# Patient Record
Sex: FEMALE | Race: WHITE | NOT HISPANIC OR LATINO | Employment: PART TIME | URBAN - METROPOLITAN AREA
[De-identification: names, ages, dates, MRNs, and addresses within clinical notes are randomized per-mention and may not be internally consistent; named-entity substitution may affect disease eponyms.]

---

## 2017-02-08 ENCOUNTER — LAB CONVERSION - ENCOUNTER (OUTPATIENT)
Dept: OTHER | Facility: OTHER | Age: 57
End: 2017-02-08

## 2017-02-08 LAB
CHOLEST SERPL-MCNC: 273 MG/DL (ref 125–200)
CHOLEST/HDLC SERPL: 5.7 (CALC)
HBA1C MFR BLD HPLC: 6.9 % OF TOTAL HGB
HDLC SERPL-MCNC: 48 MG/DL
LDL CHOLESTEROL (HISTORICAL): ABNORMAL MG/DL (CALC)
LDL CHOLESTEROL DIRECT (HISTORICAL): 139 MG/DL
NON-HDL-CHOL (CHOL-HDL) (HISTORICAL): 225 MG/DL (CALC)
TRIGL SERPL-MCNC: 459 MG/DL

## 2017-02-09 ENCOUNTER — GENERIC CONVERSION - ENCOUNTER (OUTPATIENT)
Dept: OTHER | Facility: OTHER | Age: 57
End: 2017-02-09

## 2017-02-13 ENCOUNTER — ALLSCRIPTS OFFICE VISIT (OUTPATIENT)
Dept: OTHER | Facility: OTHER | Age: 57
End: 2017-02-13

## 2017-02-13 DIAGNOSIS — L40.9 PSORIASIS: ICD-10-CM

## 2017-02-13 DIAGNOSIS — I10 ESSENTIAL (PRIMARY) HYPERTENSION: ICD-10-CM

## 2017-02-13 DIAGNOSIS — E78.5 HYPERLIPIDEMIA: ICD-10-CM

## 2017-02-13 DIAGNOSIS — E11.29 TYPE 2 DIABETES MELLITUS WITH OTHER DIABETIC KIDNEY COMPLICATION (HCC): ICD-10-CM

## 2017-02-13 DIAGNOSIS — R80.9 PROTEINURIA: ICD-10-CM

## 2017-02-13 DIAGNOSIS — G37.9 DEMYELINATING DISEASE OF CENTRAL NERVOUS SYSTEM (HCC): ICD-10-CM

## 2017-02-13 DIAGNOSIS — D64.9 ANEMIA: ICD-10-CM

## 2017-02-14 ENCOUNTER — LAB CONVERSION - ENCOUNTER (OUTPATIENT)
Dept: OTHER | Facility: OTHER | Age: 57
End: 2017-02-14

## 2017-02-14 LAB
CREATININE, RANDOM URINE (HISTORICAL): 230 MG/DL (ref 20–320)
MAGNESIUM, UR (HISTORICAL): 13 MG/DL
MICROALBUMIN/CREATININE RATIO (HISTORICAL): 57 MCG/MG CREAT

## 2017-03-30 ENCOUNTER — GENERIC CONVERSION - ENCOUNTER (OUTPATIENT)
Dept: OTHER | Facility: OTHER | Age: 57
End: 2017-03-30

## 2017-07-27 ENCOUNTER — GENERIC CONVERSION - ENCOUNTER (OUTPATIENT)
Dept: OTHER | Facility: OTHER | Age: 57
End: 2017-07-27

## 2017-08-13 DIAGNOSIS — E55.9 VITAMIN D DEFICIENCY: ICD-10-CM

## 2017-08-13 DIAGNOSIS — R80.9 PROTEINURIA: ICD-10-CM

## 2017-08-13 DIAGNOSIS — G37.9 DEMYELINATING DISEASE OF CENTRAL NERVOUS SYSTEM (HCC): ICD-10-CM

## 2017-08-13 DIAGNOSIS — E78.5 HYPERLIPIDEMIA: ICD-10-CM

## 2017-08-13 DIAGNOSIS — L40.9 PSORIASIS: ICD-10-CM

## 2017-08-13 DIAGNOSIS — C44.91 BASAL CELL CARCINOMA OF SKIN: ICD-10-CM

## 2017-08-13 DIAGNOSIS — D64.9 ANEMIA: ICD-10-CM

## 2017-08-13 DIAGNOSIS — E11.29 TYPE 2 DIABETES MELLITUS WITH OTHER DIABETIC KIDNEY COMPLICATION (HCC): ICD-10-CM

## 2017-08-13 DIAGNOSIS — I10 ESSENTIAL (PRIMARY) HYPERTENSION: ICD-10-CM

## 2017-11-30 ENCOUNTER — LAB CONVERSION - ENCOUNTER (OUTPATIENT)
Dept: OTHER | Facility: OTHER | Age: 57
End: 2017-11-30

## 2017-11-30 LAB
25(OH)D3 SERPL-MCNC: 34 NG/ML (ref 30–100)
A/G RATIO (HISTORICAL): 1.5 (CALC) (ref 1–2.5)
ALBUMIN SERPL BCP-MCNC: 4.4 G/DL (ref 3.6–5.1)
ALP SERPL-CCNC: 68 U/L (ref 33–130)
ALT SERPL W P-5'-P-CCNC: 46 U/L (ref 6–29)
AST SERPL W P-5'-P-CCNC: 42 U/L (ref 10–35)
BILIRUB SERPL-MCNC: 1 MG/DL (ref 0.2–1.2)
BUN SERPL-MCNC: 15 MG/DL (ref 7–25)
BUN/CREA RATIO (HISTORICAL): ABNORMAL (CALC) (ref 6–22)
CALCIUM SERPL-MCNC: 9.7 MG/DL (ref 8.6–10.4)
CHLORIDE SERPL-SCNC: 100 MMOL/L (ref 98–110)
CHOLEST SERPL-MCNC: 274 MG/DL
CHOLEST/HDLC SERPL: 6.7 (CALC)
CO2 SERPL-SCNC: 22 MMOL/L (ref 20–31)
CREAT SERPL-MCNC: 0.8 MG/DL (ref 0.5–1.05)
EGFR AFRICAN AMERICAN (HISTORICAL): 94 ML/MIN/1.73M2
EGFR-AMERICAN CALC (HISTORICAL): 81 ML/MIN/1.73M2
GAMMA GLOBULIN (HISTORICAL): 3 G/DL (CALC) (ref 1.9–3.7)
GLUCOSE (HISTORICAL): 193 MG/DL (ref 65–99)
HBA1C MFR BLD HPLC: 7.3 % OF TOTAL HGB
HDLC SERPL-MCNC: 41 MG/DL
LDL CHOLESTEROL DIRECT (HISTORICAL): 137 MG/DL
NON-HDL-CHOL (CHOL-HDL) (HISTORICAL): 233 MG/DL (CALC)
POTASSIUM SERPL-SCNC: 4.5 MMOL/L (ref 3.5–5.3)
SODIUM SERPL-SCNC: 132 MMOL/L (ref 135–146)
TOTAL PROTEIN (HISTORICAL): 7.4 G/DL (ref 6.1–8.1)
TRIGL SERPL-MCNC: 419 MG/DL

## 2017-12-26 ENCOUNTER — ALLSCRIPTS OFFICE VISIT (OUTPATIENT)
Dept: OTHER | Facility: OTHER | Age: 57
End: 2017-12-26

## 2018-01-13 VITALS
BODY MASS INDEX: 28.77 KG/M2 | HEART RATE: 84 BPM | DIASTOLIC BLOOD PRESSURE: 82 MMHG | SYSTOLIC BLOOD PRESSURE: 124 MMHG | HEIGHT: 66 IN | WEIGHT: 179 LBS

## 2018-01-13 NOTE — RESULT NOTES
Message   Please call the patient let him know that I do have her blood work results  Her hemoglobin A1c has elevated to 7 6 after fasting sugar was 162 and therefore I would like to add Januvia 100 mg once daily  She also needs to restart her fenofibrate 145 once daily as her triglycerides are 566 and her LDL is uncomfortable due to this elevation in her triglycerides  I have ordered a repeat blood work for 3 months from now and she should make an appointment one week after her blood work to review  Verified Results  (1) COMPREHENSIVE METABOLIC PANEL [id number] 31:02MP Juleen Shield     Test Name Result Flag Reference   GLUCOSE 162 mg/dL H 65-99   Fasting reference interval   UREA NITROGEN (BUN) 17 mg/dL  7-25   CREATININE 0 92 mg/dL  0 50-1 05   For patients >52years of age, the reference limit  for Creatinine is approximately 13% higher for people  identified as -American  eGFR NON-AFR   AMERICAN 69 mL/min/1 73m2  > OR = 60   eGFR AFRICAN AMERICAN 80 mL/min/1 73m2  > OR = 60   BUN/CREATININE RATIO   4-01   NOT APPLICABLE (calc)   SODIUM 133 mmol/L L 135-146   POTASSIUM 4 6 mmol/L  3 5-5 3   CHLORIDE 100 mmol/L     CARBON DIOXIDE 23 mmol/L  20-31   CALCIUM 9 8 mg/dL  8 6-10 4   PROTEIN, TOTAL 8 0 g/dL  6 1-8 1   ALBUMIN 4 6 g/dL  3 6-5 1   GLOBULIN 3 4 g/dL (calc)  1 9-3 7   ALBUMIN/GLOBULIN RATIO 1 4 (calc)  1 0-2 5   BILIRUBIN, TOTAL 1 2 mg/dL  0 2-1 2   ALKALINE PHOSPHATASE 89 U/L     AST 58 U/L H 10-35   ALT 63 U/L H 6-29     (1) CBC/PLT/DIFF [id number] 10:00AM Juleen Shield     Test Name Result Flag Reference   WHITE BLOOD CELL COUNT 4 3 Thousand/uL  3 8-10 8   RED BLOOD CELL COUNT 3 93 Million/uL  3 80-5 10   HEMOGLOBIN 12 3 g/dL  11 7-15 5   HEMATOCRIT 36 9 %  35 0-45 0   MCV 93 8 fL  80 0-100 0   MCH 31 3 pg  27 0-33 0   MCHC 33 4 g/dL  32 0-36 0   RDW 13 2 %  11 0-15 0   PLATELET COUNT 253 Thousand/uL  140-400   MPV 7 5 fL  7 5-11 5   ABSOLUTE NEUTROPHILS 1883 cells/uL 9837-6647   ABSOLUTE LYMPHOCYTES 1462 cells/uL  850-3900   ABSOLUTE MONOCYTES 628 cells/uL  200-950   ABSOLUTE EOSINOPHILS 292 cells/uL     ABSOLUTE BASOPHILS 34 cells/uL  0-200   NEUTROPHILS 43 8 %     LYMPHOCYTES 34 0 %     MONOCYTES 14 6 %     EOSINOPHILS 6 8 %     BASOPHILS 0 8 %       (Q) LIPID PANEL WITH REFLEX TO DIRECT LDL [id number] 10:00AM [facility] [employer]     Test Name Result Flag Reference   CHOLESTEROL, TOTAL 292 mg/dL H 125-200   HDL CHOLESTEROL 44 mg/dL L > OR = 46   TRIGLICERIDES 804 mg/dL H <150   LDL-CHOLESTEROL  mg/dL (calc)  <130   LDL cholesterol not calculated  Triglyceride levels  greater than 400 mg/dL invalidate calculated LDL results  Desirable range <100 mg/dL for patients with CHD or  diabetes and <70 mg/dL for diabetic patients with  known heart disease  CHOL/HDLC RATIO 6 6 (calc) H < OR = 5 0   NON HDL CHOLESTEROL 248 mg/dL (calc) H    Target for non-HDL cholesterol is 30 mg/dL higher than   LDL cholesterol target  DIRECT  mg/dL  <130   Desirable range <100 mg/dL for patients with CHD or  diabetes and <70 mg/dL for diabetic patients with  known heart disease  (Q) HEMOGLOBIN A1c [id number] 10:00AM Shereen Servando   REPORT COMMENT:  FASTING:YES     Test Name Result Flag Reference   HEMOGLOBIN A1c 7 6 % of total Hgb H <5 7   According to ADA guidelines, hemoglobin A1c <7 0%  represents optimal control in non-pregnant diabetic  patients  Different metrics may apply to specific  patient populations  Standards of Medical Care in    Diabetes Care  2013;36:s11-s66     For the purpose of screening for the presence of  diabetes  <5 7%       Consistent with the absence of diabetes  5 7-6 4%    Consistent with increased risk for diabetes              (prediabetes)  >or=6 5%    Consistent with diabetes     This assay result is consistent with diabetes  mellitus       Currently, no consensus exists for use of hemoglobin  A1c for diagnosis of diabetes for children  Plan  Hyperlipidemia, Type 2 diabetes mellitus with renal manifestations, controlled    · Follow-up visit in 3 months Evaluation and Treatment  Follow-up  Status: Hold For -  Scheduling  Requested for: [id number]   · (1) COMPREHENSIVE METABOLIC PANEL; Status:Active; Requested for:26Nov2016;    · (1) HEMOGLOBIN A1C; Status:Active; Requested for:26Nov2016;    · (1) LIPID PANEL FASTING W DIRECT LDL REFLEX; Status:Active; Requested  for:26Nov2016;     Signatures   Electronically signed by : Declan Argueta, [facility];  Aug 26 2016 12:12PM EST                       (Author)

## 2018-01-15 NOTE — MISCELLANEOUS
Message  Message Free Text Note Form: Diagnosed with sinus infection and been feeling nauseated for 2 days  Has to fly to  tomorrow and requesting med for nausea  She has used Zofran in the past with relief  Plan    1  Ondansetron 4 MG Oral Tablet Dispersible; 1 tab po q8 hours prn nausea    Discussion/Summary  Discussion Summary:   Nausea with sinusitis  Has to fly tomorrow  Rx: Zofran 4 mg q8 hour prn nausea #10 R0        Signatures   Electronically signed by : TOMAS Horner ; Aug 31 2016  8:48PM EST                       (Author)

## 2018-01-22 VITALS
HEIGHT: 66 IN | WEIGHT: 183.38 LBS | SYSTOLIC BLOOD PRESSURE: 124 MMHG | HEART RATE: 80 BPM | DIASTOLIC BLOOD PRESSURE: 80 MMHG | BODY MASS INDEX: 29.47 KG/M2

## 2018-01-29 ENCOUNTER — TELEPHONE (OUTPATIENT)
Dept: FAMILY MEDICINE CLINIC | Facility: CLINIC | Age: 58
End: 2018-01-29

## 2018-01-29 DIAGNOSIS — I10 ESSENTIAL HYPERTENSION: Primary | ICD-10-CM

## 2018-01-29 RX ORDER — LISINOPRIL 10 MG/1
1 TABLET ORAL DAILY
COMMUNITY
Start: 2011-11-15 | End: 2018-01-29 | Stop reason: SDUPTHER

## 2018-01-29 RX ORDER — LISINOPRIL 10 MG/1
10 TABLET ORAL DAILY
Qty: 30 TABLET | Refills: 11 | Status: SHIPPED | OUTPATIENT
Start: 2018-01-29 | End: 2019-02-19 | Stop reason: SDUPTHER

## 2018-01-29 RX ORDER — LISINOPRIL 10 MG/1
10 TABLET ORAL DAILY
Qty: 30 TABLET | Refills: 5 | Status: CANCELLED | OUTPATIENT
Start: 2018-01-29

## 2018-01-29 NOTE — TELEPHONE ENCOUNTER
Patient called in and said that her pharmacy had faxed over a refill script for lisinopril but they have not received anything back   She needs a refill sent to [address] on [facility]  Thank you

## 2018-02-06 ENCOUNTER — OFFICE VISIT (OUTPATIENT)
Dept: FAMILY MEDICINE CLINIC | Facility: CLINIC | Age: 58
End: 2018-02-06

## 2018-02-06 VITALS
RESPIRATION RATE: 16 BRPM | BODY MASS INDEX: 29.09 KG/M2 | HEIGHT: 66 IN | TEMPERATURE: 97.3 F | WEIGHT: 181 LBS | HEART RATE: 72 BPM | DIASTOLIC BLOOD PRESSURE: 76 MMHG | SYSTOLIC BLOOD PRESSURE: 122 MMHG

## 2018-02-06 DIAGNOSIS — J01.00 ACUTE NON-RECURRENT MAXILLARY SINUSITIS: Primary | ICD-10-CM

## 2018-02-06 PROBLEM — J01.90 ACUTE NON-RECURRENT SINUSITIS: Status: ACTIVE | Noted: 2018-02-06

## 2018-02-06 PROCEDURE — 99212 OFFICE O/P EST SF 10 MIN: CPT | Performed by: PHYSICIAN ASSISTANT

## 2018-02-06 RX ORDER — CEFUROXIME AXETIL 500 MG/1
500 TABLET ORAL EVERY 12 HOURS SCHEDULED
Qty: 20 TABLET | Refills: 0 | Status: SHIPPED | OUTPATIENT
Start: 2018-02-06 | End: 2018-02-16

## 2018-02-06 RX ORDER — CHLORAL HYDRATE 500 MG
2 CAPSULE ORAL DAILY
COMMUNITY
Start: 2017-12-26

## 2018-02-06 NOTE — PATIENT INSTRUCTIONS
Acute non-recurrent maxillary sinusitis  Patient was prescribed Ceftin 500 mg 1 twice daily for 10 days  Increase fluids and continue her decongestant expectorant combination as directed

## 2018-02-06 NOTE — PROGRESS NOTES
I have reviewed the physician assistant notes, assessments, and/or procedures performed, I concur with the documentation on Noemi Sandhoff

## 2018-02-06 NOTE — ASSESSMENT & PLAN NOTE
Patient was prescribed Ceftin 500 mg 1 twice daily for 10 days  Increase fluids and continue her decongestant expectorant combination as directed

## 2018-02-06 NOTE — PROGRESS NOTES
Assessment/Plan:    Acute non-recurrent maxillary sinusitis  Patient was prescribed Ceftin 500 mg 1 twice daily for 10 days  Increase fluids and continue her decongestant expectorant combination as directed  Diagnoses and all orders for this visit:    Acute non-recurrent maxillary sinusitis  -     cefuroxime (CEFTIN) 500 mg tablet; Take 1 tablet (500 mg total) by mouth every 12 (twelve) hours for 10 days    Other orders  -     metFORMIN (GLUCOPHAGE) 1000 MG tablet; Take by mouth  -     Cholecalciferol (VITAMIN D3) 1000 UNIT/SPRAY LIQD; Take by mouth  -     Multiple Vitamin (MULTI-VITAMIN DAILY PO); Take 1 tablet by mouth daily  -     Omega-3 Fatty Acids (FISH OIL) 1,000 mg; Take 2 capsules by mouth daily  -     Cancel: POCT urine dip          Subjective:      Patient ID: Lori Limon is a 59 y o  female  Cc: congestion, cough, post nasal drip, headache, sinus pressure x 5 days  R Sotero    Pt has been sick and is afraid she needs to get better since she will have her two grandchildren as her daughter is going into one month rehab  URI    This is a new problem  The current episode started in the past 7 days  The problem has been gradually worsening  There has been no fever  Associated symptoms include congestion, coughing, diarrhea, headaches, a plugged ear sensation, rhinorrhea, sinus pain and a sore throat  Treatments tried: decongestants, expectorants  The treatment provided no relief  The following portions of the patient's history were reviewed and updated as appropriate: allergies, current medications, past family history, past medical history, past social history, past surgical history and problem list     Review of Systems   Constitutional: Negative  HENT: Positive for congestion, rhinorrhea, sinus pain and sore throat  Eyes: Negative  Respiratory: Positive for cough  Cardiovascular: Negative  Gastrointestinal: Positive for diarrhea  Endocrine: Negative  Genitourinary: Negative  Musculoskeletal: Negative  Skin: Negative  Allergic/Immunologic: Negative  Neurological: Positive for headaches  Hematological: Negative  Psychiatric/Behavioral: Negative  Objective:  Vitals:    02/06/18 1544   BP: 122/76   BP Location: Left arm   Patient Position: Sitting   Cuff Size: Standard   Pulse: 72   Resp: 16   Temp: (!) 97 3 °F (36 3 °C)   TempSrc: Tympanic   Weight: 82 1 kg (181 lb)   Height: 5' 6" (1 676 m)        Physical Exam   Constitutional: She is oriented to person, place, and time  She appears well-developed and well-nourished  HENT:   Head: Normocephalic and atraumatic  Right Ear: Hearing, external ear and ear canal normal  A middle ear effusion is present  Left Ear: Hearing, tympanic membrane, external ear and ear canal normal    Nose: Mucosal edema and rhinorrhea present  Mouth/Throat: Posterior oropharyngeal edema and posterior oropharyngeal erythema present  No oropharyngeal exudate  Eyes: Conjunctivae are normal  Right eye exhibits no discharge  Left eye exhibits no discharge  Neck: Neck supple  Cardiovascular: Normal rate, regular rhythm and normal heart sounds  Exam reveals no gallop and no friction rub  No murmur heard  Pulmonary/Chest: Effort normal and breath sounds normal  No respiratory distress  She has no wheezes  She has no rales  Lymphadenopathy:     She has cervical adenopathy  Right cervical: Superficial cervical adenopathy present  Left cervical: Superficial cervical adenopathy present  Neurological: She is alert and oriented to person, place, and time  Skin: Skin is warm and dry  Psychiatric: She has a normal mood and affect  Judgment normal    Nursing note and vitals reviewed

## 2018-03-08 ENCOUNTER — TRANSITIONAL CARE MANAGEMENT (OUTPATIENT)
Dept: FAMILY MEDICINE CLINIC | Facility: CLINIC | Age: 58
End: 2018-03-08

## 2018-03-13 PROBLEM — N81.89 PELVIC FLOOR WEAKNESS IN FEMALE: Status: ACTIVE | Noted: 2017-12-01

## 2018-03-13 PROBLEM — M17.12 LEFT KNEE DJD: Status: ACTIVE | Noted: 2017-03-08

## 2018-03-13 PROBLEM — K76.0 FATTY INFILTRATION OF LIVER: Status: ACTIVE | Noted: 2017-12-26

## 2018-03-13 PROBLEM — N39.46 MIXED INCONTINENCE: Status: ACTIVE | Noted: 2017-12-01

## 2018-03-13 PROBLEM — C44.91 BASAL CELL CARCINOMA: Status: ACTIVE | Noted: 2017-02-13

## 2018-03-13 PROBLEM — E11.9 DIABETES MELLITUS (HCC): Status: ACTIVE | Noted: 2018-03-13

## 2018-03-14 ENCOUNTER — OFFICE VISIT (OUTPATIENT)
Dept: FAMILY MEDICINE CLINIC | Facility: CLINIC | Age: 58
End: 2018-03-14

## 2018-03-14 ENCOUNTER — TRANSITIONAL CARE MANAGEMENT (OUTPATIENT)
Dept: FAMILY MEDICINE CLINIC | Facility: CLINIC | Age: 58
End: 2018-03-14

## 2018-03-14 VITALS
DIASTOLIC BLOOD PRESSURE: 78 MMHG | HEART RATE: 84 BPM | BODY MASS INDEX: 28.12 KG/M2 | WEIGHT: 174.2 LBS | SYSTOLIC BLOOD PRESSURE: 126 MMHG

## 2018-03-14 DIAGNOSIS — E78.2 MIXED HYPERLIPIDEMIA: ICD-10-CM

## 2018-03-14 DIAGNOSIS — E11.8 TYPE 2 DIABETES MELLITUS WITH COMPLICATION, WITHOUT LONG-TERM CURRENT USE OF INSULIN (HCC): ICD-10-CM

## 2018-03-14 DIAGNOSIS — E34.8 PINEAL GLAND CYST: ICD-10-CM

## 2018-03-14 DIAGNOSIS — E78.5 HYPERLIPIDEMIA, UNSPECIFIED HYPERLIPIDEMIA TYPE: Primary | ICD-10-CM

## 2018-03-14 DIAGNOSIS — Q21.1 PFO (PATENT FORAMEN OVALE): ICD-10-CM

## 2018-03-14 DIAGNOSIS — I10 ESSENTIAL HYPERTENSION: ICD-10-CM

## 2018-03-14 DIAGNOSIS — I63.531 CEREBROVASCULAR ACCIDENT (CVA) DUE TO OCCLUSION OF RIGHT POSTERIOR CEREBRAL ARTERY (HCC): ICD-10-CM

## 2018-03-14 PROBLEM — I63.9 CEREBROVASCULAR ACCIDENT (CVA) DUE TO VASCULAR OCCLUSION (HCC): Status: ACTIVE | Noted: 2018-03-14

## 2018-03-14 PROBLEM — Q21.12 PFO (PATENT FORAMEN OVALE): Status: ACTIVE | Noted: 2018-03-14

## 2018-03-14 PROCEDURE — 99212 OFFICE O/P EST SF 10 MIN: CPT | Performed by: PHYSICIAN ASSISTANT

## 2018-03-14 RX ORDER — GLIMEPIRIDE 2 MG/1
TABLET ORAL
Qty: 30 TABLET | Refills: 11
Start: 2018-03-14 | End: 2018-03-14 | Stop reason: SDUPTHER

## 2018-03-14 RX ORDER — GLIMEPIRIDE 2 MG/1
2 TABLET ORAL
COMMUNITY
Start: 2018-03-08 | End: 2018-03-14 | Stop reason: SDUPTHER

## 2018-03-14 RX ORDER — ASPIRIN 81 MG/1
81 TABLET, CHEWABLE ORAL
COMMUNITY
Start: 2018-03-08 | End: 2020-05-20

## 2018-03-14 RX ORDER — METHOCARBAMOL 500 MG/1
500 TABLET, FILM COATED ORAL
COMMUNITY
Start: 2018-03-07 | End: 2018-12-11 | Stop reason: ALTCHOICE

## 2018-03-14 RX ORDER — GLIMEPIRIDE 2 MG/1
TABLET ORAL
Qty: 30 TABLET | Refills: 5 | Status: SHIPPED | OUTPATIENT
Start: 2018-03-14 | End: 2018-10-16 | Stop reason: SDUPTHER

## 2018-03-14 NOTE — PROGRESS NOTES
Assessment/Plan:     Type 2 diabetes mellitus with complication, without long-term current use of insulin ([address] )  And recent hospitalization A1c was 8  Last check here in the office A1c was 7 3  At this time upon discharge patient was started on Amaryl 2 mg daily however she is experiencing hypoglycemia shortly after this a m  dose even though she eats breakfast   We will change to half am are all with breakfast and dinner as a trial  Pt is to call me in one week with her numbers and will change if needed over the phone  Check CMP and A1C in 3 months  Pineal gland cyst  Per recent CT MRI imaging at the hospital there has been no change in size since 2015  Cerebrovascular accident (CVA) due to vascular occlusion [facility])  Patient is status post recent hospitalization at [facility]   She is to follow up with Neurology and has the number to call and continue aspirin 81 mg once daily  She is also following up with Cardiology  Some residua L arm heaviness and tiredness  PFO (patent foramen ovale)  This was found during trans esophageal echo during hospitalization  She is following up with Cardiology on 3/22  Currently on holter monitor       Essential hypertension  Stable  Mixed hyperlipidemia  Blood work to be Re ordered in 3 months  Diagnoses and all orders for this visit:    Hyperlipidemia, unspecified hyperlipidemia type  -     Lipid Panel with Direct LDL reflex; Future    Essential hypertension    Cerebrovascular accident (CVA) due to occlusion of right posterior cerebral artery (HCC)    Type 2 diabetes mellitus with complication, without long-term current use of insulin ([facility])  -     metFORMIN (GLUCOPHAGE) 1000 MG tablet; Take 1 tablet (1,000 mg total) by mouth 2 (two) times a day with meals  -     Discontinue: glimepiride (AMARYL) 2 mg tablet; 1/2 tab BID with meals   -     glimepiride (AMARYL) 2 mg tablet; 1/2 tab BID with meals  -     Comprehensive metabolic panel;  Future  - HEMOGLOBIN A1C W/ EAG ESTIMATION; Future    Pineal gland cyst    PFO (patent foramen ovale)    Mixed hyperlipidemia    Other orders  -     aspirin 81 mg chewable tablet; Chew 81 mg  -     Discontinue: glimepiride (AMARYL) 2 mg tablet; Take 2 mg by mouth  -     methocarbamol (ROBAXIN) 500 mg tablet; Take 500 mg by mouth  -     Probiotic Product (PROBIOTIC-10 PO); Take by mouth         Subjective:  CC: Follow up to hospitalization at [facility] from 3/1/18-3/7/19 for a stroke  Pt  Had a Holter monitor placed after d/c and is following up with cardiology  [facility]       Patient ID: Manpreet Espinoza is a 59 y o  female  Patient here today having no insurance for [facility] visit  On the 1st of March she woke up with severe headache nausea vomiting and spend the day trying to treat the symptoms but by midnight of this day she asked her  to take her to the emergency room  At 1st they thought it was a complex migraine having a lumbar puncture and CT within normal limits but because the headache was intensifying the patient was getting left arm discomfort it is sided to do an MRA MRI which did show a right acute subacute upset the lobe infarction which actually did worsen throughout her duration of hospitalization with a repeat image  There uncertain she was started on aspirin 81 mg but Neurology and Cardiology was consulted  She had a trans esophageal echo which did show a small POF  She was placed on Holter monitor as of yesterday and is supposed to have that for 1 month and then Re visit with Cardiology because before they consider surgery to close this they want to make sure she does not have paroxysmal atrial fibrillation  She has the name of an neurologist call and set up a follow-up appointment post told she did not need to do this for 1 month  Blood work in the hospital showed A1c did elevate from 7 3-8 since her last check here    She was started upon discharge on Amaryl 2 mg at bedtime however her sugars in the morning when she is fasting are 170-211 and then dropped to the 80s to 90s after her Amaryl dose with metformin  Overall she is without headache nausea vomiting however still is very fatigued and has intermittent left arm sensations that are discomforting  She does have a follow-up appoint with Cardiology on the 22nd of this month  Review of Systems   Constitutional: Positive for fatigue  HENT: Negative  Eyes: Negative  Respiratory: Negative  Cardiovascular: Negative  Gastrointestinal: Negative  Endocrine: Negative  Genitourinary: Negative  Musculoskeletal: Negative  Skin: Negative  Allergic/Immunologic: Negative  Neurological: Negative  Still with intermittent L arm discomfort  Hematological: Negative  Psychiatric/Behavioral: Negative  Objective:     Physical Exam   Constitutional: She is oriented to person, place, and time  She appears well-developed and well-nourished  No distress  HENT:   Head: Normocephalic and atraumatic  Eyes: Conjunctivae are normal  Right eye exhibits no discharge  Left eye exhibits no discharge  Neck: Neck supple  Carotid bruit is not present  Cardiovascular: Normal rate, regular rhythm and normal heart sounds  Exam reveals no gallop and no friction rub  No murmur heard  Pulmonary/Chest: Effort normal and breath sounds normal  No respiratory distress  She has no wheezes  She has no rales  Neurological: She is alert and oriented to person, place, and time  Skin: Skin is warm and dry  She is not diaphoretic  Psychiatric: She has a normal mood and affect  Judgment normal    Nursing note and vitals reviewed  Vitals:    03/14/18 0925   BP: 126/78   BP Location: Left arm   Patient Position: Sitting   Pulse: 84   Weight: 79 kg (174 lb 3 2 oz)       Transitional Care Management Review:  Fariba Seay is a 59 y o  female here for TCM follow up       During the TCM phone call patient stated:    Date and time hospital follow up call was made:  3/8/2018  3:29 PM  Hospital care reviewed:  Records not available  Patient was hopsitalized at:  [address] [facility]  Date of admission:  3/1/18  Date of discharge:  3/7/18  Diagnosis:  stroke  Were the patients medicaitons reviewed and updated:  No  Scheduled for follow up?:  Yes  I have advised the patient to call PCP with any new or worsening symptoms (please type in name along with any credentials):  pt called for hospital follow up appt               Sheila Love PA-C

## 2018-03-14 NOTE — ASSESSMENT & PLAN NOTE
And recent hospitalization A1c was 8  Last check here in the office A1c was 7 3  At this time upon discharge patient was started on Amaryl 2 mg daily however she is experiencing hypoglycemia shortly after this a m  dose even though she eats breakfast   We will change to half am are all with breakfast and dinner as a trial  Pt is to call me in one week with her numbers and will change if needed over the phone  Check CMP and A1C in 3 months

## 2018-03-14 NOTE — ASSESSMENT & PLAN NOTE
This was found during trans esophageal echo during hospitalization  She is following up with Cardiology on 3/22  Currently on holter monitor  Marco Clemens

## 2018-03-14 NOTE — ASSESSMENT & PLAN NOTE
Patient is status post recent hospitalization at [facility]   She is to follow up with Neurology and has the number to call and continue aspirin 81 mg once daily  She is also following up with Cardiology  Some residua L arm heaviness and tiredness

## 2018-04-05 LAB
LEFT EYE DIABETIC RETINOPATHY: NORMAL
RIGHT EYE DIABETIC RETINOPATHY: NORMAL
SEVERITY (EYE EXAM): NORMAL

## 2018-04-23 DIAGNOSIS — E11.8 TYPE 2 DIABETES MELLITUS WITH COMPLICATION, WITHOUT LONG-TERM CURRENT USE OF INSULIN (HCC): ICD-10-CM

## 2018-04-26 DIAGNOSIS — R80.9 PROTEINURIA: ICD-10-CM

## 2018-04-26 DIAGNOSIS — E78.5 HYPERLIPIDEMIA: ICD-10-CM

## 2018-04-26 DIAGNOSIS — E11.29 TYPE 2 DIABETES MELLITUS WITH OTHER DIABETIC KIDNEY COMPLICATION (CODE): ICD-10-CM

## 2018-04-26 DIAGNOSIS — E55.9 VITAMIN D DEFICIENCY: ICD-10-CM

## 2018-04-26 DIAGNOSIS — I10 ESSENTIAL (PRIMARY) HYPERTENSION: ICD-10-CM

## 2018-08-02 DIAGNOSIS — E11.8 TYPE 2 DIABETES MELLITUS WITH COMPLICATION, WITHOUT LONG-TERM CURRENT USE OF INSULIN (HCC): ICD-10-CM

## 2018-08-07 ENCOUNTER — OFFICE VISIT (OUTPATIENT)
Dept: FAMILY MEDICINE CLINIC | Facility: CLINIC | Age: 58
End: 2018-08-07

## 2018-08-07 VITALS
TEMPERATURE: 98.2 F | SYSTOLIC BLOOD PRESSURE: 128 MMHG | BODY MASS INDEX: 28.79 KG/M2 | WEIGHT: 178.4 LBS | DIASTOLIC BLOOD PRESSURE: 92 MMHG | HEART RATE: 100 BPM

## 2018-08-07 DIAGNOSIS — N89.8 LEUKORRHEA: ICD-10-CM

## 2018-08-07 DIAGNOSIS — R10.84 GENERALIZED ABDOMINAL PAIN: Primary | ICD-10-CM

## 2018-08-07 DIAGNOSIS — K21.9 GERD WITHOUT ESOPHAGITIS: ICD-10-CM

## 2018-08-07 DIAGNOSIS — R53.83 FATIGUE, UNSPECIFIED TYPE: ICD-10-CM

## 2018-08-07 DIAGNOSIS — R53.83 OTHER FATIGUE: ICD-10-CM

## 2018-08-07 PROBLEM — N95.2 VAGINAL ATROPHY: Status: ACTIVE | Noted: 2017-12-01

## 2018-08-07 PROBLEM — N36.8 SUBURETHRAL CYST: Status: ACTIVE | Noted: 2017-12-01

## 2018-08-07 PROBLEM — R17 TOTAL BILIRUBIN, ELEVATED: Status: ACTIVE | Noted: 2018-03-01

## 2018-08-07 PROBLEM — E78.1 HYPERTRIGLYCERIDEMIA: Status: ACTIVE | Noted: 2018-03-01

## 2018-08-07 PROBLEM — G43.909 MIGRAINE: Status: ACTIVE | Noted: 2018-03-01

## 2018-08-07 PROBLEM — K42.9 UMBILICAL HERNIA: Status: ACTIVE | Noted: 2017-12-01

## 2018-08-07 PROBLEM — R79.89 LOW VITAMIN D LEVEL: Status: ACTIVE | Noted: 2017-02-13

## 2018-08-07 LAB
SL AMB  POCT GLUCOSE, UA: 100
SL AMB LEUKOCYTE ESTERASE,UA: NORMAL
SL AMB POCT BILIRUBIN,UA: NEGATIVE
SL AMB POCT BLOOD,UA: NEGATIVE
SL AMB POCT CLARITY,UA: CLEAR
SL AMB POCT COLOR,UA: YELLOW
SL AMB POCT HGB: 12.7
SL AMB POCT KETONES,UA: NEGATIVE
SL AMB POCT NITRITE,UA: NEGATIVE
SL AMB POCT PH,UA: 5.5
SL AMB POCT SPECIFIC GRAVITY,UA: 1.01
SL AMB POCT URINE PROTEIN: NEGATIVE
SL AMB POCT UROBILINOGEN: 0.2

## 2018-08-07 PROCEDURE — 87086 URINE CULTURE/COLONY COUNT: CPT | Performed by: PHYSICIAN ASSISTANT

## 2018-08-07 PROCEDURE — 85018 HEMOGLOBIN: CPT | Performed by: PHYSICIAN ASSISTANT

## 2018-08-07 PROCEDURE — 99214 OFFICE O/P EST MOD 30 MIN: CPT | Performed by: PHYSICIAN ASSISTANT

## 2018-08-07 PROCEDURE — 81003 URINALYSIS AUTO W/O SCOPE: CPT | Performed by: PHYSICIAN ASSISTANT

## 2018-08-07 RX ORDER — CIPROFLOXACIN 500 MG/1
500 TABLET, FILM COATED ORAL EVERY 12 HOURS SCHEDULED
Qty: 20 TABLET | Refills: 0 | Status: SHIPPED | OUTPATIENT
Start: 2018-08-07 | End: 2018-08-17

## 2018-08-07 RX ORDER — OMEPRAZOLE 20 MG/1
20 TABLET, DELAYED RELEASE ORAL DAILY
Qty: 30 TABLET | Refills: 1 | Status: SHIPPED | OUTPATIENT
Start: 2018-08-07 | End: 2018-12-11 | Stop reason: ALTCHOICE

## 2018-08-07 NOTE — ASSESSMENT & PLAN NOTE
Pt has been off meds for many years however I am wondering if her current symptoms are due to a resurfacing of this diagnosis and therefore I will restart PPI  Hx of GI bleed but hgb is 12 7 fingerstick today

## 2018-08-07 NOTE — ASSESSMENT & PLAN NOTE
Check CBC and CMP  Urine dip shows trace WBC so will send urine for culture  Hgb fingerstick is 12 7

## 2018-08-07 NOTE — PROGRESS NOTES
Assessment/Plan:    GERD without esophagitis  Pt has been off meds for many years however I am wondering if her current symptoms are due to a resurfacing of this diagnosis and therefore I will restart PPI  Hx of GI bleed but hgb is 12 7 fingerstick today  Generalized abdominal pain  Check CBC and CMP  Urine dip shows trace WBC so will send urine for culture  Hgb fingerstick is 12 7  Leukorrhea  Send urine for culture and treat empirically with Cipro 500 mg 1 twice daily  Increase fluids  Diagnoses and all orders for this visit:    Generalized abdominal pain  -     CBC and differential; Future  -     Comprehensive metabolic panel; Future  -     Lipase; Future  -     Amylase; Future  -     Urine culture  -     POCT urine dip auto non-scope    Fatigue, unspecified type  -     CBC and differential; Future  -     Comprehensive metabolic panel; Future  -     Lipase; Future  -     Amylase; Future  -     Urine culture  -     POCT urine dip auto non-scope  -     POCT hemoglobin fingerstick    Other fatigue    GERD without esophagitis  -     omeprazole (PriLOSEC OTC) 20 MG tablet; Take 1 tablet (20 mg total) by mouth daily    Leukorrhea  -     ciprofloxacin (CIPRO) 500 mg tablet; Take 1 tablet (500 mg total) by mouth every 12 (twelve) hours for 10 days          Subjective: Pt c/o kath in lower back,uncomfortable stomach,sore throat, random heat flashes  Patient ID: Meryle Meo is a 59 y o  female  Pt c/o Thursday started not feeling well with mid back ache, stomach ache with a gnawing sensation, belching more than usual and it burns  Pt feels run down  Trying to rest more  Feels hot all over  Supposed to go away this weekend  Short with her family  ST now since two days ago  Concerned it is plavix and ASA for her recent CVA  Hx of GERD but off meds for years  BM not darker than normal for her  Apatite fair  Eating/drinking does not bother symptoms or improve them   No other family members feeling similar symptoms  Glucose 157 this am at home  The following portions of the patient's history were reviewed and updated as appropriate: allergies, current medications, past family history, past medical history, past social history, past surgical history and problem list     Review of Systems   Constitutional: Positive for diaphoresis  HENT: Positive for sore throat  Eyes: Negative  Respiratory: Negative  Cardiovascular: Negative  Gastrointestinal: Positive for abdominal pain  Endocrine: Negative  Genitourinary: Negative  Musculoskeletal: Positive for back pain  Skin: Negative  Allergic/Immunologic: Negative  Neurological: Negative  Hematological: Negative  Psychiatric/Behavioral: Negative  Objective:      /92   Pulse 100   Temp 98 2 °F (36 8 °C)   Wt 80 9 kg (178 lb 6 4 oz)   BMI 28 79 kg/m²          Physical Exam   Constitutional: She is oriented to person, place, and time  She appears well-developed and well-nourished  No distress  HENT:   Head: Normocephalic and atraumatic  Mouth/Throat: Uvula is midline, oropharynx is clear and moist and mucous membranes are normal    Maybe a hint of PND erythema  Eyes: Conjunctivae are normal  Right eye exhibits no discharge  Left eye exhibits no discharge  Neck: Neck supple  Carotid bruit is not present  Cardiovascular: Normal rate, regular rhythm and normal heart sounds  Exam reveals no gallop and no friction rub  No murmur heard  Pulmonary/Chest: Effort normal and breath sounds normal  No respiratory distress  She has no wheezes  She has no rales  Abdominal: Normal appearance and bowel sounds are normal  She exhibits no shifting dullness, no distension, no pulsatile liver, no fluid wave, no abdominal bruit, no ascites, no pulsatile midline mass and no mass  There is no hepatosplenomegaly  There is generalized tenderness  There is no rebound and no CVA tenderness     Neurological: She is alert and oriented to person, place, and time  Skin: Skin is warm and dry  She is not diaphoretic  Psychiatric: She has a normal mood and affect  Judgment normal    Nursing note and vitals reviewed

## 2018-08-07 NOTE — PATIENT INSTRUCTIONS
Problem List Items Addressed This Visit        Digestive    GERD without esophagitis     Pt has been off meds for many years however I am wondering if her current symptoms are due to a resurfacing of this diagnosis and therefore I will restart PPI  Hx of GI bleed but hgb is 12 7 fingerstick today  Relevant Medications    omeprazole (PriLOSEC OTC) 20 MG tablet       Other    Generalized abdominal pain - Primary     Check CBC and CMP  Urine dip shows trace WBC so will send urine for culture  Hgb fingerstick is 12 7  Relevant Orders    CBC and differential    Comprehensive metabolic panel    Lipase    Amylase    Urine culture    POCT urine dip auto non-scope (Completed)    Other fatigue    Leukorrhea     Send urine for culture and treat empirically with Cipro 500 mg 1 twice daily  Increase fluids           Relevant Medications    ciprofloxacin (CIPRO) 500 mg tablet      Other Visit Diagnoses     Fatigue, unspecified type        Relevant Orders    CBC and differential    Comprehensive metabolic panel    Lipase    Amylase    Urine culture    POCT urine dip auto non-scope (Completed)    POCT hemoglobin fingerstick (Completed)

## 2018-08-09 LAB — BACTERIA UR CULT: NORMAL

## 2018-09-10 ENCOUNTER — OFFICE VISIT (OUTPATIENT)
Dept: FAMILY MEDICINE CLINIC | Facility: CLINIC | Age: 58
End: 2018-09-10

## 2018-09-10 VITALS
HEART RATE: 84 BPM | BODY MASS INDEX: 28.92 KG/M2 | WEIGHT: 179.2 LBS | SYSTOLIC BLOOD PRESSURE: 124 MMHG | DIASTOLIC BLOOD PRESSURE: 70 MMHG

## 2018-09-10 DIAGNOSIS — R10.2 PELVIC PAIN: Primary | ICD-10-CM

## 2018-09-10 PROCEDURE — 99212 OFFICE O/P EST SF 10 MIN: CPT | Performed by: PHYSICIAN ASSISTANT

## 2018-09-10 NOTE — PROGRESS NOTES
Assessment/Plan:    Pelvic pain  Exam WNL  Urine studies and labs done last month  At this point we need to order a pelvic U/S with transvaginal views  Colonoscopy WNL 2015  Diagnoses and all orders for this visit:    Pelvic pain  -     US pelvis complete w transvaginal; Future          Subjective:   CC: c/o pain and discomfort in pelvic area x 3 wks  stephen     Patient ID: Robbin Mendoza is a 59 y o  female  Pt  Was seen one month ago and is s/p antibiotic for possible UTI which ended up culturing out negative  All symptoms improved/resolved except low pelvic discomfort  States it gets better when lying down wore when lifting sitting or standing up or doing anything  This is when it feels like a pain and not just a discomfort  Always there in the background as a pressure and dull ache  But when active pain intensifies with increased pressure and throbbing  No vaginal d/c, urinary frequency, burning, fever  Appetite normal  BM have been intermittently loose but not darker, no blood or mucus  Menopause at least 10 years  Last pap/gyn Nov 21st 2017 and WNL  Pt supposed to go on vacation on plane to [place]  The following portions of the patient's history were reviewed and updated as appropriate: allergies, current medications, past family history, past medical history, past social history, past surgical history and problem list     Review of Systems   Constitutional: Negative  Negative for appetite change and fever  HENT: Negative  Eyes: Negative  Respiratory: Negative  Cardiovascular: Negative  Gastrointestinal: Positive for diarrhea  Negative for abdominal distention, abdominal pain, blood in stool, constipation, nausea and vomiting  Endocrine: Negative  Genitourinary: Positive for dyspareunia and pelvic pain  Negative for decreased urine volume, difficulty urinating, frequency, urgency, vaginal bleeding and vaginal discharge  Musculoskeletal: Negative  Skin: Negative  Allergic/Immunologic: Negative  Neurological: Negative  Hematological: Negative  Psychiatric/Behavioral: Negative  Objective:      Vitals:    09/10/18 0857   BP: 124/70   BP Location: Left arm   Patient Position: Sitting   Pulse: 84   Weight: 81 3 kg (179 lb 3 2 oz)            Physical Exam   Constitutional: She is oriented to person, place, and time  She appears well-developed and well-nourished  No distress  HENT:   Head: Normocephalic and atraumatic  Eyes: Conjunctivae are normal  Right eye exhibits no discharge  Left eye exhibits no discharge  Neck: Neck supple  Carotid bruit is not present  Cardiovascular: Normal rate, regular rhythm and normal heart sounds  Exam reveals no gallop and no friction rub  No murmur heard  Pulmonary/Chest: Effort normal and breath sounds normal  No respiratory distress  She has no wheezes  She has no rales  Abdominal: Soft  Normal appearance and bowel sounds are normal  She exhibits no shifting dullness, no distension, no pulsatile liver, no fluid wave, no abdominal bruit, no ascites, no pulsatile midline mass and no mass  There is no hepatosplenomegaly  There is generalized tenderness  There is no rebound and no CVA tenderness  No hernia  Neurological: She is alert and oriented to person, place, and time  Skin: Skin is warm and dry  She is not diaphoretic  Psychiatric: She has a normal mood and affect  Judgment normal    Nursing note and vitals reviewed

## 2018-09-10 NOTE — PATIENT INSTRUCTIONS
Problem List Items Addressed This Visit        Other    Pelvic pain - Primary     Exam WNL  Urine studies and labs done last month  At this point we need to order a pelvic U/S with transvaginal views  Colonoscopy WNL 2015            Relevant Orders    US pelvis complete w transvaginal

## 2018-09-10 NOTE — ASSESSMENT & PLAN NOTE
Exam WNL  Urine studies and labs done last month  At this point we need to order a pelvic U/S with transvaginal views  Colonoscopy WNL 2015

## 2018-09-12 ENCOUNTER — TELEPHONE (OUTPATIENT)
Dept: FAMILY MEDICINE CLINIC | Facility: CLINIC | Age: 58
End: 2018-09-12

## 2018-09-12 NOTE — TELEPHONE ENCOUNTER
Pls call pt and let her know that her pelvic U/S ws WNL  I suggest she make an apt with her Gyn for further evaluation  Thank you

## 2018-10-16 DIAGNOSIS — E11.8 TYPE 2 DIABETES MELLITUS WITH COMPLICATION, WITHOUT LONG-TERM CURRENT USE OF INSULIN (HCC): ICD-10-CM

## 2018-10-16 RX ORDER — GLIMEPIRIDE 2 MG/1
TABLET ORAL
Qty: 30 TABLET | Refills: 4 | Status: SHIPPED | OUTPATIENT
Start: 2018-10-16 | End: 2018-12-11 | Stop reason: SDUPTHER

## 2018-10-20 DIAGNOSIS — K21.9 GASTROESOPHAGEAL REFLUX DISEASE WITHOUT ESOPHAGITIS: Primary | ICD-10-CM

## 2018-10-20 RX ORDER — OMEPRAZOLE 20 MG/1
CAPSULE, DELAYED RELEASE ORAL
Qty: 30 CAPSULE | Refills: 1 | Status: SHIPPED | OUTPATIENT
Start: 2018-10-20 | End: 2018-12-11 | Stop reason: ALTCHOICE

## 2018-11-21 DIAGNOSIS — E11.8 TYPE 2 DIABETES MELLITUS WITH COMPLICATION, WITHOUT LONG-TERM CURRENT USE OF INSULIN (HCC): ICD-10-CM

## 2018-12-04 LAB
CREAT ?TM UR-SCNC: 180 UMOL/L
HBA1C MFR BLD HPLC: 6.7 %
MICROALBUMIN UR-MCNC: 38.9 MG/L (ref 0–20)
MICROALBUMIN/CREAT UR: 216.1 MG/G{CREAT}

## 2018-12-10 PROBLEM — R53.83 OTHER FATIGUE: Status: RESOLVED | Noted: 2018-08-07 | Resolved: 2018-12-10

## 2018-12-10 PROBLEM — R10.2 PELVIC PAIN: Status: RESOLVED | Noted: 2018-09-10 | Resolved: 2018-12-10

## 2018-12-10 PROBLEM — J01.00 ACUTE NON-RECURRENT MAXILLARY SINUSITIS: Status: RESOLVED | Noted: 2018-02-06 | Resolved: 2018-12-10

## 2018-12-10 PROBLEM — R10.84 GENERALIZED ABDOMINAL PAIN: Status: RESOLVED | Noted: 2018-08-07 | Resolved: 2018-12-10

## 2018-12-10 NOTE — ASSESSMENT & PLAN NOTE
Hemoglobin A1c stable at 6 7  CMP was not done so fasting sugar unavailable  Continue current medication recheck 6 months  Pt wishes to hold off on Pneumonia 23 due to no insurance

## 2018-12-10 NOTE — ASSESSMENT & PLAN NOTE
Patient intolerant to multiple cholesterol reducing medications  She is status post Cardiology evaluation and lipid Clinic evaluation  LDL is 168 with triglyceride of 474  This is obviously above goal for a diabetic patient however patient is intolerant to most medications or unable to afford others  Will at least have pt restart fenofibrate 145 mg once daily and recheck with labs

## 2018-12-11 ENCOUNTER — OFFICE VISIT (OUTPATIENT)
Dept: FAMILY MEDICINE CLINIC | Facility: CLINIC | Age: 58
End: 2018-12-11

## 2018-12-11 VITALS
DIASTOLIC BLOOD PRESSURE: 82 MMHG | SYSTOLIC BLOOD PRESSURE: 124 MMHG | BODY MASS INDEX: 28.99 KG/M2 | WEIGHT: 180.4 LBS | HEIGHT: 66 IN | HEART RATE: 80 BPM

## 2018-12-11 DIAGNOSIS — R80.9 CONTROLLED TYPE 2 DIABETES MELLITUS WITH MICROALBUMINURIA, WITHOUT LONG-TERM CURRENT USE OF INSULIN (HCC): Primary | ICD-10-CM

## 2018-12-11 DIAGNOSIS — R80.9 MICROALBUMINURIA: ICD-10-CM

## 2018-12-11 DIAGNOSIS — E78.2 MIXED HYPERLIPIDEMIA: ICD-10-CM

## 2018-12-11 DIAGNOSIS — E11.29 CONTROLLED TYPE 2 DIABETES MELLITUS WITH MICROALBUMINURIA, WITHOUT LONG-TERM CURRENT USE OF INSULIN (HCC): Primary | ICD-10-CM

## 2018-12-11 DIAGNOSIS — I10 ESSENTIAL HYPERTENSION: ICD-10-CM

## 2018-12-11 DIAGNOSIS — E11.8 TYPE 2 DIABETES MELLITUS WITH COMPLICATION, WITHOUT LONG-TERM CURRENT USE OF INSULIN (HCC): ICD-10-CM

## 2018-12-11 PROCEDURE — 99213 OFFICE O/P EST LOW 20 MIN: CPT | Performed by: PHYSICIAN ASSISTANT

## 2018-12-11 RX ORDER — GLIMEPIRIDE 2 MG/1
TABLET ORAL
Qty: 90 TABLET | Refills: 1 | Status: SHIPPED | OUTPATIENT
Start: 2018-12-11 | End: 2019-03-09 | Stop reason: SDUPTHER

## 2018-12-11 RX ORDER — FENOFIBRATE 145 MG/1
145 TABLET, COATED ORAL DAILY
Qty: 30 TABLET | Refills: 11 | Status: SHIPPED | OUTPATIENT
Start: 2018-12-11 | End: 2019-05-08

## 2018-12-11 NOTE — PATIENT INSTRUCTIONS
Problem List Items Addressed This Visit        Endocrine    Type 2 diabetes mellitus with complication, without long-term current use of insulin ([facility] [address] )     Lab Results   Component Value Date    HGBA1C 6 7 12/04/2018       No results for input(s): POCGLU in the last 72 hours  Blood Sugar Average: Last 72 hrs:           Relevant Medications    glimepiride (AMARYL) 2 mg tablet    Type 2 diabetes mellitus with renal manifestations, controlled (HCC) - Primary     Hemoglobin A1c stable at 6 7  CMP was not done so fasting sugar unavailable  Continue current medication recheck 6 months  Pt wishes to hold off on Pneumonia 23 due to no insurance  Relevant Medications    glimepiride (AMARYL) 2 mg tablet    Other Relevant Orders    Hemoglobin A1C    Comprehensive metabolic panel       Cardiovascular and Mediastinum    Essential hypertension     Stable continue current medication  Other    Mixed hyperlipidemia     Patient intolerant to multiple cholesterol reducing medications  She is status post Cardiology evaluation and lipid Clinic evaluation  LDL is 168 with triglyceride of 474  This is obviously above goal for a diabetic patient however patient is intolerant to most medications or unable to afford others  Will at least have pt restart fenofibrate 145 mg once daily and recheck with labs  Relevant Orders    Lipid Panel with Direct LDL reflex    Microalbuminuria     Continues to be slightly elevated  Continue Zestril daily  Recheck 1 year

## 2018-12-11 NOTE — PROGRESS NOTES
Assessment/Plan:    Type 2 diabetes mellitus with renal manifestations, controlled (HCC)  Hemoglobin A1c stable at 6 7  CMP was not done so fasting sugar unavailable  Continue current medication recheck 6 months  Pt wishes to hold off on Pneumonia 23 due to no insurance  Mixed hyperlipidemia  Patient intolerant to multiple cholesterol reducing medications  She is status post Cardiology evaluation and lipid Clinic evaluation  LDL is 168 with triglyceride of 474  This is obviously above goal for a diabetic patient however patient is intolerant to most medications or unable to afford others  Will at least have pt restart fenofibrate 145 mg once daily and recheck with labs  Microalbuminuria  Continues to be slightly elevated  Continue Zestril daily  Recheck 1 year  Essential hypertension  Stable continue current medication  Type 2 diabetes mellitus with complication, without long-term current use of insulin (HCC)  Lab Results   Component Value Date    HGBA1C 6 7 12/04/2018       No results for input(s): POCGLU in the last 72 hours  Blood Sugar Average: Last 72 hrs:         Diagnoses and all orders for this visit:    Controlled type 2 diabetes mellitus with microalbuminuria, without long-term current use of insulin (HCC)  -     Hemoglobin A1C; Future  -     Comprehensive metabolic panel; Future    Essential hypertension    Mixed hyperlipidemia  -     Lipid Panel with Direct LDL reflex; Future  -     fenofibrate (TRICOR) 145 mg tablet; Take 1 tablet (145 mg total) by mouth daily    Microalbuminuria    Type 2 diabetes mellitus with complication, without long-term current use of insulin (HCC)  -     glimepiride (AMARYL) 2 mg tablet; Take 1/2 tablet by mouth Twice daily with meals  Subjective:   CC: Follow up to review blood work  C/o sinus dryness and slight sore throat  stephen   Patient ID: Terri Narayanan is a 59 y o  female        Terri Narayanan is here for chronic conditions f/u including the diagnosis of Controlled type 2 diabetes mellitus with microalbuminuria, without long-term current use of insulin (hcc)  (primary encounter diagnosis)  Essential hypertension  Mixed hyperlipidemia  Microalbuminuria  Type 2 diabetes mellitus with complication, without long-term current use of insulin (hcc)   Pt  states they are taking all medications as directed without complaints or side effects  Pt  had labs done prior to today's visit which included Recent Results (from the past 672 hour(s))  -Hemoglobin A1C  Collection Time: 12/04/18 12:00 AM       Result                      Value             Ref Range           Hemoglobin A1C              6 7                              -Microalbumin / creatinine urine ratio  Collection Time: 12/04/18 12:00 AM       Result                      Value             Ref Range           EXT Creatinine Urine        180 0                                 Microalbum  ,U,Random        38 9 (A)          0 0 - 20 0 m*       EXTERNAL Microalb/Crea*     216 1                                    The following portions of the patient's history were reviewed and updated as appropriate: allergies, current medications, past family history, past medical history, past social history, past surgical history and problem list     Review of Systems   Constitutional: Negative  HENT: Negative  Eyes: Negative  Respiratory: Negative  Cardiovascular: Negative  Gastrointestinal: Negative  Endocrine: Negative  Genitourinary: Negative  Musculoskeletal: Negative  Skin: Negative  Allergic/Immunologic: Negative  Neurological: Negative  Hematological: Negative  Psychiatric/Behavioral: Negative  Objective:      Vitals:    12/11/18 1421   BP: 124/82   BP Location: Left arm   Patient Position: Sitting   Pulse: 80   Weight: 81 8 kg (180 lb 6 4 oz)   Height: 5' 5 75" (1 67 m)            Physical Exam   Constitutional: She is oriented to person, place, and time  She appears well-developed and well-nourished  No distress  HENT:   Head: Normocephalic and atraumatic  Eyes: Conjunctivae are normal  Right eye exhibits no discharge  Left eye exhibits no discharge  Neck: Neck supple  Carotid bruit is not present  Cardiovascular: Normal rate, regular rhythm and normal heart sounds  Exam reveals no gallop and no friction rub  No murmur heard  Pulmonary/Chest: Effort normal and breath sounds normal  No respiratory distress  She has no wheezes  She has no rales  Neurological: She is alert and oriented to person, place, and time  Skin: Skin is warm and dry  She is not diaphoretic  Psychiatric: She has a normal mood and affect  Judgment normal    Nursing note and vitals reviewed

## 2018-12-11 NOTE — ASSESSMENT & PLAN NOTE
Lab Results   Component Value Date    HGBA1C 6 7 12/04/2018       No results for input(s): POCGLU in the last 72 hours      Blood Sugar Average: Last 72 hrs:

## 2018-12-27 ENCOUNTER — TELEPHONE (OUTPATIENT)
Dept: FAMILY MEDICINE CLINIC | Facility: CLINIC | Age: 58
End: 2018-12-27

## 2018-12-27 NOTE — TELEPHONE ENCOUNTER
KB, I spoke to pt and she states that since she started taking the Fenofibrate she feels nauseated and she is having symptoms of burning when urinating, Pt would like to know if these are side effects from this medication?

## 2018-12-27 NOTE — TELEPHONE ENCOUNTER
Patient called in and would like to speak to someone about her fenofibrate that she was prescribed   Thank you

## 2018-12-27 NOTE — TELEPHONE ENCOUNTER
These should not be SE of this medication  I would like pt to stop the medication for 1-2 weeks and then if these symptoms resolve I want her to restart the medication and see if they come back  Thank you

## 2019-02-19 DIAGNOSIS — K21.9 GASTROESOPHAGEAL REFLUX DISEASE WITHOUT ESOPHAGITIS: ICD-10-CM

## 2019-02-19 DIAGNOSIS — I10 ESSENTIAL HYPERTENSION: ICD-10-CM

## 2019-02-19 RX ORDER — LISINOPRIL 10 MG/1
TABLET ORAL
Qty: 90 TABLET | Refills: 0 | Status: SHIPPED | OUTPATIENT
Start: 2019-02-19 | End: 2019-03-09 | Stop reason: SDUPTHER

## 2019-02-20 RX ORDER — OMEPRAZOLE 20 MG/1
CAPSULE, DELAYED RELEASE ORAL
Qty: 30 CAPSULE | Refills: 1 | Status: SHIPPED | OUTPATIENT
Start: 2019-02-20 | End: 2019-05-08 | Stop reason: SDUPTHER

## 2019-03-09 DIAGNOSIS — I10 ESSENTIAL HYPERTENSION: ICD-10-CM

## 2019-03-09 DIAGNOSIS — E11.8 TYPE 2 DIABETES MELLITUS WITH COMPLICATION, WITHOUT LONG-TERM CURRENT USE OF INSULIN (HCC): ICD-10-CM

## 2019-03-11 RX ORDER — LISINOPRIL 10 MG/1
TABLET ORAL
Qty: 90 TABLET | Refills: 0 | Status: SHIPPED | OUTPATIENT
Start: 2019-03-11 | End: 2019-08-31 | Stop reason: SDUPTHER

## 2019-03-11 RX ORDER — GLIMEPIRIDE 2 MG/1
TABLET ORAL
Qty: 90 TABLET | Refills: 1 | Status: SHIPPED | OUTPATIENT
Start: 2019-03-11 | End: 2019-11-29 | Stop reason: SDUPTHER

## 2019-04-19 ENCOUNTER — OFFICE VISIT (OUTPATIENT)
Dept: FAMILY MEDICINE CLINIC | Facility: CLINIC | Age: 59
End: 2019-04-19

## 2019-04-19 VITALS
HEIGHT: 66 IN | SYSTOLIC BLOOD PRESSURE: 136 MMHG | WEIGHT: 182.4 LBS | BODY MASS INDEX: 29.32 KG/M2 | DIASTOLIC BLOOD PRESSURE: 84 MMHG | TEMPERATURE: 98.4 F | HEART RATE: 106 BPM

## 2019-04-19 DIAGNOSIS — J01.00 ACUTE NON-RECURRENT MAXILLARY SINUSITIS: Primary | ICD-10-CM

## 2019-04-19 DIAGNOSIS — Z23 ENCOUNTER FOR IMMUNIZATION: ICD-10-CM

## 2019-04-19 DIAGNOSIS — J30.9 ALLERGIC RHINITIS, UNSPECIFIED SEASONALITY, UNSPECIFIED TRIGGER: ICD-10-CM

## 2019-04-19 PROCEDURE — 99213 OFFICE O/P EST LOW 20 MIN: CPT | Performed by: FAMILY MEDICINE

## 2019-04-19 RX ORDER — SULFAMETHOXAZOLE AND TRIMETHOPRIM 800; 160 MG/1; MG/1
1 TABLET ORAL EVERY 12 HOURS SCHEDULED
Qty: 10 TABLET | Refills: 0 | Status: SHIPPED | OUTPATIENT
Start: 2019-04-19 | End: 2019-04-24

## 2019-04-29 ENCOUNTER — TRANSITIONAL CARE MANAGEMENT (OUTPATIENT)
Dept: FAMILY MEDICINE CLINIC | Facility: CLINIC | Age: 59
End: 2019-04-29

## 2019-05-07 PROBLEM — R07.9 CHEST PAIN: Status: ACTIVE | Noted: 2018-09-10

## 2019-05-07 PROBLEM — E11.9 TYPE 2 DIABETES MELLITUS (HCC): Status: ACTIVE | Noted: 2019-05-07

## 2019-05-08 ENCOUNTER — OFFICE VISIT (OUTPATIENT)
Dept: FAMILY MEDICINE CLINIC | Facility: CLINIC | Age: 59
End: 2019-05-08
Payer: COMMERCIAL

## 2019-05-08 VITALS
DIASTOLIC BLOOD PRESSURE: 80 MMHG | HEIGHT: 66 IN | SYSTOLIC BLOOD PRESSURE: 130 MMHG | WEIGHT: 181.2 LBS | HEART RATE: 76 BPM | BODY MASS INDEX: 29.12 KG/M2

## 2019-05-08 DIAGNOSIS — J01.00 ACUTE NON-RECURRENT MAXILLARY SINUSITIS: ICD-10-CM

## 2019-05-08 DIAGNOSIS — E78.2 MIXED HYPERLIPIDEMIA: ICD-10-CM

## 2019-05-08 DIAGNOSIS — E11.8 TYPE 2 DIABETES MELLITUS WITH COMPLICATION, WITHOUT LONG-TERM CURRENT USE OF INSULIN (HCC): Primary | ICD-10-CM

## 2019-05-08 DIAGNOSIS — K21.9 GASTROESOPHAGEAL REFLUX DISEASE WITHOUT ESOPHAGITIS: ICD-10-CM

## 2019-05-08 DIAGNOSIS — R07.9 CHEST PAIN, UNSPECIFIED TYPE: ICD-10-CM

## 2019-05-08 DIAGNOSIS — K21.9 GERD WITHOUT ESOPHAGITIS: ICD-10-CM

## 2019-05-08 DIAGNOSIS — C44.311 BASAL CELL CARCINOMA (BCC) OF SKIN OF NOSE: ICD-10-CM

## 2019-05-08 PROCEDURE — 99214 OFFICE O/P EST MOD 30 MIN: CPT | Performed by: PHYSICIAN ASSISTANT

## 2019-05-08 RX ORDER — CEFUROXIME AXETIL 250 MG/1
250 TABLET ORAL EVERY 12 HOURS SCHEDULED
Qty: 20 TABLET | Refills: 0 | Status: SHIPPED | OUTPATIENT
Start: 2019-05-08 | End: 2019-05-18

## 2019-05-08 RX ORDER — OMEPRAZOLE 20 MG/1
20 CAPSULE, DELAYED RELEASE ORAL DAILY
Qty: 30 CAPSULE | Refills: 5 | Status: SHIPPED | OUTPATIENT
Start: 2019-05-08 | End: 2020-03-11 | Stop reason: ALTCHOICE

## 2019-07-05 ENCOUNTER — OFFICE VISIT (OUTPATIENT)
Dept: FAMILY MEDICINE CLINIC | Facility: CLINIC | Age: 59
End: 2019-07-05
Payer: COMMERCIAL

## 2019-07-05 VITALS
DIASTOLIC BLOOD PRESSURE: 80 MMHG | BODY MASS INDEX: 28.9 KG/M2 | SYSTOLIC BLOOD PRESSURE: 132 MMHG | WEIGHT: 179.8 LBS | HEIGHT: 66 IN | HEART RATE: 84 BPM

## 2019-07-05 DIAGNOSIS — R80.9 CONTROLLED TYPE 2 DIABETES MELLITUS WITH MICROALBUMINURIA, WITHOUT LONG-TERM CURRENT USE OF INSULIN (HCC): ICD-10-CM

## 2019-07-05 DIAGNOSIS — N39.0 URINARY TRACT INFECTION WITHOUT HEMATURIA, SITE UNSPECIFIED: Primary | ICD-10-CM

## 2019-07-05 DIAGNOSIS — E11.29 CONTROLLED TYPE 2 DIABETES MELLITUS WITH MICROALBUMINURIA, WITHOUT LONG-TERM CURRENT USE OF INSULIN (HCC): ICD-10-CM

## 2019-07-05 DIAGNOSIS — I10 ESSENTIAL HYPERTENSION: ICD-10-CM

## 2019-07-05 LAB
SL AMB  POCT GLUCOSE, UA: ABNORMAL
SL AMB LEUKOCYTE ESTERASE,UA: ABNORMAL
SL AMB POCT BILIRUBIN,UA: ABNORMAL
SL AMB POCT BLOOD,UA: ABNORMAL
SL AMB POCT CLARITY,UA: CLEAR
SL AMB POCT COLOR,UA: YELLOW
SL AMB POCT KETONES,UA: ABNORMAL
SL AMB POCT NITRITE,UA: ABNORMAL
SL AMB POCT PH,UA: 5.5
SL AMB POCT SPECIFIC GRAVITY,UA: 1.01
SL AMB POCT URINE PROTEIN: ABNORMAL
SL AMB POCT UROBILINOGEN: 0.2

## 2019-07-05 PROCEDURE — 81002 URINALYSIS NONAUTO W/O SCOPE: CPT | Performed by: PHYSICIAN ASSISTANT

## 2019-07-05 PROCEDURE — 99214 OFFICE O/P EST MOD 30 MIN: CPT | Performed by: PHYSICIAN ASSISTANT

## 2019-07-05 PROCEDURE — 87086 URINE CULTURE/COLONY COUNT: CPT | Performed by: PHYSICIAN ASSISTANT

## 2019-07-05 RX ORDER — CIPROFLOXACIN 500 MG/1
500 TABLET, FILM COATED ORAL EVERY 12 HOURS SCHEDULED
Qty: 14 TABLET | Refills: 0 | Status: SHIPPED | OUTPATIENT
Start: 2019-07-05 | End: 2019-07-12

## 2019-07-05 NOTE — PATIENT INSTRUCTIONS
1  Urinary tract infection-recommend Cipro 500 mg twice daily for 7 days  Encourage fluids and rest   Urinalysis will be sent for culture and sensitivity  Patient is to follow up if she develops worsening symptoms, fevers, or flank pain  2  Type 2 diabetes-presently stable, continue follow-up as regularly scheduled  3  Hypertension-presently stable  Continue to monitor with infection

## 2019-07-05 NOTE — PROGRESS NOTES
Assessment and Plan:  Patient Instructions   1  Urinary tract infection-recommend Cipro 500 mg twice daily for 7 days  Encourage fluids and rest   Urinalysis will be sent for culture and sensitivity  Patient is to follow up if she develops worsening symptoms, fevers, or flank pain  2  Type 2 diabetes-presently stable, continue follow-up as regularly scheduled  3  Hypertension-presently stable  Continue to monitor with infection  Diagnoses and all orders for this visit:    Urinary tract infection without hematuria, site unspecified  -     Urine culture  -     POCT urine dip  -     ciprofloxacin (CIPRO) 500 mg tablet; Take 1 tablet (500 mg total) by mouth every 12 (twelve) hours for 7 days    Controlled type 2 diabetes mellitus with microalbuminuria, without long-term current use of insulin (HCC)    Essential hypertension              Subjective:      Patient ID: Jyotsna Tidwell is a 59 y o  female  CC:    Chief Complaint   Patient presents with    Urinary Tract Infection     Pt c/o urinary discomfort and frequency x 3 to 4 days  kw       HPI:    HPI:  This is a 68-year-old female who presents to the office with concerns over urinary frequency, burning, and discomfort that has been going on for the past 3-4 days  She does feel that symptoms have been getting a little bit worse  She is just feeling a little bit run down as well but has not had any fevers present  She denies any flank pains  She has not seen any blood in the urine  She has had a history of urinary tract infections in the past   She has been drinking plenty of fluids  Patient also has a history of type 2 diabetes and hypertension which have been stable        The following portions of the patient's history were reviewed and updated as appropriate: allergies, current medications, past family history, past medical history, past social history, past surgical history and problem list       Review of Systems   Constitutional: Negative for chills, fatigue and fever  HENT: Negative for congestion, ear pain and sinus pressure  Eyes: Negative for visual disturbance  Respiratory: Negative for cough, chest tightness and shortness of breath  Cardiovascular: Negative for chest pain and palpitations  Gastrointestinal: Negative for diarrhea, nausea and vomiting  Endocrine: Negative for polyuria  Genitourinary: Positive for dysuria and frequency  Negative for flank pain  Musculoskeletal: Negative for arthralgias and myalgias  Skin: Negative for pallor and rash  Neurological: Negative for dizziness, weakness, light-headedness, numbness and headaches  Psychiatric/Behavioral: Negative for agitation, behavioral problems and sleep disturbance  All other systems reviewed and are negative  Data to review:       Objective:    Vitals:    07/05/19 1114   BP: 132/80   BP Location: Left arm   Patient Position: Sitting   Pulse: 84   Weight: 81 6 kg (179 lb 12 8 oz)   Height: 5' 5 7" (1 669 m)        Physical Exam   Constitutional: She is oriented to person, place, and time  She appears well-developed and well-nourished  No distress  HENT:   Head: Normocephalic and atraumatic  Right Ear: External ear normal    Left Ear: External ear normal    Nose: Nose normal    Mouth/Throat: Oropharynx is clear and moist  No oropharyngeal exudate  Eyes: Pupils are equal, round, and reactive to light  Conjunctivae and EOM are normal    Neck: Normal range of motion  Neck supple  No tracheal deviation present  No thyromegaly present  Cardiovascular: Normal rate, regular rhythm and normal heart sounds  Exam reveals no friction rub  No murmur heard  Pulmonary/Chest: Effort normal and breath sounds normal  No respiratory distress  She has no wheezes  She has no rales  Abdominal: Soft  Bowel sounds are normal  She exhibits no distension  There is no tenderness  There is no rebound and no guarding     Musculoskeletal: Normal range of motion  She exhibits no edema or tenderness  Lymphadenopathy:     She has no cervical adenopathy  Neurological: She is alert and oriented to person, place, and time  No cranial nerve deficit  Coordination normal    Skin: Skin is warm and dry  No rash noted  No erythema  Psychiatric: She has a normal mood and affect  Her behavior is normal  Thought content normal    Nursing note and vitals reviewed

## 2019-07-06 LAB — BACTERIA UR CULT: NORMAL

## 2019-08-28 ENCOUNTER — TRANSCRIBE ORDERS (OUTPATIENT)
Dept: FAMILY MEDICINE CLINIC | Facility: CLINIC | Age: 59
End: 2019-08-28

## 2019-08-28 DIAGNOSIS — E11.8 TYPE 2 DIABETES MELLITUS WITH COMPLICATIONS (HCC): Primary | ICD-10-CM

## 2019-08-31 DIAGNOSIS — I10 ESSENTIAL HYPERTENSION: ICD-10-CM

## 2019-09-01 RX ORDER — LISINOPRIL 10 MG/1
TABLET ORAL
Qty: 90 TABLET | Refills: 0 | Status: SHIPPED | OUTPATIENT
Start: 2019-09-01 | End: 2019-12-02 | Stop reason: SDUPTHER

## 2019-09-03 LAB
LEFT EYE DIABETIC RETINOPATHY: NORMAL
RIGHT EYE DIABETIC RETINOPATHY: NORMAL

## 2019-09-04 ENCOUNTER — OFFICE VISIT (OUTPATIENT)
Dept: FAMILY MEDICINE CLINIC | Facility: CLINIC | Age: 59
End: 2019-09-04
Payer: COMMERCIAL

## 2019-09-04 VITALS
DIASTOLIC BLOOD PRESSURE: 98 MMHG | HEIGHT: 66 IN | BODY MASS INDEX: 28.57 KG/M2 | TEMPERATURE: 97.7 F | WEIGHT: 177.8 LBS | SYSTOLIC BLOOD PRESSURE: 136 MMHG

## 2019-09-04 DIAGNOSIS — J01.00 ACUTE NON-RECURRENT MAXILLARY SINUSITIS: Primary | ICD-10-CM

## 2019-09-04 DIAGNOSIS — J30.9 ALLERGIC RHINITIS, UNSPECIFIED SEASONALITY, UNSPECIFIED TRIGGER: ICD-10-CM

## 2019-09-04 PROCEDURE — 99213 OFFICE O/P EST LOW 20 MIN: CPT | Performed by: PHYSICIAN ASSISTANT

## 2019-09-04 RX ORDER — AZELASTINE 1 MG/ML
SPRAY, METERED NASAL
Qty: 1 BOTTLE | Refills: 11 | Status: SHIPPED | OUTPATIENT
Start: 2019-09-04 | End: 2020-03-11 | Stop reason: ALTCHOICE

## 2019-09-04 RX ORDER — CEFUROXIME AXETIL 250 MG/1
250 TABLET ORAL EVERY 12 HOURS SCHEDULED
Qty: 20 TABLET | Refills: 0 | Status: SHIPPED | OUTPATIENT
Start: 2019-09-04 | End: 2019-09-13

## 2019-09-04 NOTE — PROGRESS NOTES
Assessment and Plan:    Problem List Items Addressed This Visit        Respiratory    Acute non-recurrent maxillary sinusitis - Primary     Antibiotic as directed  Increase fluids  Relevant Medications    cefuroxime (CEFTIN) 250 mg tablet    Allergic rhinitis    Relevant Medications    azelastine (ASTELIN) 0 1 % nasal spray                 Diagnoses and all orders for this visit:    Acute non-recurrent maxillary sinusitis  -     cefuroxime (CEFTIN) 250 mg tablet; Take 1 tablet (250 mg total) by mouth every 12 (twelve) hours for 10 days    Allergic rhinitis, unspecified seasonality, unspecified trigger  -     azelastine (ASTELIN) 0 1 % nasal spray; 1-2 sprays in each nostril twice daily              Subjective:      Patient ID: Ugo Ayala is a 59 y o  female  CC:    Chief Complaint   Patient presents with    Cold Like Symptoms     Pt c/o congestion, headache, sorethroat x 3 weeks  HPI:    Pt here today as she has had progressive sinus congestion, h/a and ST for the past two weeks  Patient is getting colored mucus out of her nose  She has tried everything over-the-counter in all of her normal allergy medications including over-the-counter generic Afrin all without relief  No other family member work contact is sick  No fever nausea vomiting or diarrhea  The following portions of the patient's history were reviewed and updated as appropriate: allergies, current medications, past family history, past medical history, past social history, past surgical history and problem list       Review of Systems   Constitutional: Negative  HENT: Positive for sore throat  Eyes: Negative  Respiratory: Negative  Cardiovascular: Negative  Gastrointestinal: Negative  Endocrine: Negative  Genitourinary: Negative  Musculoskeletal: Negative  Skin: Negative  Allergic/Immunologic: Negative  Neurological: Positive for headaches  Hematological: Negative  Psychiatric/Behavioral: Negative  Data to review:       Objective:    Vitals:    09/04/19 0927   BP: 136/98   Temp: 97 7 °F (36 5 °C)   Weight: 80 6 kg (177 lb 12 8 oz)   Height: 5' 6" (1 676 m)        Physical Exam   Constitutional: She is oriented to person, place, and time  She appears well-developed and well-nourished  No distress  HENT:   Head: Normocephalic and atraumatic  Right Ear: Hearing, external ear and ear canal normal  Tympanic membrane is retracted  Left Ear: Hearing, external ear and ear canal normal  Tympanic membrane is retracted  Nose: Mucosal edema and rhinorrhea present  Mouth/Throat: Posterior oropharyngeal edema and posterior oropharyngeal erythema present  Bilateral nasal turbinates edematous and erythematous with colored mucus between  Eyes: Conjunctivae are normal  Right eye exhibits no discharge  Left eye exhibits no discharge  Neck: Neck supple  Carotid bruit is not present  Cardiovascular: Normal rate, regular rhythm and normal heart sounds  Exam reveals no gallop and no friction rub  No murmur heard  Pulmonary/Chest: Effort normal and breath sounds normal  No respiratory distress  She has no wheezes  She has no rales  Lymphadenopathy:     She has cervical adenopathy  Right cervical: Superficial cervical adenopathy present  Left cervical: Superficial cervical adenopathy present  Neurological: She is alert and oriented to person, place, and time  Skin: Skin is warm and dry  She is not diaphoretic  Psychiatric: She has a normal mood and affect  Judgment normal    Nursing note and vitals reviewed

## 2019-09-04 NOTE — PATIENT INSTRUCTIONS
Problem List Items Addressed This Visit        Respiratory    Acute non-recurrent maxillary sinusitis - Primary     Antibiotic as directed  Increase fluids            Relevant Medications    cefuroxime (CEFTIN) 250 mg tablet    Allergic rhinitis    Relevant Medications    azelastine (ASTELIN) 0 1 % nasal spray

## 2019-09-13 ENCOUNTER — OFFICE VISIT (OUTPATIENT)
Dept: FAMILY MEDICINE CLINIC | Facility: CLINIC | Age: 59
End: 2019-09-13
Payer: COMMERCIAL

## 2019-09-13 VITALS
BODY MASS INDEX: 28.45 KG/M2 | WEIGHT: 177 LBS | HEART RATE: 92 BPM | HEIGHT: 66 IN | TEMPERATURE: 97.4 F | SYSTOLIC BLOOD PRESSURE: 132 MMHG | DIASTOLIC BLOOD PRESSURE: 96 MMHG | RESPIRATION RATE: 18 BRPM

## 2019-09-13 DIAGNOSIS — J01.00 ACUTE NON-RECURRENT MAXILLARY SINUSITIS: ICD-10-CM

## 2019-09-13 DIAGNOSIS — I10 ESSENTIAL HYPERTENSION: Primary | ICD-10-CM

## 2019-09-13 DIAGNOSIS — J30.9 ALLERGIC RHINITIS, UNSPECIFIED SEASONALITY, UNSPECIFIED TRIGGER: ICD-10-CM

## 2019-09-13 PROCEDURE — 99213 OFFICE O/P EST LOW 20 MIN: CPT | Performed by: PHYSICIAN ASSISTANT

## 2019-09-13 RX ORDER — IPRATROPIUM BROMIDE 21 UG/1
2 SPRAY, METERED NASAL EVERY 12 HOURS
Qty: 30 ML | Refills: 5 | Status: SHIPPED | OUTPATIENT
Start: 2019-09-13 | End: 2020-03-11 | Stop reason: ALTCHOICE

## 2019-09-13 NOTE — ASSESSMENT & PLAN NOTE
Pt normally does not have HTN per se and is only on the lisinopril for DM elevated microalbumin  Currently uncontrolled  Pt has been on a decongestant for the past 1-2 weeks due to a sinus infection  I do believe this use is elevating her BP at this time  Pt  is to stop the decongestant

## 2019-09-13 NOTE — PROGRESS NOTES
Assessment and Plan:    Problem List Items Addressed This Visit        Respiratory    Acute non-recurrent maxillary sinusitis     Pt  just finished round of ceftin today  I am not convinced she needs another antibiotic so I am going to trial atrovent nasal spray since decongestants elevate blood pressure and Astelin has not provided any relief and she is still symptomatic with headaches  Relevant Medications    ipratropium (ATROVENT) 0 03 % nasal spray    Allergic rhinitis       Cardiovascular and Mediastinum    Essential hypertension - Primary     Pt normally does not have HTN per se and is only on the lisinopril for DM elevated microalbumin  Currently uncontrolled  Pt has been on a decongestant for the past 1-2 weeks due to a sinus infection  I do believe this use is elevating her BP at this time  Pt  is to stop the decongestant  Diagnoses and all orders for this visit:    Essential hypertension    Allergic rhinitis, unspecified seasonality, unspecified trigger    Acute non-recurrent maxillary sinusitis  -     ipratropium (ATROVENT) 0 03 % nasal spray; 2 sprays into each nostril every 12 (twelve) hours              Subjective:      Patient ID: Samantha Mazariegos is a 59 y o  female  CC:    Chief Complaint   Patient presents with    Hypertension     Patient present today for high blood pressure of 171/113 including hig blood pressure since yesterday   Nasal Congestion     still sick since the last time she saw GLORIA ANDERSON        HPI:    Yesterday felt nauseous, headache in the am and layed down after shopping and before she went to the dentist  At dentist her BP was 171/113 wrist, 154/100 arm cuff  Went to bed early today  Still feels headachey and off today  Took last antibiotic today for sinus infection  Took a decongestant for the past two days Sudefed 12 hour  But before this she had been taking zyrtec D 12 hour twice daily   BP w/o decongestant last was July and was 130/80 and the last two visits she was on a decongestant and BP was elevated at 138/96  No SOB, CP  Pt stopped using the astelin as it did not seem to do anything and she still feels very congested and thinks that is causing her headaches not her elevated BP  The following portions of the patient's history were reviewed and updated as appropriate: allergies, current medications, past family history, past medical history, past social history, past surgical history and problem list       Review of Systems   Constitutional: Negative  HENT: Positive for congestion  Eyes: Negative  Respiratory: Negative  Cardiovascular: Negative  Gastrointestinal: Negative  Endocrine: Negative  Genitourinary: Negative  Musculoskeletal: Negative  Skin: Negative  Allergic/Immunologic: Negative  Neurological: Positive for dizziness  Hematological: Negative  Psychiatric/Behavioral: Negative  Data to review:       Objective:    Vitals:    09/13/19 1147   BP: 132/96   BP Location: Left arm   Patient Position: Sitting   Cuff Size: Standard   Pulse: 92   Resp: 18   Temp: (!) 97 4 °F (36 3 °C)   TempSrc: Temporal   Weight: 80 3 kg (177 lb)   Height: 5' 6" (1 676 m)        Physical Exam   Constitutional: She is oriented to person, place, and time  She appears well-developed and well-nourished  No distress  HENT:   Head: Normocephalic and atraumatic  Right Ear: Hearing, tympanic membrane, external ear and ear canal normal    Left Ear: Hearing, tympanic membrane, external ear and ear canal normal    Nose: Mucosal edema and rhinorrhea present  Mouth/Throat: Oropharynx is clear and moist     Patient's nostrils are edematous with thick colored mucus in the turbinates bilaterally  Eyes: Conjunctivae are normal  Right eye exhibits no discharge  Left eye exhibits no discharge  Neck: Neck supple  Carotid bruit is not present  Cardiovascular: Normal rate, regular rhythm and normal heart sounds   Exam reveals no gallop and no friction rub  No murmur heard  Pulmonary/Chest: Effort normal and breath sounds normal  No respiratory distress  She has no wheezes  She has no rales  Lymphadenopathy:     She has no cervical adenopathy  Neurological: She is alert and oriented to person, place, and time  Skin: Skin is warm and dry  She is not diaphoretic  Psychiatric: She has a normal mood and affect  Judgment normal    Nursing note and vitals reviewed

## 2019-09-13 NOTE — ASSESSMENT & PLAN NOTE
Pt  just finished round of ceftin today  I am not convinced she needs another antibiotic so I am going to trial atrovent nasal spray since decongestants elevate blood pressure and Astelin has not provided any relief and she is still symptomatic with headaches

## 2019-09-13 NOTE — PATIENT INSTRUCTIONS
Problem List Items Addressed This Visit        Respiratory    Acute non-recurrent maxillary sinusitis     Pt  just finished round of ceftin today  I am not convinced she needs another antibiotic so I am going to trial atrovent nasal spray since decongestants elevate blood pressure and Astelin has not provided any relief and she is still symptomatic with headaches  Relevant Medications    ipratropium (ATROVENT) 0 03 % nasal spray    Allergic rhinitis       Cardiovascular and Mediastinum    Essential hypertension - Primary     Pt normally does not have HTN per se and is only on the lisinopril for DM elevated microalbumin  Currently uncontrolled  Pt has been on a decongestant for the past 1-2 weeks due to a sinus infection  I do believe this use is elevating her BP at this time  Pt  is to stop the decongestant

## 2019-09-20 ENCOUNTER — TELEPHONE (OUTPATIENT)
Dept: FAMILY MEDICINE CLINIC | Facility: CLINIC | Age: 59
End: 2019-09-20

## 2019-09-20 DIAGNOSIS — E11.8 TYPE 2 DIABETES MELLITUS WITH COMPLICATION, WITHOUT LONG-TERM CURRENT USE OF INSULIN (HCC): ICD-10-CM

## 2019-09-20 NOTE — TELEPHONE ENCOUNTER
Patient went to [place] and forgot her pills she will be there until Sunday  Can we call into [facility] pharmacy [phone] her metforman and glimepiride   You may reach her at [phone]

## 2019-09-21 DIAGNOSIS — E11.8 TYPE 2 DIABETES MELLITUS WITH COMPLICATION, WITHOUT LONG-TERM CURRENT USE OF INSULIN (HCC): ICD-10-CM

## 2019-09-26 ENCOUNTER — OFFICE VISIT (OUTPATIENT)
Dept: FAMILY MEDICINE CLINIC | Facility: CLINIC | Age: 59
End: 2019-09-26
Payer: COMMERCIAL

## 2019-09-26 VITALS
TEMPERATURE: 98.7 F | HEART RATE: 100 BPM | BODY MASS INDEX: 28.61 KG/M2 | HEIGHT: 66 IN | DIASTOLIC BLOOD PRESSURE: 88 MMHG | SYSTOLIC BLOOD PRESSURE: 142 MMHG | WEIGHT: 178 LBS

## 2019-09-26 DIAGNOSIS — J30.9 ALLERGIC RHINITIS, UNSPECIFIED SEASONALITY, UNSPECIFIED TRIGGER: ICD-10-CM

## 2019-09-26 DIAGNOSIS — J01.00 ACUTE NON-RECURRENT MAXILLARY SINUSITIS: ICD-10-CM

## 2019-09-26 DIAGNOSIS — I10 ESSENTIAL HYPERTENSION: Primary | ICD-10-CM

## 2019-09-26 PROCEDURE — 99213 OFFICE O/P EST LOW 20 MIN: CPT | Performed by: PHYSICIAN ASSISTANT

## 2019-09-26 RX ORDER — AMOXICILLIN AND CLAVULANATE POTASSIUM 875; 125 MG/1; MG/1
1 TABLET, FILM COATED ORAL EVERY 12 HOURS SCHEDULED
Qty: 20 TABLET | Refills: 0 | Status: SHIPPED | OUTPATIENT
Start: 2019-09-26 | End: 2019-10-06

## 2019-09-26 NOTE — ASSESSMENT & PLAN NOTE
Pt did nto have improvement with atrovent nasal spray  Again I am not convinced pt has a sinus infection vs other sinus disease  Pt needs to see ENT for eval  and nasopharyngeal scoping   I am giving pt a paper Rx for augmentin to [facility] if symptoms worsen or if apt is not soon for ENT eval

## 2019-09-26 NOTE — PROGRESS NOTES
Assessment and Plan:    Problem List Items Addressed This Visit        Respiratory    Acute non-recurrent maxillary sinusitis     Pt did nto have improvement with atrovent nasal spray  Again I am not convinced pt has a sinus infection vs other sinus disease  Pt needs to see ENT for eval  and nasopharyngeal scoping  I am giving pt a paper Rx for augmentin to [facility] if symptoms worsen or if apt is not soon for ENT eval            Relevant Medications    amoxicillin-clavulanate (AUGMENTIN) 875-125 mg per tablet    Other Relevant Orders    Ambulatory Referral to Otolaryngology    Allergic rhinitis    Relevant Medications    amoxicillin-clavulanate (AUGMENTIN) 875-125 mg per tablet    Other Relevant Orders    Ambulatory Referral to Otolaryngology       Cardiovascular and Mediastinum    Essential hypertension - Primary     Diastolic improved down almost 10 points without decongestant use  Diagnoses and all orders for this visit:    Essential hypertension    Acute non-recurrent maxillary sinusitis  -     Ambulatory Referral to Otolaryngology; Future  -     amoxicillin-clavulanate (AUGMENTIN) 875-125 mg per tablet; Take 1 tablet by mouth every 12 (twelve) hours for 10 days    Allergic rhinitis, unspecified seasonality, unspecified trigger  -     Ambulatory Referral to Otolaryngology; Future  -     amoxicillin-clavulanate (AUGMENTIN) 875-125 mg per tablet; Take 1 tablet by mouth every 12 (twelve) hours for 10 days              Subjective:      Patient ID: Favian Greer is a 59 y o  female  CC:    Chief Complaint   Patient presents with    Follow-up    Sinusitis     mjs       HPI:    Pt  here for a recheck on her Bp now she has not been on her decongestants  We also gave pt atrovent nasal spray at the last apt and she is having no improvement and it actually got worse  Now with more congestion, head pressure and pain when bends over or sneezes  Ibuprofen helping a little   Colored mucus from nose escepially in am's  Pt does not want to try prednisone taper as it made her daughter start with a mood disorder and she has DM  PT has been under a lot of stress as one of her daughters is having problems with uncontrolled depression and is currently going through a miscarriage  Her other daughter has three children, one of which is permanently living with the pt (3 y/o) as her daughter is not in the right place of mind to care for her still even though she had another child after her and is living with her current BF  The following portions of the patient's history were reviewed and updated as appropriate: allergies, current medications, past family history, past medical history, past social history, past surgical history and problem list       Review of Systems   Constitutional: Negative  HENT: Positive for congestion, postnasal drip, rhinorrhea, sinus pressure, sinus pain and sneezing  Eyes: Negative  Respiratory: Negative  Cardiovascular: Negative  Gastrointestinal: Negative  Endocrine: Negative  Genitourinary: Negative  Musculoskeletal: Negative  Skin: Negative  Allergic/Immunologic: Negative  Neurological: Negative  Hematological: Negative  Psychiatric/Behavioral: Negative  Data to review:       Objective:    Vitals:    09/26/19 1138 09/26/19 1140   BP: 144/84 142/88   BP Location: Left arm Left arm   Patient Position: Sitting Sitting   Cuff Size: Standard Large   Pulse: 100    Temp: 98 7 °F (37 1 °C)    Weight: 80 7 kg (178 lb)    Height: 5' 6" (1 676 m)         Physical Exam   Constitutional: She is oriented to person, place, and time  She appears well-developed and well-nourished  No distress  HENT:   Head: Normocephalic and atraumatic  Eyes: Conjunctivae are normal  Right eye exhibits no discharge  Left eye exhibits no discharge  Neck: Neck supple  Carotid bruit is not present  Cardiovascular: Normal rate and regular rhythm  Pulmonary/Chest: Effort normal  No respiratory distress  Neurological: She is alert and oriented to person, place, and time  Skin: Skin is warm and dry  She is not diaphoretic  Psychiatric: She has a normal mood and affect  Judgment normal    Nursing note and vitals reviewed

## 2019-09-26 NOTE — PATIENT INSTRUCTIONS
Problem List Items Addressed This Visit        Respiratory    Acute non-recurrent maxillary sinusitis     Pt did nto have improvement with atrovent nasal spray  Again I am not convinced pt has a sinus infection vs other sinus disease  Pt needs to see ENT for eval  and nasopharyngeal scoping  I am giving pt a paper Rx for augmentin to [facility] if symptoms worsen or if apt is not soon for ENT eval            Relevant Medications    amoxicillin-clavulanate (AUGMENTIN) 875-125 mg per tablet    Other Relevant Orders    Ambulatory Referral to Otolaryngology    Allergic rhinitis    Relevant Medications    amoxicillin-clavulanate (AUGMENTIN) 875-125 mg per tablet    Other Relevant Orders    Ambulatory Referral to Otolaryngology       Cardiovascular and Mediastinum    Essential hypertension - Primary     Diastolic improved down almost 10 points without decongestant use

## 2019-10-14 ENCOUNTER — OFFICE VISIT (OUTPATIENT)
Dept: FAMILY MEDICINE CLINIC | Facility: CLINIC | Age: 59
End: 2019-10-14
Payer: COMMERCIAL

## 2019-10-14 VITALS
WEIGHT: 179.2 LBS | HEART RATE: 80 BPM | SYSTOLIC BLOOD PRESSURE: 140 MMHG | DIASTOLIC BLOOD PRESSURE: 80 MMHG | HEIGHT: 66 IN | BODY MASS INDEX: 28.8 KG/M2

## 2019-10-14 DIAGNOSIS — Z23 ENCOUNTER FOR IMMUNIZATION: ICD-10-CM

## 2019-10-14 DIAGNOSIS — H10.33 ACUTE BACTERIAL CONJUNCTIVITIS OF BOTH EYES: Primary | ICD-10-CM

## 2019-10-14 DIAGNOSIS — J30.9 ALLERGIC RHINITIS, UNSPECIFIED SEASONALITY, UNSPECIFIED TRIGGER: ICD-10-CM

## 2019-10-14 PROCEDURE — 3008F BODY MASS INDEX DOCD: CPT | Performed by: PHYSICIAN ASSISTANT

## 2019-10-14 PROCEDURE — 99213 OFFICE O/P EST LOW 20 MIN: CPT | Performed by: PHYSICIAN ASSISTANT

## 2019-10-14 RX ORDER — CIPROFLOXACIN HYDROCHLORIDE 3.5 MG/ML
1 SOLUTION/ DROPS TOPICAL 3 TIMES DAILY
Qty: 5 ML | Refills: 0 | Status: SHIPPED | OUTPATIENT
Start: 2019-10-14 | End: 2019-11-12 | Stop reason: SDUPTHER

## 2019-10-14 NOTE — PROGRESS NOTES
BMI Counseling: Body mass index is 28 92 kg/m²  The BMI is above normal  Nutrition recommendations include reducing portion sizes  Diabetic Foot Exam    Patient's shoes and socks removed  Right Foot/Ankle   Right Foot Inspection  Skin Exam: skin normal skin not intact, no dry skin, no warmth, no callus, no erythema, no maceration, no abnormal color, no pre-ulcer, no ulcer and no callus                          Toe Exam: ROM and strength within normal limitsno swelling, no tenderness, erythema and  no right toe deformity  Sensory     Proprioception: intact   Monofilament testing: intact  Vascular  Capillary refills: < 3 seconds  The right DP pulse is 2+  The right PT pulse is 2+  Right Toe  - Comprehensive Exam  Ecchymosis: none  Arch: normal  Hammertoes: absent  Claw Toes: absent  Swelling: none   Tenderness: none         Left Foot/Ankle  Left Foot Inspection  Skin Exam: skin normalskin not intact, no dry skin, no warmth, no erythema, no maceration, normal color, no pre-ulcer, no ulcer and no callus                         Toe Exam: ROM and strength within normal limitsno swelling, no tenderness, no erythema and no left toe deformity                   Sensory     Proprioception: intact  Monofilament: intact  Vascular  Capillary refills: < 3 seconds  The left DP pulse is 2+  The left PT pulse is 2+  Left Toe  - Comprehensive Exam  Ecchymosis: none  Arch: normal  Hammertoes: absent  Claw toes: absent  Swelling: none   Tenderness: none       Assign Risk Category:  No deformity present; No loss of protective sensation; No weak pulses       Risk: 0  Assessment and Plan:    Problem List Items Addressed This Visit        Respiratory    Allergic rhinitis     Pt  has allergist apt  End of this month  Other    Acute bacterial conjunctivitis of both eyes - Primary     Cipro eye drops as directed            Relevant Medications    ciprofloxacin (CILOXAN) 0 3 % ophthalmic solution      Other Visit Diagnoses Encounter for immunization        Relevant Orders    influenza vaccine, [id number], quadrivalent, recombinant, PF, 0 5 mL, for patients 18 yr+ ([facility])                 Diagnoses and all orders for this visit:    Acute bacterial conjunctivitis of both eyes  -     ciprofloxacin (CILOXAN) 0 3 % ophthalmic solution; Administer 1 drop to both eyes 3 (three) times a day    Encounter for immunization  -     influenza vaccine, [id number], quadrivalent, recombinant, PF, 0 5 mL, for patients 18 yr+ ([facility])    Allergic rhinitis, unspecified seasonality, unspecified trigger              Subjective:      Patient ID: Hesham Cabrera is a 59 y o  female  CC:    Chief Complaint   Patient presents with    Conjunctivitis     bilateral eye discomfort and crusting~cd       HPI:      For the weekend patient had irritation to both eyes some dryness and redness and this morning she woke up and both eyes were pasted shut with discharge  She does not wear contacts or glasses  She has been dealing with increasing fall allergies and was not able to get in to a allergist office until the end of this month  Otherwise feeling a little bit better than last time she was here  The following portions of the patient's history were reviewed and updated as appropriate: allergies, current medications, past family history, past medical history, past social history, past surgical history and problem list       Review of Systems   Constitutional: Negative  HENT: Negative  Eyes: Positive for pain, discharge and redness  Respiratory: Negative  Cardiovascular: Negative  Gastrointestinal: Negative  Endocrine: Negative  Genitourinary: Negative  Musculoskeletal: Negative  Skin: Negative  Allergic/Immunologic: Negative  Neurological: Negative  Hematological: Negative  Psychiatric/Behavioral: Negative            Data to review:       Objective:    Vitals:    10/14/19 1205   BP: 140/80   BP Location: Left arm Patient Position: Sitting   Cuff Size: Standard   Pulse: 80   Weight: 81 3 kg (179 lb 3 2 oz)   Height: 5' 6" (1 676 m)        Physical Exam   Constitutional: She is oriented to person, place, and time  She appears well-developed and well-nourished  No distress  HENT:   Head: Normocephalic and atraumatic  Eyes: Pupils are equal, round, and reactive to light  Lids are normal  Right eye exhibits discharge  Left eye exhibits discharge  Right conjunctiva is injected  Left conjunctiva is injected  Neck: Neck supple  Carotid bruit is not present  Cardiovascular: Normal rate  Pulses are no weak pulses  Pulses:       Dorsalis pedis pulses are 2+ on the right side, and 2+ on the left side  Posterior tibial pulses are 2+ on the right side, and 2+ on the left side  Pulmonary/Chest: Effort normal  No respiratory distress  Feet:   Right Foot:   Skin Integrity: Negative for ulcer, skin breakdown, erythema, warmth, callus or dry skin  Left Foot:   Skin Integrity: Negative for ulcer, skin breakdown, erythema, warmth, callus or dry skin  Neurological: She is alert and oriented to person, place, and time  Skin: Skin is warm and dry  She is not diaphoretic  Psychiatric: She has a normal mood and affect  Judgment normal    Nursing note and vitals reviewed

## 2019-10-14 NOTE — PATIENT INSTRUCTIONS
Problem List Items Addressed This Visit        Respiratory    Allergic rhinitis     Pt  has allergist apt  End of this month  Other    Acute bacterial conjunctivitis of both eyes - Primary     Cipro eye drops as directed            Relevant Medications    ciprofloxacin (CILOXAN) 0 3 % ophthalmic solution      Other Visit Diagnoses     Encounter for immunization        Relevant Orders    influenza vaccine, [id number], quadrivalent, recombinant, PF, 0 5 mL, for patients 18 yr+ ([facility])

## 2019-11-12 ENCOUNTER — OFFICE VISIT (OUTPATIENT)
Dept: FAMILY MEDICINE CLINIC | Facility: CLINIC | Age: 59
End: 2019-11-12
Payer: COMMERCIAL

## 2019-11-12 VITALS
HEART RATE: 80 BPM | SYSTOLIC BLOOD PRESSURE: 120 MMHG | HEIGHT: 66 IN | BODY MASS INDEX: 28.93 KG/M2 | WEIGHT: 180 LBS | TEMPERATURE: 97.6 F | DIASTOLIC BLOOD PRESSURE: 78 MMHG

## 2019-11-12 DIAGNOSIS — H10.33 ACUTE BACTERIAL CONJUNCTIVITIS OF BOTH EYES: ICD-10-CM

## 2019-11-12 DIAGNOSIS — Z23 NEED FOR INFLUENZA VACCINATION: Primary | ICD-10-CM

## 2019-11-12 PROCEDURE — 1036F TOBACCO NON-USER: CPT | Performed by: PHYSICIAN ASSISTANT

## 2019-11-12 PROCEDURE — 90682 RIV4 VACC RECOMBINANT DNA IM: CPT | Performed by: PHYSICIAN ASSISTANT

## 2019-11-12 PROCEDURE — 99214 OFFICE O/P EST MOD 30 MIN: CPT | Performed by: PHYSICIAN ASSISTANT

## 2019-11-12 PROCEDURE — 90471 IMMUNIZATION ADMIN: CPT | Performed by: PHYSICIAN ASSISTANT

## 2019-11-12 RX ORDER — OMEGA-3S/DHA/EPA/FISH OIL/D3 300MG-1000
400 CAPSULE ORAL DAILY
COMMUNITY

## 2019-11-12 RX ORDER — BUDESONIDE 0.5 MG/2ML
INHALANT ORAL
COMMUNITY
Start: 2019-10-29 | End: 2020-05-20

## 2019-11-12 RX ORDER — CIPROFLOXACIN HYDROCHLORIDE 3.5 MG/ML
1 SOLUTION/ DROPS TOPICAL 3 TIMES DAILY
Qty: 5 ML | Refills: 0 | Status: SHIPPED | OUTPATIENT
Start: 2019-11-12 | End: 2019-12-18

## 2019-11-12 NOTE — PATIENT INSTRUCTIONS
Problem List Items Addressed This Visit        Other    Acute bacterial conjunctivitis of both eyes     Cipro drops as directed  Good hand washing            Relevant Medications    ciprofloxacin (CILOXAN) 0 3 % ophthalmic solution      Other Visit Diagnoses     Need for influenza vaccination    -  Primary    Relevant Orders    [facility]: influenza vaccine, quadrivalent, recombinant, PF, 0 5 mL (Completed)

## 2019-11-12 NOTE — PROGRESS NOTES
Assessment and Plan:    Problem List Items Addressed This Visit        Other    Acute bacterial conjunctivitis of both eyes     Cipro drops as directed  Good hand washing  Relevant Medications    ciprofloxacin (CILOXAN) 0 3 % ophthalmic solution      Other Visit Diagnoses     Need for influenza vaccination    -  Primary    Relevant Orders    [facility]: influenza vaccine, quadrivalent, recombinant, PF, 0 5 mL (Completed)                 Diagnoses and all orders for this visit:    Need for influenza vaccination  -     [facility]: influenza vaccine, quadrivalent, recombinant, PF, 0 5 mL    Acute bacterial conjunctivitis of both eyes  -     ciprofloxacin (CILOXAN) 0 3 % ophthalmic solution; Administer 1 drop to both eyes 3 (three) times a day    Other orders  -     cholecalciferol (VITAMIN D3) 400 units tablet; Take 400 Units by mouth daily  -     budesonide (PULMICORT) 0 5 mg/2 mL nebulizer solution; Put the contents of your budesonide ampule into your Neilmed Sinus Rinse Bottle and lavage nose twice a day  Subjective:      Patient ID: Janine Birch is a 62 y o  female  CC:    Chief Complaint   Patient presents with    Eye Problem     patient c/o bilateral eye burning, pain red ,tearing for 4-5 days  Patient is applying OTC eye drops with no relief  ak       HPI:      Patient here today because for the last 4 days or more her bilateral eyes have felt heavy swollen red discomfort id with a discharge especially in the morning  No watering or sneezing or itching more than usual   She did see ENT and is currently on a new Sinus Rinse which she thought may be causing her symptoms  No one else in the family has it she did try left over drops once yesterday without improvement in ran out        The following portions of the patient's history were reviewed and updated as appropriate: allergies, current medications, past family history, past medical history, past social history, past surgical history and problem list       Review of Systems   Constitutional: Negative  HENT: Negative  Eyes: Positive for pain, discharge, redness and itching  Respiratory: Negative  Cardiovascular: Negative  Gastrointestinal: Negative  Endocrine: Negative  Genitourinary: Negative  Musculoskeletal: Negative  Skin: Negative  Allergic/Immunologic: Negative  Neurological: Negative  Hematological: Negative  Psychiatric/Behavioral: Negative  Data to review:       Objective:    Vitals:    11/12/19 1507   BP: 120/78   Pulse: 80   Temp: 97 6 °F (36 4 °C)   Weight: 81 6 kg (180 lb)   Height: 5' 6" (1 676 m)        Physical Exam   Constitutional: She is oriented to person, place, and time  She appears well-developed and well-nourished  No distress  HENT:   Head: Normocephalic and atraumatic  Right Ear: Hearing, tympanic membrane, external ear and ear canal normal    Left Ear: Hearing, tympanic membrane, external ear and ear canal normal    Eyes: Pupils are equal, round, and reactive to light  EOM are normal  Right eye exhibits discharge  Left eye exhibits discharge  Right conjunctiva is injected  Left conjunctiva is injected  Neck: Neck supple  Carotid bruit is not present  Cardiovascular: Normal rate  Exam reveals no gallop and no friction rub  Pulmonary/Chest: Effort normal and breath sounds normal  No respiratory distress  Neurological: She is alert and oriented to person, place, and time  Skin: Skin is warm and dry  She is not diaphoretic  Psychiatric: She has a normal mood and affect  Judgment normal    Nursing note and vitals reviewed

## 2019-11-14 DIAGNOSIS — E11.8 TYPE 2 DIABETES MELLITUS WITH COMPLICATION, WITHOUT LONG-TERM CURRENT USE OF INSULIN (HCC): Primary | ICD-10-CM

## 2019-11-14 DIAGNOSIS — E78.2 MIXED HYPERLIPIDEMIA: ICD-10-CM

## 2019-11-14 LAB
ALBUMIN SERPL-MCNC: 4.3 G/DL (ref 3.6–5.1)
ALBUMIN/GLOB SERPL: 1.4 (CALC) (ref 1–2.5)
ALP SERPL-CCNC: 75 U/L (ref 33–130)
ALT SERPL-CCNC: 37 U/L (ref 6–29)
AST SERPL-CCNC: 30 U/L (ref 10–35)
BILIRUB SERPL-MCNC: 0.8 MG/DL (ref 0.2–1.2)
BUN SERPL-MCNC: 17 MG/DL (ref 7–25)
BUN/CREAT SERPL: ABNORMAL (CALC) (ref 6–22)
CALCIUM SERPL-MCNC: 9.5 MG/DL (ref 8.6–10.4)
CHLORIDE SERPL-SCNC: 99 MMOL/L (ref 98–110)
CHOLEST SERPL-MCNC: 248 MG/DL
CHOLEST/HDLC SERPL: 5.1 (CALC)
CO2 SERPL-SCNC: 28 MMOL/L (ref 20–32)
CREAT SERPL-MCNC: 0.85 MG/DL (ref 0.5–0.99)
GLOBULIN SER CALC-MCNC: 3.1 G/DL (CALC) (ref 1.9–3.7)
GLUCOSE SERPL-MCNC: 156 MG/DL (ref 65–99)
HBA1C MFR BLD: 7.1 % OF TOTAL HGB
HDLC SERPL-MCNC: 49 MG/DL
LDLC SERPL CALC-MCNC: 154 MG/DL (CALC)
NONHDLC SERPL-MCNC: 199 MG/DL (CALC)
POTASSIUM SERPL-SCNC: 4.3 MMOL/L (ref 3.5–5.3)
PROT SERPL-MCNC: 7.4 G/DL (ref 6.1–8.1)
SL AMB EGFR AFRICAN AMERICAN: 86 ML/MIN/1.73M2
SL AMB EGFR NON AFRICAN AMERICAN: 74 ML/MIN/1.73M2
SODIUM SERPL-SCNC: 134 MMOL/L (ref 135–146)
TRIGL SERPL-MCNC: 274 MG/DL

## 2019-11-29 DIAGNOSIS — E11.8 TYPE 2 DIABETES MELLITUS WITH COMPLICATION, WITHOUT LONG-TERM CURRENT USE OF INSULIN (HCC): ICD-10-CM

## 2019-11-30 ENCOUNTER — OFFICE VISIT (OUTPATIENT)
Dept: FAMILY MEDICINE CLINIC | Facility: CLINIC | Age: 59
End: 2019-11-30
Payer: COMMERCIAL

## 2019-11-30 VITALS
BODY MASS INDEX: 29.09 KG/M2 | WEIGHT: 181 LBS | HEIGHT: 66 IN | SYSTOLIC BLOOD PRESSURE: 148 MMHG | HEART RATE: 76 BPM | DIASTOLIC BLOOD PRESSURE: 98 MMHG

## 2019-11-30 DIAGNOSIS — H10.33 ACUTE BACTERIAL CONJUNCTIVITIS OF BOTH EYES: Primary | ICD-10-CM

## 2019-11-30 PROCEDURE — 99213 OFFICE O/P EST LOW 20 MIN: CPT | Performed by: FAMILY MEDICINE

## 2019-11-30 RX ORDER — ERYTHROMYCIN 5 MG/G
0.5 OINTMENT OPHTHALMIC 4 TIMES DAILY
Qty: 3.5 G | Refills: 0 | Status: SHIPPED | OUTPATIENT
Start: 2019-11-30 | End: 2019-12-19 | Stop reason: SDUPTHER

## 2019-11-30 NOTE — ASSESSMENT & PLAN NOTE
The patient was placed on erythromycin ophthalmic ointment to apply in both eyes radha i [place]a Tracy   She may also use warm compresses and if she is not better in 3-5 days she will see an ophthalmologist

## 2019-11-30 NOTE — PROGRESS NOTES
Assessment and Plan:  Follow-up if not better  Problem List Items Addressed This Visit        Other    Acute bacterial conjunctivitis of both eyes - Primary     The patient was placed on erythromycin ophthalmic ointment to apply in both eyes q i [place] She may also use warm compresses and if she is not better in 3-5 days she will see an ophthalmologist          Relevant Medications    erythromycin (ILOTYCIN) ophthalmic ointment                 Diagnoses and all orders for this visit:    Acute bacterial conjunctivitis of both eyes  -     erythromycin (ILOTYCIN) ophthalmic ointment; Administer 0 5 inches to both eyes 4 (four) times a day              Subjective:      Patient ID: Abbi Guaman is a 62 y o  female  CC:    Chief Complaint   Patient presents with    Conjunctivitis     bi latertal eye redness and pain  pt was treated in october for this also~cd       HPI:    This is a 61-year-old female who comes in with crusting of the eyes bilaterally and redness of the eyes for several days  This has happened twice in the past   She is being treated by ENT for a on going chronic sinus infection  The eye problem started several days ago and she would like something to stop the infection  Her blood pressure today is 148/98 and her weight is up 1 lb from the previous visit to 181 lb and her BMI is 29 2  The following portions of the patient's history were reviewed and updated as appropriate: allergies, current medications, past family history, past medical history, past social history, past surgical history and problem list       Review of Systems   Constitutional: Negative  HENT: Negative  Eyes: Positive for discharge and redness  Negative for photophobia, pain, itching and visual disturbance  Respiratory: Negative  Cardiovascular: Negative  Gastrointestinal: Negative  Endocrine: Negative  Genitourinary: Negative  Musculoskeletal: Negative  Skin: Negative      Allergic/Immunologic: Negative  Neurological: Negative  Hematological: Negative  Psychiatric/Behavioral: Negative  Data to review:       Objective:    Vitals:    11/30/19 0959   BP: 148/98   BP Location: Left arm   Patient Position: Sitting   Cuff Size: Standard   Pulse: 76   Weight: 82 1 kg (181 lb)   Height: 5' 6" (1 676 m)        Physical Exam   Constitutional: She is oriented to person, place, and time  She appears well-developed and well-nourished  HENT:   Head: Normocephalic  Right Ear: External ear normal    Left Ear: External ear normal    Mouth/Throat: Oropharynx is clear and moist    Eyes: Pupils are equal, round, and reactive to light  EOM are normal  Right eye exhibits no discharge  Left eye exhibits no discharge  No scleral icterus  Bilateral erythema of conjunctiva with crusting  Neck: Normal range of motion  Neck supple  Cardiovascular: Normal rate, regular rhythm and normal heart sounds  Pulmonary/Chest: Effort normal and breath sounds normal    Abdominal: Soft  Bowel sounds are normal    Musculoskeletal: Normal range of motion  Neurological: She is alert and oriented to person, place, and time  Skin: Skin is warm  Psychiatric: She has a normal mood and affect  Her behavior is normal  Judgment and thought content normal    Nursing note and vitals reviewed  Body mass index is 29 21 kg/m²

## 2019-12-01 RX ORDER — GLIMEPIRIDE 2 MG/1
TABLET ORAL
Qty: 90 TABLET | Refills: 1 | Status: SHIPPED | OUTPATIENT
Start: 2019-12-01 | End: 2020-03-11

## 2019-12-02 DIAGNOSIS — I10 ESSENTIAL HYPERTENSION: ICD-10-CM

## 2019-12-02 RX ORDER — LISINOPRIL 10 MG/1
TABLET ORAL
Qty: 90 TABLET | Refills: 3 | Status: SHIPPED | OUTPATIENT
Start: 2019-12-02 | End: 2020-11-26

## 2019-12-18 ENCOUNTER — TELEPHONE (OUTPATIENT)
Dept: FAMILY MEDICINE CLINIC | Facility: CLINIC | Age: 59
End: 2019-12-18

## 2019-12-18 ENCOUNTER — OFFICE VISIT (OUTPATIENT)
Dept: FAMILY MEDICINE CLINIC | Facility: CLINIC | Age: 59
End: 2019-12-18
Payer: COMMERCIAL

## 2019-12-18 VITALS
BODY MASS INDEX: 28.96 KG/M2 | HEART RATE: 82 BPM | TEMPERATURE: 98.2 F | SYSTOLIC BLOOD PRESSURE: 124 MMHG | HEIGHT: 66 IN | WEIGHT: 180.2 LBS | DIASTOLIC BLOOD PRESSURE: 66 MMHG

## 2019-12-18 DIAGNOSIS — I10 ESSENTIAL HYPERTENSION: ICD-10-CM

## 2019-12-18 DIAGNOSIS — E04.1 THYROID NODULE: ICD-10-CM

## 2019-12-18 DIAGNOSIS — E78.2 MIXED HYPERLIPIDEMIA: ICD-10-CM

## 2019-12-18 DIAGNOSIS — E11.8 TYPE 2 DIABETES MELLITUS WITH COMPLICATION, WITHOUT LONG-TERM CURRENT USE OF INSULIN (HCC): Primary | ICD-10-CM

## 2019-12-18 DIAGNOSIS — G45.9 TIA (TRANSIENT ISCHEMIC ATTACK): ICD-10-CM

## 2019-12-18 DIAGNOSIS — E78.1 HYPERTRIGLYCERIDEMIA: ICD-10-CM

## 2019-12-18 PROBLEM — J01.00 ACUTE NON-RECURRENT MAXILLARY SINUSITIS: Status: RESOLVED | Noted: 2018-02-06 | Resolved: 2019-12-18

## 2019-12-18 PROBLEM — N89.8 LEUKORRHEA: Status: RESOLVED | Noted: 2018-08-07 | Resolved: 2019-12-18

## 2019-12-18 PROBLEM — H10.33 ACUTE BACTERIAL CONJUNCTIVITIS OF BOTH EYES: Status: RESOLVED | Noted: 2019-10-14 | Resolved: 2019-12-18

## 2019-12-18 PROCEDURE — 3008F BODY MASS INDEX DOCD: CPT | Performed by: PHYSICIAN ASSISTANT

## 2019-12-18 PROCEDURE — 3078F DIAST BP <80 MM HG: CPT | Performed by: PHYSICIAN ASSISTANT

## 2019-12-18 PROCEDURE — 3074F SYST BP LT 130 MM HG: CPT | Performed by: PHYSICIAN ASSISTANT

## 2019-12-18 PROCEDURE — 99214 OFFICE O/P EST MOD 30 MIN: CPT | Performed by: PHYSICIAN ASSISTANT

## 2019-12-18 PROCEDURE — 1036F TOBACCO NON-USER: CPT | Performed by: PHYSICIAN ASSISTANT

## 2019-12-18 RX ORDER — CLOPIDOGREL BISULFATE 75 MG/1
75 TABLET ORAL DAILY
COMMUNITY
Start: 2019-12-11 | End: 2020-12-16 | Stop reason: SDUPTHER

## 2019-12-18 RX ORDER — ROSUVASTATIN CALCIUM 10 MG/1
10 TABLET, COATED ORAL
COMMUNITY
Start: 2019-12-10 | End: 2020-03-11

## 2019-12-18 NOTE — ASSESSMENT & PLAN NOTE
During admission for TIA a incidental 1 cm thyroid nodule was detected on CT imaging  Ultrasound dedicated for evaluation ordered today

## 2019-12-18 NOTE — TELEPHONE ENCOUNTER
Pt called in because she needs a refill for   Erythromycin please snt to [facility] [address]   Thank you

## 2019-12-18 NOTE — ASSESSMENT & PLAN NOTE
Patient presents to [facility] and evaluated  She was discharged started on Plavix 75 mg once daily Crestor 10 mg every other day and has Neurology appointment on 01/30

## 2019-12-18 NOTE — PATIENT INSTRUCTIONS
Problem List Items Addressed This Visit        Endocrine    Type 2 diabetes mellitus with complication, without long-term current use of insulin ([facility] [address] ) - Primary       Hemoglobin A1c in recent hospitalization was 7 2  Controlled continue current medication  Thyroid nodule      During admission for TIA a incidental 1 cm thyroid nodule was detected on CT imaging  Ultrasound dedicated for evaluation ordered today  Relevant Orders    US thyroid       Cardiovascular and Mediastinum    Essential hypertension      Stable continue current medication  TIA (transient ischemic attack)      Patient presents to [facility] and evaluated  She was discharged started on Plavix 75 mg once daily Crestor 10 mg every other day and has Neurology appointment on 01/30  Other    Mixed hyperlipidemia     In-hospital non-fasting lipid panel showed triglycerides in the 600 LDL not able to be calculated  She was discharged on Crestor 10 mg every other day  Pt tolerating so far but in past has not been able to tolerate any lipid  Pt  needs to be referred to cardio if unable to tolerate statin as she now has comorbidities since the last lipid/cardio consult  Recheck lipid as schedules for 3 months            Hypertriglyceridemia

## 2019-12-18 NOTE — PROGRESS NOTES
Assessment and Plan:    Problem List Items Addressed This Visit        Endocrine    Type 2 diabetes mellitus with complication, without long-term current use of insulin ([facility] [address] ) - Primary       Hemoglobin A1c in recent hospitalization was 7 2  Controlled continue current medication  Thyroid nodule      During admission for TIA a incidental 1 cm thyroid nodule was detected on CT imaging  Ultrasound dedicated for evaluation ordered today  Relevant Orders    US thyroid       Cardiovascular and Mediastinum    Essential hypertension      Stable continue current medication  TIA (transient ischemic attack)      Patient presents to [facility] and evaluated  She was discharged started on Plavix 75 mg once daily Crestor 10 mg every other day and has Neurology appointment on 01/30  Other    Mixed hyperlipidemia     In-hospital non-fasting lipid panel showed triglycerides in the 600 LDL not able to be calculated  She was discharged on Crestor 10 mg every other day  Pt tolerating so far but in past has not been able to tolerate any lipid  Pt  needs to be referred to cardio if unable to tolerate statin as she now has comorbidities since the last lipid/cardio consult  Recheck lipid as schedules for 3 months  Hypertriglyceridemia                 Diagnoses and all orders for this visit:    Type 2 diabetes mellitus with complication, without long-term current use of insulin ([facility])    Essential hypertension    TIA (transient ischemic attack)    Mixed hyperlipidemia    Thyroid nodule  -     US thyroid; Future    Hypertriglyceridemia              Subjective:      Patient ID: Eugenio Hines is a 62 y o  female  CC:    Chief Complaint   Patient presents with    Follow-up     Pt is here as an ED follow up from 12/09/19 for numbness and tingling to left side of body  Pt states they told her she had a TIA  Pt denies TIA/ stroke symptoms  Pt denies one sided weakness   No facial droopiness  HPI:      Patient here today for follow-up of emergency room visit at 12/9 for paresthesias on 1 side on the left  She was diagnosed with a TIA status post MRI head neck and MRI ruling out CVA  She does have a history of CVA and was on aspirin therapy  Unfortunately she is intolerant to any cholesterol medication but she was still discharged on Crestor 10 mg every other day and Plavix 75 mg once daily  Incidentally there was a 1 cm right thyroid nodule detected on imaging which needs further workup  Neurology appointment is scheduled for 1/30/2020  She was instructed to have a CMP in 1 month  Pt has 4 episodes of a very short fleeting L sided "wave feeling" before going to the ER  She went to the ER this time as her symptoms were lasting longer than they had been  Since ER has had a couple episodes lasting only a couple seconds each of L sides "wave sensation"  The following portions of the patient's history were reviewed and updated as appropriate: allergies, current medications, past family history, past medical history, past social history, past surgical history and problem list       Review of Systems   Constitutional: Negative  HENT: Negative  Eyes: Negative  Respiratory: Negative  Cardiovascular: Negative  Gastrointestinal: Negative  Endocrine: Negative  Genitourinary: Negative  Musculoskeletal: Negative  Skin: Negative  Allergic/Immunologic: Negative  Neurological: Negative  Hematological: Negative  Psychiatric/Behavioral: Negative  Data to review:       Objective:    Vitals:    12/18/19 0805   BP: 124/66   BP Location: Left arm   Patient Position: Sitting   Cuff Size: Adult   Pulse: 82   Temp: 98 2 °F (36 8 °C)   TempSrc: Oral   Weight: 81 7 kg (180 lb 3 2 oz)   Height: 5' 6" (1 676 m)        Physical Exam   Constitutional: She is oriented to person, place, and time  She appears well-developed and well-nourished  No distress  HENT:   Head: Normocephalic and atraumatic  Eyes: Conjunctivae are normal  Right eye exhibits no discharge  Left eye exhibits no discharge  Neck: Neck supple  Carotid bruit is not present  Cardiovascular: Normal rate, regular rhythm and normal heart sounds  Exam reveals no gallop and no friction rub  No murmur heard  Pulmonary/Chest: Effort normal and breath sounds normal  No respiratory distress  She has no wheezes  She has no rales  Neurological: She is alert and oriented to person, place, and time  She has normal strength and normal reflexes  No cranial nerve deficit or sensory deficit  She displays a negative Romberg sign  Skin: Skin is warm and dry  She is not diaphoretic  Psychiatric: She has a normal mood and affect  Her speech is normal and behavior is normal  Judgment and thought content normal  Cognition and memory are normal    Nursing note and vitals reviewed

## 2019-12-18 NOTE — ASSESSMENT & PLAN NOTE
In-hospital non-fasting lipid panel showed triglycerides in the 600 LDL not able to be calculated  She was discharged on Crestor 10 mg every other day  Pt tolerating so far but in past has not been able to tolerate any lipid  Pt  needs to be referred to cardio if unable to tolerate statin as she now has comorbidities since the last lipid/cardio consult  Recheck lipid as schedules for 3 months

## 2019-12-19 DIAGNOSIS — H10.33 ACUTE BACTERIAL CONJUNCTIVITIS OF BOTH EYES: ICD-10-CM

## 2019-12-19 RX ORDER — ERYTHROMYCIN 5 MG/G
0.5 OINTMENT OPHTHALMIC 4 TIMES DAILY
Qty: 3.5 G | Refills: 0 | Status: SHIPPED | OUTPATIENT
Start: 2019-12-19 | End: 2020-05-20

## 2019-12-27 ENCOUNTER — HOSPITAL ENCOUNTER (OUTPATIENT)
Dept: RADIOLOGY | Facility: HOSPITAL | Age: 59
Discharge: HOME | End: 2019-12-27
Payer: COMMERCIAL

## 2019-12-27 DIAGNOSIS — E04.1 THYROID NODULE: ICD-10-CM

## 2019-12-27 PROCEDURE — 76536 US EXAM OF HEAD AND NECK: CPT

## 2020-02-14 ENCOUNTER — OFFICE VISIT (OUTPATIENT)
Dept: URGENT CARE | Facility: CLINIC | Age: 60
End: 2020-02-14
Payer: COMMERCIAL

## 2020-02-14 ENCOUNTER — APPOINTMENT (OUTPATIENT)
Dept: RADIOLOGY | Facility: CLINIC | Age: 60
End: 2020-02-14
Payer: COMMERCIAL

## 2020-02-14 VITALS
OXYGEN SATURATION: 99 % | DIASTOLIC BLOOD PRESSURE: 84 MMHG | SYSTOLIC BLOOD PRESSURE: 126 MMHG | RESPIRATION RATE: 18 BRPM | HEIGHT: 66 IN | HEART RATE: 72 BPM | WEIGHT: 180 LBS | BODY MASS INDEX: 28.93 KG/M2 | TEMPERATURE: 97.3 F

## 2020-02-14 DIAGNOSIS — S92.525A CLOSED NONDISPLACED FRACTURE OF MIDDLE PHALANX OF LESSER TOE OF LEFT FOOT, INITIAL ENCOUNTER: Primary | ICD-10-CM

## 2020-02-14 DIAGNOSIS — S99.922A FOOT INJURY, LEFT, INITIAL ENCOUNTER: ICD-10-CM

## 2020-02-14 PROCEDURE — 73630 X-RAY EXAM OF FOOT: CPT

## 2020-02-14 PROCEDURE — 99203 OFFICE O/P NEW LOW 30 MIN: CPT | Performed by: PREVENTIVE MEDICINE

## 2020-02-14 NOTE — PATIENT INSTRUCTIONS
Elevate the foot as much as possible  Ice as much as possible  Tylenol for pain  Weightbearing as tolerated  You can try taping several toes together

## 2020-02-14 NOTE — PROGRESS NOTES
[facility]        NAME: Citlali Wilde is a 62 y o  female  : 1960    MRN: [id number]  DATE: 2020  TIME: 6:20 PM    Assessment and Plan   Closed nondisplaced fracture of middle phalanx of lesser toe of left foot, initial encounter [S92 525A]  1  Closed nondisplaced fracture of middle phalanx of lesser toe of left foot, initial encounter     2  Foot injury, left, initial encounter  XR foot 3+ vw left         Patient Instructions       Follow up with PCP in 3-5 days  Proceed to  ER if symptoms worsen  Chief Complaint     Chief Complaint   Patient presents with    Foot Pain     left foot pain s/p bumping against steel chair on Wednesday  Pain and ecchymosis to base of left middle toe  Pain is a 0 at rest; a 7 when weight-bearing  Taking tylenol only (on Plavix)  History of Present Illness       On Wednesday she bumped her left foot against a chair she now has pain at the base of her 3rd toe left foot      Review of Systems   Review of Systems   Musculoskeletal: Positive for gait problem and joint swelling           Current Medications       Current Outpatient Medications:     aspirin 81 mg chewable tablet, Chew 81 mg, Disp: , Rfl:     budesonide (PULMICORT) 0 5 mg/2 mL nebulizer solution, Put the contents of your budesonide ampule into your Neilmed Sinus Rinse Bottle and lavage nose twice a day , Disp: , Rfl:     cholecalciferol (VITAMIN D3) 400 units tablet, Take 400 Units by mouth daily, Disp: , Rfl:     clopidogrel (PLAVIX) 75 mg tablet, Take 75 mg by mouth daily, Disp: , Rfl:     erythromycin (ILOTYCIN) ophthalmic ointment, Administer 0 5 inches to both eyes 4 (four) times a day, Disp: 3 5 g, Rfl: 0    glimepiride (AMARYL) 2 mg tablet, TAKE 1/2 TABLET BY MOUTH TWICE DAILY WITH MEALS, Disp: 90 tablet, Rfl: 1    lisinopril (ZESTRIL) 10 mg tablet, TAKE ONE TABLET BY MOUTH EVERY DAY, Disp: 90 tablet, Rfl: 3    metFORMIN (GLUCOPHAGE) 1000 MG tablet, TAKE ONE TABLET BY MOUTH TWICE A DAY WITH MEALS, Disp: 180 tablet, Rfl: 1    Multiple Vitamin (MULTI-VITAMIN DAILY PO), Take 1 tablet by mouth daily, Disp: , Rfl:     Omega-3 Fatty Acids (FISH OIL) 1,000 mg, Take 2 capsules by mouth daily, Disp: , Rfl:     Probiotic Product (PROBIOTIC-10 PO), Take by mouth, Disp: , Rfl:     azelastine (ASTELIN) 0 1 % nasal spray, 1-2 sprays in each nostril twice daily, Disp: 1 Bottle, Rfl: 11    ipratropium (ATROVENT) 0 03 % nasal spray, 2 sprays into each nostril every 12 (twelve) hours (Patient not taking: Reported on 11/12/2019), Disp: 30 mL, Rfl: 5    omeprazole (PriLOSEC) 20 mg delayed release capsule, Take 1 capsule (20 mg total) by mouth daily, Disp: 30 capsule, Rfl: 5    rosuvastatin (CRESTOR) 10 MG tablet, Take 10 mg by mouth, Disp: , Rfl:     Current Allergies     Allergies as of 02/14/2020 - Reviewed 02/14/2020   Allergen Reaction Noted    Bactrim [sulfamethoxazole-trimethoprim]  05/08/2019    Neomycin-bacitracin zn-polymyx Other (See Comments) 02/09/2017            The following portions of the patient's history were reviewed and updated as appropriate: allergies, current medications, past family history, past medical history, past social history, past surgical history and problem list      No past medical history on file  Past Surgical History:   Procedure Laterality Date    CHOLECYSTECTOMY LAPAROSCOPIC      MOHS SURGERY      Micrographic Surgery Nose, Last assessed: 2/13/17    TOTAL KNEE ARTHROPLASTY Left 03/08/2017    Last assessed: 12/26/17    TUBAL LIGATION         Family History   Problem Relation Age of Onset    Diabetes Father     Hypertension Father     Anemia Daughter     Breast cancer Other          Medications have been verified          Objective   /84   Pulse 72   Temp (!) 97 3 °F (36 3 °C)   Resp 18   Ht 5' 6" (1 676 m)   Wt 81 6 kg (180 lb)   SpO2 99%   BMI 29 05 kg/m²        Physical Exam     Physical Exam   Musculoskeletal:   Hematoma at base of left 3rd toe       X-ray reveals transverse fracture proximal phalanx

## 2020-02-21 ENCOUNTER — OFFICE VISIT (OUTPATIENT)
Dept: URGENT CARE | Facility: CLINIC | Age: 60
End: 2020-02-21
Payer: COMMERCIAL

## 2020-02-21 VITALS
HEIGHT: 66 IN | HEART RATE: 81 BPM | WEIGHT: 182 LBS | OXYGEN SATURATION: 98 % | BODY MASS INDEX: 29.25 KG/M2 | TEMPERATURE: 97.4 F | RESPIRATION RATE: 20 BRPM | DIASTOLIC BLOOD PRESSURE: 88 MMHG | SYSTOLIC BLOOD PRESSURE: 139 MMHG

## 2020-02-21 DIAGNOSIS — R07.9 CHEST PAIN, UNSPECIFIED TYPE: Primary | ICD-10-CM

## 2020-02-21 PROCEDURE — 93005 ELECTROCARDIOGRAM TRACING: CPT | Performed by: PHYSICIAN ASSISTANT

## 2020-02-21 PROCEDURE — 99213 OFFICE O/P EST LOW 20 MIN: CPT | Performed by: PHYSICIAN ASSISTANT

## 2020-02-21 NOTE — PROGRESS NOTES
[facility]        NAME: Cameron Torres is a 62 y o  female  : 1960    MRN: [id number]  DATE: 2020  TIME: 12:57 PM    Assessment and Plan   Chest pain, unspecified type [R07 9]  1  Chest pain, unspecified type  Transfer to other facility     Pt is not in distress  She drove herself to urgent care  Plan to transfer her to ER for cardiac work up  Pt refused EMS transport and has a friend to drive her  Patient Instructions   Patient Instructions   Go directly to ER for further evaluation  Chest Pain   WHAT YOU NEED TO KNOW:   Chest pain can be caused by a range of conditions, from not serious to life-threatening  Chest pain can be a symptom of a digestive problem, such as acid reflux or a stomach ulcer  An anxiety attack or a strong emotion, such as anger, can also cause chest pain  Infection, inflammation, or a fracture in the bones or cartilage in your chest can cause pain or discomfort  Sometimes chest pain or pressure is caused by poor blood flow to your heart (angina)  Chest pain may also be caused by life-threatening conditions such as a heart attack or blood clot in your lungs  DISCHARGE INSTRUCTIONS:   Call 911 if:   · You have any of the following signs of a heart attack:      ¨ Squeezing, pressure, or pain in your chest that lasts longer than 5 minutes or returns    ¨ Discomfort or pain in your back, neck, jaw, stomach, or arm     ¨ Trouble breathing    ¨ Nausea or vomiting    ¨ Lightheadedness or a sudden cold sweat, especially with chest pain or trouble breathing    Return to the emergency department if:   · You have chest discomfort that gets worse, even with medicine  · You cough or vomit blood  · Your bowel movements are black or bloody  · You cannot stop vomiting, or it hurts to swallow  Contact your healthcare provider if:   · You have questions or concerns about your condition or care      Medicines:   · Medicines  may be given to treat the cause of your chest pain  Examples include pain medicine, anxiety medicine, or medicines to increase blood flow to your heart  · Do not take certain medicines without asking your healthcare provider first   These include NSAIDs, herbal or vitamin supplements, or hormones (estrogen or progestin)  · Take your medicine as directed  Contact your healthcare provider if you think your medicine is not helping or if you have side effects  Tell him or her if you are allergic to any medicine  Keep a list of the medicines, vitamins, and herbs you take  Include the amounts, and when and why you take them  Bring the list or the pill bottles to follow-up visits  Carry your medicine list with you in case of an emergency  Follow up with your healthcare provider within 72 hours, or as directed: You may need to return for more tests to find the cause of your chest pain  You may be referred to a specialist, such as a cardiologist or gastroenterologist  Write down your questions so you remember to ask them during your visits  Healthy living tips: The following are general healthy guidelines  If your chest pain is caused by a heart problem, your healthcare provider will give you specific guidelines to follow  · Do not smoke  Nicotine and other chemicals in cigarettes and cigars can cause lung and heart damage  Ask your healthcare provider for information if you currently smoke and need help to quit  E-cigarettes or smokeless tobacco still contain nicotine  Talk to your healthcare provider before you use these products  · Eat a variety of healthy, low-fat foods  Healthy foods include fruits, vegetables, whole-grain breads, low-fat dairy products, beans, lean meats, and fish  Ask for more information about a heart healthy diet  · Ask about activity  Your healthcare provider will tell you which activities to limit or avoid  Ask when you can drive, return to work, and have sex   Ask about the best exercise plan for you     · Maintain a healthy weight  Ask your healthcare provider how much you should weigh  Ask him or her to help you create a weight loss plan if you are overweight  · Get the flu and pneumonia vaccines  All adults should get the influenza (flu) vaccine  Get it every year as soon as it becomes available  The pneumococcal vaccine is given to adults aged 72 years or older  The vaccine is given every 5 years to prevent pneumococcal disease, such as pneumonia  © 2017 [employer] Information is for End User's use only and may not be sold, redistributed or otherwise used for commercial purposes  All illustrations and images included in CareNotes® are the copyrighted property of [employer]  or [employer]  The above information is an  only  It is not intended as medical advice for individual conditions or treatments  Talk to your doctor, nurse or pharmacist before following any medical regimen to see if it is safe and effective for you  Proceed to  ER if symptoms worsen  Chief Complaint     Chief Complaint   Patient presents with    Chest Pain     patient reports 2 years ago she had a CVA with mild results, this am she woke up "fluttery chest pressure" which is constant, pain level of a 5 along with slight nausea  took Tylenol at 0800 with no relief  History of Present Illness       Patient is a 58-year-old female with history of hypertension, type 2 diabetes, CVA 2 years ago and a TIA this past December presents for evaluation of chest pressure and fluttering feeling that began upon waking this morning  She rates her pain as a 5/10  She denies that the pain radiates  No jaw pain or arm pain  She does have some nausea no vomiting or diaphoresis  She does take aspirin and Plavix daily  Review of Systems   Review of Systems   Constitutional: Positive for fatigue  HENT: Negative  Eyes: Negative      Respiratory: Negative for shortness of breath  Cardiovascular: Positive for chest pain and palpitations  Negative for leg swelling  Gastrointestinal: Positive for nausea  Musculoskeletal: Negative  Skin: Negative  Neurological: Negative            Current Medications       Current Outpatient Medications:     aspirin 81 mg chewable tablet, Chew 81 mg, Disp: , Rfl:     budesonide (PULMICORT) 0 5 mg/2 mL nebulizer solution, Put the contents of your budesonide ampule into your Neilmed Sinus Rinse Bottle and lavage nose twice a day , Disp: , Rfl:     cholecalciferol (VITAMIN D3) 400 units tablet, Take 400 Units by mouth daily, Disp: , Rfl:     clopidogrel (PLAVIX) 75 mg tablet, Take 75 mg by mouth daily, Disp: , Rfl:     erythromycin (ILOTYCIN) ophthalmic ointment, Administer 0 5 inches to both eyes 4 (four) times a day, Disp: 3 5 g, Rfl: 0    glimepiride (AMARYL) 2 mg tablet, TAKE 1/2 TABLET BY MOUTH TWICE DAILY WITH MEALS, Disp: 90 tablet, Rfl: 1    lisinopril (ZESTRIL) 10 mg tablet, TAKE ONE TABLET BY MOUTH EVERY DAY, Disp: 90 tablet, Rfl: 3    metFORMIN (GLUCOPHAGE) 1000 MG tablet, TAKE ONE TABLET BY MOUTH TWICE A DAY WITH MEALS, Disp: 180 tablet, Rfl: 1    Multiple Vitamin (MULTI-VITAMIN DAILY PO), Take 1 tablet by mouth daily, Disp: , Rfl:     Omega-3 Fatty Acids (FISH OIL) 1,000 mg, Take 2 capsules by mouth daily, Disp: , Rfl:     Probiotic Product (PROBIOTIC-10 PO), Take by mouth, Disp: , Rfl:     rosuvastatin (CRESTOR) 10 MG tablet, Take 10 mg by mouth, Disp: , Rfl:     azelastine (ASTELIN) 0 1 % nasal spray, 1-2 sprays in each nostril twice daily, Disp: 1 Bottle, Rfl: 11    ipratropium (ATROVENT) 0 03 % nasal spray, 2 sprays into each nostril every 12 (twelve) hours (Patient not taking: Reported on 11/12/2019), Disp: 30 mL, Rfl: 5    omeprazole (PriLOSEC) 20 mg delayed release capsule, Take 1 capsule (20 mg total) by mouth daily, Disp: 30 capsule, Rfl: 5    Current Allergies     Allergies as of 02/21/2020 - Reviewed 02/21/2020   Allergen Reaction Noted    Bactrim [sulfamethoxazole-trimethoprim]  05/08/2019    Neomycin-bacitracin zn-polymyx Other (See Comments) 02/09/2017            The following portions of the patient's history were reviewed and updated as appropriate: allergies, current medications, past family history, past medical history, past social history, past surgical history and problem list      Past Medical History:   Diagnosis Date    Diabetes mellitus ([facility] [address] )     Hypertension     Stroke (cerebrum) ([facility] [address] )        Past Surgical History:   Procedure Laterality Date    CHOLECYSTECTOMY LAPAROSCOPIC      MOHS SURGERY      Micrographic Surgery Nose, Last assessed: 2/13/17    TOTAL KNEE ARTHROPLASTY Left 03/08/2017    Last assessed: 12/26/17    TUBAL LIGATION         Family History   Problem Relation Age of Onset    Diabetes Father     Hypertension Father     Anemia Daughter     Breast cancer Other          Medications have been verified  Objective   /88   Pulse 81   Temp (!) 97 4 °F (36 3 °C)   Resp 20   Ht 5' 6" (1 676 m)   Wt 82 6 kg (182 lb)   SpO2 98%   BMI 29 38 kg/m²        Physical Exam     Physical Exam   Constitutional: She is oriented to person, place, and time  She appears well-developed  Cardiovascular: Normal rate and normal pulses  HR irregular    Pulmonary/Chest: Effort normal and breath sounds normal    Abdominal: Soft  Normal appearance  There is no tenderness  Musculoskeletal: Normal range of motion  Neurological: She is alert and oriented to person, place, and time  She has normal strength  No cranial nerve deficit or sensory deficit  Skin: Skin is warm and dry  She is not diaphoretic  Vitals reviewed

## 2020-02-21 NOTE — PATIENT INSTRUCTIONS
Go directly to ER for further evaluation  Chest Pain   WHAT YOU NEED TO KNOW:   Chest pain can be caused by a range of conditions, from not serious to life-threatening  Chest pain can be a symptom of a digestive problem, such as acid reflux or a stomach ulcer  An anxiety attack or a strong emotion, such as anger, can also cause chest pain  Infection, inflammation, or a fracture in the bones or cartilage in your chest can cause pain or discomfort  Sometimes chest pain or pressure is caused by poor blood flow to your heart (angina)  Chest pain may also be caused by life-threatening conditions such as a heart attack or blood clot in your lungs  DISCHARGE INSTRUCTIONS:   Call 911 if:   · You have any of the following signs of a heart attack:      ¨ Squeezing, pressure, or pain in your chest that lasts longer than 5 minutes or returns    ¨ Discomfort or pain in your back, neck, jaw, stomach, or arm     ¨ Trouble breathing    ¨ Nausea or vomiting    ¨ Lightheadedness or a sudden cold sweat, especially with chest pain or trouble breathing    Return to the emergency department if:   · You have chest discomfort that gets worse, even with medicine  · You cough or vomit blood  · Your bowel movements are black or bloody  · You cannot stop vomiting, or it hurts to swallow  Contact your healthcare provider if:   · You have questions or concerns about your condition or care  Medicines:   · Medicines  may be given to treat the cause of your chest pain  Examples include pain medicine, anxiety medicine, or medicines to increase blood flow to your heart  · Do not take certain medicines without asking your healthcare provider first   These include NSAIDs, herbal or vitamin supplements, or hormones (estrogen or progestin)  · Take your medicine as directed  Contact your healthcare provider if you think your medicine is not helping or if you have side effects   Tell him or her if you are allergic to any medicine  Keep a list of the medicines, vitamins, and herbs you take  Include the amounts, and when and why you take them  Bring the list or the pill bottles to follow-up visits  Carry your medicine list with you in case of an emergency  Follow up with your healthcare provider within 72 hours, or as directed: You may need to return for more tests to find the cause of your chest pain  You may be referred to a specialist, such as a cardiologist or gastroenterologist  Write down your questions so you remember to ask them during your visits  Healthy living tips: The following are general healthy guidelines  If your chest pain is caused by a heart problem, your healthcare provider will give you specific guidelines to follow  · Do not smoke  Nicotine and other chemicals in cigarettes and cigars can cause lung and heart damage  Ask your healthcare provider for information if you currently smoke and need help to quit  E-cigarettes or smokeless tobacco still contain nicotine  Talk to your healthcare provider before you use these products  · Eat a variety of healthy, low-fat foods  Healthy foods include fruits, vegetables, whole-grain breads, low-fat dairy products, beans, lean meats, and fish  Ask for more information about a heart healthy diet  · Ask about activity  Your healthcare provider will tell you which activities to limit or avoid  Ask when you can drive, return to work, and have sex  Ask about the best exercise plan for you  · Maintain a healthy weight  Ask your healthcare provider how much you should weigh  Ask him or her to help you create a weight loss plan if you are overweight  · Get the flu and pneumonia vaccines  All adults should get the influenza (flu) vaccine  Get it every year as soon as it becomes available  The pneumococcal vaccine is given to adults aged 72 years or older  The vaccine is given every 5 years to prevent pneumococcal disease, such as pneumonia    © 2017 [facility] [address] is for End User's use only and may not be sold, redistributed or otherwise used for commercial purposes  All illustrations and images included in CareNotes® are the copyrighted property of A D A M , Inc  or Daryl Parham  The above information is an  only  It is not intended as medical advice for individual conditions or treatments  Talk to your doctor, nurse or pharmacist before following any medical regimen to see if it is safe and effective for you

## 2020-02-24 LAB
ATRIAL RATE: 103 BPM
P AXIS: 18 DEGREES
PR INTERVAL: 186 MS
QRS AXIS: 79 DEGREES
QRSD INTERVAL: 82 MS
QT INTERVAL: 344 MS
QTC INTERVAL: 450 MS
T WAVE AXIS: -16 DEGREES
VENTRICULAR RATE: 103 BPM

## 2020-02-24 PROCEDURE — 93010 ELECTROCARDIOGRAM REPORT: CPT | Performed by: INTERNAL MEDICINE

## 2020-03-03 LAB
LEFT EYE DIABETIC RETINOPATHY: NORMAL
RIGHT EYE DIABETIC RETINOPATHY: NORMAL

## 2020-03-11 ENCOUNTER — OFFICE VISIT (OUTPATIENT)
Dept: FAMILY MEDICINE CLINIC | Facility: CLINIC | Age: 60
End: 2020-03-11
Payer: COMMERCIAL

## 2020-03-11 VITALS
SYSTOLIC BLOOD PRESSURE: 120 MMHG | WEIGHT: 177.8 LBS | DIASTOLIC BLOOD PRESSURE: 70 MMHG | HEART RATE: 90 BPM | BODY MASS INDEX: 28.57 KG/M2 | TEMPERATURE: 97.6 F | HEIGHT: 66 IN | RESPIRATION RATE: 18 BRPM

## 2020-03-11 DIAGNOSIS — E11.8 TYPE 2 DIABETES MELLITUS WITH COMPLICATION, WITHOUT LONG-TERM CURRENT USE OF INSULIN (HCC): ICD-10-CM

## 2020-03-11 DIAGNOSIS — L40.9 PSORIASIS: Primary | ICD-10-CM

## 2020-03-11 PROBLEM — I49.3 FREQUENT PVCS: Status: ACTIVE | Noted: 2020-02-22

## 2020-03-11 PROBLEM — Z86.73 HISTORY OF TIA (TRANSIENT ISCHEMIC ATTACK): Status: ACTIVE | Noted: 2020-02-01

## 2020-03-11 PROBLEM — I67.9 CEREBROVASCULAR DISEASE: Status: ACTIVE | Noted: 2020-02-01

## 2020-03-11 PROBLEM — Z78.9 STATIN INTOLERANCE: Status: ACTIVE | Noted: 2020-02-01

## 2020-03-11 PROBLEM — R00.2 PALPITATIONS: Status: ACTIVE | Noted: 2020-02-22

## 2020-03-11 PROCEDURE — 1036F TOBACCO NON-USER: CPT | Performed by: PHYSICIAN ASSISTANT

## 2020-03-11 PROCEDURE — 99213 OFFICE O/P EST LOW 20 MIN: CPT | Performed by: PHYSICIAN ASSISTANT

## 2020-03-11 PROCEDURE — 3066F NEPHROPATHY DOC TX: CPT | Performed by: PHYSICIAN ASSISTANT

## 2020-03-11 PROCEDURE — 3078F DIAST BP <80 MM HG: CPT | Performed by: PHYSICIAN ASSISTANT

## 2020-03-11 PROCEDURE — 3074F SYST BP LT 130 MM HG: CPT | Performed by: PHYSICIAN ASSISTANT

## 2020-03-11 PROCEDURE — 3008F BODY MASS INDEX DOCD: CPT | Performed by: PHYSICIAN ASSISTANT

## 2020-03-11 PROCEDURE — 2022F DILAT RTA XM EVC RTNOPTHY: CPT | Performed by: PHYSICIAN ASSISTANT

## 2020-03-11 RX ORDER — EZETIMIBE 10 MG/1
TABLET ORAL
COMMUNITY
Start: 2020-02-22 | End: 2020-03-11

## 2020-03-11 RX ORDER — TRIAMCINOLONE ACETONIDE 1 MG/G
CREAM TOPICAL 2 TIMES DAILY
Qty: 30 G | Refills: 5 | Status: SHIPPED | OUTPATIENT
Start: 2020-03-11

## 2020-03-11 NOTE — ASSESSMENT & PLAN NOTE
Pt has been experiencing low glucose now that she is on a very low carb diet  PT instructed to hold amaryl for now  If needed in future will decrease metformin     Lab Results   Component Value Date    HGBA1C 7 2 (H) 12/09/2019

## 2020-03-11 NOTE — ASSESSMENT & PLAN NOTE
Spreading from scalp to bilateral ears  Has not had topical treatment in a while  Trial topical steroid as directed

## 2020-03-11 NOTE — PATIENT INSTRUCTIONS
Problem List Items Addressed This Visit        Musculoskeletal and Integument    Psoriasis - Primary     Spreading from scalp to bilateral ears  Has not had topical treatment in a while  Trial topical steroid as directed            Relevant Medications    triamcinolone (KENALOG) 0 1 % cream

## 2020-03-11 NOTE — PROGRESS NOTES
Assessment and Plan:    Problem List Items Addressed This Visit        Endocrine    Type 2 diabetes mellitus with complication, without long-term current use of insulin ([address] )     Pt has been experiencing low glucose now that she is on a very low carb diet  PT instructed to hold amaryl for now  If needed in future will decrease metformin  Lab Results   Component Value Date    HGBA1C 7 2 (H) 12/09/2019               Musculoskeletal and Integument    Psoriasis - Primary     Spreading from scalp to bilateral ears  Has not had topical treatment in a while  Trial topical steroid as directed  Relevant Medications    triamcinolone (KENALOG) 0 1 % cream                 Diagnoses and all orders for this visit:    Psoriasis  -     triamcinolone (KENALOG) 0 1 % cream; Apply topically 2 (two) times a day    Type 2 diabetes mellitus with complication, without long-term current use of insulin ([facility])    Other orders  -     Discontinue: ezetimibe (ZETIA) 10 mg tablet              Subjective:      Patient ID: Romaine Marquis is a 62 y o  female  CC:    Chief Complaint   Patient presents with    Rash     pt has a rash around both ears, Derm gave pt Metro gel  and pt says it is not working   Blood Sugar Problem     pt wishes to discuss blood sugar       HPI:    Patient here for 2 things today  She does have a history of psoriasis more notable on her scalp but for the past several weeks she has had redness itching and irritability circling her ears on her face and on her ears as well both stemming from her psoriasis from her scalp edge  She did see dermatology and they gave her prescription for metronidazole cream which is usually for rosacea which is not doing anything    She also has been in the hospital lately with palpitations and does have a follow-up with cardiology next week but she decided after this episode last in the hospital that she was going to really take cold of her diet and she has dramatically cut out simple carbohydrates and other carbs and now she is finding that her blood glucose levels have been going too low and she is not feeling well  She wants to know which medications need to be cut back or stopped an order for her to remain feeling better and avoiding low sugars  The following portions of the patient's history were reviewed and updated as appropriate: allergies, current medications, past family history, past medical history, past social history, past surgical history and problem list       Review of Systems   Constitutional: Negative  HENT: Negative  Eyes: Negative  Respiratory: Negative  Cardiovascular: Negative  Gastrointestinal: Negative  Endocrine: Negative  Genitourinary: Negative  Musculoskeletal: Negative  Skin: Positive for rash  Allergic/Immunologic: Negative  Neurological: Negative  Hematological: Negative  Psychiatric/Behavioral: Negative  Data to review:       Objective:    Vitals:    03/11/20 1433   BP: 120/70   BP Location: Left arm   Patient Position: Sitting   Cuff Size: Adult   Pulse: 90   Resp: 18   Temp: 97 6 °F (36 4 °C)   TempSrc: Temporal   Weight: 80 6 kg (177 lb 12 8 oz)   Height: 5' 6" (1 676 m)        Physical Exam   Constitutional: She is oriented to person, place, and time  She appears well-developed and well-nourished  No distress  HENT:   Head: Normocephalic and atraumatic  Eyes: Conjunctivae are normal  Right eye exhibits no discharge  Left eye exhibits no discharge  Neck: Neck supple  Carotid bruit is not present  Cardiovascular: Normal rate, regular rhythm and normal heart sounds  Exam reveals no gallop and no friction rub  No murmur heard  Pulmonary/Chest: Effort normal and breath sounds normal  No respiratory distress  She has no wheezes  She has no rales  Neurological: She is alert and oriented to person, place, and time  Skin: Skin is warm and dry  She is not diaphoretic     Entire scalp does have a mild amount of psoriasis but it does look controlled and not angry stemming from the scalp above bilateral ears is a well-demarcated erythematous rash going down around each ear and back to the scalp and involving the ear itself with dryness and flakiness  Psychiatric: She has a normal mood and affect  Judgment normal    Nursing note and vitals reviewed

## 2020-03-23 ENCOUNTER — TELEMEDICINE (OUTPATIENT)
Dept: FAMILY MEDICINE CLINIC | Facility: CLINIC | Age: 60
End: 2020-03-23
Payer: COMMERCIAL

## 2020-03-23 DIAGNOSIS — J01.11 ACUTE RECURRENT FRONTAL SINUSITIS: Primary | ICD-10-CM

## 2020-03-23 PROCEDURE — G2012 BRIEF CHECK IN BY MD/QHP: HCPCS | Performed by: PHYSICIAN ASSISTANT

## 2020-03-23 RX ORDER — CEFUROXIME AXETIL 250 MG/1
250 TABLET ORAL EVERY 12 HOURS SCHEDULED
Qty: 20 TABLET | Refills: 0 | Status: SHIPPED | OUTPATIENT
Start: 2020-03-23 | End: 2020-04-02

## 2020-03-23 NOTE — PATIENT INSTRUCTIONS
Problem List Items Addressed This Visit        Respiratory    Acute recurrent frontal sinusitis - Primary     Ceftin 250 mg 1 twice daily for 10 days increase fluids  I have instructed patient to add back her Pulmicort her sinus rinses to make a medicated rinse this was ordered by ENT and patient has not done this for about 1 month now  Patient to call back if any of her symptoms worsen  Relevant Medications    cefuroxime (CEFTIN) 250 mg tablet        Sinusitis   AMBULATORY CARE:   Sinusitis  is inflammation or infection of your sinuses  It is most often caused by a virus  Acute sinusitis may last up to 12 weeks  Chronic sinusitis lasts longer than 12 weeks  Recurrent sinusitis means you have 4 or more times in 1 year  Common symptoms include the following:   · Fever    · Pain, pressure, redness, or swelling around the forehead, cheeks, or eyes    · Thick yellow or green discharge from your nose    · Tenderness when you touch your face over your sinuses    · Dry cough that happens mostly at night or when you lie down    · Headache and face pain that is worse when you lean forward    · Tooth pain, or pain when you chew  Seek care immediately if:   · Your eye and eyelid are red, swollen, and painful  · You cannot open your eye  · You have vision changes, such as double vision  · Your eyeball bulges out or you cannot move your eye  · You are more sleepy than normal, or you notice changes in your ability to think, move, or talk  · You have a stiff neck, a fever, or a bad headache  · You have swelling of your forehead or scalp  Contact your healthcare provider if:   · Your symptoms do not improve after 3 days  · Your symptoms do not go away after 10 days  · You have nausea and are vomiting  · Your nose is bleeding  · You have questions or concerns about your condition or care  Treatment for sinusitis:  Your symptoms may go away on their own   Your healthcare provider may recommend watchful waiting for up to 10 days before starting antibiotics  You may  need any of the following:  · Acetaminophen  decreases pain and fever  It is available without a doctor's order  Ask how much to take and how often to take it  Follow directions  Read the labels of all other medicines you are using to see if they also contain acetaminophen, or ask your doctor or pharmacist  Acetaminophen can cause liver damage if not taken correctly  Do not use more than 4 grams (4,000 milligrams) total of acetaminophen in one day  · NSAIDs , such as ibuprofen, help decrease swelling, pain, and fever  This medicine is available with or without a doctor's order  NSAIDs can cause stomach bleeding or kidney problems in certain people  If you take blood thinner medicine, always ask your healthcare provider if NSAIDs are safe for you  Always read the medicine label and follow directions  · Nasal steroid sprays  may help decrease inflammation in your nose and sinuses  · Decongestants  help reduce swelling and drain mucus in the nose and sinuses  They may help you breathe easier  · Antihistamines  help dry mucus in the nose and relieve sneezing  · Antibiotics  help treat or prevent a bacterial infection  · Take your medicine as directed  Contact your healthcare provider if you think your medicine is not helping or if you have side effects  Tell him or her if you are allergic to any medicine  Keep a list of the medicines, vitamins, and herbs you take  Include the amounts, and when and why you take them  Bring the list or the pill bottles to follow-up visits  Carry your medicine list with you in case of an emergency  Self-care:   · Rinse your sinuses  Use a sinus rinse device to rinse your nasal passages with a saline (salt water) solution or distilled water  Do not use tap water  This will help thin the mucus in your nose and rinse away pollen and dirt   It will also help reduce swelling so you can breathe normally  Ask your healthcare provider how often to do this  · Breathe in steam   Heat a bowl of water until you see steam  Lean over the bowl and make a tent over your head with a large towel  Breathe deeply for about 20 minutes  Be careful not to get too close to the steam or burn yourself  Do this 3 times a day  You can also breathe deeply when you take a hot shower  · Sleep with your head elevated  Place an extra pillow under your head before you go to sleep to help your sinuses drain  · Drink liquids as directed  Ask your healthcare provider how much liquid to drink each day and which liquids are best for you  Liquids will thin the mucus in your nose and help it drain  Avoid drinks that contain alcohol or caffeine  · Do not smoke, and avoid secondhand smoke  Nicotine and other chemicals in cigarettes and cigars can make your symptoms worse  Ask your healthcare provider for information if you currently smoke and need help to quit  E-cigarettes or smokeless tobacco still contain nicotine  Talk to your healthcare provider before you use these products  Prevent the spread of germs that cause sinusitis:  Wash your hands often with soap and water  Wash your hands after you use the bathroom, change a child's diaper, or sneeze  Wash your hands before you prepare or eat food  Follow up with your healthcare provider as directed: You may be referred to an ear, nose, and throat specialist  Write down your questions so you remember to ask them during your visits  © 2017 [address] Information is for End User's use only and may not be sold, redistributed or otherwise used for commercial purposes  All illustrations and images included in CareNotes® are the copyrighted property of [employer] , [employer]  or Daryl Parham  The above information is an  only  It is not intended as medical advice for individual conditions or treatments   Talk to your doctor, nurse or pharmacist before following any medical regimen to see if it is safe and effective for you

## 2020-03-23 NOTE — PROGRESS NOTES
Virtual Regular Visit    Problem List Items Addressed This Visit        Respiratory    Acute recurrent frontal sinusitis - Primary     Ceftin 250 mg 1 twice daily for 10 days increase fluids  I have instructed patient to add back her Pulmicort her sinus rinses to make a medicated rinse this was ordered by ENT and patient has not done this for about 1 month now  Patient to call back if any of her symptoms worsen  Relevant Medications    cefuroxime (CEFTIN) 250 mg tablet               Reason for visit is sinus infection symptoms  Encounter provider Rangel Patel PA-C    Provider located at [address] [place] [address] PRIMARY CARE  [address] [place] [address]      Recent Visits  No visits were found meeting these conditions  Showing recent visits within past 7 days and meeting all other requirements     Future Appointments  No visits were found meeting these conditions  Showing future appointments within next 150 days and meeting all other requirements        After connecting through [employer], the patient was identified by name and date of birth  Kristan Nicholas was informed that this is a telemedicine visit and that the visit is being conducted through telephone which may not be secure and therefore, might not be HIPAA-compliant  My office door was closed  No one else was in the room  She acknowledged consent and understanding of privacy and security of the video platform  The patient has agreed to participate and understands they can discontinue the visit at any time  Subjective  Kristan Nicholas is a 62 y o  female pt        Past Medical History:   Diagnosis Date    Diabetes mellitus ([facility] [address] )     Hypertension     [facility]roke [facility]cerebrum[facility])        Past Surgical History:   Procedure Laterality Date    CHOLECYSTECTOMY LAPAROSCOPIC      MOHS SURGERY      Micrographic Surgery Nose, Last assessed: 2/13/17    TOTAL KNEE ARTHROPLASTY Left 03/08/2017    Last assessed: 12/26/17    TUBAL LIGATION         Current Outpatient Medications   Medication Sig Dispense Refill    aspirin 81 mg chewable tablet Chew 81 mg      budesonide (PULMICORT) 0 5 mg/2 mL nebulizer solution Put the contents of your budesonide ampule into your [employer] Sinus Rinse Bottle and lavage nose twice a day   cefuroxime (CEFTIN) 250 mg tablet Take 1 tablet (250 mg total) by mouth every 12 (twelve) hours for 10 days 20 tablet 0    cholecalciferol (VITAMIN D3) 400 units tablet Take 400 Units by mouth daily      clopidogrel (PLAVIX) 75 mg tablet Take 75 mg by mouth daily      erythromycin (ILOTYCIN) ophthalmic ointment Administer 0 5 inches to both eyes 4 (four) times a day 3 5 g 0    lisinopril (ZESTRIL) 10 mg tablet TAKE ONE TABLET BY MOUTH EVERY DAY 90 tablet 3    metFORMIN (GLUCOPHAGE) 1000 MG tablet TAKE ONE TABLET BY MOUTH TWICE A DAY WITH MEALS 180 tablet 1    Multiple Vitamin (MULTI-VITAMIN DAILY PO) Take 1 tablet by mouth daily      Omega-3 Fatty Acids (FISH OIL) 1,000 mg Take 2 capsules by mouth daily      Probiotic Product (PROBIOTIC-10 PO) Take by mouth      triamcinolone (KENALOG) 0 1 % cream Apply topically 2 (two) times a day 30 g 5     No current facility-administered medications for this visit  Allergies   Allergen Reactions    Bactrim [Sulfamethoxazole-Trimethoprim]     Neomycin-Bacitracin Zn-Polymyx Other (See Comments)     REDNESS       Review of Systems   Constitutional: Negative  Negative for fever  HENT: Positive for congestion, postnasal drip, rhinorrhea and sinus pressure  Negative for ear pain  Eyes: Negative  Respiratory: Positive for cough  Cardiovascular: Negative  Gastrointestinal: Positive for nausea  Endocrine: Negative  Genitourinary: Negative  Musculoskeletal: Negative  Skin: Negative  Allergic/Immunologic: Negative  Neurological: Positive for headaches  Hematological: Negative  Psychiatric/Behavioral: Negative            I spent 10 minutes with the patient during this visit  HPI  This is a telephone call visit today  Patient does have a history of sinus infections then recurrence  She does see ENT on occasion and he did have her on a medicated rinse with Pulmicort which she has not done for about a month now  At this point she is just doing plain rinses  She is having a lot of very very thick colored mucus when she is able to get out of her sinuses she is having a lot of pain pressure headaches no fever no nausea vomiting or diarrhea  No one else in the household is sick with similar symptoms  She has been now fighting this for over 2 weeks at this point and is wondering if she needs an antibiotic  COVID 19 Instructions    Mariel Braden was advised to limit contact with others to essential tasks such as getting food, medications, and medical care  Proper handwashing reviewed, and Hand sanitzer when washing is not available  If the patient develops symptoms of COVID 19, the patient should call the office as soon as possible            Virtual Visit expected code: [id number]

## 2020-03-23 NOTE — ASSESSMENT & PLAN NOTE
Ceftin 250 mg 1 twice daily for 10 days increase fluids  I have instructed patient to add back her Pulmicort her sinus rinses to make a medicated rinse this was ordered by ENT and patient has not done this for about 1 month now  Patient to call back if any of her symptoms worsen

## 2020-05-20 ENCOUNTER — TELEPHONE (OUTPATIENT)
Dept: FAMILY MEDICINE CLINIC | Facility: CLINIC | Age: 60
End: 2020-05-20

## 2020-05-20 ENCOUNTER — TELEMEDICINE (OUTPATIENT)
Dept: FAMILY MEDICINE CLINIC | Facility: CLINIC | Age: 60
End: 2020-05-20
Payer: COMMERCIAL

## 2020-05-20 VITALS — TEMPERATURE: 97.6 F

## 2020-05-20 DIAGNOSIS — R10.9 ABDOMINAL PAIN, UNSPECIFIED ABDOMINAL LOCATION: ICD-10-CM

## 2020-05-20 DIAGNOSIS — R80.9 TYPE 2 DIABETES MELLITUS WITH MICROALBUMINURIA, WITHOUT LONG-TERM CURRENT USE OF INSULIN (HCC): ICD-10-CM

## 2020-05-20 DIAGNOSIS — E11.29 TYPE 2 DIABETES MELLITUS WITH MICROALBUMINURIA, WITHOUT LONG-TERM CURRENT USE OF INSULIN (HCC): ICD-10-CM

## 2020-05-20 DIAGNOSIS — I67.9 CEREBROVASCULAR DISEASE: ICD-10-CM

## 2020-05-20 DIAGNOSIS — R19.7 DIARRHEA, UNSPECIFIED TYPE: Primary | ICD-10-CM

## 2020-05-20 PROCEDURE — 3066F NEPHROPATHY DOC TX: CPT | Performed by: PHYSICIAN ASSISTANT

## 2020-05-20 PROCEDURE — G2012 BRIEF CHECK IN BY MD/QHP: HCPCS | Performed by: FAMILY MEDICINE

## 2020-05-30 DIAGNOSIS — E11.8 TYPE 2 DIABETES MELLITUS WITH COMPLICATION, WITHOUT LONG-TERM CURRENT USE OF INSULIN (HCC): ICD-10-CM

## 2020-05-30 RX ORDER — GLIMEPIRIDE 2 MG/1
TABLET ORAL
Qty: 90 TABLET | Refills: 1 | Status: SHIPPED | OUTPATIENT
Start: 2020-05-30 | End: 2021-07-01 | Stop reason: SDUPTHER

## 2020-06-08 NOTE — PATIENT INSTRUCTIONS
Problem List Items Addressed This Visit     Mixed hyperlipidemia - Primary     Blood work to be Re ordered in 3 months  Essential hypertension     Stable  Pineal gland cyst     Per recent CT MRI imaging at the hospital there has been no change in size since 2015  Type 2 diabetes mellitus with complication, without long-term current use of insulin ([address] )     And recent hospitalization A1c was 8  Last check here in the office A1c was 7 3  At this time upon discharge patient was started on Amaryl 2 mg daily however she is experiencing hypoglycemia shortly after this a m  dose even though she eats breakfast   We will change to half am are all with breakfast and dinner as a trial  Pt is to call me in one week with her numbers and will change if needed over the phone  Check CMP and A1C in 3 months  Relevant Medications    metFORMIN (GLUCOPHAGE) 1000 MG tablet    glimepiride (AMARYL) 2 mg tablet    Other Relevant Orders    Comprehensive metabolic panel    HEMOGLOBIN A1C W/ EAG ESTIMATION    Cerebrovascular accident (CVA) due to vascular occlusion [facility])     Patient is status post recent hospitalization at [facility]   She is to follow up with Neurology and has the number to call and continue aspirin 81 mg once daily  She is also following up with Cardiology  Some residua L arm heaviness and tiredness  PFO (patent foramen ovale)     This was found during trans esophageal echo during hospitalization  She is following up with Cardiology on 3/22  Currently on holter monitor  Frederick Guzmán Detail Level: Simple

## 2020-07-06 ENCOUNTER — LAB (OUTPATIENT)
Dept: LAB | Facility: CLINIC | Age: 60
End: 2020-07-06
Payer: COMMERCIAL

## 2020-07-06 ENCOUNTER — TRANSCRIBE ORDERS (OUTPATIENT)
Dept: LAB | Facility: CLINIC | Age: 60
End: 2020-07-06

## 2020-07-06 DIAGNOSIS — E78.2 MIXED HYPERLIPIDEMIA: ICD-10-CM

## 2020-07-06 DIAGNOSIS — E11.8 TYPE 2 DIABETES MELLITUS WITH COMPLICATION, WITHOUT LONG-TERM CURRENT USE OF INSULIN (HCC): ICD-10-CM

## 2020-07-06 DIAGNOSIS — E78.1 HYPERTRIGLYCERIDEMIA: ICD-10-CM

## 2020-07-06 DIAGNOSIS — E78.1 HYPERTRIGLYCERIDEMIA: Primary | ICD-10-CM

## 2020-07-06 LAB
ALBUMIN SERPL BCP-MCNC: 4 G/DL (ref 3.5–5)
ALP SERPL-CCNC: 80 U/L (ref 46–116)
ALT SERPL W P-5'-P-CCNC: 46 U/L (ref 12–78)
ANION GAP SERPL CALCULATED.3IONS-SCNC: 8 MMOL/L (ref 4–13)
AST SERPL W P-5'-P-CCNC: 36 U/L (ref 5–45)
BILIRUB SERPL-MCNC: 1.31 MG/DL (ref 0.2–1)
BUN SERPL-MCNC: 16 MG/DL (ref 5–25)
CALCIUM SERPL-MCNC: 9.3 MG/DL (ref 8.3–10.1)
CHLORIDE SERPL-SCNC: 99 MMOL/L (ref 100–108)
CHOLEST SERPL-MCNC: 250 MG/DL (ref 50–200)
CO2 SERPL-SCNC: 24 MMOL/L (ref 21–32)
CREAT SERPL-MCNC: 0.94 MG/DL (ref 0.6–1.3)
CREAT UR-MCNC: 160 MG/DL
EST. AVERAGE GLUCOSE BLD GHB EST-MCNC: 157 MG/DL
GFR SERPL CREATININE-BSD FRML MDRD: 66 ML/MIN/1.73SQ M
GLUCOSE P FAST SERPL-MCNC: 177 MG/DL (ref 65–99)
HBA1C MFR BLD: 7.1 %
HDLC SERPL-MCNC: 44 MG/DL
LDLC SERPL CALC-MCNC: 131 MG/DL (ref 0–100)
MICROALBUMIN UR-MCNC: 501 MG/L (ref 0–20)
MICROALBUMIN/CREAT 24H UR: 313 MG/G CREATININE (ref 0–30)
POTASSIUM SERPL-SCNC: 3.9 MMOL/L (ref 3.5–5.3)
PROT SERPL-MCNC: 7.9 G/DL (ref 6.4–8.2)
SODIUM SERPL-SCNC: 131 MMOL/L (ref 136–145)
TRIGL SERPL-MCNC: 377 MG/DL

## 2020-07-06 PROCEDURE — 82570 ASSAY OF URINE CREATININE: CPT

## 2020-07-06 PROCEDURE — 3062F POS MACROALBUMINURIA REV: CPT | Performed by: PHYSICIAN ASSISTANT

## 2020-07-06 PROCEDURE — 82043 UR ALBUMIN QUANTITATIVE: CPT

## 2020-07-06 PROCEDURE — 36415 COLL VENOUS BLD VENIPUNCTURE: CPT

## 2020-07-06 PROCEDURE — 80061 LIPID PANEL: CPT

## 2020-07-06 PROCEDURE — 80053 COMPREHEN METABOLIC PANEL: CPT

## 2020-07-06 PROCEDURE — 83036 HEMOGLOBIN GLYCOSYLATED A1C: CPT

## 2020-07-06 PROCEDURE — 3051F HG A1C>EQUAL 7.0%<8.0%: CPT | Performed by: PHYSICIAN ASSISTANT

## 2020-07-07 ENCOUNTER — TRANSCRIBE ORDERS (OUTPATIENT)
Dept: FAMILY MEDICINE CLINIC | Facility: CLINIC | Age: 60
End: 2020-07-07

## 2020-07-07 DIAGNOSIS — K21.9 GASTRO-ESOPHAGEAL REFLUX DISEASE WITHOUT ESOPHAGITIS: Primary | ICD-10-CM

## 2020-07-15 PROBLEM — Z91.89 CARDIOVASCULAR RISK FACTOR: Status: ACTIVE | Noted: 2020-06-24

## 2020-07-15 PROBLEM — J01.11 ACUTE RECURRENT FRONTAL SINUSITIS: Status: RESOLVED | Noted: 2020-03-23 | Resolved: 2020-07-15

## 2020-07-15 NOTE — ASSESSMENT & PLAN NOTE
A1c slightly improved from 7 2-7 1 with a fasting sugar of 177  I propose to restart amaryl at 1/2 in the am   Work on decreasing simple carbohydrates  Recheck with next labs in 4 months      Lab Results   Component Value Date    HGBA1C 7 1 (H) 07/06/2020

## 2020-07-15 NOTE — ASSESSMENT & PLAN NOTE
Patient is seeing cardiology she is intolerant to statins  Currently LDL is 131 down from the 150s and triglycerides are 377 which is not goal for someone who has had previous CVA  She was given a copy of her blood work to bring to Cardiology at the next appointment

## 2020-07-16 ENCOUNTER — OFFICE VISIT (OUTPATIENT)
Dept: FAMILY MEDICINE CLINIC | Facility: CLINIC | Age: 60
End: 2020-07-16
Payer: COMMERCIAL

## 2020-07-16 VITALS
SYSTOLIC BLOOD PRESSURE: 110 MMHG | HEIGHT: 66 IN | DIASTOLIC BLOOD PRESSURE: 74 MMHG | HEART RATE: 80 BPM | TEMPERATURE: 97.8 F | WEIGHT: 172 LBS | BODY MASS INDEX: 27.64 KG/M2

## 2020-07-16 DIAGNOSIS — C44.311 BASAL CELL CARCINOMA (BCC) OF SKIN OF NOSE: ICD-10-CM

## 2020-07-16 DIAGNOSIS — I10 ESSENTIAL HYPERTENSION: ICD-10-CM

## 2020-07-16 DIAGNOSIS — I67.9 CEREBROVASCULAR DISEASE: ICD-10-CM

## 2020-07-16 DIAGNOSIS — K21.9 GERD WITHOUT ESOPHAGITIS: ICD-10-CM

## 2020-07-16 DIAGNOSIS — Z12.39 BREAST CANCER SCREENING: ICD-10-CM

## 2020-07-16 DIAGNOSIS — Z78.9 STATIN INTOLERANCE: ICD-10-CM

## 2020-07-16 DIAGNOSIS — R80.9 MICROALBUMINURIA: ICD-10-CM

## 2020-07-16 DIAGNOSIS — E11.29 CONTROLLED TYPE 2 DIABETES MELLITUS WITH MICROALBUMINURIA, WITHOUT LONG-TERM CURRENT USE OF INSULIN (HCC): Primary | ICD-10-CM

## 2020-07-16 DIAGNOSIS — E78.2 MIXED HYPERLIPIDEMIA: ICD-10-CM

## 2020-07-16 DIAGNOSIS — R80.9 CONTROLLED TYPE 2 DIABETES MELLITUS WITH MICROALBUMINURIA, WITHOUT LONG-TERM CURRENT USE OF INSULIN (HCC): Primary | ICD-10-CM

## 2020-07-16 DIAGNOSIS — G37.9 DEMYELINATING DISEASE OF CENTRAL NERVOUS SYSTEM (HCC): ICD-10-CM

## 2020-07-16 PROCEDURE — 3060F POS MICROALBUMINURIA REV: CPT | Performed by: PHYSICIAN ASSISTANT

## 2020-07-16 PROCEDURE — 3008F BODY MASS INDEX DOCD: CPT | Performed by: PHYSICIAN ASSISTANT

## 2020-07-16 PROCEDURE — 3051F HG A1C>EQUAL 7.0%<8.0%: CPT | Performed by: PHYSICIAN ASSISTANT

## 2020-07-16 PROCEDURE — 99214 OFFICE O/P EST MOD 30 MIN: CPT | Performed by: PHYSICIAN ASSISTANT

## 2020-07-16 PROCEDURE — 2022F DILAT RTA XM EVC RTNOPTHY: CPT | Performed by: PHYSICIAN ASSISTANT

## 2020-07-16 PROCEDURE — 3066F NEPHROPATHY DOC TX: CPT | Performed by: PHYSICIAN ASSISTANT

## 2020-07-16 PROCEDURE — 3074F SYST BP LT 130 MM HG: CPT | Performed by: PHYSICIAN ASSISTANT

## 2020-07-16 PROCEDURE — 1036F TOBACCO NON-USER: CPT | Performed by: PHYSICIAN ASSISTANT

## 2020-07-16 PROCEDURE — 3078F DIAST BP <80 MM HG: CPT | Performed by: PHYSICIAN ASSISTANT

## 2020-07-16 RX ORDER — FAMOTIDINE 20 MG/1
20 TABLET, FILM COATED ORAL 2 TIMES DAILY
COMMUNITY
End: 2020-10-13

## 2020-07-16 NOTE — PATIENT INSTRUCTIONS
Problem List Items Addressed This Visit        Digestive    GERD without esophagitis     Patient follow with gastroenterology an EGD is set for this month  Relevant Medications    famotidine (PEPCID) 20 mg tablet       Endocrine    RESOLVED: Type 2 diabetes mellitus with renal manifestations, controlled ([facility] [address] ) - Primary    Relevant Orders    Hemoglobin A1C    Comprehensive metabolic panel       Cardiovascular and Mediastinum    Essential hypertension     Stable continue current medication  Cerebrovascular disease     Stable on Plavix and continue with neurology  Nervous and Auditory    Demyelinating disease of central nervous system [facility])     Continue with neurology follow-up  Musculoskeletal and Integument    Basal cell carcinoma       Other    Mixed hyperlipidemia     Patient is seen cardiology she is intolerant to statins  Currently LDL is 131 and triglycerides are 377 which is not goal for someone who has had previous CVA  Relevant Orders    Lipid Panel with Direct LDL reflex    Microalbuminuria     Microalbumin slightly elevated continue Zestril check yearly             Other Visit Diagnoses     Breast cancer screening        Relevant Orders    Mammo screening bilateral w 3d & cad

## 2020-07-16 NOTE — PROGRESS NOTES
Assessment and Plan:    Problem List Items Addressed This Visit        Digestive    GERD without esophagitis     Patient follow with gastroenterology an EGD is set for this month  Relevant Medications    famotidine (PEPCID) 20 mg tablet       Endocrine    RESOLVED: Type 2 diabetes mellitus with renal manifestations, controlled ([facility] [address] ) - Primary    Relevant Orders    Hemoglobin A1C    Comprehensive metabolic panel       Cardiovascular and Mediastinum    Essential hypertension     Stable continue current medication  Cerebrovascular disease     Stable on Plavix and continue with neurology  Nervous and Auditory    Demyelinating disease of central nervous system [facility])     Continue with neurology follow-up  Musculoskeletal and Integument    Basal cell carcinoma     Continue with regular dermatology for visits  Other    Mixed hyperlipidemia     Patient is seeing cardiology she is intolerant to statins  Currently LDL is 131 down from the 150s and triglycerides are 377 which is not goal for someone who has had previous CVA  She was given a copy of her blood work to bring to Cardiology at the next appointment  Relevant Orders    Lipid Panel with Direct LDL reflex    Microalbuminuria     Microalbumin slightly elevated continue Zestril check yearly  Statin intolerance      Other Visit Diagnoses     Breast cancer screening        Relevant Orders    Mammo screening bilateral w 3d & cad                 Diagnoses and all orders for this visit:    Controlled type 2 diabetes mellitus with microalbuminuria, without long-term current use of insulin (HCC)  -     Hemoglobin A1C; Future  -     Comprehensive metabolic panel; Future    Essential hypertension    Mixed hyperlipidemia  -     Lipid Panel with Direct LDL reflex;  Future    Microalbuminuria    Cerebrovascular disease    Demyelinating disease of central nervous system (HCC)    Basal cell carcinoma (BCC) of skin of nose    GERD without esophagitis    Breast cancer screening  -     Mammo screening bilateral w 3d & cad; Future    Statin intolerance    Other orders  -     famotidine (PEPCID) 20 mg tablet; Take 20 mg by mouth 2 (two) times a day              Subjective:      Patient ID: Shayne Cruz is a 62 y o  female  CC:    Chief Complaint   Patient presents with    Diabetes     Review BW results  Pt states there are no other concerns at this time  -  [facility]    GERD    Hypertension    Hyperlipidemia       HPI:      Shayne Cruz is here for chronic conditions f/u including the diagnosis of Controlled type 2 diabetes mellitus with microalbuminuria, without long-term current use of insulin (hcc)  (primary encounter diagnosis)  Essential hypertension  Mixed hyperlipidemia  Microalbuminuria  Cerebrovascular disease  Demyelinating disease of central nervous system (hcc)  Basal cell carcinoma (bcc) of skin of nose  Gerd without esophagitis  Breast cancer screening   Pt  states they are taking all medications as directed without complaints or side effects   Pt  had labs done prior to today's visit which included Recent Results (from the past 672 hour(s))  -Comprehensive metabolic panel  Collection Time: 07/06/20  7:16 AM       Result                      Value             Ref Range           Sodium                      131 (L)           136 - 145 mm*       Potassium                   3 9               3 5 - 5 3 mm*       Chloride                    99 (L)            100 - 108 mm*       CO2                         24                21 - 32 mmol*       ANION GAP                   8                 4 - 13 mmol/L       BUN                         16                5 - 25 mg/dL        Creatinine                  0 94              0 60 - 1 30 *       Glucose, Fasting            177 (H)           65 - 99 mg/dL       Calcium                     9 3               8 3 - 10 1 m*       AST                         36                5 - 45 U/L          ALT                         46                12 - 78 U/L         Alkaline Phosphatase        80                46 - 116 U/L        Total Protein               7 9               6 4 - 8 2 g/*       Albumin                     4 0               3 5 - 5 0 g/*       Total Bilirubin             1 31 (H)          0 20 - 1 00 *       eGFR                        66                ml/min/1 73s*  -Hemoglobin A1C  Collection Time: 07/06/20  7:16 AM       Result                      Value             Ref Range           Hemoglobin A1C              7 1 (H)           Normal 3 8-5*       EAG                         157               mg/dl          -Lipid Panel with Direct LDL reflex  Collection Time: 07/06/20  7:16 AM       Result                      Value             Ref Range           Cholesterol                 250 (H)           50 - 200 mg/*       Triglycerides               377 (H)           <=150 mg/dL         HDL, Direct                 44                >=40 mg/dL          LDL Calculated              131 (H)           0 - 100 mg/dL  -Microalbumin / creatinine urine ratio  Collection Time: 07/06/20  7:16 AM       Result                      Value             Ref Range           Creatinine, Ur              160 0             mg/dL               Microalbum  ,U,Random        501 0 (H)         0 0 - 20 0 m*       Microalb Creat Ratio        313 (H)           0 - 30 mg/g *        The following portions of the patient's history were reviewed and updated as appropriate: allergies, current medications, past family history, past medical history, past social history, past surgical history and problem list       Review of Systems   Constitutional: Negative  HENT: Negative  Eyes: Negative  Respiratory: Negative  Cardiovascular: Negative  Gastrointestinal: Negative  Endocrine: Negative  Genitourinary: Negative  Musculoskeletal: Negative  Skin: Negative  Allergic/Immunologic: Negative  Neurological: Negative  Hematological: Negative  Psychiatric/Behavioral: Negative  Data to review:     Patient's shoes and socks removed  Right Foot/Ankle   Right Foot Inspection  Skin Exam: skin normal skin not intact, no dry skin, no warmth, no callus, no erythema, no maceration, no abnormal color, no pre-ulcer, no ulcer and no callus                          Toe Exam: ROM and strength within normal limitsno swelling, no tenderness, erythema and  no right toe deformity  Sensory     Proprioception: intact   Monofilament testing: intact  Vascular  Capillary refills: < 3 seconds  The right DP pulse is 2+  The right PT pulse is 2+  Right Toe  - Comprehensive Exam  Ecchymosis: none  Arch: normal  Hammertoes: absent  Claw Toes: absent  Swelling: none   Tenderness: none         Left Foot/Ankle  Left Foot Inspection  Skin Exam: skin normalskin not intact, no dry skin, no warmth, no erythema, no maceration, normal color, no pre-ulcer, no ulcer and no callus                         Toe Exam: ROM and strength within normal limitsno swelling, no tenderness, no erythema and no left toe deformity                   Sensory     Proprioception: intact  Monofilament: intact  Vascular  Capillary refills: < 3 seconds  The left DP pulse is 2+  The left PT pulse is 2+  Left Toe  - Comprehensive Exam  Ecchymosis: none  Arch: normal  Hammertoes: absent  Claw toes: absent  Swelling: none   Tenderness: none       Assign Risk Category:  No deformity present; No loss of protective sensation; No weak pulses       Risk: 0      Objective:    Vitals:    07/16/20 0833   BP: 110/74   BP Location: Left arm   Patient Position: Sitting   Cuff Size: Large   Pulse: 80   Temp: 97 8 °F (36 6 °C)   TempSrc: Temporal   Weight: 78 kg (172 lb)   Height: 5' 6" (1 676 m)        Physical Exam   Constitutional: She is oriented to person, place, and time  She appears well-developed and well-nourished  No distress     HENT:   Head: Normocephalic and atraumatic  Eyes: Conjunctivae are normal  Right eye exhibits no discharge  Left eye exhibits no discharge  Neck: Neck supple  Carotid bruit is not present  Cardiovascular: Normal rate, regular rhythm and normal heart sounds  Exam reveals no gallop and no friction rub  Pulses are no weak pulses  No murmur heard  Pulses:       Dorsalis pedis pulses are 2+ on the right side, and 2+ on the left side  Posterior tibial pulses are 2+ on the right side, and 2+ on the left side  Pulmonary/Chest: Effort normal and breath sounds normal  No respiratory distress  She has no wheezes  She has no rales  Feet:   Right Foot:   Skin Integrity: Negative for ulcer, skin breakdown, erythema, warmth, callus or dry skin  Left Foot:   Skin Integrity: Negative for ulcer, skin breakdown, erythema, warmth, callus or dry skin  Neurological: She is alert and oriented to person, place, and time  Skin: Skin is warm and dry  She is not diaphoretic  Psychiatric: She has a normal mood and affect  Judgment normal    Nursing note and vitals reviewed

## 2020-07-20 ENCOUNTER — TRANSCRIBE ORDERS (OUTPATIENT)
Dept: FAMILY MEDICINE CLINIC | Facility: CLINIC | Age: 60
End: 2020-07-20

## 2020-07-20 DIAGNOSIS — K21.9 GASTRO-ESOPHAGEAL REFLUX DISEASE WITHOUT ESOPHAGITIS: Primary | ICD-10-CM

## 2020-08-03 ENCOUNTER — TRANSCRIBE ORDERS (OUTPATIENT)
Dept: FAMILY MEDICINE CLINIC | Facility: CLINIC | Age: 60
End: 2020-08-03

## 2020-08-03 DIAGNOSIS — K21.9 GASTRO-ESOPHAGEAL REFLUX DISEASE WITHOUT ESOPHAGITIS: Primary | ICD-10-CM

## 2020-10-13 ENCOUNTER — OFFICE VISIT (OUTPATIENT)
Dept: FAMILY MEDICINE CLINIC | Facility: CLINIC | Age: 60
End: 2020-10-13
Payer: COMMERCIAL

## 2020-10-13 VITALS
BODY MASS INDEX: 28.28 KG/M2 | DIASTOLIC BLOOD PRESSURE: 86 MMHG | TEMPERATURE: 97.8 F | HEIGHT: 66 IN | WEIGHT: 176 LBS | HEART RATE: 88 BPM | SYSTOLIC BLOOD PRESSURE: 128 MMHG

## 2020-10-13 DIAGNOSIS — R42 LIGHTHEADEDNESS: ICD-10-CM

## 2020-10-13 DIAGNOSIS — G44.019 EPISODIC CLUSTER HEADACHE, NOT INTRACTABLE: Primary | ICD-10-CM

## 2020-10-13 PROCEDURE — 3079F DIAST BP 80-89 MM HG: CPT | Performed by: PHYSICIAN ASSISTANT

## 2020-10-13 PROCEDURE — 99213 OFFICE O/P EST LOW 20 MIN: CPT | Performed by: PHYSICIAN ASSISTANT

## 2020-10-13 PROCEDURE — 3074F SYST BP LT 130 MM HG: CPT | Performed by: PHYSICIAN ASSISTANT

## 2020-10-13 PROCEDURE — 1036F TOBACCO NON-USER: CPT | Performed by: PHYSICIAN ASSISTANT

## 2020-11-26 DIAGNOSIS — I10 ESSENTIAL HYPERTENSION: ICD-10-CM

## 2020-11-26 DIAGNOSIS — E11.8 TYPE 2 DIABETES MELLITUS WITH COMPLICATION, WITHOUT LONG-TERM CURRENT USE OF INSULIN (HCC): ICD-10-CM

## 2020-11-26 PROCEDURE — 4010F ACE/ARB THERAPY RXD/TAKEN: CPT | Performed by: PHYSICIAN ASSISTANT

## 2020-11-26 RX ORDER — LISINOPRIL 10 MG/1
TABLET ORAL
Qty: 90 TABLET | Refills: 3 | Status: SHIPPED | OUTPATIENT
Start: 2020-11-26 | End: 2021-01-04

## 2020-12-08 ENCOUNTER — LAB (OUTPATIENT)
Dept: LAB | Facility: CLINIC | Age: 60
End: 2020-12-08
Payer: COMMERCIAL

## 2020-12-08 DIAGNOSIS — E78.2 MIXED HYPERLIPIDEMIA: ICD-10-CM

## 2020-12-08 DIAGNOSIS — E11.29 CONTROLLED TYPE 2 DIABETES MELLITUS WITH MICROALBUMINURIA, WITHOUT LONG-TERM CURRENT USE OF INSULIN (HCC): ICD-10-CM

## 2020-12-08 DIAGNOSIS — R80.9 CONTROLLED TYPE 2 DIABETES MELLITUS WITH MICROALBUMINURIA, WITHOUT LONG-TERM CURRENT USE OF INSULIN (HCC): ICD-10-CM

## 2020-12-08 LAB
ALBUMIN SERPL BCP-MCNC: 4 G/DL (ref 3.5–5)
ALP SERPL-CCNC: 92 U/L (ref 46–116)
ALT SERPL W P-5'-P-CCNC: 51 U/L (ref 12–78)
ANION GAP SERPL CALCULATED.3IONS-SCNC: 5 MMOL/L (ref 4–13)
AST SERPL W P-5'-P-CCNC: 31 U/L (ref 5–45)
BILIRUB SERPL-MCNC: 0.89 MG/DL (ref 0.2–1)
BUN SERPL-MCNC: 12 MG/DL (ref 5–25)
CALCIUM SERPL-MCNC: 10 MG/DL (ref 8.3–10.1)
CHLORIDE SERPL-SCNC: 103 MMOL/L (ref 100–108)
CHOLEST SERPL-MCNC: 268 MG/DL (ref 50–200)
CO2 SERPL-SCNC: 27 MMOL/L (ref 21–32)
CREAT SERPL-MCNC: 0.9 MG/DL (ref 0.6–1.3)
EST. AVERAGE GLUCOSE BLD GHB EST-MCNC: 140 MG/DL
GFR SERPL CREATININE-BSD FRML MDRD: 69 ML/MIN/1.73SQ M
GLUCOSE P FAST SERPL-MCNC: 165 MG/DL (ref 65–99)
HBA1C MFR BLD: 6.5 %
HDLC SERPL-MCNC: 41 MG/DL
LDLC SERPL DIRECT ASSAY-MCNC: 151 MG/DL (ref 0–100)
POTASSIUM SERPL-SCNC: 4.4 MMOL/L (ref 3.5–5.3)
PROT SERPL-MCNC: 8.1 G/DL (ref 6.4–8.2)
SODIUM SERPL-SCNC: 135 MMOL/L (ref 136–145)
TRIGL SERPL-MCNC: 495 MG/DL

## 2020-12-08 PROCEDURE — 83036 HEMOGLOBIN GLYCOSYLATED A1C: CPT

## 2020-12-08 PROCEDURE — 3066F NEPHROPATHY DOC TX: CPT | Performed by: PHYSICIAN ASSISTANT

## 2020-12-08 PROCEDURE — 80061 LIPID PANEL: CPT

## 2020-12-08 PROCEDURE — 83721 ASSAY OF BLOOD LIPOPROTEIN: CPT

## 2020-12-08 PROCEDURE — 80053 COMPREHEN METABOLIC PANEL: CPT

## 2020-12-08 PROCEDURE — 36415 COLL VENOUS BLD VENIPUNCTURE: CPT

## 2020-12-08 PROCEDURE — 3044F HG A1C LEVEL LT 7.0%: CPT | Performed by: PHYSICIAN ASSISTANT

## 2020-12-09 DIAGNOSIS — E78.2 MIXED HYPERLIPIDEMIA: ICD-10-CM

## 2020-12-09 DIAGNOSIS — E11.8 TYPE 2 DIABETES MELLITUS WITH COMPLICATION, WITHOUT LONG-TERM CURRENT USE OF INSULIN (HCC): Primary | ICD-10-CM

## 2020-12-09 DIAGNOSIS — M79.10 MYALGIA: ICD-10-CM

## 2020-12-16 ENCOUNTER — TELEPHONE (OUTPATIENT)
Dept: FAMILY MEDICINE CLINIC | Facility: CLINIC | Age: 60
End: 2020-12-16

## 2020-12-16 DIAGNOSIS — I63.531 CEREBROVASCULAR ACCIDENT (CVA) DUE TO STENOSIS OF RIGHT POSTERIOR CEREBRAL ARTERY (HCC): Primary | ICD-10-CM

## 2020-12-16 RX ORDER — CLOPIDOGREL BISULFATE 75 MG/1
75 TABLET ORAL DAILY
Qty: 90 TABLET | Refills: 3 | Status: SHIPPED | OUTPATIENT
Start: 2020-12-16 | End: 2022-01-11

## 2021-01-03 DIAGNOSIS — I10 ESSENTIAL HYPERTENSION: ICD-10-CM

## 2021-01-04 RX ORDER — LISINOPRIL 10 MG/1
TABLET ORAL
Qty: 90 TABLET | Refills: 3 | Status: SHIPPED | OUTPATIENT
Start: 2021-01-04 | End: 2022-01-11

## 2021-03-26 ENCOUNTER — TELEPHONE (OUTPATIENT)
Dept: FAMILY MEDICINE CLINIC | Facility: CLINIC | Age: 61
End: 2021-03-26

## 2021-03-26 ENCOUNTER — TELEMEDICINE (OUTPATIENT)
Dept: FAMILY MEDICINE CLINIC | Facility: CLINIC | Age: 61
End: 2021-03-26

## 2021-03-26 VITALS — HEIGHT: 66 IN | BODY MASS INDEX: 28.28 KG/M2 | WEIGHT: 176 LBS

## 2021-03-26 DIAGNOSIS — Z20.822 EXPOSURE TO COVID-19 VIRUS: Primary | ICD-10-CM

## 2021-03-26 DIAGNOSIS — Z20.822 EXPOSURE TO COVID-19 VIRUS: ICD-10-CM

## 2021-03-26 PROCEDURE — U0005 INFEC AGEN DETEC AMPLI PROBE: HCPCS | Performed by: NURSE PRACTITIONER

## 2021-03-26 PROCEDURE — 99212 OFFICE O/P EST SF 10 MIN: CPT | Performed by: NURSE PRACTITIONER

## 2021-03-26 PROCEDURE — U0003 INFECTIOUS AGENT DETECTION BY NUCLEIC ACID (DNA OR RNA); SEVERE ACUTE RESPIRATORY SYNDROME CORONAVIRUS 2 (SARS-COV-2) (CORONAVIRUS DISEASE [COVID-19]), AMPLIFIED PROBE TECHNIQUE, MAKING USE OF HIGH THROUGHPUT TECHNOLOGIES AS DESCRIBED BY CMS-2020-01-R: HCPCS | Performed by: NURSE PRACTITIONER

## 2021-03-26 NOTE — ASSESSMENT & PLAN NOTE
26Mar:  Patient is currently asymptomatic  COVID testing ordered    Will follow up with patient pending results of COVID test

## 2021-03-26 NOTE — PROGRESS NOTES
COVID-19 Virtual Visit     Assessment/Plan:    Problem List Items Addressed This Visit        Other    Exposure to COVID-19 virus - Primary     26Mar:  Patient is currently asymptomatic  COVID testing ordered  Will follow up with patient pending results of COVID test          Relevant Orders    Novel Coronavirus (COVID-19), PCR [facility] Collected at [facility] or [facility]         Disposition:     I referred patient to one of our centralized sites for a COVID-19 swab  I have spent 15 minutes directly with the patient  Encounter provider MARQUITA Cardeans    Provider located at [address] [place] [address] PRIMARY CARE  [facility] [id number]    Recent Visits  No visits were found meeting these conditions  Showing recent visits within past 7 days and meeting all other requirements     Today's Visits  Date Type Provider Dept   03/26/21 Telemedicine MARQUITA Gasca [facility]   03/26/21 Telephone [address] Primary Care   Showing today's visits and meeting all other requirements     Future Appointments  No visits were found meeting these conditions  Showing future appointments within next 150 days and meeting all other requirements        Patient agrees to participate in a virtual check in via telephone or video visit instead of presenting to the office to address urgent/immediate medical needs  Patient is aware this is a billable service  After connecting through Telephone, the patient was identified by name and date of birth  Lacy Gillette was informed that this was a telemedicine visit and that the exam was being conducted confidentially over secure lines  My office door was closed  No one else was in the room  Lacy Gillette acknowledged consent and understanding of privacy and security of the telemedicine visit   I informed the patient that I have reviewed her record in Epic and presented the opportunity for her to ask any questions regarding the visit today  The patient agreed to participate  It was my intent to perform this visit via video technology but the patient was not able to do a video connection so the visit was completed via audio telephone only  Subjective:   Tez Jackman is a 56 y o  female who is concerned about COVID-19  Patient is currently asymptomatic  Patient denies fever, chills, fatigue, malaise, congestion, rhinorrhea, sore throat, anosmia, loss of taste, cough, shortness of breath, chest tightness, abdominal pain, nausea, vomiting, diarrhea, myalgias and headaches  Date of exposure: 3/19/2021    Exposure:   Contact with a person who is under investigation (PUI) for or who is positive for COVID-19 within the last 14 days?: Yes    Hospitalized recently for fever and/or lower respiratory symptoms?: No      Currently a healthcare worker that is involved in direct patient care?: No      Works in a special setting where the risk of COVID-19 transmission may be high? (this may include long-term care, correctional and penitentiary facilities; homeless shelters; assisted-living facilities and group homes ): No      Resident in a special setting where the risk of COVID-19 transmission may be high? (this may include long-term care, correctional and penitentiary facilities; homeless shelters; assisted-living facilities and group homes ): No      Patient states that she was sitting at a table across from a person who tested positive for COVID  The last time she was in contact with this person was on 03/19/2021  The patient is currently asymptomatic  Since it has been over 5 days since the patient was last in contact with the COVID positive individual I will have her tested for COVID  COVID testing ordered    Patient advised to continue to quarantine until she receives the results of her COVID test     Lab Results   Component Value Date    [facility] Not Detected 02/08/2021     Past Medical History:   Diagnosis Date  Diabetes mellitus ([facility] [address] )     Hypertension     Stroke (cerebrum) ([facility] [address] )      Past Surgical History:   Procedure Laterality Date    CHOLECYSTECTOMY LAPAROSCOPIC      MOHS SURGERY      Micrographic Surgery Nose, Last assessed: 2/13/17    TOTAL KNEE ARTHROPLASTY Left 03/08/2017    Last assessed: 12/26/17    TUBAL LIGATION       Current Outpatient Medications   Medication Sig Dispense Refill    cholecalciferol (VITAMIN D3) 400 units tablet Take 400 Units by mouth daily      clopidogrel (PLAVIX) 75 mg tablet Take 1 tablet (75 mg total) by mouth daily 90 tablet 3    Famotidine (PEPCID AC PO) Take by mouth      glimepiride (AMARYL) 2 mg tablet TAKE 1/2 TABLET BY MOUTH TWICE DAILY WITH MEALS (Patient taking differently: Take by mouth Take 1/2 tablet by mouth  daily with meals ) 90 tablet 1    lisinopril (ZESTRIL) 10 mg tablet TAKE ONE TABLET BY MOUTH EVERY DAY 90 tablet 3    metFORMIN (GLUCOPHAGE) 1000 MG tablet TAKE ONE TABLET BY MOUTH TWICE A DAY WITH MEALS 180 tablet 1    Multiple Vitamin (MULTI-VITAMIN DAILY PO) Take 1 tablet by mouth daily      Omega-3 Fatty Acids (FISH OIL) 1,000 mg Take 2 capsules by mouth daily      Probiotic Product (PROBIOTIC-10 PO) Take by mouth      triamcinolone (KENALOG) 0 1 % cream Apply topically 2 (two) times a day 30 g 5     No current facility-administered medications for this visit  Allergies   Allergen Reactions    Bactrim [Sulfamethoxazole-Trimethoprim]     Neomycin-Bacitracin Zn-Polymyx Other (See Comments)     REDNESS    Statins Myalgia       Review of Systems   Constitutional: Negative for chills, fatigue and fever  HENT: Negative for congestion, rhinorrhea and sore throat  Eyes: Negative  Respiratory: Negative for cough, chest tightness and shortness of breath  Cardiovascular: Negative for chest pain and palpitations  Gastrointestinal: Negative for abdominal pain, diarrhea, nausea and vomiting  Endocrine: Negative  Genitourinary: Negative  Musculoskeletal: Negative for myalgias  Skin: Negative  Allergic/Immunologic: Negative  Neurological: Negative for headaches  Hematological: Negative  Psychiatric/Behavioral: Negative  Objective:    Vitals:    03/26/21 1045   Weight: 79 8 kg (176 lb)   Height: 5' 6" (1 676 m)       Physical Exam  Vitals reviewed: limited due to phone only exam    Neurological:      Mental Status: She is alert  VIRTUAL VISIT DISCLAIMER    Trinity Ruth acknowledges that she has consented to an online visit or consultation  She understands that the online visit is based solely on information provided by her, and that, in the absence of a face-to-face physical evaluation by the physician, the diagnosis she receives is both limited and provisional in terms of accuracy and completeness  This is not intended to replace a full medical face-to-face evaluation by the physician  Trinity Ruth understands and accepts these terms

## 2021-03-26 NOTE — PATIENT INSTRUCTIONS
TENT COVID TESTING SITES FOR SURGICAL/PROCEDURAL PATIENTS     [facility] Location  Address  Hours   Monday-Friday Saturday Hours    [facility]  8am-5pm  CLOSED    [place]   Reopening on 2/25  [address] [place] [address] [address] [address]  8am-5pm  8am-1pm      Please note: [employer] are closed as of [id number]  [address]    Your healthcare provider and/or public health staff have evaluated you and have determined that you do not need to remain in the hospital at this time  At this time you can be isolated at home where you will be monitored by staff from your local or state health department  You should carefully follow the prevention and isolation steps below until a healthcare provider or local or state health department says that you can return to your normal activities  Stay home except to get medical care    People who are mildly ill with COVID-19 are able to isolate at home during their illness  You should restrict activities outside your home, except for getting medical care  Do not go to work, school, or public areas  Avoid using public transportation, ride-sharing, or taxis  Separate yourself from other people and animals in your home    People: As much as possible, you should stay in a specific room and away from other people in your home  Also, you should use a separate bathroom, if available  Animals: You should restrict contact with pets and other animals while you are sick with COVID-19, just like you would around other people  Although there have not been reports of pets or other animals becoming sick with COVID-19, it is still recommended that people sick with COVID-19 limit contact with animals until more information is known about the virus  When possible, have another member of your household care for your animals while you are sick   If you are sick with COVID-19, avoid contact with your pet, including petting, snuggling, being kissed or licked, and sharing food  If you must care for your pet or be around animals while you are sick, wash your hands before and after you interact with pets and wear a facemask  See COVID-19 and Animals for more information  Call ahead before visiting your doctor    If you have a medical appointment, call the healthcare provider and tell them that you have or may have COVID-19  This will help the healthcare providers office take steps to keep other people from getting infected or exposed  Wear a facemask    You should wear a facemask when you are around other people (e g , sharing a room or vehicle) or pets and before you enter a healthcare providers office  If you are not able to wear a facemask (for example, because it causes trouble breathing), then people who live with you should not stay in the same room with you, or they should wear a facemask if they enter your room  Cover your coughs and sneezes    Cover your mouth and nose with a tissue when you cough or sneeze  Throw used tissues in a lined trash can  Immediately wash your hands with soap and water for at least 20 seconds or, if soap and water are not available, clean your hands with an alcohol-based hand  that contains at least 60% alcohol  Clean your hands often    Wash your hands often with soap and water for at least 20 seconds, especially after blowing your nose, coughing, or sneezing; going to the bathroom; and before eating or preparing food  If soap and water are not readily available, use an alcohol-based hand  with at least 60% alcohol, covering all surfaces of your hands and rubbing them together until they feel dry  Soap and water are the best option if hands are visibly dirty  Avoid touching your eyes, nose, and mouth with unwashed hands      Avoid sharing personal household items    You should not share dishes, drinking glasses, cups, eating utensils, towels, or bedding with other people or pets in your home  After using these items, they should be washed thoroughly with soap and water  Clean all high-touch surfaces everyday    High touch surfaces include counters, tabletops, doorknobs, bathroom fixtures, toilets, phones, keyboards, tablets, and bedside tables  Also, clean any surfaces that may have blood, stool, or body fluids on them  Use a household cleaning spray or wipe, according to the label instructions  Labels contain instructions for safe and effective use of the cleaning product including precautions you should take when applying the product, such as wearing gloves and making sure you have good ventilation during use of the product  Monitor your symptoms    Seek prompt medical attention if your illness is worsening (e g , difficulty breathing)  Before seeking care, call your healthcare provider and tell them that you have, or are being evaluated for, COVID-19  Put on a facemask before you enter the facility  These steps will help the healthcare providers office to keep other people in the office or waiting room from getting infected or exposed  Ask your healthcare provider to call the local or [facility] health department  Persons who are placed under active monitoring or facilitated self-monitoring should follow instructions provided by their local health department or occupational health professionals, as appropriate  If you have a medical emergency and need to call 911, notify the dispatch personnel that you have, or are being evaluated for COVID-19  If possible, put on a facemask before emergency medical services arrive      Discontinuing home isolation    Patients with confirmed COVID-19 should remain under home isolation precautions until the following conditions are met:   - They have had no fever for at least 24 hours (that is one full day of no fever without the use medicine that reduces fevers)  AND  - other symptoms have improved (for example, when their cough or shortness of breath have improved)  AND  - If had mild or moderate illness, at least 10 days have passed since their symptoms first appeared or if severe illness (needed oxygen) or immunosuppressed, at least 20 days have passed since symptoms first appeared  Patients with confirmed COVID-19 should also notify close contacts (including their workplace) and ask that they self-quarantine  Currently, close contact is defined as being within 6 feet for 15 minutes or more from the period 24 hours starting 48 hours before symptom onset to the time at which the patient went into isolation  Close contacts of patients diagnosed with COVID-19 should be instructed by the patient to self-quarantine for 14 days from the last time of their last contact with the patient  Source: [employer]     Problem List Items Addressed This Visit        Other    Exposure to COVID-19 virus - Primary     26Mar:  Patient is currently asymptomatic  COVID testing ordered    Will follow up with patient pending results of COVID test          Relevant Orders    Novel Coronavirus (COVID-19), PCR [facility] Collected at   [facility] [address] or [facility]

## 2021-03-27 LAB — SARS-COV-2 RNA RESP QL NAA+PROBE: NEGATIVE

## 2021-06-25 LAB
25(OH)D3 SERPL-MCNC: 53 NG/ML (ref 30–100)
ALBUMIN SERPL-MCNC: 4.3 G/DL (ref 3.6–5.1)
ALBUMIN/GLOB SERPL: 1.3 (CALC) (ref 1–2.5)
ALP SERPL-CCNC: 84 U/L (ref 37–153)
ALT SERPL-CCNC: 35 U/L (ref 6–29)
AST SERPL-CCNC: 32 U/L (ref 10–35)
BILIRUB SERPL-MCNC: 0.9 MG/DL (ref 0.2–1.2)
BUN SERPL-MCNC: 14 MG/DL (ref 7–25)
BUN/CREAT SERPL: ABNORMAL (CALC) (ref 6–22)
CALCIUM SERPL-MCNC: 10 MG/DL (ref 8.6–10.4)
CHLORIDE SERPL-SCNC: 99 MMOL/L (ref 98–110)
CHOLEST SERPL-MCNC: 232 MG/DL
CHOLEST/HDLC SERPL: 5.3 (CALC)
CO2 SERPL-SCNC: 27 MMOL/L (ref 20–32)
CREAT SERPL-MCNC: 0.73 MG/DL (ref 0.5–0.99)
GLOBULIN SER CALC-MCNC: 3.2 G/DL (CALC) (ref 1.9–3.7)
GLUCOSE SERPL-MCNC: 185 MG/DL (ref 65–99)
HBA1C MFR BLD: 7.3 % OF TOTAL HGB
HDLC SERPL-MCNC: 44 MG/DL
LDLC SERPL CALC-MCNC: 132 MG/DL (CALC)
NONHDLC SERPL-MCNC: 188 MG/DL (CALC)
POTASSIUM SERPL-SCNC: 4.5 MMOL/L (ref 3.5–5.3)
PROT SERPL-MCNC: 7.5 G/DL (ref 6.1–8.1)
SL AMB EGFR AFRICAN AMERICAN: 102 ML/MIN/1.73M2
SL AMB EGFR NON AFRICAN AMERICAN: 88 ML/MIN/1.73M2
SODIUM SERPL-SCNC: 134 MMOL/L (ref 135–146)
TRIGL SERPL-MCNC: 395 MG/DL

## 2021-06-29 PROBLEM — E55.9 VITAMIN D DEFICIENCY: Status: ACTIVE | Noted: 2020-12-29

## 2021-06-30 PROBLEM — R10.9 ABDOMINAL PAIN: Status: RESOLVED | Noted: 2020-05-20 | Resolved: 2021-06-30

## 2021-06-30 PROBLEM — R19.7 DIARRHEA: Status: RESOLVED | Noted: 2020-05-20 | Resolved: 2021-06-30

## 2021-06-30 PROBLEM — E78.1 HYPERTRIGLYCERIDEMIA: Status: RESOLVED | Noted: 2018-03-01 | Resolved: 2021-06-30

## 2021-06-30 PROBLEM — R07.9 CHEST PAIN: Status: RESOLVED | Noted: 2018-09-10 | Resolved: 2021-06-30

## 2021-07-01 ENCOUNTER — OFFICE VISIT (OUTPATIENT)
Dept: FAMILY MEDICINE CLINIC | Facility: CLINIC | Age: 61
End: 2021-07-01

## 2021-07-01 VITALS
BODY MASS INDEX: 28.28 KG/M2 | SYSTOLIC BLOOD PRESSURE: 138 MMHG | RESPIRATION RATE: 16 BRPM | WEIGHT: 176 LBS | HEART RATE: 76 BPM | HEIGHT: 66 IN | DIASTOLIC BLOOD PRESSURE: 86 MMHG

## 2021-07-01 DIAGNOSIS — G37.9 DEMYELINATING DISEASE OF CENTRAL NERVOUS SYSTEM (HCC): ICD-10-CM

## 2021-07-01 DIAGNOSIS — E78.1 HYPERTRIGLYCERIDEMIA: ICD-10-CM

## 2021-07-01 DIAGNOSIS — I10 ESSENTIAL HYPERTENSION: ICD-10-CM

## 2021-07-01 DIAGNOSIS — I63.531 CEREBROVASCULAR ACCIDENT (CVA) DUE TO STENOSIS OF RIGHT POSTERIOR CEREBRAL ARTERY (HCC): ICD-10-CM

## 2021-07-01 DIAGNOSIS — E55.9 VITAMIN D DEFICIENCY: ICD-10-CM

## 2021-07-01 DIAGNOSIS — J01.10 ACUTE NON-RECURRENT FRONTAL SINUSITIS: ICD-10-CM

## 2021-07-01 DIAGNOSIS — R79.89 LOW VITAMIN D LEVEL: ICD-10-CM

## 2021-07-01 DIAGNOSIS — E11.8 TYPE 2 DIABETES MELLITUS WITH COMPLICATION, WITHOUT LONG-TERM CURRENT USE OF INSULIN (HCC): Primary | ICD-10-CM

## 2021-07-01 DIAGNOSIS — R80.9 MICROALBUMINURIA: ICD-10-CM

## 2021-07-01 DIAGNOSIS — E78.2 MIXED HYPERLIPIDEMIA: ICD-10-CM

## 2021-07-01 PROCEDURE — 99214 OFFICE O/P EST MOD 30 MIN: CPT | Performed by: PHYSICIAN ASSISTANT

## 2021-07-01 RX ORDER — GLIMEPIRIDE 2 MG/1
2 TABLET ORAL 2 TIMES DAILY
Qty: 180 TABLET | Refills: 3 | Status: SHIPPED | OUTPATIENT
Start: 2021-07-01

## 2021-07-01 RX ORDER — CEFUROXIME AXETIL 500 MG/1
500 TABLET ORAL EVERY 12 HOURS SCHEDULED
Qty: 20 TABLET | Refills: 0 | Status: SHIPPED | OUTPATIENT
Start: 2021-07-01 | End: 2021-07-11

## 2021-07-01 NOTE — PROGRESS NOTES
Assessment and Plan:    Problem List Items Addressed This Visit        Endocrine    Type 2 diabetes mellitus with complication, without long-term current use of insulin ([facility] [address] ) - Primary       Hemoglobin A1c uncontrolled at this point time at 7 3 with a fasting sugar of 185  Pt hs only been taking 1/2 glymipiride so will have her take the full 2 mg BID and check in 4 months  Lab Results   Component Value Date    HGBA1C 7 3 (H) 06/24/2021            Relevant Medications    metFORMIN (GLUCOPHAGE) 1000 MG tablet    glimepiride (AMARYL) 2 mg tablet    Other Relevant Orders    Comprehensive metabolic panel    Hemoglobin A1C       Respiratory    Acute non-recurrent frontal sinusitis     Exam with significant findings  Ceftin 500 mg BID for 10 days  Relevant Medications    cefuroxime (CEFTIN) 500 mg tablet       Cardiovascular and Mediastinum    Essential hypertension      Stable would prefer patient to have a blood pressure lower than 130  CVA (cerebral vascular accident) [facility])      Patient continues on Plavix  Nervous and Auditory    Demyelinating disease of central nervous system [facility])      Continue with neurology follow-up  Other    Mixed hyperlipidemia       Patient intolerant to statins LDL is 132 triglycerides 395  Patient does follow-up cardiology  Pt stopped statin once weekly and never filled Vasepa due to cost from cardio  She is due to make f/u apt with them  Relevant Orders    Lipid Panel with Direct LDL reflex    Microalbuminuria       Check microalbumin next labs continue Zestril  Relevant Orders    Microalbumin / creatinine urine ratio    Vitamin D deficiency       Vitamin-D excellent at 53 continue current supplementation recheck 1 year  RESOLVED: Hypertriglyceridemia    RESOLVED: Low vitamin D level       Vitamin-D level great at 53 continue current supplementation recheck 1 year                        Diagnoses and all orders for this visit:    Type 2 diabetes mellitus with complication, without long-term current use of insulin ([facility])  -     Comprehensive metabolic panel; Future  -     Hemoglobin A1C; Future  -     metFORMIN (GLUCOPHAGE) 1000 MG tablet; Take 1 tablet (1,000 mg total) by mouth 2 (two) times a day with meals  -     glimepiride (AMARYL) 2 mg tablet; Take 1 tablet (2 mg total) by mouth 2 (two) times a day    Cerebrovascular accident (CVA) due to stenosis of right posterior cerebral artery (HCC)    Essential hypertension    Demyelinating disease of central nervous system ([facility] [address] )    Microalbuminuria  -     Microalbumin / creatinine urine ratio; Future    Mixed hyperlipidemia  -     Lipid Panel with Direct LDL reflex; Future    Low vitamin D level    Hypertriglyceridemia    Vitamin D deficiency    Acute non-recurrent frontal sinusitis  -     cefuroxime (CEFTIN) 500 mg tablet; Take 1 tablet (500 mg total) by mouth every 12 (twelve) hours for 10 days    Other orders  -     L-Lysine 1000 MG TABS; Take by mouth daily  -     Misc Natural Products (DAILY HERBS IMMUNE DEFENSE PO); Take by mouth              Subjective:      Patient ID: Yuridia Cronin is a 56 y o  female  CC:    Chief Complaint   Patient presents with    Follow-up     Patient present today for her routine follow up and to review labs  HPI:      Yuridia Cronin is here for chronic conditions f/u including the diagnosis of Type 2 diabetes mellitus with complication, without long-term current use of insulin ([facility])  (primary encounter diagnosis)  Cerebrovascular accident (cva) due to stenosis of right posterior cerebral artery (hcc)  Essential hypertension  Demyelinating disease of central nervous system (hcc)  Microalbuminuria  Mixed hyperlipidemia  Low vitamin d level  Hypertriglyceridemia  Vitamin d deficiency   Pt  states they are taking all medications as directed without complaints or side effects   Pt  had labs done prior to today's visit which included Recent Results (from the past 672 hour(s))  -Lipid Panel with Direct LDL reflex  Collection Time: 06/24/21  8:13 AM       Result                      Value             Ref Range           Total Cholesterol           232 (H)           <200 mg/dL          HDL                         44 (L)            > OR = 50 mg*       Triglycerides               395 (H)           <150 mg/dL          LDL Calculated              132 (H)           mg/dL (calc)        Chol HDLC Ratio             5 3 (H)           <5 0 (calc)         Non-HDL Cholesterol         188 (H)           <130 mg/dL (*  -Comprehensive metabolic panel  Collection Time: 06/24/21  8:13 AM       Result                      Value             Ref Range           Glucose, Random             185 (H)           65 - 99 mg/dL       BUN                         14                7 - 25 mg/dL        Creatinine                  0 73              0 50 - 0 99 *       eGFR Non  [employer]*     88                > OR = 60 mL*       eGFR        102               > OR = 60 mL*       SL AMB BUN/CREATININE *                       6 - 22 (calc)   NOT APPLICABLE       Sodium                      134 (L)           135 - 146 mm*       Potassium                   4 5               3 5 - 5 3 mm*       Chloride                    99                98 - 110 mmo*       CO2                         27                20 - 32 mmol*       Calcium                     10 0              8 6 - 10 4 m*       Protein, Total              7 5               6 1 - 8 1 g/*       Albumin                     4 3               3 6 - 5 1 g/*       Globulin                    3 2               1 9 - 3 7 g/*       Albumin/Globulin Ratio      1 3               1 0 - 2 5 (c*       TOTAL BILIRUBIN             0 9               0 2 - 1 2 mg*       Alkaline Phosphatase        84                37 - 153 U/L        AST                         32                10 - 35 U/L         ALT                         35 (H)            6 - 29 U/L     -Vitamin D 25 hydroxy  Collection Time: 06/24/21  8:13 AM       Result                      Value             Ref Range           Vitamin D, 25-Hydroxy,*     53                30 - 100 ng/*  -Hemoglobin A1c (w/out EAG)  Collection Time: 06/24/21  8:13 AM       Result                      Value             Ref Range           Hemoglobin A1C              7 3 (H)           <5 7 % of to*        The following portions of the patient's history were reviewed and updated as appropriate: allergies, current medications, past family history, past medical history, past social history, past surgical history and problem list       Review of Systems   Constitutional: Negative  HENT: Positive for congestion and sinus pressure  Eyes: Negative  Respiratory: Negative  Cardiovascular: Negative  Gastrointestinal: Negative  Endocrine: Negative  Genitourinary: Negative  Musculoskeletal: Negative  Skin: Negative  Allergic/Immunologic: Negative  Neurological: Positive for headaches  Hematological: Negative  Psychiatric/Behavioral: Negative  Data to review:   Patient's shoes and socks removed  Right Foot/Ankle   Right Foot Inspection  Skin Exam: skin normal skin not intact, no dry skin, no warmth, no callus, no erythema, no maceration, no abnormal color, no pre-ulcer, no ulcer and no callus                          Toe Exam: ROM and strength within normal limitsno swelling, no tenderness, erythema and  no right toe deformity  Sensory     Proprioception: intact   Monofilament testing: intact  Vascular  Capillary refills: < 3 seconds  The right DP pulse is 2+  The right PT pulse is 2+     Right Toe  - Comprehensive Exam  Ecchymosis: none  Arch: normal  Hammertoes: absent  Claw Toes: absent  Swelling: none   Tenderness: none         Left Foot/Ankle  Left Foot Inspection  Skin Exam: skin normalskin not intact, no dry skin, no warmth, no erythema, no maceration, normal color, no pre-ulcer, no ulcer and no callus                         Toe Exam: ROM and strength within normal limitsno swelling, no tenderness, no erythema and no left toe deformity                   Sensory     Proprioception: intact  Monofilament: intact  Vascular  Capillary refills: < 3 seconds  The left DP pulse is 2+  The left PT pulse is 2+  Left Toe  - Comprehensive Exam  Ecchymosis: none  Arch: normal  Hammertoes: absent  Claw toes: absent  Swelling: none   Tenderness: none       Assign Risk Category:  No deformity present; No loss of protective sensation; No weak pulses       Risk: 0        Objective:    Vitals:    07/01/21 0926   BP: 138/86   BP Location: Left arm   Patient Position: Sitting   Cuff Size: Large   Pulse: 76   Resp: 16   Weight: 79 8 kg (176 lb)   Height: 5' 6" (1 676 m)        Physical Exam  Vitals and nursing note reviewed  Constitutional:       Appearance: Normal appearance  She is well-developed  HENT:      Head: Normocephalic and atraumatic  Nose: Nasal tenderness present  Right Turbinates: Enlarged and swollen  Left Turbinates: Enlarged and swollen  Right Sinus: Frontal sinus tenderness present  Left Sinus: Frontal sinus tenderness present  Comments: Erythematous enlargement elin turbinates  Eyes:      General: Lids are normal       Conjunctiva/sclera: Conjunctivae normal       Pupils: Pupils are equal, round, and reactive to light  Cardiovascular:      Rate and Rhythm: Normal rate and regular rhythm  Pulses: no weak pulses          Dorsalis pedis pulses are 2+ on the right side and 2+ on the left side  Posterior tibial pulses are 2+ on the right side and 2+ on the left side  Heart sounds: No murmur heard  Pulmonary:      Effort: Pulmonary effort is normal       Breath sounds: Normal breath sounds  Feet:      Right foot:      Skin integrity: No ulcer, skin breakdown, erythema, warmth, callus or dry skin        Left foot: Skin integrity: No ulcer, skin breakdown, erythema, warmth, callus or dry skin  Skin:     General: Skin is warm and dry  Neurological:      General: No focal deficit present  Mental Status: She is alert  Coordination: Coordination is intact  Psychiatric:         Mood and Affect: Mood normal          Behavior: Behavior normal  Behavior is cooperative  Thought Content: Thought content normal          Judgment: Judgment normal            BMI Counseling: Body mass index is 28 41 kg/m²  The BMI is above normal  Nutrition recommendations include decreasing portion sizes, encouraging healthy choices of fruits and vegetables, decreasing fast food intake, consuming healthier snacks, limiting drinks that contain sugar, moderation in carbohydrate intake, increasing intake of lean protein, reducing intake of saturated and trans fat and reducing intake of cholesterol  Exercise recommendations include exercising 3-5 times per week  No pharmacotherapy was ordered

## 2021-07-01 NOTE — ASSESSMENT & PLAN NOTE
Hemoglobin A1c uncontrolled at this point time at 7 3 with a fasting sugar of 185  Pt hs only been taking 1/2 glymipiride so will have her take the full 2 mg BID and check in 4 months     Lab Results   Component Value Date    HGBA1C 7 3 (H) 06/24/2021

## 2021-07-01 NOTE — ASSESSMENT & PLAN NOTE
Patient intolerant to statins LDL is 132 triglycerides 395  Patient does follow-up cardiology  Pt stopped statin once weekly and never filled Vasepa due to cost from cardio  She is due to make f/u apt with them

## 2021-07-01 NOTE — PATIENT INSTRUCTIONS
Problem List Items Addressed This Visit        Endocrine    Type 2 diabetes mellitus with complication, without long-term current use of insulin (HCC) - Primary       Hemoglobin A1c uncontrolled at this point time at 7 3 with a fasting sugar of 185  Pt hs only been taking 1/2 glymipiride so will have her take the full 2 mg BID and check in 4 months  Lab Results   Component Value Date    HGBA1C 7 3 (H) 06/24/2021            Relevant Medications    metFORMIN (GLUCOPHAGE) 1000 MG tablet    glimepiride (AMARYL) 2 mg tablet    Other Relevant Orders    Comprehensive metabolic panel    Hemoglobin A1C       Cardiovascular and Mediastinum    Essential hypertension      Stable would prefer patient to have a blood pressure lower than 130  CVA (cerebral vascular accident) [facility])      Patient continues on Plavix  Nervous and Auditory    Demyelinating disease of central nervous system [facility])      Continue with neurology follow-up  Other    Mixed hyperlipidemia       Patient intolerant to statins LDL is 132 triglycerides 395  Patient does follow-up cardiology  Pt stopped statin once weekly and never filled Vasepa due to cost from cardio  She is due to make f/u apt with them  Relevant Orders    Lipid Panel with Direct LDL reflex    Microalbuminuria       Check microalbumin next labs continue Zestril  Relevant Orders    Microalbumin / creatinine urine ratio    Vitamin D deficiency       Vitamin-D excellent at 53 continue current supplementation recheck 1 year  RESOLVED: Hypertriglyceridemia    RESOLVED: Low vitamin D level       Vitamin-D level great at 53 continue current supplementation recheck 1 year             Other Visit Diagnoses     Acute non-recurrent frontal sinusitis        Relevant Medications    cefuroxime (CEFTIN) 500 mg tablet

## 2021-08-10 ENCOUNTER — TELEMEDICINE (OUTPATIENT)
Dept: FAMILY MEDICINE CLINIC | Facility: CLINIC | Age: 61
End: 2021-08-10

## 2021-08-10 VITALS — BODY MASS INDEX: 28.93 KG/M2 | WEIGHT: 180 LBS | HEIGHT: 66 IN

## 2021-08-10 DIAGNOSIS — J06.9 ACUTE URI: Primary | ICD-10-CM

## 2021-08-10 PROCEDURE — 99213 OFFICE O/P EST LOW 20 MIN: CPT | Performed by: PHYSICIAN ASSISTANT

## 2021-08-10 RX ORDER — DOXYCYCLINE 100 MG/1
100 CAPSULE ORAL 2 TIMES DAILY
Qty: 20 CAPSULE | Refills: 0 | Status: SHIPPED | OUTPATIENT
Start: 2021-08-10 | End: 2021-08-20

## 2021-08-10 NOTE — PROGRESS NOTES
COVID-19 Outpatient Progress Note    Assessment/Plan:    Problem List Items Addressed This Visit        Respiratory    Acute URI - Primary     I do not suspect covid at this time  Will cover pt  with doxycycline 100 mg BID  Increase fluids  Relevant Medications    doxycycline monohydrate (MONODOX) 100 mg capsule         Disposition:     After clarifying the patient's history, my suspicion for COVID-19 infection is very low  I have spent 10 minutes directly with the patient  Verification of patient location:    Patient is located in the following state in which I hold an active license [place]    Encounter provider Antoni Medel PA-C    Provider located at [address]  [address] [facility]  [address] [facility] [address] [place] [phone]    Recent Visits  No visits were found meeting these conditions  Showing recent visits within past 7 days and meeting all other requirements  Today's Visits  Date Type Provider Dept   08/10/21 1135 Patricia Raphael PA-C [facility]   Showing today's visits and meeting all other requirements  Future Appointments  No visits were found meeting these conditions  Showing future appointments within next 150 days and meeting all other requirements     This virtual check-in was done via [employer] and patient was informed that this is a secure, HIPAA-compliant platform  She agrees to proceed  Patient agrees to participate in a virtual check in via telephone or video visit instead of presenting to the office to address urgent/immediate medical needs  Patient is aware this is a billable service  After connecting through [facility], the patient was identified by name and date of birth  Neda Rodas was informed that this was a telemedicine visit and that the exam was being conducted confidentially over secure lines  My office door was closed  No one else was in the room   Neda Rodas acknowledged consent and understanding of privacy and security of the telemedicine visit  I informed the patient that I have reviewed her record in Epic and presented the opportunity for her to ask any questions regarding the visit today  The patient agreed to participate  Subjective:   Ashok Burkitt is a 56 y o  female who is concerned about COVID-19  Patient's symptoms include nasal congestion, cough, chest tightness, nausea, vomiting and headache  Patient denies fever, chills, fatigue, malaise, rhinorrhea, sore throat, anosmia, loss of taste, shortness of breath, abdominal pain, diarrhea and myalgias  Date of symptom onset: 8/3/2021    Exposure:   Contact with a person who is under investigation (PUI) for or who is positive for COVID-19 within the last 14 days?: No    Hospitalized recently for fever and/or lower respiratory symptoms?: No      Currently a healthcare worker that is involved in direct patient care?: No      Works in a special setting where the risk of COVID-19 transmission may be high? (this may include long-term care, correctional and long-term facilities; homeless shelters; assisted-living facilities and group homes ): No      Resident in a special setting where the risk of COVID-19 transmission may be high? (this may include long-term care, correctional and long-term facilities; homeless shelters; assisted-living facilities and group homes ): No      Pt  was seen in the office July 1st with same symptoms which partially resolved with antibiotic of ceftin and then returned  AM coughing is green mucus and pt feels tight  She has been using her rescue inhaler from a past ER visit which does help to lighten her chest symptoms when needed  No other family member or contacts are sick  She was nauseous once with one episode of vomiting this past week       Lab Results   Component Value Date    SARSCOV2 Negative 03/26/2021    [facility] Not Detected 02/08/2021     Past Medical History:   Diagnosis Date    Diabetes mellitus ([facility] )     Hypertension     Stroke (cerebrum) ([facility] )      Past Surgical History:   Procedure Laterality Date    CHOLECYSTECTOMY LAPAROSCOPIC      MOHS SURGERY      Micrographic Surgery Nose, Last assessed: 2/13/17    TOTAL KNEE ARTHROPLASTY Left 03/08/2017    Last assessed: 12/26/17    TUBAL LIGATION       Current Outpatient Medications   Medication Sig Dispense Refill    cholecalciferol (VITAMIN D3) 400 units tablet Take 400 Units by mouth daily      clopidogrel (PLAVIX) 75 mg tablet Take 1 tablet (75 mg total) by mouth daily 90 tablet 3    glimepiride (AMARYL) 2 mg tablet Take 1 tablet (2 mg total) by mouth 2 (two) times a day 180 tablet 3    L-Lysine 1000 MG TABS Take by mouth daily      lisinopril (ZESTRIL) 10 mg tablet TAKE ONE TABLET BY MOUTH EVERY DAY 90 tablet 3    metFORMIN (GLUCOPHAGE) 1000 MG tablet Take 1 tablet (1,000 mg total) by mouth 2 (two) times a day with meals 180 tablet 1    Misc Natural Products (DAILY HERBS IMMUNE DEFENSE PO) Take by mouth      Multiple Vitamin (MULTI-VITAMIN DAILY PO) Take 1 tablet by mouth daily      Omega-3 Fatty Acids (FISH OIL) 1,000 mg Take 2 capsules by mouth daily      Probiotic Product (PROBIOTIC-10 PO) Take by mouth      triamcinolone (KENALOG) 0 1 % cream Apply topically 2 (two) times a day 30 g 5    doxycycline monohydrate (MONODOX) 100 mg capsule Take 1 capsule (100 mg total) by mouth 2 (two) times a day for 10 days 20 capsule 0    Famotidine (PEPCID AC PO) Take by mouth (Patient not taking: Reported on 8/10/2021)       No current facility-administered medications for this visit  Allergies   Allergen Reactions    Bactrim [Sulfamethoxazole-Trimethoprim]     Neomycin-Bacitracin Zn-Polymyx Other (See Comments)     REDNESS    Statins Myalgia       Review of Systems   Constitutional: Negative  Negative for chills, fatigue and fever  HENT: Positive for congestion  Negative for rhinorrhea and sore throat  Eyes: Negative  Respiratory: Positive for cough and chest tightness  Negative for shortness of breath  Cardiovascular: Negative  Gastrointestinal: Positive for nausea and vomiting  Negative for abdominal pain and diarrhea  Endocrine: Negative  Genitourinary: Negative  Musculoskeletal: Negative  Negative for myalgias  Skin: Negative  Allergic/Immunologic: Negative  Neurological: Positive for headaches  Hematological: Negative  Psychiatric/Behavioral: Negative  Objective:    Vitals:    08/10/21 1503   Weight: 81 6 kg (180 lb)   Height: 5' 6" (1 676 m)       Physical Exam  Vitals and nursing note reviewed  Constitutional:       General: She is not in acute distress  Appearance: Normal appearance  HENT:      Head: Normocephalic and atraumatic  Eyes:      General:         Right eye: No discharge  Left eye: No discharge  Conjunctiva/sclera: Conjunctivae normal    Pulmonary:      Effort: Pulmonary effort is normal    Skin:     General: Skin is warm and dry  Neurological:      General: No focal deficit present  Mental Status: She is alert  Psychiatric:         Mood and Affect: Mood normal          Behavior: Behavior normal          Thought Content: Thought content normal          Judgment: Judgment normal          VIRTUAL VISIT DISCLAIMER    Siobhan Romero verbally agrees to participate in [employer]  Pt is aware that [employer] could be limited without vital signs or the ability to perform a full hands-on physical Cleo New understands she or the provider may request at any time to terminate the video visit and request the patient to seek care or treatment in person

## 2021-08-17 ENCOUNTER — TELEPHONE (OUTPATIENT)
Dept: FAMILY MEDICINE CLINIC | Facility: CLINIC | Age: 61
End: 2021-08-17

## 2021-08-17 NOTE — TELEPHONE ENCOUNTER
PATIENT ASKING IF THERE IS AN ALTERNATIVE TO THE MUCINEX, SHE FINISHED A WHOLE BOX AND FEELS THAT IT HAS NOT REALLY HELPED HER, SHE IS COUGHING UP  A LOT OF MUCOUS IN THE MORNING, BUT SHE FEELS IT IS NOT "GETTING OUT OF HER BODY"  PATIENT HAS BEEN TAKING HER ANTIBIOTIC, SOUNDS VERY CONGESTED, DOES NOT UNDERSTAND WHY SHE IS NOT REALLY FEELING BETTER   PLEASE CALL PATIENT TO ADVISE [phone]

## 2021-08-18 NOTE — TELEPHONE ENCOUNTER
KB-patient still coughing up colored mucous only in the am  Patient still has 2 days of the antibiotic left  Patient has been using an OTC spray saline gel (nasal spray)  She is concerned that another nasal spray will dry her nose up even more  Please advise

## 2021-08-20 NOTE — TELEPHONE ENCOUNTER
She said she has today yet for the antibiotics  She asked specifically if she can take a decongestant because she thinks that would break up the mucous and make her feel better  She took a whole box of the plain Mucinex already  She doesn't really want another antibiotic unless you feel she needs one

## 2021-08-20 NOTE — TELEPHONE ENCOUNTER
Decongestants can raise blood pressure and she is on medication for that however if she wants to try a decongestant I would say to not take it more than 3 days

## 2021-10-25 LAB
ALBUMIN SERPL-MCNC: 4.4 G/DL (ref 3.6–5.1)
ALBUMIN/CREAT UR: 165 MCG/MG CREAT
ALBUMIN/GLOB SERPL: 1.2 (CALC) (ref 1–2.5)
ALP SERPL-CCNC: 69 U/L (ref 37–153)
ALT SERPL-CCNC: 32 U/L (ref 6–29)
AST SERPL-CCNC: 30 U/L (ref 10–35)
BILIRUB SERPL-MCNC: 1.4 MG/DL (ref 0.2–1.2)
BUN SERPL-MCNC: 17 MG/DL (ref 7–25)
BUN/CREAT SERPL: ABNORMAL (CALC) (ref 6–22)
CALCIUM SERPL-MCNC: 9.9 MG/DL (ref 8.6–10.4)
CHLORIDE SERPL-SCNC: 99 MMOL/L (ref 98–110)
CHOLEST SERPL-MCNC: 251 MG/DL
CHOLEST/HDLC SERPL: 5.2 (CALC)
CO2 SERPL-SCNC: 27 MMOL/L (ref 20–32)
CREAT SERPL-MCNC: 0.83 MG/DL (ref 0.5–0.99)
CREAT UR-MCNC: 149 MG/DL (ref 20–275)
GLOBULIN SER CALC-MCNC: 3.8 G/DL (CALC) (ref 1.9–3.7)
GLUCOSE SERPL-MCNC: 202 MG/DL (ref 65–99)
HBA1C MFR BLD: 7 % OF TOTAL HGB
HDLC SERPL-MCNC: 48 MG/DL
LDLC SERPL CALC-MCNC: 156 MG/DL (CALC)
MICROALBUMIN UR-MCNC: 24.6 MG/DL
NONHDLC SERPL-MCNC: 203 MG/DL (CALC)
POTASSIUM SERPL-SCNC: 4.6 MMOL/L (ref 3.5–5.3)
PROT SERPL-MCNC: 8.2 G/DL (ref 6.1–8.1)
SL AMB EGFR AFRICAN AMERICAN: 88 ML/MIN/1.73M2
SL AMB EGFR NON AFRICAN AMERICAN: 76 ML/MIN/1.73M2
SODIUM SERPL-SCNC: 134 MMOL/L (ref 135–146)
TRIGL SERPL-MCNC: 308 MG/DL

## 2021-11-07 PROBLEM — E11.9 TYPE 2 DIABETES MELLITUS (HCC): Status: RESOLVED | Noted: 2019-05-07 | Resolved: 2021-11-07

## 2021-11-07 PROBLEM — Z20.822 EXPOSURE TO COVID-19 VIRUS: Status: RESOLVED | Noted: 2021-03-26 | Resolved: 2021-11-07

## 2021-11-07 PROBLEM — Z86.73 HISTORY OF CVA (CEREBROVASCULAR ACCIDENT): Status: ACTIVE | Noted: 2018-03-14

## 2021-11-07 PROBLEM — J06.9 ACUTE URI: Status: RESOLVED | Noted: 2021-08-10 | Resolved: 2021-11-07

## 2021-11-07 PROBLEM — J01.10 ACUTE NON-RECURRENT FRONTAL SINUSITIS: Status: RESOLVED | Noted: 2020-03-23 | Resolved: 2021-11-07

## 2021-11-08 ENCOUNTER — OFFICE VISIT (OUTPATIENT)
Dept: FAMILY MEDICINE CLINIC | Facility: CLINIC | Age: 61
End: 2021-11-08

## 2021-11-08 VITALS
HEIGHT: 66 IN | HEART RATE: 110 BPM | DIASTOLIC BLOOD PRESSURE: 78 MMHG | SYSTOLIC BLOOD PRESSURE: 134 MMHG | TEMPERATURE: 97.8 F | WEIGHT: 176 LBS | BODY MASS INDEX: 28.28 KG/M2

## 2021-11-08 DIAGNOSIS — E55.9 VITAMIN D DEFICIENCY: ICD-10-CM

## 2021-11-08 DIAGNOSIS — I10 ESSENTIAL HYPERTENSION: ICD-10-CM

## 2021-11-08 DIAGNOSIS — Z12.31 ENCOUNTER FOR SCREENING MAMMOGRAM FOR MALIGNANT NEOPLASM OF BREAST: ICD-10-CM

## 2021-11-08 DIAGNOSIS — C44.311 BASAL CELL CARCINOMA (BCC) OF SKIN OF NOSE: ICD-10-CM

## 2021-11-08 DIAGNOSIS — G37.9 DEMYELINATING DISEASE OF CENTRAL NERVOUS SYSTEM (HCC): ICD-10-CM

## 2021-11-08 DIAGNOSIS — E11.8 TYPE 2 DIABETES MELLITUS WITH COMPLICATION, WITHOUT LONG-TERM CURRENT USE OF INSULIN (HCC): Primary | ICD-10-CM

## 2021-11-08 DIAGNOSIS — R80.9 MICROALBUMINURIA: ICD-10-CM

## 2021-11-08 DIAGNOSIS — E78.2 MIXED HYPERLIPIDEMIA: ICD-10-CM

## 2021-11-08 DIAGNOSIS — Z78.0 MENOPAUSE: ICD-10-CM

## 2021-11-08 DIAGNOSIS — I67.9 CEREBROVASCULAR DISEASE: ICD-10-CM

## 2021-11-08 PROCEDURE — 99214 OFFICE O/P EST MOD 30 MIN: CPT | Performed by: PHYSICIAN ASSISTANT

## 2021-11-08 RX ORDER — OMEGA-3-ACID ETHYL ESTERS 1 G/1
1 CAPSULE, LIQUID FILLED ORAL 2 TIMES DAILY
Qty: 60 CAPSULE | Refills: 3
Start: 2021-11-08 | End: 2022-04-29 | Stop reason: ALTCHOICE

## 2022-03-30 LAB
ALBUMIN SERPL-MCNC: 4.4 G/DL (ref 3.6–5.1)
ALBUMIN/GLOB SERPL: 1.3 (CALC) (ref 1–2.5)
ALP SERPL-CCNC: 76 U/L (ref 37–153)
ALT SERPL-CCNC: 31 U/L (ref 6–29)
AST SERPL-CCNC: 31 U/L (ref 10–35)
BILIRUB SERPL-MCNC: 1 MG/DL (ref 0.2–1.2)
BUN SERPL-MCNC: 16 MG/DL (ref 7–25)
BUN/CREAT SERPL: ABNORMAL (CALC) (ref 6–22)
CALCIUM SERPL-MCNC: 9.8 MG/DL (ref 8.6–10.4)
CHLORIDE SERPL-SCNC: 98 MMOL/L (ref 98–110)
CHOLEST SERPL-MCNC: 258 MG/DL
CHOLEST/HDLC SERPL: 5.4 (CALC)
CO2 SERPL-SCNC: 27 MMOL/L (ref 20–32)
CREAT SERPL-MCNC: 0.79 MG/DL (ref 0.5–0.99)
GLOBULIN SER CALC-MCNC: 3.5 G/DL (CALC) (ref 1.9–3.7)
GLUCOSE SERPL-MCNC: 168 MG/DL (ref 65–99)
HBA1C MFR BLD: 7 % OF TOTAL HGB
HDLC SERPL-MCNC: 48 MG/DL
LDLC SERPL DIRECT ASSAY-MCNC: 139 MG/DL
NONHDLC SERPL-MCNC: 210 MG/DL (CALC)
POTASSIUM SERPL-SCNC: 4.6 MMOL/L (ref 3.5–5.3)
PROT SERPL-MCNC: 7.9 G/DL (ref 6.1–8.1)
SL AMB EGFR AFRICAN AMERICAN: 92 ML/MIN/1.73M2
SL AMB EGFR NON AFRICAN AMERICAN: 80 ML/MIN/1.73M2
SODIUM SERPL-SCNC: 134 MMOL/L (ref 135–146)
TRIGL SERPL-MCNC: 413 MG/DL

## 2022-04-29 ENCOUNTER — OFFICE VISIT (OUTPATIENT)
Dept: FAMILY MEDICINE CLINIC | Facility: CLINIC | Age: 62
End: 2022-04-29

## 2022-04-29 VITALS
TEMPERATURE: 97.3 F | BODY MASS INDEX: 28.45 KG/M2 | SYSTOLIC BLOOD PRESSURE: 130 MMHG | WEIGHT: 177 LBS | DIASTOLIC BLOOD PRESSURE: 86 MMHG | HEART RATE: 96 BPM | HEIGHT: 66 IN

## 2022-04-29 DIAGNOSIS — G37.9 DEMYELINATING DISEASE OF CENTRAL NERVOUS SYSTEM (HCC): ICD-10-CM

## 2022-04-29 DIAGNOSIS — E78.2 MIXED HYPERLIPIDEMIA: ICD-10-CM

## 2022-04-29 DIAGNOSIS — K76.0 FATTY INFILTRATION OF LIVER: ICD-10-CM

## 2022-04-29 DIAGNOSIS — I10 ESSENTIAL HYPERTENSION: ICD-10-CM

## 2022-04-29 DIAGNOSIS — J30.9 ALLERGIC RHINITIS, UNSPECIFIED SEASONALITY, UNSPECIFIED TRIGGER: ICD-10-CM

## 2022-04-29 DIAGNOSIS — E11.8 TYPE 2 DIABETES MELLITUS WITH COMPLICATION, WITHOUT LONG-TERM CURRENT USE OF INSULIN (HCC): Primary | ICD-10-CM

## 2022-04-29 DIAGNOSIS — I67.9 CEREBROVASCULAR DISEASE: ICD-10-CM

## 2022-04-29 DIAGNOSIS — E55.9 VITAMIN D DEFICIENCY: ICD-10-CM

## 2022-04-29 DIAGNOSIS — C44.311 BASAL CELL CARCINOMA (BCC) OF SKIN OF NOSE: ICD-10-CM

## 2022-04-29 DIAGNOSIS — Z86.73 HISTORY OF CVA (CEREBROVASCULAR ACCIDENT): ICD-10-CM

## 2022-04-29 DIAGNOSIS — R80.9 MICROALBUMINURIA: ICD-10-CM

## 2022-04-29 DIAGNOSIS — Z86.73 HISTORY OF TIA (TRANSIENT ISCHEMIC ATTACK): ICD-10-CM

## 2022-04-29 PROCEDURE — 99213 OFFICE O/P EST LOW 20 MIN: CPT | Performed by: PHYSICIAN ASSISTANT

## 2022-04-29 RX ORDER — OMEGA-3-ACID ETHYL ESTERS 1 G/1
1 CAPSULE, LIQUID FILLED ORAL 2 TIMES DAILY
Qty: 60 CAPSULE | Refills: 3 | Status: SHIPPED | OUTPATIENT
Start: 2022-04-29

## 2022-04-29 NOTE — PATIENT INSTRUCTIONS
Problem List Items Addressed This Visit        Endocrine    Type 2 diabetes mellitus with complication, without long-term current use of insulin ([facility] ) - Primary    Relevant Orders    [facility] Diabetic eye exam          10% - bad control"> 10% - bad control,Hemoglobin A1c (HbA1c) greater than 10% indicating poor diabetic control,Haemoglobin A1c greater than 10% indicating poor diabetic control">   Diabetes Mellitus Type 2 in Adults, Ambulatory Care   GENERAL INFORMATION:   Diabetes mellitus type 2  is a disease that affects how your body uses glucose (sugar)  Insulin helps move sugar out of the blood so it can be used for energy  Normally, when the blood sugar level increases, the pancreas makes more insulin  Type 2 diabetes develops because either the body cannot make enough insulin, or it cannot use the insulin correctly  After many years, your pancreas may stop making insulin  Common symptoms include the following:   · More hunger or thirst than usual     · Frequent urination     · Weight loss without trying     · Blurred vision  Seek immediate care for the following symptoms:   · Severe abdominal pain, or pain that spreads to your back  You may also be vomiting  · Trouble staying awake or focusing    · Shaking or sweating    · Blurred or double vision    · Breath has a fruity, sweet smell    · Breathing is deep and labored, or rapid and shallow    · Heartbeat is fast and weak  Treatment for diabetes mellitus type 2  includes keeping your blood sugar at a normal level  You must eat the right foods, and exercise regularly  You may also need medicine if you cannot control your blood sugar level with nutrition and exercise  Manage diabetes mellitus type 2:   · Check your blood sugar level  You will be taught how to check a small drop of blood in a glucose monitor  Ask your healthcare provider when and how often to check during the day   Ask your healthcare provider what your blood sugar levels should be when you check them  · Keep track of carbohydrates (sugar and starchy foods)  Your blood sugar level can get too high if you eat too many carbohydrates  Your dietitian will help you plan meals and snacks that have the right amount of carbohydrates  · Eat low-fat foods  Some examples are skinless chicken and low-fat milk  · Eat less sodium (salt)  Some examples of high-sodium foods to limit are soy sauce, potato chips, and soup  Do not add salt to food you cook  Limit your use of table salt  · Eat high-fiber foods  Foods that are a good source of fiber include vegetables, whole grain bread, and beans  · Limit alcohol  Alcohol affects your blood sugar level and can make it harder to manage your diabetes  Women should limit alcohol to 1 drink a day  Men should limit alcohol to 2 drinks a day  A drink of alcohol is 12 ounces of beer, 5 ounces of wine, or 1½ ounces of liquor  · Get regular exercise  Exercise can help keep your blood sugar level steady, decrease your risk of heart disease, and help you lose weight  Exercise for at least 30 minutes, 5 days a week  Include muscle strengthening activities 2 days each week  Work with your healthcare provider to create an exercise plan  · Check your feet each day  for injuries or open sores  Ask your healthcare provider for activities you can do if you have an open sore  · Quit smoking  If you smoke, it is never too late to quit  Smoking can worsen the problems that may occur with diabetes  Ask your healthcare provider for information about how to stop smoking if you are having trouble quitting  · Ask about your weight:  Ask healthcare providers if you need to lose weight, and how much to lose  Ask them to help you with a weight loss program  Even a 10 to 15 pound weight loss can help you manage your blood sugar level  · Carry medical alert identification  Wear medical alert jewelry or carry a card that says you have diabetes   Ask your healthcare provider where to get these items  · Ask about vaccines  Diabetes puts you at risk of serious illness if you get the flu, pneumonia, or hepatitis  Ask your healthcare provider if you should get a flu, pneumonia, or hepatitis B vaccine, and when to get the vaccine  Follow up with your healthcare provider as directed:  Write down your questions so you remember to ask them during your visits  CARE AGREEMENT:   You have the right to help plan your care  Learn about your health condition and how it may be treated  Discuss treatment options with your caregivers to decide what care you want to receive  You always have the right to refuse treatment  The above information is an  only  It is not intended as medical advice for individual conditions or treatments  Talk to your doctor, nurse or pharmacist before following any medical regimen to see if it is safe and effective for you  © 2014 [address] is for End User's use only and may not be sold, redistributed or otherwise used for commercial purposes  All illustrations and images included in CareNotes® are the copyrighted property of A D A M [employer]  or Daryl Parham

## 2022-04-29 NOTE — ASSESSMENT & PLAN NOTE
No residual side effects patient still on Plavix but is looking for a new neurologist in [place] where she has moved

## 2022-04-29 NOTE — PROGRESS NOTES
Assessment and Plan:  Patient has been a patient of mine for many years however she has moved to [place] and I am going to give her information for a [facility] primary care practice in [facility] where she lives so she can set up as a new patient and continue care  They can help her to find specialists in this area  She was given blood work to do in 4 months 1 week before her appointment with them  In the meantime we will still available to have produce any refills or acute care as needed  Problem List Items Addressed This Visit        Digestive    Fatty infiltration of liver     AST ALT only slightly elevated at 31  Continue to observe  Patient has no insurance  Relevant Orders    Ambulatory Referral to [facility]       Endocrine    Type 2 diabetes mellitus with complication, without long-term current use of insulin ([address] ) - Primary     Controlled on current medications with an A1c of 7 and fasting sugar of 168  Pt was offered Prevnar 20 but wants to wait  Patient on lives in [place] and I suggest finding a new provider within [facility] closer to her new home  I did give her blood work for 4 months from now to do prior to getting set up for with her 1st  Appointment  Lab Results   Component Value Date    HGBA1C 7 0 (H) 03/29/2022            Relevant Orders    Ambulatory Referral to [facility]    Hemoglobin A1C    Comprehensive metabolic panel       Respiratory    Allergic rhinitis     Currently symptomatic  Cardiovascular and Mediastinum    Essential hypertension     Stable continue current medication  Relevant Orders    Ambulatory Referral to Family Practice    Cerebrovascular disease     Pt needs to find a new neurologist in [place]  She is to continue plavix  Nervous and Auditory    Demyelinating disease of central nervous system ([facility] [address] )     Nees to find a new neuro in [place]            Relevant Orders    Ambulatory Referral to [facility] Musculoskeletal and Integument    Basal cell carcinoma     Pt has moved and is still seeking a new derm in [place]  Relevant Orders    Ambulatory Referral to [facility]       Other    Mixed hyperlipidemia     Pt  LDL is better than it has been in a while at 139  Patient is intolerant to all statins and most therapy tried by Cardiology to this date  Trigs elevated at 413  Pt  was Seeing cardio for treatment but has been out of her Lovaza so I did refill this  She needs to set up with new cardio in [place] her she currently resides             Relevant Medications    omega-3-acid ethyl esters (LOVAZA) 1 g capsule    Other Relevant Orders    Ambulatory Referral to [facility]    Lipid Panel with Direct LDL reflex    History of CVA (cerebrovascular accident)     No residual side effects patient still on Plavix but is looking for a new neurologist in [place] where she has moved  Microalbuminuria     Continue ACE  Check yearly  Relevant Orders    Ambulatory Referral to [facility] / creatinine urine ratio    History of TIA (transient ischemic attack)     Continue Plavix  Patient does need to get set up with a new neurologist          Vitamin D deficiency     Check D level with next labs  Relevant Orders    Ambulatory Referral to Family Practice                 Diagnoses and all orders for this visit:    Type 2 diabetes mellitus with complication, without long-term current use of insulin ([facility] [address] )  -     Cancel: IRIS Diabetic eye exam  -     Ambulatory Referral to [facility]; Future  -     Hemoglobin A1C; Future  -     Comprehensive metabolic panel; Future    Fatty infiltration of liver  -     Ambulatory Referral to [facility]; Future    Essential hypertension  -     Ambulatory Referral to [facility]; Future    Demyelinating disease of central nervous system [facility])  -     Ambulatory Referral to [facility];  Future    Basal cell carcinoma (BCC) of skin of nose  -     Ambulatory Referral to [facility]; Future    Vitamin D deficiency  -     Ambulatory Referral to [facility]; Future    Mixed hyperlipidemia  -     omega-3-acid ethyl esters (LOVAZA) 1 g capsule; Take 1 capsule (1 g total) by mouth 2 (two) times a day Pre cardio  -     Ambulatory Referral to [facility]; Future  -     Lipid Panel with Direct LDL reflex; Future    Microalbuminuria  -     Ambulatory Referral to [facility]; Future  -     Microalbumin / creatinine urine ratio; Future    Allergic rhinitis, unspecified seasonality, unspecified trigger    Cerebrovascular disease    History of TIA (transient ischemic attack)    History of CVA (cerebrovascular accident)        Patient's shoes and socks removed  Right Foot/Ankle   Right Foot Inspection  Skin Exam: skin normal  Skin not intact, no dry skin, no warmth, no callus, no erythema, no maceration, no abnormal color, no pre-ulcer, no ulcer and no callus  Toe Exam: ROM and strength within normal limits  No swelling, no tenderness, erythema and  no right toe deformity    Sensory   Proprioception: intact  Monofilament testing: intact    Vascular  Capillary refills: < 3 seconds  The right DP pulse is 2+  The right PT pulse is 2+  Right Toe  - Comprehensive Exam  Ecchymosis: none  Arch: normal  Hammertoes: absent  Claw Toes: absent  Swelling: none   Tenderness: none         Left Foot/Ankle  Left Foot Inspection  Skin Exam: skin normal  Skin not intact, no dry skin, no warmth, no erythema, no maceration, normal color, no pre-ulcer and no ulcer  Toe Exam: ROM and strength within normal limits  No swelling, no tenderness, no erythema and no left toe deformity  Sensory   Proprioception: intact  Monofilament testing: intact    Vascular  Capillary refills: < 3 seconds  The left DP pulse is 2+  The left PT pulse is 2+       Left Toe  - Comprehensive Exam  Ecchymosis: none  Arch: normal  Hammertoes: absent  Claw toes: absent  Swelling: none   Tenderness: none           Assign Risk Category  No deformity present  No loss of protective sensation  No weak pulses  Risk: 0   foot      Subjective:      Patient ID: Kiara Aparicio is a 59 y o  female  CC:    Chief Complaint   Patient presents with    Follow-up     review labs from 3/29/22 - offered no complaints   Diabetes       HPI:      Kiara Aparicio is here for chronic conditions f/u including the diagnosis of Type 2 diabetes mellitus with complication, without long-term current use of insulin (hcc)  (primary encounter diagnosis)   Pt  states they are taking all medications as directed without complaints or side effects  Pt  had labs done prior to today's visit which included      The following portions of the patient's history were reviewed and updated as appropriate: allergies, current medications, past family history, past medical history, past social history, past surgical history and problem list       Review of Systems   Constitutional: Negative  HENT: Negative  Eyes: Negative  Respiratory: Negative  Cardiovascular: Negative  Gastrointestinal: Negative  Endocrine: Negative  Genitourinary: Negative  Musculoskeletal: Negative  Skin: Negative  Allergic/Immunologic: Negative  Neurological: Negative  Hematological: Negative  Psychiatric/Behavioral: Negative  Data to review:       Objective:    Vitals:    04/29/22 0900   BP: 130/86   BP Location: Right arm   Patient Position: Sitting   Cuff Size: Adult   Pulse: 96   Temp: (!) 97 3 °F (36 3 °C)   TempSrc: Temporal   Weight: 80 3 kg (177 lb)   Height: 5' 6" (1 676 m)        Physical Exam  Vitals and nursing note reviewed  Constitutional:       Appearance: Normal appearance  She is well-developed  HENT:      Head: Normocephalic and atraumatic     Eyes:      General: Lids are normal       Conjunctiva/sclera: Conjunctivae normal       Pupils: Pupils are equal, round, and reactive to light  Cardiovascular:      Rate and Rhythm: Normal rate and regular rhythm  Pulses: no weak pulses          Dorsalis pedis pulses are 2+ on the right side and 2+ on the left side  Posterior tibial pulses are 2+ on the right side and 2+ on the left side  Heart sounds: No murmur heard  Pulmonary:      Effort: Pulmonary effort is normal       Breath sounds: Normal breath sounds  Feet:      Right foot:      Skin integrity: No ulcer, skin breakdown, erythema, warmth, callus or dry skin  Left foot:      Skin integrity: No ulcer, skin breakdown, erythema, warmth or dry skin  Skin:     General: Skin is warm and dry  Neurological:      General: No focal deficit present  Mental Status: She is alert  Coordination: Coordination is intact  Psychiatric:         Mood and Affect: Mood normal          Behavior: Behavior normal  Behavior is cooperative  Thought Content: Thought content normal          Judgment: Judgment normal          BMI Counseling: Body mass index is 28 57 kg/m²  The BMI is above normal  Nutrition recommendations include reducing portion sizes, decreasing overall calorie intake, 3-5 servings of fruits/vegetables daily, reducing fast food intake, consuming healthier snacks, decreasing soda and/or juice intake, moderation in carbohydrate intake, increasing intake of lean protein, reducing intake of saturated fat and trans fat and reducing intake of cholesterol  BMI Counseling: Body mass index is 28 57 kg/m²  The BMI is above normal  Nutrition recommendations include decreasing portion sizes, encouraging healthy choices of fruits and vegetables, decreasing fast food intake, consuming healthier snacks, limiting drinks that contain sugar, moderation in carbohydrate intake, increasing intake of lean protein, reducing intake of saturated and trans fat and reducing intake of cholesterol  Exercise recommendations include exercising 3-5 times per week   No pharmacotherapy was ordered  Rationale for BMI follow-up plan is due to patient being overweight or obese  Depression Screening and Follow-up Plan: Patient was screened for depression during today's encounter  They screened negative with a PHQ-2 score of 0

## 2022-04-29 NOTE — ASSESSMENT & PLAN NOTE
Pt  LDL is better than it has been in a while at 139  Patient is intolerant to all statins and most therapy tried by Cardiology to this date  Trigs elevated at 413  Pt  was Seeing cardio for treatment but has been out of her Lovaza so I did refill this  She needs to set up with new cardio in [place] her she currently resides  [facility]

## 2022-04-29 NOTE — ASSESSMENT & PLAN NOTE
Pt needs to find a new neurologist in [address] [place] [address]  She is to continue plavix

## 2022-04-29 NOTE — ASSESSMENT & PLAN NOTE
Controlled on current medications with an A1c of 7 and fasting sugar of 168  Pt was offered Prevnar 20 but wants to wait  Patient on lives in [place] and I suggest finding a new provider within [facility] closer to her new home  I did give her blood work for 4 months from now to do prior to getting set up for with her 1st  Appointment    Lab Results   Component Value Date    HGBA1C 7 0 (H) 03/29/2022

## 2022-10-12 ENCOUNTER — TELEPHONE (OUTPATIENT)
Dept: DERMATOLOGY | Facility: CLINIC | Age: 62
End: 2022-10-12

## 2022-10-12 NOTE — TELEPHONE ENCOUNTER
Called to schedule consult from referral work queue  Spoke with patient who states that she does not currently have insurance  Made awre that current referral will  next month   Advised pt to contact her PCP once she gets new insurance to get a new referral

## 2022-11-10 LAB
ALBUMIN SERPL-MCNC: 4.6 G/DL (ref 3.8–4.8)
ALBUMIN/CREAT UR: 650 MG/G CREAT (ref 0–29)
ALBUMIN/GLOB SERPL: 1.4 {RATIO} (ref 1.2–2.2)
ALP SERPL-CCNC: 83 IU/L (ref 44–121)
ALT SERPL-CCNC: 41 IU/L (ref 0–32)
AST SERPL-CCNC: 47 IU/L (ref 0–40)
BILIRUB SERPL-MCNC: 0.8 MG/DL (ref 0–1.2)
BUN SERPL-MCNC: 12 MG/DL (ref 8–27)
BUN/CREAT SERPL: 14 (ref 12–28)
CALCIUM SERPL-MCNC: 10 MG/DL (ref 8.7–10.3)
CHLORIDE SERPL-SCNC: 96 MMOL/L (ref 96–106)
CHOLEST SERPL-MCNC: 282 MG/DL (ref 100–199)
CO2 SERPL-SCNC: 21 MMOL/L (ref 20–29)
CREAT SERPL-MCNC: 0.85 MG/DL (ref 0.57–1)
CREAT UR-MCNC: 253 MG/DL
EGFR: 76 ML/MIN/1.73
GLOBULIN SER-MCNC: 3.3 G/DL (ref 1.5–4.5)
GLUCOSE SERPL-MCNC: 184 MG/DL (ref 70–99)
HBA1C MFR BLD: 7.5 % (ref 4.8–5.6)
HDLC SERPL-MCNC: 46 MG/DL
LDLC SERPL CALC-MCNC: 151 MG/DL (ref 0–99)
MICROALBUMIN UR-MCNC: 1644.6 UG/ML
POTASSIUM SERPL-SCNC: 4.5 MMOL/L (ref 3.5–5.2)
PROT SERPL-MCNC: 7.9 G/DL (ref 6–8.5)
SODIUM SERPL-SCNC: 134 MMOL/L (ref 134–144)
TRIGL SERPL-MCNC: 452 MG/DL (ref 0–149)

## 2022-11-13 NOTE — RESULT ENCOUNTER NOTE
Please let pt know we have her lab results if she can keep her NP apt with her new PCP later this week to review  I hope she is well  Thank you

## 2022-11-18 ENCOUNTER — OFFICE VISIT (OUTPATIENT)
Dept: FAMILY MEDICINE CLINIC | Facility: CLINIC | Age: 62
End: 2022-11-18

## 2022-11-18 VITALS
TEMPERATURE: 97.5 F | BODY MASS INDEX: 28.67 KG/M2 | RESPIRATION RATE: 14 BRPM | DIASTOLIC BLOOD PRESSURE: 96 MMHG | HEIGHT: 66 IN | WEIGHT: 178.4 LBS | SYSTOLIC BLOOD PRESSURE: 152 MMHG | HEART RATE: 90 BPM

## 2022-11-18 DIAGNOSIS — Z23 NEED FOR INFLUENZA VACCINATION: ICD-10-CM

## 2022-11-18 DIAGNOSIS — Z87.74 S/P PATENT FORAMEN OVALE CLOSURE: ICD-10-CM

## 2022-11-18 DIAGNOSIS — Z12.31 ENCOUNTER FOR SCREENING MAMMOGRAM FOR MALIGNANT NEOPLASM OF BREAST: ICD-10-CM

## 2022-11-18 DIAGNOSIS — E11.29 TYPE 2 DIABETES MELLITUS WITH MICROALBUMINURIA, WITHOUT LONG-TERM CURRENT USE OF INSULIN (HCC): ICD-10-CM

## 2022-11-18 DIAGNOSIS — R80.9 TYPE 2 DIABETES MELLITUS WITH MICROALBUMINURIA, WITHOUT LONG-TERM CURRENT USE OF INSULIN (HCC): ICD-10-CM

## 2022-11-18 DIAGNOSIS — E78.2 MIXED HYPERLIPIDEMIA: ICD-10-CM

## 2022-11-18 DIAGNOSIS — I10 ESSENTIAL HYPERTENSION: Primary | ICD-10-CM

## 2022-11-18 PROBLEM — K42.9 UMBILICAL HERNIA: Status: RESOLVED | Noted: 2017-12-01 | Resolved: 2022-11-18

## 2022-11-18 PROBLEM — N36.8 SUBURETHRAL CYST: Status: RESOLVED | Noted: 2017-12-01 | Resolved: 2022-11-18

## 2022-11-18 RX ORDER — LOSARTAN POTASSIUM AND HYDROCHLOROTHIAZIDE 12.5; 1 MG/1; MG/1
1 TABLET ORAL DAILY
Qty: 90 TABLET | Refills: 3 | Status: SHIPPED | OUTPATIENT
Start: 2022-11-18

## 2022-11-18 RX ORDER — GEMFIBROZIL 600 MG/1
600 TABLET, FILM COATED ORAL
Qty: 60 TABLET | Refills: 1 | Status: SHIPPED | OUTPATIENT
Start: 2022-11-18

## 2022-11-18 NOTE — PROGRESS NOTES
Chief Complaint   Patient presents with   • Establish Care   multiple issues      Patient ID: Kristan Nicholas is a 57 y o  female  HPI  Pt is seeing for HTN, DM2, HLD   Stable, seen as a new pt     The following portions of the patient's history were reviewed and updated as appropriate: allergies, current medications, past family history, past medical history, past social history, past surgical history and problem list     Review of Systems   Constitutional: Negative  Respiratory: Negative  Cardiovascular: Negative  Gastrointestinal: Negative  Genitourinary: Negative  Musculoskeletal: Negative  Skin: Negative  Neurological: Negative  Psychiatric/Behavioral: Negative  Current Outpatient Medications   Medication Sig Dispense Refill   • clopidogrel (PLAVIX) 75 mg tablet TAKE ONE TABLET BY MOUTH EVERY DAY 90 tablet 3   • glimepiride (AMARYL) 2 mg tablet Take 1 tablet (2 mg total) by mouth 2 (two) times a day 180 tablet 3   • lisinopril (ZESTRIL) 10 mg tablet TAKE ONE TABLET BY MOUTH EVERY DAY 90 tablet 3   • metFORMIN (GLUCOPHAGE) 1000 MG tablet TAKE ONE TABLET BY MOUTH TWICE A DAY WITH MEALS 180 tablet 3   • Misc Natural Products (DAILY HERBS IMMUNE DEFENSE PO) Take by mouth     • Multiple Vitamin (MULTI-VITAMIN DAILY PO) Take 1 tablet by mouth daily     • omega-3-acid ethyl esters (LOVAZA) 1 g capsule TAKE ONE CAPSULE BY MOUTH TWICE A DAY 60 capsule 0   • cholecalciferol (VITAMIN D3) 400 units tablet Take 400 Units by mouth daily     • Famotidine (PEPCID AC PO) Take by mouth       • L-Lysine 1000 MG TABS Take by mouth daily       No current facility-administered medications for this visit         Objective:    /96 (BP Location: Left arm, Patient Position: Sitting, Cuff Size: Large)   Pulse 90   Temp 97 5 °F (36 4 °C)   Resp 14   Ht 5' 6" (1 676 m)   Wt 80 9 kg (178 lb 6 4 oz)   BMI 28 79 kg/m²        Physical Exam  Constitutional:       General: She is not in acute distress  Appearance: She is not ill-appearing  Cardiovascular:      Rate and Rhythm: Normal rate and regular rhythm  Pulmonary:      Effort: Pulmonary effort is normal  No respiratory distress  Breath sounds: No wheezing, rhonchi or rales  Neurological:      General: No focal deficit present  Mental Status: She is alert and oriented to person, place, and time  Psychiatric:         Mood and Affect: Mood normal            Labs in chart were reviewed  Recent Results (from the past 672 hour(s))   Comprehensive metabolic panel    Collection Time: 11/08/22  8:02 AM   Result Value Ref Range    Glucose, Random 184 (H) 70 - 99 mg/dL    BUN 12 8 - 27 mg/dL    Creatinine 0 85 0 57 - 1 00 mg/dL    eGFR 76 >59 mL/min/1 73    SL AMB BUN/CREATININE RATIO 14 12 - 28    Sodium 134 134 - 144 mmol/L    Potassium 4 5 3 5 - 5 2 mmol/L    Chloride 96 96 - 106 mmol/L    CO2 21 20 - 29 mmol/L    CALCIUM 10 0 8 7 - 10 3 mg/dL    Protein, Total 7 9 6 0 - 8 5 g/dL    Albumin 4 6 3 8 - 4 8 g/dL    Globulin, Total 3 3 1 5 - 4 5 g/dL    Albumin/Globulin Ratio 1 4 1 2 - 2 2    TOTAL BILIRUBIN 0 8 0 0 - 1 2 mg/dL    Alk Phos Isoenzymes 83 44 - 121 IU/L    AST 47 (H) 0 - 40 IU/L    ALT 41 (H) 0 - 32 IU/L   Lipid panel    Collection Time: 11/08/22  8:02 AM   Result Value Ref Range    Cholesterol, Total 282 (H) 100 - 199 mg/dL    Triglycerides 452 (H) 0 - 149 mg/dL    HDL 46 >39 mg/dL    LDL Calculated 151 (H) 0 - 99 mg/dL   Microalbumin / creatinine urine ratio    Collection Time: 11/08/22  8:02 AM   Result Value Ref Range    Creatinine, Urine 253 0 Not Estab  mg/dL    Microalbum  ,Mallorie Rump 9,686 4 Not Estab  ug/mL    Microalb/Creat Ratio 650 (H) 0 - 29 mg/g creat   Hemoglobin A1c (w/out EAG)    Collection Time: 11/08/22  8:02 AM   Result Value Ref Range    Hemoglobin A1C 7 5 (H) 4 8 - 5 6 %       Assessment/Plan:         Diagnoses and all orders for this visit:    Essential hypertension  -     losartan-hydrochlorothiazide (HYZAAR) 100-12 5 MG per tablet; Take 1 tablet by mouth daily    Encounter for screening mammogram for malignant neoplasm of breast  -     Mammo screening bilateral w cad; Future    Need for influenza vaccination  -     influenza vaccine, quadrivalent, recombinant, PF, 0 5 mL, for patients 18 yr+ (FLUBLOK)    Mixed hyperlipidemia  -     gemfibrozil (LOPID) 600 mg tablet; Take 1 tablet (600 mg total) by mouth 2 (two) times a day before meals    Type 2 diabetes mellitus with microalbuminuria, without long-term current use of insulin (HCC)  -     sitaGLIPtin-metFORMIN (JANUMET)  MG per tablet; Take 1 tablet by mouth 2 (two) times a day with meals    S/P patent foramen ovale closure  -     Ambulatory Referral to Cardiology; Future          BMI Counseling: Body mass index is 28 79 kg/m²  Discussed the patient's BMI with her  The BMI is above normal  Nutrition recommendations include reducing portion sizes, decreasing overall calorie intake, 3-5 servings of fruits/vegetables daily, reducing fast food intake and consuming healthier snacks  Exercise recommendations include exercising 3-5 times per week           rto in 3 m         Codey Contreras MD

## 2022-11-21 DIAGNOSIS — E11.8 TYPE 2 DIABETES MELLITUS WITH COMPLICATION, WITHOUT LONG-TERM CURRENT USE OF INSULIN (HCC): ICD-10-CM

## 2022-11-21 LAB
LEFT EYE DIABETIC RETINOPATHY: NORMAL
RIGHT EYE DIABETIC RETINOPATHY: NORMAL

## 2022-11-21 RX ORDER — GLIMEPIRIDE 2 MG/1
TABLET ORAL
Qty: 180 TABLET | Refills: 2 | Status: SHIPPED | OUTPATIENT
Start: 2022-11-21 | End: 2022-11-22 | Stop reason: SDUPTHER

## 2022-11-22 DIAGNOSIS — E11.8 TYPE 2 DIABETES MELLITUS WITH COMPLICATION, WITHOUT LONG-TERM CURRENT USE OF INSULIN (HCC): ICD-10-CM

## 2022-11-22 RX ORDER — GLIMEPIRIDE 2 MG/1
2 TABLET ORAL 2 TIMES DAILY
Qty: 180 TABLET | Refills: 2 | Status: SHIPPED | OUTPATIENT
Start: 2022-11-22

## 2022-11-23 ENCOUNTER — TELEPHONE (OUTPATIENT)
Dept: FAMILY MEDICINE CLINIC | Facility: CLINIC | Age: 62
End: 2022-11-23

## 2022-11-30 ENCOUNTER — OFFICE VISIT (OUTPATIENT)
Dept: FAMILY MEDICINE CLINIC | Facility: CLINIC | Age: 62
End: 2022-11-30

## 2022-11-30 VITALS
SYSTOLIC BLOOD PRESSURE: 120 MMHG | RESPIRATION RATE: 14 BRPM | BODY MASS INDEX: 28.28 KG/M2 | HEART RATE: 78 BPM | TEMPERATURE: 96.9 F | WEIGHT: 176 LBS | DIASTOLIC BLOOD PRESSURE: 80 MMHG | HEIGHT: 66 IN

## 2022-11-30 DIAGNOSIS — R10.13 EPIGASTRIC PAIN: Primary | ICD-10-CM

## 2022-11-30 RX ORDER — FAMOTIDINE 20 MG/1
20 TABLET, FILM COATED ORAL 2 TIMES DAILY
Qty: 30 TABLET | Refills: 1 | Status: SHIPPED | OUTPATIENT
Start: 2022-11-30 | End: 2022-12-09 | Stop reason: SDUPTHER

## 2022-11-30 NOTE — PROGRESS NOTES
Chief Complaint   Patient presents with   • Abdominal Pain     Pt states stomach feels acidic x3 days        Patient ID: Trinity Ruth is a 57 y o  female  HPI  Pt is seeing for epigastric cramping abd pain since started Lopid and Losartan  -  No N/V, had constipation x 3 days that resolved remote h/o pepcid use for GERD but did not use for 2 years     The following portions of the patient's history were reviewed and updated as appropriate: allergies, current medications, past family history, past medical history, past social history, past surgical history and problem list     Review of Systems   Constitutional: Negative  Respiratory: Negative  Cardiovascular: Negative  Genitourinary: Negative          Current Outpatient Medications   Medication Sig Dispense Refill   • clopidogrel (PLAVIX) 75 mg tablet TAKE ONE TABLET BY MOUTH EVERY DAY 90 tablet 3   • gemfibrozil (LOPID) 600 mg tablet Take 1 tablet (600 mg total) by mouth 2 (two) times a day before meals 60 tablet 1   • glimepiride (AMARYL) 2 mg tablet Take 1 tablet (2 mg total) by mouth 2 (two) times a day 180 tablet 2   • losartan-hydrochlorothiazide (HYZAAR) 100-12 5 MG per tablet Take 1 tablet by mouth daily 90 tablet 3   • metFORMIN (GLUCOPHAGE) 1000 MG tablet Take 1,000 mg by mouth 2 (two) times a day with meals     • Misc Natural Products (DAILY HERBS IMMUNE DEFENSE PO) Take by mouth     • Multiple Vitamin (MULTI-VITAMIN DAILY PO) Take 1 tablet by mouth daily     • omega-3-acid ethyl esters (LOVAZA) 1 g capsule TAKE ONE CAPSULE BY MOUTH TWICE A DAY 60 capsule 0   • cholecalciferol (VITAMIN D3) 400 units tablet Take 400 Units by mouth daily     • Famotidine (PEPCID AC PO) Take by mouth       • L-Lysine 1000 MG TABS Take by mouth daily     • sitaGLIPtin-metFORMIN (JANUMET)  MG per tablet Take 1 tablet by mouth 2 (two) times a day with meals (Patient not taking: Reported on 11/30/2022) 60 tablet 1     No current facility-administered medications for this visit  Objective:    /80 (BP Location: Left arm, Patient Position: Sitting, Cuff Size: Adult)   Pulse 78   Temp (!) 96 9 °F (36 1 °C) (Temporal)   Resp 14   Ht 5' 6" (1 676 m)   Wt 79 8 kg (176 lb)   BMI 28 41 kg/m²        Physical Exam  Constitutional:       Appearance: She is not ill-appearing  Abdominal:      Palpations: Abdomen is soft  Tenderness: There is no abdominal tenderness  There is no guarding or rebound  Neurological:      Mental Status: She is alert  Assessment/Plan:         Diagnoses and all orders for this visit:    Epigastric pain  -     famotidine (PEPCID) 20 mg tablet; Take 1 tablet (20 mg total) by mouth 2 (two) times a day  Will hold Lopid x 2 wks  Other orders  -     metFORMIN (GLUCOPHAGE) 1000 MG tablet;  Take 1,000 mg by mouth 2 (two) times a day with meals          Phone f/u in 2 wks                   Mally Cee MD

## 2022-12-01 ENCOUNTER — OFFICE VISIT (OUTPATIENT)
Dept: DERMATOLOGY | Age: 62
End: 2022-12-01

## 2022-12-01 VITALS — HEIGHT: 66 IN | BODY MASS INDEX: 28.03 KG/M2 | WEIGHT: 174.4 LBS | TEMPERATURE: 98.4 F

## 2022-12-01 DIAGNOSIS — D22.60 MULTIPLE BENIGN MELANOCYTIC NEVI OF UPPER EXTREMITY, LOWER EXTREMITY, AND TRUNK: ICD-10-CM

## 2022-12-01 DIAGNOSIS — D22.70 MULTIPLE BENIGN MELANOCYTIC NEVI OF UPPER EXTREMITY, LOWER EXTREMITY, AND TRUNK: ICD-10-CM

## 2022-12-01 DIAGNOSIS — L40.9 PSORIASIS: Primary | ICD-10-CM

## 2022-12-01 DIAGNOSIS — D22.5 MULTIPLE BENIGN MELANOCYTIC NEVI OF UPPER EXTREMITY, LOWER EXTREMITY, AND TRUNK: ICD-10-CM

## 2022-12-01 DIAGNOSIS — L81.4 SOLAR LENTIGO: ICD-10-CM

## 2022-12-01 DIAGNOSIS — Z85.828 HISTORY OF BASAL CELL CARCINOMA: ICD-10-CM

## 2022-12-01 RX ORDER — KETOCONAZOLE 20 MG/ML
SHAMPOO TOPICAL
Qty: 120 ML | Refills: 2 | Status: SHIPPED | OUTPATIENT
Start: 2022-12-01

## 2022-12-01 RX ORDER — TRIAMCINOLONE ACETONIDE 1 MG/G
CREAM TOPICAL 2 TIMES DAILY
Qty: 30 G | Refills: 0 | Status: SHIPPED | OUTPATIENT
Start: 2022-12-01

## 2022-12-01 RX ORDER — CLOBETASOL PROPIONATE 0.46 MG/ML
SOLUTION TOPICAL DAILY
Qty: 50 ML | Refills: 3 | Status: SHIPPED | OUTPATIENT
Start: 2022-12-01

## 2022-12-01 NOTE — PROGRESS NOTES
[facility] Note     Patient Name: Robert Cullen  Encounter Date: 12/1/22     Have you been cared for by a [place] [id number] Dermatologist in the last 3 years and, if so, which description applies to you? NO  I am considered a "new" patient and must complete all patient intake questions  I am FEMALE/of child-bearing potential     REVIEW OF SYSTEMS:  Have you recently had or currently have any of the following? · Recent fever or chills? No  · Any non-healing wound? No  · Are you pregnant or planning to become pregnant? No  · Are you currently or planning to be nursing or breast feeding? No   PAST MEDICAL HISTORY:  Have you personally ever had or currently have any of the following? If "YES," then please provide more detail  · Skin cancer (such as Melanoma, Basal Cell Carcinoma, Squamous Cell Carcinoma? YES, basal cell  · Tuberculosis, HIV/AIDS, Hepatitis B or C: No  · Systemic Immunosuppression such as Diabetes, Biologic or Immunotherapy, Chemotherapy, Organ Transplantation, Bone Marrow Transplantation No  · Radiation Treatment No   FAMILY HISTORY:  Any "first degree relatives" (parent, brother, sister, or child) with the following? • Skin Cancer, Pancreatic or Other Cancer? YES, father basal cell   PATIENT EXPERIENCE:    • Do you want the Dermatologist to perform a COMPLETE skin exam today including a clinical examination under the "bra and underwear" areas? Yes  • If necessary, do we have your permission to call and leave a detailed message on your Preferred Phone number that includes your specific medical information?   Yes      Allergies   Allergen Reactions   • Bactrim [Sulfamethoxazole-Trimethoprim]    • Neomycin-Bacitracin Zn-Polymyx Other (See Comments)     REDNESS   • Statins Myalgia      Current Outpatient Medications:   •  clopidogrel (PLAVIX) 75 mg tablet, TAKE ONE TABLET BY MOUTH EVERY DAY, Disp: 90 tablet, Rfl: 3  •  famotidine (PEPCID) 20 mg tablet, Take 1 tablet (20 mg total) by mouth 2 (two) times a day, Disp: 30 tablet, Rfl: 1  •  gemfibrozil (LOPID) 600 mg tablet, Take 1 tablet (600 mg total) by mouth 2 (two) times a day before meals, Disp: 60 tablet, Rfl: 1  •  glimepiride (AMARYL) 2 mg tablet, Take 1 tablet (2 mg total) by mouth 2 (two) times a day, Disp: 180 tablet, Rfl: 2  •  losartan-hydrochlorothiazide (HYZAAR) 100-12 5 MG per tablet, Take 1 tablet by mouth daily, Disp: 90 tablet, Rfl: 3  •  metFORMIN (GLUCOPHAGE) 1000 MG tablet, Take 1,000 mg by mouth 2 (two) times a day with meals, Disp: , Rfl:   •  Misc Natural Products (DAILY HERBS IMMUNE DEFENSE PO), Take by mouth, Disp: , Rfl:   •  Multiple Vitamin (MULTI-VITAMIN DAILY PO), Take 1 tablet by mouth daily, Disp: , Rfl:   •  omega-3-acid ethyl esters (LOVAZA) 1 g capsule, TAKE ONE CAPSULE BY MOUTH TWICE A DAY, Disp: 60 capsule, Rfl: 0  •  cholecalciferol (VITAMIN D3) 400 units tablet, Take 400 Units by mouth daily, Disp: , Rfl:   •  L-Lysine 1000 MG TABS, Take by mouth daily, Disp: , Rfl:   •  sitaGLIPtin-metFORMIN (JANUMET)  MG per tablet, Take 1 tablet by mouth 2 (two) times a day with meals (Patient not taking: Reported on 11/30/2022), Disp: 60 tablet, Rfl: 1          • Whom besides the patient is providing clinical information about today's encounter?   o NO ADDITIONAL HISTORIAN (patient alone provided history)    Physical Exam and Assessment/Plan by Diagnosis:      HISTORY OF BASAL CELL CARCINOMA    Physical Exam:  • Anatomic Location Affected:  nose  • Morphological Description of scar:  Well healed  • Suspected Recurrence: No  • Pertinent Positives: father with history  • Pertinent Negatives:       Additional History of Present Condition:  History of basal cell carcinoma with no sign of recurrence    Assessment and Plan:  Based on a thorough discussion of this condition and the management approach to it (including a comprehensive discussion of the known risks, side effects and potential benefits of treatment), the patient (family) agrees to implement the following specific plan:  • When outside we recommend using a wide brim hat, sunglasses, long sleeve and pants, sunscreen with SPF 60+ with reapplication every 2 hours, or SPF specific clothing     How can basal cell carcinoma be prevented? The most important way to prevent BCC is to avoid sunburn  This is especially important in childhood and early life  Fair skinned individuals and those with a personal or family history of BCC should protect their skin from sun exposure daily, year-round and lifelong  • Stay indoors or under the shade in the middle of the day   • Wear covering clothing   • Apply high protection factor SPF50+ broad-spectrum sunscreens generously to exposed skin if outdoors   • Avoid indoor tanning (sun beds, solaria)  • Oral nicotinamide (vitamin B3) in a dose of 500 mg twice daily may reduce the number and severity of BCCs  What is the outlook for basal cell carcinoma? Most BCCs are cured by treatment  Cure is most likely if treatment is undertaken when the lesion is small  About 50% of people with BCC develop a second one within 3 years of the first  They are also at increased risk of other skin cancers, especially melanoma  Regular self-skin examinations and long-term annual skin checks by an experienced health professional are recommended  PSORIASIS    Physical Exam:  • Anatomic Location Affected:  Scalp, posterior ears, left breast  • Morphological Description:  Pink patches with scale  • Severity: moderate  • Body Percent Affected: 15  • Pertinent Positives:  • Pertinent Negatives:     Additional History of Present Condition:  Noted itching    Assessment and Plan:  Based on a thorough discussion of this condition and the management approach to it (including a comprehensive discussion of the known risks, side effects and potential benefits of treatment), the patient (family) agrees to implement the following specific plan:  • Ketoconazole 2% shampoo apply topically on "Mondays, Wednesdays and Fridays":  Lather into scalp and behind the ears  leave on for 5 minutes and then rinse off completely  • Clobetasol 0 05% scalp solution apply topically daily for 2-4 weeks  Then as needed for flares  • Triamcinolone 0 1% cream apply topically under the left breast 2 times daily as needed  Psoriasis is a chronic inflammatory condition that causes the body to make new skin cells in days rather than weeks  As these cells pile up on the surface of the skin, you may see thick, scaly patches of thickened skin  Psoriasis affects 2-4% of males and females  It can start at any age including childhood, with peaks of onset at 15-25 years and 50-60 years  It tends to persist lifelong, fluctuating in extent and severity  It is particularly common in Caucasians but may affect people of any race  About one-third of patients with psoriasis have family members with psoriasis  Psoriasis is multifactorial  It is classified as an immune-mediated inflammatory disease (IMID)  Genetic factors are important and influence the type of psoriasis and response to treatment  What are the signs and symptoms of psoriasis? There are many different types of psoriasis that each have present uniquely  The types of psoriasis include:    Plaque psoriasis: About 80% to 90% of people who have psoriasis develop this type  When plaque psoriasis appears, you may see:  Plaque psoriasis usually presents with symmetrically distributed, red, scaly plaques with well-defined edges  The scale is typically silvery white, except in skin folds where the plaques often appear shiny and they may have a moist peeling surface  The most common sites are scalp, elbows and knees, but any part of the skin can be involved  The plaques are usually very persistent without treatment    Itch is mostly mild but may be severe in some patients, leading to scratching and lichenification (thickened leathery skin with increased skin markings)  Painful skin cracks or fissures may occur  When psoriatic plaques clear up, they may leave brown or pale marks that can be expected to fade over several months  Guttate psoriasis: When someone gets this type of psoriasis, you often see tiny bumps appear on the skin quite suddenly  The bumps tend to cover much of the torso, legs, and arms  Sometimes, the bumps also develop on the face, scalp, and ears  No matter where they appear, the bumps tend to be:   • Small and scaly  • [place]-colored to pink  • Temporary, clearing in a few weeks or months without treatment  When guttate psoriasis clears, it may never return  Why this happens is still a bit of a mystery  Guttate psoriasis tends to develop in children and young adults who've had an infection, such as strep throat  It's possible that when the infection clears so does guttate psoriasis  It's also possible to have:  • Guttate psoriasis for life  • See the guttate psoriasis clear and plaque psoriasis develop later in life  • Plaque psoriasis when you develop guttate psoriasis  There's no way to predict what will happen after the first flare-up of guttate psoriasis clears  Inverse psoriasis: This type of psoriasis develops in areas where skin touches skin, such as the armpits, genitals, and crease of the buttocks  Where the inverse psoriasis appears, you're likely to notice:  • Smooth, red patches of skin that look raw  • Little, if any, silvery-white coating  • Sore or painful skin  Other names for this type of psoriasis are intertriginous psoriasis or flexural psoriasis  Pustular psoriasis: This type of psoriasis causes pus-filled bumps that usually appear only on the feet and hands  While the pus-filled bumps may look like an infection, the skin is not infected  The bumps don't contain bacteria or anything else that could cause an infection    Where pustular psoriasis appears, you tend to notice:  • Red, swollen skin that is dotted with pus-filled bumps  • Extremely sore or painful skin  • Brown dots (and sometimes scale) appear as the pus-filled bumps dry  Pustular psoriasis can make just about any activity that requires your hands or feet, such as typing or walking, unbearably painful  Pustular psoriasis (generalized): Serious and life-threatening, this rare type of psoriasis causes pus-filled bumps to develop on much of the skin  Also called von Zumbusch psoriasis, a flare-up causes this sequence of events:  1  Skin on most of the body suddenly turns dry, red, and tender  2  Within hours, pus-filled bumps cover most of the skin  3  Often within a day, the pus-filled bumps break open and pools of pus leak onto the skin  4  As the pus dries (usually within 24 to 48 hours), the skin dries out and peels (as shown in this picture)  5  When the dried skin peels off, you see a smooth, glazed surface  6  In a few days or weeks, you may see a new crop of pus-filled bumps covering most of the skin, as the cycle repeats itself  Anyone with pustular psoriasis also feels very sick, and may develop a fever, headache, muscle weakness, and other symptoms  Medical care is often necessary to save the person's life  Erythrodermic psoriasis: Serious and life-threatening, this type of psoriasis requires immediate medical care  When someone develops erythrodermic psoriasis, you may notice:  • Skin on most of the body looks burnt  • Chills, fever, and the person looks extremely ill  • Muscle weakness, a rapid pulse, and severe itch  The person may also be unable to keep warm, so hypothermia can set in quickly  Most people who develop this type of psoriasis already have another type of psoriasis  Before developing erythrodermic psoriasis, they often notice that their psoriasis is worsening or not improving with treatment   If you notice either of these happening, see a board-certified dermatologist     Nails    Nail psoriasis: With any type of psoriasis, you may see changes to your fingernails or toenails  About half of the people who have plaque psoriasis see signs of psoriasis on their fingernails at some point2  When psoriasis affects the nails, you may notice:  • Tiny dents in your nails (called “nail pits”)  • White, yellow, or brown discoloration under one or more nails  • Crumbling, rough nails  • A nail lifting up so that it's no longer attached  • Buildup of skin cells beneath one or more nails, which lifts up the nail  Treatment and proper nail care can help you control nail psoriasis  Psoriatic arthritis: If you have psoriasis, it's important to pay attention to your joints  Some people who have psoriasis develop a type of arthritis called psoriatic arthritis  This is more likely to occur if you have severe psoriasis  Most people notice psoriasis on their skin years before they develop psoriatic arthritis  It's also possible to get psoriatic arthritis before psoriasis, but this is less common  When psoriatic arthritis develops, the signs can be subtle  At first, you may notice:  • A swollen and tender joint, especially in a finger or toe  • Heel pain  • Swelling on the back of your leg, just above your heel  • Stiffness in the morning that fades during the day  Like psoriasis, psoriatic arthritis cannot be cured  Treatment can prevent psoriatic arthritis from worsening, which is important  Allowed to progress, psoriatic arthritis can become disabling  Diagnosis and treatment of psoriasis   Psoriasis is usually diagnosed by clinical features, and skin biopsy if necessary  It is important to decrease factors that aggravate psoriasis  These include treating streptococcal infections, minimizing skin injuries, avoiding sun exposure if it exacerbates psoriasis, smoking, alcohol usage, decreasing stress, and maintaining an optimal body weight   Certain medications such as lithium, beta blockers, antimalarials, and NSAIDs have also been implicated  Suddenly stopping oral steroids or strong topical steroids can cause rebound disease  There are many categories of psoriasis treatments available  Topical therapy  Mild psoriasis is generally treated with topical agents alone  Which treatment is selected may depend on body site, extent and severity of psoriasis  • Emollients  • Coal tar preparations  • Dithranol  • Salicylic acid  • Vitamin D analogue (calcipotriol)  • Topical corticosteroids  • Calcineurin inhibitor (tacrolimus, pimecrolimus)  Phototherapy  Most psoriasis centres offer phototherapy with ultraviolet (UV) radiation, often in combination with topical or systemic agents  Types of phototherapy include  • Narrowband UVB  • Broadband UVB  • Photochemotherapy (PUVA)  • Targeted phototherapy  Systemic therapy  Moderate to severe psoriasis warrants treatment with a systemic agent and/or phototherapy  The most common treatments are:  • Methotrexate  • Ciclosporin  • Acitretin  Other medicines occasionally used for psoriasis include:  • Mycophenolate  • Apremilast  • Hydroxyurea  • Azathioprine  • 6-mercaptopurine  Systemic corticosteroids are best avoided due to a risk of severe withdrawal flare of psoriasis and adverse effects  Biologics or targeted therapies are reserved for conventional treatment-resistant severe psoriasis, mainly because of expense, as side effects compare favorably with other systemic agents  These include:  • Anti-tumour necrosis factor-alpha antagonists (anti-TNF?) infliximab, adalimumab and etanercept  • The interleukin (IL)-12/23 antagonist ustekinumab  • IL-17 antagonists such as secukinumab  Many other monoclonal antibodies are under investigation in the treatment of psoriasis        MELANOCYTIC NEVI ("Moles")    Physical Exam:  • Anatomic Location Affected:   Mostly on sun-exposed areas of the trunk and extremities  • Morphological Description:  Scattered, 1-4mm round to ovoid, symmetrical-appearing, even bordered, skin colored to dark brown macules/papules, mostly in sun-exposed areas  • Pertinent Positives:  • Pertinent Negatives: Additional History of Present Condition:      Assessment and Plan:  Based on a thorough discussion of this condition and the management approach to it (including a comprehensive discussion of the known risks, side effects and potential benefits of treatment), the patient (family) agrees to implement the following specific plan:  • When outside we recommend using a wide brim hat, sunglasses, long sleeve and pants, sunscreen with SPF 10+ with reapplication every 2 hours, or SPF specific clothing   • Benign, reassured  • Annual skin check     Melanocytic Nevi  Melanocytic nevi ("moles") are tan or brown, raised or flat areas of the skin which have an increased number of melanocytes  Melanocytes are the cells in our body which make pigment and account for skin color  Some moles are present at birth (I e , "congenital nevi"), while others come up later in life (i e , "acquired nevi")  The sun can stimulate the body to make more moles  Sunburns are not the only thing that triggers more moles  Chronic sun exposure can do it too  Clinically distinguishing a healthy mole from melanoma may be difficult, even for experienced dermatologists  The "ABCDE's" of moles have been suggested as a means of helping to alert a person to a suspicious mole and the possible increased risk of melanoma  The suggestions for raising alert are as follows:    Asymmetry: Healthy moles tend to be symmetric, while melanomas are often asymmetric  Asymmetry means if you draw a line through the mole, the two halves do not match in color, size, shape, or surface texture  Asymmetry can be a result of rapid enlargement of a mole, the development of a raised area on a previously flat lesion, scaling, ulceration, bleeding or scabbing within the mole    Any mole that starts to demonstrate "asymmetry" should be examined promptly by a board certified dermatologist      Jyothi Liner: Healthy moles tend to have discrete, even borders  The border of a melanoma often blends into the normal skin and does not sharply delineate the mole from normal skin  Any mole that starts to demonstrate "uneven borders" should be examined promptly by a board certified dermatologist      Color: Healthy moles tend to be one color throughout  Melanomas tend to be made up of different colors ranging from dark black, blue, white, or red  Any mole that demonstrates a color change should be examined promptly by a board certified dermatologist      Diameter: Healthy moles tend to be smaller than 0 6 cm in size; an exception are "congenital nevi" that can be larger  Melanomas tend to grow and can often be greater than 0 6 cm (1/4 of an inch, or the size of a pencil eraser)  This is only a guideline, and many normal moles may be larger than 0 6 cm without being unhealthy  Any mole that starts to change in size (small to bigger or bigger to smaller) should be examined promptly by a board certified dermatologist      Evolving: Healthy moles tend to "stay the same "  Melanomas may often show signs of change or evolution such as a change in size, shape, color, or elevation  Any mole that starts to itch, bleed, crust, burn, hurt, or ulcerate or demonstrate a change or evolution should be examined promptly by a board certified dermatologist         LENTIGO    Physical Exam:  • Anatomic Location Affected:  Mostly sun exposed areas  • Morphological Description:  Light brown macules  • Pertinent Positives:  • Pertinent Negatives:     Additional History of Present Condition:      Assessment and Plan:  Based on a thorough discussion of this condition and the management approach to it (including a comprehensive discussion of the known risks, side effects and potential benefits of treatment), the patient (family) agrees to implement the following specific plan:  • When outside we recommend using a wide brim hat, sunglasses, long sleeve and pants, sunscreen with SPF 11+ with reapplication every 2 hours, or SPF specific clothing       What is a lentigo? A lentigo is a pigmented flat or slightly raised lesion with a clearly defined edge  Unlike an ephelis (freckle), it does not fade in the winter months  There are several kinds of lentigo  The name lentigo originally referred to its appearance resembling a small lentil  The plural of lentigo is lentigines, although “lentigos” is also in common use  Who gets lentigines? Lentigines can affect males and females of all ages and races  Solar lentigines are especially prevalent in fair skinned adults  Lentigines associated with syndromes are present at birth or arise during childhood  What causes lentigines? Common forms of lentigo are due to exposure to ultraviolet radiation:  • Sun damage including sunburn   • Indoor tanning   • Phototherapy, especially photochemotherapy (PUVA)    Ionizing radiation, eg radiation therapy, can also cause lentigines  Several familial syndromes associated with widespread lentigines originate from mutations in Bc-MAP kinase, mTOR signaling and PTEN pathways  What is the treatment for lentigines? Most lentigines are left alone  Attempts to lighten them may not be successful  The following approaches are used:  • SPF 50+ broad-spectrum sunscreen   • Hydroquinone bleaching cream   • Alpha hydroxy acids   • Vitamin C   • Retinoids   • Azelaic acid   • Chemical peels  Individual lesions can be permanently removed using:  • Cryotherapy   • Intense pulsed light   • Pigment lasers    How can lentigines be prevented? Lentigines associated with exposure ultraviolet radiation can be prevented by very careful sun protection  Clothing is more successful at preventing new lentigines than are sunscreens  What is the outlook for lentigines?   Lentigines usually persist  They may increase in number with age and sun exposure  Some in sun-protected sites may fade and disappear        Scribe Attestation    I,:  Ailyn Thompson am acting as a scribe while in the presence of the attending physician :       I,:  Natalia Amaya MD personally performed the services described in this documentation    as scribed in my presence :

## 2022-12-01 NOTE — PATIENT INSTRUCTIONS
HISTORY OF BASAL CELL CARCINOMA    Assessment and Plan:  Based on a thorough discussion of this condition and the management approach to it (including a comprehensive discussion of the known risks, side effects and potential benefits of treatment), the patient (family) agrees to implement the following specific plan:  When outside we recommend using a wide brim hat, sunglasses, long sleeve and pants, sunscreen with SPF 96+ with reapplication every 2 hours, or SPF specific clothing     How can basal cell carcinoma be prevented? The most important way to prevent BCC is to avoid sunburn  This is especially important in childhood and early life  Fair skinned individuals and those with a personal or family history of BCC should protect their skin from sun exposure daily, year-round and lifelong  Stay indoors or under the shade in the middle of the day   Wear covering clothing   Apply high protection factor SPF50+ broad-spectrum sunscreens generously to exposed skin if outdoors   Avoid indoor tanning (sun beds, solaria)  Oral nicotinamide (vitamin B3) in a dose of 500 mg twice daily may reduce the number and severity of BCCs  What is the outlook for basal cell carcinoma? Most BCCs are cured by treatment  Cure is most likely if treatment is undertaken when the lesion is small  About 50% of people with BCC develop a second one within 3 years of the first  They are also at increased risk of other skin cancers, especially melanoma  Regular self-skin examinations and long-term annual skin checks by an experienced health professional are recommended        PSORIASIS    Assessment and Plan:  Based on a thorough discussion of this condition and the management approach to it (including a comprehensive discussion of the known risks, side effects and potential benefits of treatment), the patient (family) agrees to implement the following specific plan:  Ketoconazole 2% shampoo apply topically on "Mondays, Wednesdays and Fridays":  Lather into scalp and behind the ears  leave on for 5 minutes and then rinse off completely  Clobetasol 0 05% scalp solution apply topically daily for 2-4 weeks  Then as needed for flares  Triamcinolone 0 1% cream apply topically under the left breast 2 times daily as needed  Psoriasis is a chronic inflammatory condition that causes the body to make new skin cells in days rather than weeks  As these cells pile up on the surface of the skin, you may see thick, scaly patches of thickened skin  Psoriasis affects 2-4% of males and females  It can start at any age including childhood, with peaks of onset at 15-25 years and 50-60 years  It tends to persist lifelong, fluctuating in extent and severity  It is particularly common in Caucasians but may affect people of any race  About one-third of patients with psoriasis have family members with psoriasis  Psoriasis is multifactorial  It is classified as an immune-mediated inflammatory disease (IMID)  Genetic factors are important and influence the type of psoriasis and response to treatment  What are the signs and symptoms of psoriasis? There are many different types of psoriasis that each have present uniquely  The types of psoriasis include:    Plaque psoriasis: About 80% to 90% of people who have psoriasis develop this type  When plaque psoriasis appears, you may see:  Plaque psoriasis usually presents with symmetrically distributed, red, scaly plaques with well-defined edges  The scale is typically silvery white, except in skin folds where the plaques often appear shiny and they may have a moist peeling surface  The most common sites are scalp, elbows and knees, but any part of the skin can be involved  The plaques are usually very persistent without treatment  Itch is mostly mild but may be severe in some patients, leading to scratching and lichenification (thickened leathery skin with increased skin markings)   Painful skin cracks or fissures may occur  When psoriatic plaques clear up, they may leave brown or pale marks that can be expected to fade over several months  Guttate psoriasis: When someone gets this type of psoriasis, you often see tiny bumps appear on the skin quite suddenly  The bumps tend to cover much of the torso, legs, and arms  Sometimes, the bumps also develop on the face, scalp, and ears  No matter where they appear, the bumps tend to be:   Small and scaly  Graham-colored to pink  Temporary, clearing in a few weeks or months without treatment  When guttate psoriasis clears, it may never return  Why this happens is still a bit of a mystery  Guttate psoriasis tends to develop in children and young adults who've had an infection, such as strep throat  It's possible that when the infection clears so does guttate psoriasis  It's also possible to have:  Guttate psoriasis for life  See the guttate psoriasis clear and plaque psoriasis develop later in life  Plaque psoriasis when you develop guttate psoriasis  There's no way to predict what will happen after the first flare-up of guttate psoriasis clears  Inverse psoriasis: This type of psoriasis develops in areas where skin touches skin, such as the armpits, genitals, and crease of the buttocks  Where the inverse psoriasis appears, you're likely to notice:  Smooth, red patches of skin that look raw  Little, if any, silvery-white coating  Sore or painful skin  Other names for this type of psoriasis are intertriginous psoriasis or flexural psoriasis  Pustular psoriasis: This type of psoriasis causes pus-filled bumps that usually appear only on the feet and hands  While the pus-filled bumps may look like an infection, the skin is not infected  The bumps don't contain bacteria or anything else that could cause an infection    Where pustular psoriasis appears, you tend to notice:  Red, swollen skin that is dotted with pus-filled bumps  Extremely sore or painful skin  Brown dots (and sometimes scale) appear as the pus-filled bumps dry  Pustular psoriasis can make just about any activity that requires your hands or feet, such as typing or walking, unbearably painful  Pustular psoriasis (generalized): Serious and life-threatening, this rare type of psoriasis causes pus-filled bumps to develop on much of the skin  Also called von Zumbusch psoriasis, a flare-up causes this sequence of events:  Skin on most of the body suddenly turns dry, red, and tender  Within hours, pus-filled bumps cover most of the skin  Often within a day, the pus-filled bumps break open and pools of pus leak onto the skin  As the pus dries (usually within 24 to 48 hours), the skin dries out and peels (as shown in this picture)  When the dried skin peels off, you see a smooth, glazed surface  In a few days or weeks, you may see a new crop of pus-filled bumps covering most of the skin, as the cycle repeats itself  Anyone with pustular psoriasis also feels very sick, and may develop a fever, headache, muscle weakness, and other symptoms  Medical care is often necessary to save the person's life  Erythrodermic psoriasis: Serious and life-threatening, this type of psoriasis requires immediate medical care  When someone develops erythrodermic psoriasis, you may notice:  Skin on most of the body looks burnt  Chills, fever, and the person looks extremely ill  Muscle weakness, a rapid pulse, and severe itch  The person may also be unable to keep warm, so hypothermia can set in quickly  Most people who develop this type of psoriasis already have another type of psoriasis  Before developing erythrodermic psoriasis, they often notice that their psoriasis is worsening or not improving with treatment  If you notice either of these happening, see a board-certified dermatologist     Nails    Nail psoriasis: With any type of psoriasis, you may see changes to your fingernails or toenails   About half of the people who have plaque psoriasis see signs of psoriasis on their fingernails at some point2  When psoriasis affects the nails, you may notice:  Tiny dents in your nails (called “nail pits”)  White, yellow, or brown discoloration under one or more nails  Crumbling, rough nails  A nail lifting up so that it's no longer attached  Buildup of skin cells beneath one or more nails, which lifts up the nail  Treatment and proper nail care can help you control nail psoriasis  Psoriatic arthritis: If you have psoriasis, it's important to pay attention to your joints  Some people who have psoriasis develop a type of arthritis called psoriatic arthritis  This is more likely to occur if you have severe psoriasis  Most people notice psoriasis on their skin years before they develop psoriatic arthritis  It's also possible to get psoriatic arthritis before psoriasis, but this is less common  When psoriatic arthritis develops, the signs can be subtle  At first, you may notice:  A swollen and tender joint, especially in a finger or toe  Heel pain  Swelling on the back of your leg, just above your heel  Stiffness in the morning that fades during the day  Like psoriasis, psoriatic arthritis cannot be cured  Treatment can prevent psoriatic arthritis from worsening, which is important  Allowed to progress, psoriatic arthritis can become disabling  Diagnosis and treatment of psoriasis   Psoriasis is usually diagnosed by clinical features, and skin biopsy if necessary  It is important to decrease factors that aggravate psoriasis  These include treating streptococcal infections, minimizing skin injuries, avoiding sun exposure if it exacerbates psoriasis, smoking, alcohol usage, decreasing stress, and maintaining an optimal body weight  Certain medications such as lithium, beta blockers, antimalarials, and NSAIDs have also been implicated  Suddenly stopping oral steroids or strong topical steroids can cause rebound disease       There are many categories of psoriasis treatments available  Topical therapy  Mild psoriasis is generally treated with topical agents alone  Which treatment is selected may depend on body site, extent and severity of psoriasis  Emollients  Coal tar preparations  Dithranol  Salicylic acid  Vitamin D analogue (calcipotriol)  Topical corticosteroids  Calcineurin inhibitor (tacrolimus, pimecrolimus)  Phototherapy  Most psoriasis centres offer phototherapy with ultraviolet (UV) radiation, often in combination with topical or systemic agents  Types of phototherapy include  Narrowband UVB  Broadband UVB  Photochemotherapy (PUVA)  Targeted phototherapy  Systemic therapy  Moderate to severe psoriasis warrants treatment with a systemic agent and/or phototherapy  The most common treatments are:  Methotrexate  Ciclosporin  Acitretin  Other medicines occasionally used for psoriasis include:  Mycophenolate  Apremilast  Hydroxyurea  Azathioprine  6-mercaptopurine  Systemic corticosteroids are best avoided due to a risk of severe withdrawal flare of psoriasis and adverse effects  Biologics or targeted therapies are reserved for conventional treatment-resistant severe psoriasis, mainly because of expense, as side effects compare favorably with other systemic agents  These include:  Anti-tumour necrosis factor-alpha antagonists (anti-TNF?) infliximab, adalimumab and etanercept  The interleukin (IL)-12/23 antagonist ustekinumab  IL-17 antagonists such as secukinumab  Many other monoclonal antibodies are under investigation in the treatment of psoriasis        MELANOCYTIC NEVI ("Moles")    Assessment and Plan:  Based on a thorough discussion of this condition and the management approach to it (including a comprehensive discussion of the known risks, side effects and potential benefits of treatment), the patient (family) agrees to implement the following specific plan:  When outside we recommend using a wide brim hat, sunglasses, long sleeve and pants, sunscreen with SPF 19+ with reapplication every 2 hours, or SPF specific clothing   Benign, reassured  Annual skin check     Melanocytic Nevi  Melanocytic nevi ("moles") are tan or brown, raised or flat areas of the skin which have an increased number of melanocytes  Melanocytes are the cells in our body which make pigment and account for skin color  Some moles are present at birth (I e , "congenital nevi"), while others come up later in life (i e , "acquired nevi")  The sun can stimulate the body to make more moles  Sunburns are not the only thing that triggers more moles  Chronic sun exposure can do it too  Clinically distinguishing a healthy mole from melanoma may be difficult, even for experienced dermatologists  The "ABCDE's" of moles have been suggested as a means of helping to alert a person to a suspicious mole and the possible increased risk of melanoma  The suggestions for raising alert are as follows:    Asymmetry: Healthy moles tend to be symmetric, while melanomas are often asymmetric  Asymmetry means if you draw a line through the mole, the two halves do not match in color, size, shape, or surface texture  Asymmetry can be a result of rapid enlargement of a mole, the development of a raised area on a previously flat lesion, scaling, ulceration, bleeding or scabbing within the mole  Any mole that starts to demonstrate "asymmetry" should be examined promptly by a board certified dermatologist      Border: Healthy moles tend to have discrete, even borders  The border of a melanoma often blends into the normal skin and does not sharply delineate the mole from normal skin  Any mole that starts to demonstrate "uneven borders" should be examined promptly by a board certified dermatologist      Color: Healthy moles tend to be one color throughout  Melanomas tend to be made up of different colors ranging from dark black, blue, white, or red    Any mole that demonstrates a color change should be examined promptly by a board certified dermatologist      Diameter: Healthy moles tend to be smaller than 0 6 cm in size; an exception are "congenital nevi" that can be larger  Melanomas tend to grow and can often be greater than 0 6 cm (1/4 of an inch, or the size of a pencil eraser)  This is only a guideline, and many normal moles may be larger than 0 6 cm without being unhealthy  Any mole that starts to change in size (small to bigger or bigger to smaller) should be examined promptly by a board certified dermatologist      Evolving: Healthy moles tend to "stay the same "  Melanomas may often show signs of change or evolution such as a change in size, shape, color, or elevation  Any mole that starts to itch, bleed, crust, burn, hurt, or ulcerate or demonstrate a change or evolution should be examined promptly by a board certified dermatologist         Estela Rodriguez and Plan:  Based on a thorough discussion of this condition and the management approach to it (including a comprehensive discussion of the known risks, side effects and potential benefits of treatment), the patient (family) agrees to implement the following specific plan:  When outside we recommend using a wide brim hat, sunglasses, long sleeve and pants, sunscreen with SPF 93+ with reapplication every 2 hours, or SPF specific clothing       What is a lentigo? A lentigo is a pigmented flat or slightly raised lesion with a clearly defined edge  Unlike an ephelis (freckle), it does not fade in the winter months  There are several kinds of lentigo  The name lentigo originally referred to its appearance resembling a small lentil  The plural of lentigo is lentigines, although “lentigos” is also in common use  Who gets lentigines? Lentigines can affect males and females of all ages and races  Solar lentigines are especially prevalent in fair skinned adults   Lentigines associated with syndromes are present at birth or arise during childhood  What causes lentigines? Common forms of lentigo are due to exposure to ultraviolet radiation:  Sun damage including sunburn   Indoor tanning   Phototherapy, especially photochemotherapy (PUVA)    Ionizing radiation, eg radiation therapy, can also cause lentigines  Several familial syndromes associated with widespread lentigines originate from mutations in Bc-MAP kinase, mTOR signaling and PTEN pathways  What is the treatment for lentigines? Most lentigines are left alone  Attempts to lighten them may not be successful  The following approaches are used:  SPF 50+ broad-spectrum sunscreen   Hydroquinone bleaching cream   Alpha hydroxy acids   Vitamin C   Retinoids   Azelaic acid   Chemical peels  Individual lesions can be permanently removed using:  Cryotherapy   Intense pulsed light   Pigment lasers    How can lentigines be prevented? Lentigines associated with exposure ultraviolet radiation can be prevented by very careful sun protection  Clothing is more successful at preventing new lentigines than are sunscreens  What is the outlook for lentigines? Lentigines usually persist  They may increase in number with age and sun exposure  Some in sun-protected sites may fade and disappear

## 2022-12-15 ENCOUNTER — CONSULT (OUTPATIENT)
Dept: CARDIOLOGY CLINIC | Facility: CLINIC | Age: 62
End: 2022-12-15

## 2022-12-15 ENCOUNTER — TELEPHONE (OUTPATIENT)
Dept: CARDIOLOGY CLINIC | Facility: CLINIC | Age: 62
End: 2022-12-15

## 2022-12-15 VITALS
BODY MASS INDEX: 28.45 KG/M2 | OXYGEN SATURATION: 96 % | WEIGHT: 177 LBS | HEIGHT: 66 IN | DIASTOLIC BLOOD PRESSURE: 82 MMHG | HEART RATE: 93 BPM | SYSTOLIC BLOOD PRESSURE: 132 MMHG | TEMPERATURE: 97.7 F

## 2022-12-15 DIAGNOSIS — G37.9 DEMYELINATING DISEASE OF CENTRAL NERVOUS SYSTEM (HCC): ICD-10-CM

## 2022-12-15 DIAGNOSIS — I10 ESSENTIAL HYPERTENSION: ICD-10-CM

## 2022-12-15 DIAGNOSIS — I49.3 FREQUENT PVCS: ICD-10-CM

## 2022-12-15 DIAGNOSIS — I67.9 CEREBROVASCULAR DISEASE: ICD-10-CM

## 2022-12-15 DIAGNOSIS — E78.2 MIXED HYPERLIPIDEMIA: ICD-10-CM

## 2022-12-15 DIAGNOSIS — Z87.74 S/P PATENT FORAMEN OVALE CLOSURE: Primary | ICD-10-CM

## 2022-12-15 DIAGNOSIS — Z86.73 HISTORY OF TIA (TRANSIENT ISCHEMIC ATTACK): ICD-10-CM

## 2022-12-15 DIAGNOSIS — E11.8 TYPE 2 DIABETES MELLITUS WITH COMPLICATION, WITHOUT LONG-TERM CURRENT USE OF INSULIN (HCC): ICD-10-CM

## 2022-12-15 RX ORDER — OMEGA-3-ACID ETHYL ESTERS 1 G/1
1 CAPSULE, LIQUID FILLED ORAL 2 TIMES DAILY
Qty: 180 CAPSULE | Refills: 3 | Status: SHIPPED | OUTPATIENT
Start: 2022-12-15

## 2022-12-15 NOTE — TELEPHONE ENCOUNTER
(Radhames Wooten) - [id number]  Omega-3-acid Ethyl Esters 1GM capsules       Status: Sent to Plan  Created: December 15th, 5483 [phone]  Sent: December 15th, 2022

## 2022-12-21 ENCOUNTER — OFFICE VISIT (OUTPATIENT)
Dept: FAMILY MEDICINE CLINIC | Facility: CLINIC | Age: 62
End: 2022-12-21

## 2022-12-21 VITALS
WEIGHT: 178.4 LBS | RESPIRATION RATE: 14 BRPM | TEMPERATURE: 97.3 F | SYSTOLIC BLOOD PRESSURE: 158 MMHG | HEIGHT: 66 IN | DIASTOLIC BLOOD PRESSURE: 98 MMHG | BODY MASS INDEX: 28.67 KG/M2 | HEART RATE: 97 BPM

## 2022-12-21 DIAGNOSIS — R10.13 EPIGASTRIC PAIN: ICD-10-CM

## 2022-12-21 DIAGNOSIS — I10 ESSENTIAL HYPERTENSION: ICD-10-CM

## 2022-12-21 DIAGNOSIS — E11.8 TYPE 2 DIABETES MELLITUS WITH COMPLICATION, WITHOUT LONG-TERM CURRENT USE OF INSULIN (HCC): Primary | ICD-10-CM

## 2022-12-21 RX ORDER — FAMOTIDINE 20 MG/1
20 TABLET, FILM COATED ORAL 2 TIMES DAILY
Qty: 60 TABLET | Refills: 0 | Status: SHIPPED | OUTPATIENT
Start: 2022-12-21

## 2022-12-21 RX ORDER — AMLODIPINE BESYLATE 5 MG/1
5 TABLET ORAL DAILY
Qty: 30 TABLET | Refills: 3 | Status: SHIPPED | OUTPATIENT
Start: 2022-12-21

## 2022-12-21 RX ORDER — ALOGLIPTIN 25 MG/1
25 TABLET, FILM COATED ORAL DAILY
Qty: 30 TABLET | Refills: 1 | Status: SHIPPED | OUTPATIENT
Start: 2022-12-21

## 2022-12-21 NOTE — PROGRESS NOTES
Chief Complaint   Patient presents with   • Medication Management     Med check up   f/u HTN, DM2 , epigastric pain      Patient ID: Jazmín Sagastume is a 57 y o  female  HPI  Pt is seeing for f/u HTN and DM2 -  Admits " not ideal diet" -  Hg AIC 7 5 - has epigastric pain after Lopid was tried -  Med was stopped and Pepcid was Rx  -  Initially better but did not refill Rx  -  Still has epigastric discomfort     The following portions of the patient's history were reviewed and updated as appropriate: allergies, current medications, past family history, past medical history, past social history, past surgical history and problem list     Review of Systems   Constitutional: Negative  HENT: Negative  Respiratory: Negative  Cardiovascular: Negative  Gastrointestinal: Positive for abdominal pain  Negative for constipation, diarrhea, nausea and vomiting  Neurological: Negative  Current Outpatient Medications   Medication Sig Dispense Refill   • clobetasol (TEMOVATE) 0 05 % external solution Apply topically in the morning For 2-4 weeks scalp and posterior ears Then as needed for flares  50 mL 3   • clopidogrel (PLAVIX) 75 mg tablet TAKE ONE TABLET BY MOUTH EVERY DAY 90 tablet 3   • glimepiride (AMARYL) 2 mg tablet Take 1 tablet (2 mg total) by mouth 2 (two) times a day 180 tablet 2   • ketoconazole (NIZORAL) 2 % shampoo Apply topically on "Mondays, Wednesdays and Fridays":Lather into scalp and behind the ears  leave on for 5 minutes and then rinse off completely   120 mL 2   • losartan-hydrochlorothiazide (HYZAAR) 100-12 5 MG per tablet Take 1 tablet by mouth daily 90 tablet 3   • metFORMIN (GLUCOPHAGE) 1000 MG tablet Take 1,000 mg by mouth 2 (two) times a day with meals     • Misc Natural Products (DAILY HERBS IMMUNE DEFENSE PO) Take by mouth     • Multiple Vitamin (MULTI-VITAMIN DAILY PO) Take 1 tablet by mouth daily     • omega-3-acid ethyl esters (LOVAZA) 1 g capsule Take 1 capsule (1 g total) by mouth 2 (two) times a day 180 capsule 3   • triamcinolone (KENALOG) 0 1 % cream Apply topically 2 (two) times a day Under the left breast as needed for flares  30 g 0   • cholecalciferol (VITAMIN D3) 400 units tablet Take 400 Units by mouth daily     • famotidine (PEPCID) 20 mg tablet Take 1 tablet (20 mg total) by mouth 2 (two) times a day 60 tablet 0   • L-Lysine 1000 MG TABS Take by mouth daily       No current facility-administered medications for this visit  Objective:    /98 (BP Location: Left arm, Patient Position: Sitting, Cuff Size: Standard)   Pulse 97   Temp (!) 97 3 °F (36 3 °C) (Probe)   Resp 14   Ht 5' 6" (1 676 m)   Wt 80 9 kg (178 lb 6 4 oz)   BMI 28 79 kg/m²        Physical Exam  Constitutional:       General: She is not in acute distress  Appearance: She is not ill-appearing  Cardiovascular:      Rate and Rhythm: Normal rate and regular rhythm  Pulmonary:      Effort: Pulmonary effort is normal  No respiratory distress  Breath sounds: No wheezing  Neurological:      General: No focal deficit present  Mental Status: She is alert and oriented to person, place, and time  Assessment/Plan:         Diagnoses and all orders for this visit:    Type 2 diabetes mellitus with complication, without long-term current use of insulin (HCC)  -    Will add  Alogliptin Benzoate 25 MG TABS; Take 1 tablet (25 mg total) by mouth in the morning  -     Hemoglobin A1C; Future  -     Comprehensive metabolic panel; Future  -     Lipid Panel with Direct LDL reflex; Future  -     Hemoglobin A1C  -     Comprehensive metabolic panel  -     Lipid Panel with Direct LDL reflex    Epigastric pain  -    Will restart famotidine (PEPCID) 20 mg tablet; Take 1 tablet (20 mg total) by mouth 2 (two) times a day    Essential hypertension  -  Will add     amLODIPine (NORVASC) 5 mg tablet;  Take 1 tablet (5 mg total) by mouth daily          rto in 3 [facility] Meredith Cartwright MD

## 2022-12-22 ENCOUNTER — HOSPITAL ENCOUNTER (OUTPATIENT)
Dept: RADIOLOGY | Facility: HOSPITAL | Age: 62
Discharge: HOME | End: 2022-12-22
Attending: INTERNAL MEDICINE

## 2022-12-22 ENCOUNTER — HOSPITAL ENCOUNTER (OUTPATIENT)
Dept: NON INVASIVE DIAGNOSTICS | Facility: HOSPITAL | Age: 62
Discharge: HOME | End: 2022-12-22
Attending: INTERNAL MEDICINE

## 2022-12-22 VITALS
WEIGHT: 178 LBS | BODY MASS INDEX: 28.61 KG/M2 | HEART RATE: 92 BPM | SYSTOLIC BLOOD PRESSURE: 138 MMHG | DIASTOLIC BLOOD PRESSURE: 77 MMHG | HEIGHT: 66 IN

## 2022-12-22 DIAGNOSIS — Z87.74 S/P PATENT FORAMEN OVALE CLOSURE: ICD-10-CM

## 2022-12-22 DIAGNOSIS — Z86.73 HISTORY OF TIA (TRANSIENT ISCHEMIC ATTACK): ICD-10-CM

## 2022-12-23 ENCOUNTER — TELEPHONE (OUTPATIENT)
Dept: CARDIOLOGY CLINIC | Facility: CLINIC | Age: 62
End: 2022-12-23

## 2022-12-23 LAB
AORTIC ROOT: 3.2 CM
E WAVE DECELERATION TIME: 129 MS
FRACTIONAL SHORTENING: 31 (ref 28–44)
INTERVENTRICULAR SEPTUM IN DIASTOLE (PARASTERNAL SHORT AXIS VIEW): 1.1 CM
INTERVENTRICULAR SEPTUM: 1.1 CM (ref 0.6–1.1)
LAAS-AP2: 15.5 CM2
LAAS-AP4: 13.3 CM2
LEFT ATRIUM SIZE: 3.6 CM
LEFT INTERNAL DIMENSION IN SYSTOLE: 2.2 CM (ref 2.1–4)
LEFT VENTRICULAR INTERNAL DIMENSION IN DIASTOLE: 3.2 CM (ref 3.5–6)
LEFT VENTRICULAR POSTERIOR WALL IN END DIASTOLE: 1 CM
LEFT VENTRICULAR STROKE VOLUME: 24 ML
LVSV (TEICH): 24 ML
MV E'TISSUE VEL-LAT: 10 CM/S
MV E'TISSUE VEL-SEP: 7 CM/S
MV PEAK A VEL: 1.01 M/S
MV PEAK E VEL: 74 CM/S
RIGHT VENTRICLE ID DIMENSION: 2.8 CM
SL CV LEFT ATRIUM LENGTH A2C: 4.7 CM
SL CV LV EF: 65
SL CV PED ECHO LEFT VENTRICLE DIASTOLIC VOLUME (MOD BIPLANE) 2D: 41 ML
SL CV PED ECHO LEFT VENTRICLE SYSTOLIC VOLUME (MOD BIPLANE) 2D: 17 ML

## 2022-12-23 NOTE — TELEPHONE ENCOUNTER
----- Message from Tarik Moon DO sent at 12/23/2022  2:58 PM EST -----  Can you please let the patient know echocardiogram and carotid ultrasound were overall normal   Carotid ultrasound showed some very mild disease on one side  Keep working on diet and exercise     No changes at this time

## 2022-12-29 ENCOUNTER — TELEPHONE (OUTPATIENT)
Dept: FAMILY MEDICINE CLINIC | Facility: CLINIC | Age: 62
End: 2022-12-29

## 2022-12-29 NOTE — TELEPHONE ENCOUNTER
Pt is having a dental cleaning 1/6 and needs to know when to stop and resume plavix    She will need a clearance note faxed as well to [phone]

## 2023-01-16 DIAGNOSIS — E78.2 MIXED HYPERLIPIDEMIA: Primary | ICD-10-CM

## 2023-01-16 RX ORDER — GEMFIBROZIL 600 MG/1
TABLET, FILM COATED ORAL
Qty: 60 TABLET | Refills: 1 | Status: SHIPPED | OUTPATIENT
Start: 2023-01-16

## 2023-01-19 ENCOUNTER — HOSPITAL ENCOUNTER (OUTPATIENT)
Dept: RADIOLOGY | Facility: HOSPITAL | Age: 63
Discharge: HOME | End: 2023-01-19
Attending: FAMILY MEDICINE

## 2023-01-19 VITALS — HEIGHT: 66 IN | BODY MASS INDEX: 28.61 KG/M2 | WEIGHT: 178 LBS

## 2023-01-19 DIAGNOSIS — Z12.31 ENCOUNTER FOR SCREENING MAMMOGRAM FOR MALIGNANT NEOPLASM OF BREAST: ICD-10-CM

## 2023-01-23 DIAGNOSIS — R10.13 EPIGASTRIC PAIN: ICD-10-CM

## 2023-01-23 RX ORDER — FAMOTIDINE 20 MG/1
TABLET, FILM COATED ORAL
Qty: 30 TABLET | Refills: 1 | Status: SHIPPED | OUTPATIENT
Start: 2023-01-23

## 2023-02-01 DIAGNOSIS — I63.531 CEREBROVASCULAR ACCIDENT (CVA) DUE TO STENOSIS OF RIGHT POSTERIOR CEREBRAL ARTERY (HCC): ICD-10-CM

## 2023-02-01 RX ORDER — CLOPIDOGREL BISULFATE 75 MG/1
75 TABLET ORAL DAILY
Qty: 90 TABLET | Refills: 3 | Status: SHIPPED | OUTPATIENT
Start: 2023-02-01

## 2023-02-18 DIAGNOSIS — E11.8 TYPE 2 DIABETES MELLITUS WITH COMPLICATION, WITHOUT LONG-TERM CURRENT USE OF INSULIN (HCC): ICD-10-CM

## 2023-02-18 RX ORDER — ALOGLIPTIN 25 MG/1
TABLET, FILM COATED ORAL
Qty: 30 TABLET | Refills: 1 | Status: SHIPPED | OUTPATIENT
Start: 2023-02-18 | End: 2023-02-20

## 2023-02-23 ENCOUNTER — OFFICE VISIT (OUTPATIENT)
Dept: FAMILY MEDICINE CLINIC | Facility: CLINIC | Age: 63
End: 2023-02-23

## 2023-02-23 VITALS
DIASTOLIC BLOOD PRESSURE: 90 MMHG | HEIGHT: 66 IN | HEART RATE: 96 BPM | RESPIRATION RATE: 14 BRPM | WEIGHT: 178.4 LBS | BODY MASS INDEX: 28.67 KG/M2 | SYSTOLIC BLOOD PRESSURE: 140 MMHG | TEMPERATURE: 97.3 F

## 2023-02-23 DIAGNOSIS — E11.8 TYPE 2 DIABETES MELLITUS WITH COMPLICATION, WITHOUT LONG-TERM CURRENT USE OF INSULIN (HCC): Primary | ICD-10-CM

## 2023-02-23 DIAGNOSIS — J06.9 UPPER RESPIRATORY TRACT INFECTION, UNSPECIFIED TYPE: ICD-10-CM

## 2023-02-23 DIAGNOSIS — I10 ESSENTIAL HYPERTENSION: ICD-10-CM

## 2023-02-23 RX ORDER — AMOXICILLIN AND CLAVULANATE POTASSIUM 875; 125 MG/1; MG/1
1 TABLET, FILM COATED ORAL EVERY 12 HOURS SCHEDULED
Qty: 14 TABLET | Refills: 0 | Status: SHIPPED | OUTPATIENT
Start: 2023-02-23 | End: 2023-03-02

## 2023-02-23 NOTE — PROGRESS NOTES
Clinic Visit Note  Valenitno Short 57 y o  female   MRN: [id number]    Assessment and Plan      Diagnoses and all orders for this visit:    Type 2 diabetes mellitus with complication, without long-term current use of insulin ([address] )  Monitor blood glucose especially in the setting of acute infection, increased oral hydration recommended    Essential hypertension  Continue to monitor blood pressure, follow-up recommended with PCP    Upper respiratory tract infection, unspecified type  Antibiotic recommended, OTC symptomatic management, if symptoms worsen or not improving reevaluation  -     amoxicillin-clavulanate (AUGMENTIN) 875-125 mg per tablet; Take 1 tablet by mouth every 12 (twelve) hours for 7 days      My impressions and treatment recommendations were discussed in detail with the patient who verbalized understanding and had no further questions  Discharge instructions were provided  Subjective     Chief Complaint: Same-day/follow-up visit    History of Present Illness:    Patient is a 78-year-old female coming in as a same-day visit secondary to sore throat, upper respiratory tract infection symptoms that have been going on for over 5 days now  The following portions of the patient's history were reviewed and updated as appropriate: allergies, current medications, past family history, past medical history, past social history, past surgical history and problem list     REVIEW OF SYSTEMS:  A complete 12-point review of systems is negative other than that noted in the HPI  Review of Systems   Constitutional: Negative for chills, fatigue and fever  HENT: Positive for congestion and sore throat  Negative for postnasal drip  Respiratory: Negative for cough, shortness of breath and wheezing  Cardiovascular: Negative for chest pain and palpitations  Neurological: Negative for dizziness and headaches           Current Outpatient Medications:   •  Alogliptin Benzoate 25 MG TABS, TAKE ONE TABLET BY MOUTH EVERY DAY IN THE MORNING, Disp: 30 tablet, Rfl: 1  •  amLODIPine (NORVASC) 5 mg tablet, Take 1 tablet (5 mg total) by mouth daily, Disp: 30 tablet, Rfl: 3  •  amoxicillin-clavulanate (AUGMENTIN) 875-125 mg per tablet, Take 1 tablet by mouth every 12 (twelve) hours for 7 days, Disp: 14 tablet, Rfl: 0  •  clopidogrel (PLAVIX) 75 mg tablet, Take 1 tablet (75 mg total) by mouth daily, Disp: 90 tablet, Rfl: 3  •  famotidine (PEPCID) 20 mg tablet, TAKE ONE TABLET BY MOUTH TWICE A DAY, Disp: 30 tablet, Rfl: 1  •  gemfibrozil (LOPID) 600 mg tablet, TAKE ONE TABLET BY MOUTH TWICE A DAY BEFORE MEALS, Disp: 60 tablet, Rfl: 1  •  glimepiride (AMARYL) 2 mg tablet, Take 1 tablet (2 mg total) by mouth 2 (two) times a day, Disp: 180 tablet, Rfl: 2  •  losartan-hydrochlorothiazide (HYZAAR) 100-12 5 MG per tablet, Take 1 tablet by mouth daily, Disp: 90 tablet, Rfl: 3  •  metFORMIN (GLUCOPHAGE) 1000 MG tablet, Take 1,000 mg by mouth 2 (two) times a day with meals, Disp: , Rfl:   •  Misc Natural Products (DAILY HERBS IMMUNE DEFENSE PO), Take by mouth, Disp: , Rfl:   •  Multiple Vitamin (MULTI-VITAMIN DAILY PO), Take 1 tablet by mouth daily, Disp: , Rfl:   •  triamcinolone (KENALOG) 0 1 % cream, Apply topically 2 (two) times a day Under the left breast as needed for flares  , Disp: 30 g, Rfl: 0  Past Medical History:   Diagnosis Date   • Diabetes mellitus ([address] )    • Hypertension    • Stroke (cerebrum) ([address] )      Past Surgical History:   Procedure Laterality Date   • CHOLECYSTECTOMY LAPAROSCOPIC     • MOHS SURGERY      Micrographic Surgery Nose, Last assessed: 2/13/17   • TOTAL KNEE ARTHROPLASTY Left 03/08/2017    Last assessed: 12/26/17   • TUBAL LIGATION       Social History     Socioeconomic History   • Marital status: /Civil Union     Spouse name: Not on file   • Number of children: Not on file   • Years of education: Not on file   • Highest education level: Not on file   Occupational History   • Occupation: currently working   Tobacco Use   • Smoking status: Never   • Smokeless tobacco: Never   • Tobacco comments:     No secondhand smoke exposure   Vaping Use   • Vaping Use: Never used   Substance and Sexual Activity   • Alcohol use: No   • Drug use: Never   • Sexual activity: Not on file   Other Topics Concern   • Not on file   Social History Narrative    Feels safe at home     Social Determinants of Health     Financial Resource Strain: Not on file   Food Insecurity: Not on file   Transportation Needs: Not on file   Physical Activity: Not on file   Stress: Not on file   Social Connections: Not on file   Intimate Partner Violence: Not on file   Housing Stability: Not on file     Family History   Problem Relation Age of Onset   • Diabetes Father    • Hypertension Father    • Anemia Daughter    • Breast cancer Paternal Aunt      Allergies   Allergen Reactions   • Bactrim [Sulfamethoxazole-Trimethoprim]    • Neomycin-Bacitracin Zn-Polymyx Other (See Comments)     REDNESS   • Statins Myalgia       Objective     Vitals:    02/23/23 1026   BP: 140/90   BP Location: Left arm   Patient Position: Sitting   Cuff Size: Large   Pulse: 96   Resp: 14   Temp: (!) 97 3 °F (36 3 °C)   TempSrc: Temporal   Weight: 80 9 kg (178 lb 6 4 oz)   Height: 5' 6" (1 676 m)       Physical Exam:     GENERAL: NAD, pleasant   HEENT:  NC/AT, PERRL, EOMI, no scleral icterus, pharynx erythema with tonsillar swelling appreciated  CARDIAC:  RRR, +S1/S2, no S3/S4 appreciated, no m/g/r  PULMONARY:  CTA B/L, no wheezing/rales/rhonci, non-labored breathing  ABDOMEN:  Soft, NT/ND, no rebound/guarding/rigidity  Extremities:   No edema, cyanosis, or clubbing  Musculoskeletal:  Full range of motion intact in all extremities   NEUROLOGIC: Grossly intact, no focal deficits  SKIN:  No rashes or erythema noted on exposed skin  Psych: Normal affect, mood stable    ==  PLEASE NOTE:  This encounter was completed utilizing the M- Modal/Fluency Direct Speech Voice Recognition Software  Grammatical errors, random word insertions, pronoun errors and incomplete sentences are occasional consequences of the system due to software limitations, ambient noise and hardware issues  These may be missed by proof reading prior to affixing electronic signature  Any questions or concerns about the content, text or information contained within the body of this dictation should be directly addressed to the physician for clarification  Please do not hesitate to call me directly if you have any any questions or concerns      DO Artur Mendez [address] Internal Medicine   [facility]

## 2023-02-25 ENCOUNTER — APPOINTMENT (EMERGENCY)
Dept: RADIOLOGY | Facility: HOSPITAL | Age: 63
End: 2023-02-25

## 2023-02-25 ENCOUNTER — HOSPITAL ENCOUNTER (EMERGENCY)
Facility: HOSPITAL | Age: 63
Discharge: HOME | End: 2023-02-25
Attending: EMERGENCY MEDICINE

## 2023-02-25 VITALS
BODY MASS INDEX: 28.63 KG/M2 | SYSTOLIC BLOOD PRESSURE: 127 MMHG | HEART RATE: 90 BPM | TEMPERATURE: 96.9 F | WEIGHT: 177.4 LBS | RESPIRATION RATE: 18 BRPM | DIASTOLIC BLOOD PRESSURE: 66 MMHG | OXYGEN SATURATION: 96 %

## 2023-02-25 DIAGNOSIS — R10.9 ABDOMINAL PAIN: Primary | ICD-10-CM

## 2023-02-25 LAB
ALBUMIN SERPL BCP-MCNC: 4.4 G/DL (ref 3.5–5)
ALP SERPL-CCNC: 70 U/L (ref 34–104)
ALT SERPL W P-5'-P-CCNC: 35 U/L (ref 7–52)
ANION GAP SERPL CALCULATED.3IONS-SCNC: 13 MMOL/L (ref 4–13)
AST SERPL W P-5'-P-CCNC: 33 U/L (ref 13–39)
ATRIAL RATE: 97 BPM
BACTERIA UR QL AUTO: NORMAL /HPF
BASOPHILS # BLD AUTO: 0.03 THOUSANDS/ÂΜL (ref 0–0.1)
BASOPHILS NFR BLD AUTO: 1 % (ref 0–1)
BILIRUB SERPL-MCNC: 0.78 MG/DL (ref 0.2–1)
BILIRUB UR QL STRIP: NEGATIVE
BUN SERPL-MCNC: 15 MG/DL (ref 5–25)
CALCIUM SERPL-MCNC: 10.2 MG/DL (ref 8.4–10.2)
CARDIAC TROPONIN I PNL SERPL HS: 4 NG/L
CHLORIDE SERPL-SCNC: 97 MMOL/L (ref 96–108)
CLARITY UR: CLEAR
CO2 SERPL-SCNC: 23 MMOL/L (ref 21–32)
COLOR UR: ABNORMAL
CREAT SERPL-MCNC: 0.78 MG/DL (ref 0.6–1.3)
EOSINOPHIL # BLD AUTO: 0.29 THOUSAND/ÂΜL (ref 0–0.61)
EOSINOPHIL NFR BLD AUTO: 7 % (ref 0–6)
ERYTHROCYTE [DISTWIDTH] IN BLOOD BY AUTOMATED COUNT: 12 % (ref 11.6–15.1)
GFR SERPL CREATININE-BSD FRML MDRD: 80 ML/MIN/1.73SQ M
GLUCOSE SERPL-MCNC: 211 MG/DL (ref 65–140)
GLUCOSE UR STRIP-MCNC: NEGATIVE MG/DL
HCT VFR BLD AUTO: 37 % (ref 34.8–46.1)
HGB BLD-MCNC: 13.1 G/DL (ref 11.5–15.4)
HGB UR QL STRIP.AUTO: NEGATIVE
IMM GRANULOCYTES # BLD AUTO: 0.04 THOUSAND/UL (ref 0–0.2)
IMM GRANULOCYTES NFR BLD AUTO: 1 % (ref 0–2)
KETONES UR STRIP-MCNC: NEGATIVE MG/DL
LEUKOCYTE ESTERASE UR QL STRIP: NEGATIVE
LIPASE SERPL-CCNC: 64 U/L (ref 11–82)
LYMPHOCYTES # BLD AUTO: 1.41 THOUSANDS/ÂΜL (ref 0.6–4.47)
LYMPHOCYTES NFR BLD AUTO: 32 % (ref 14–44)
MAGNESIUM SERPL-MCNC: 1.6 MG/DL (ref 1.9–2.7)
MCH RBC QN AUTO: 32.5 PG (ref 26.8–34.3)
MCHC RBC AUTO-ENTMCNC: 35.4 G/DL (ref 31.4–37.4)
MCV RBC AUTO: 92 FL (ref 82–98)
MONOCYTES # BLD AUTO: 0.74 THOUSAND/ÂΜL (ref 0.17–1.22)
MONOCYTES NFR BLD AUTO: 17 % (ref 4–12)
NEUTROPHILS # BLD AUTO: 1.97 THOUSANDS/ÂΜL (ref 1.85–7.62)
NEUTS SEG NFR BLD AUTO: 42 % (ref 43–75)
NITRITE UR QL STRIP: NEGATIVE
NON-SQ EPI CELLS URNS QL MICRO: NORMAL /HPF
NRBC BLD AUTO-RTO: 0 /100 WBCS
P AXIS: 48 DEGREES
PH UR STRIP.AUTO: 7 [PH]
PLATELET # BLD AUTO: 265 THOUSANDS/UL (ref 149–390)
PMV BLD AUTO: 8.8 FL (ref 8.9–12.7)
POTASSIUM SERPL-SCNC: 4 MMOL/L (ref 3.5–5.3)
PR INTERVAL: 206 MS
PROT SERPL-MCNC: 7.5 G/DL (ref 6.4–8.4)
PROT UR STRIP-MCNC: ABNORMAL MG/DL
QRS AXIS: -14 DEGREES
QRSD INTERVAL: 90 MS
QT INTERVAL: 362 MS
QTC INTERVAL: 459 MS
RBC # BLD AUTO: 4.03 MILLION/UL (ref 3.81–5.12)
RBC #/AREA URNS AUTO: NORMAL /HPF
SODIUM SERPL-SCNC: 133 MMOL/L (ref 135–147)
SP GR UR STRIP.AUTO: 1.01 (ref 1–1.03)
T WAVE AXIS: 77 DEGREES
UROBILINOGEN UR QL STRIP.AUTO: 0.2 E.U./DL
VENTRICULAR RATE: 97 BPM
WBC # BLD AUTO: 4.48 THOUSAND/UL (ref 4.31–10.16)
WBC #/AREA URNS AUTO: NORMAL /HPF

## 2023-02-25 RX ORDER — FAMOTIDINE 10 MG/ML
20 INJECTION, SOLUTION INTRAVENOUS ONCE
Status: COMPLETED | OUTPATIENT
Start: 2023-02-25 | End: 2023-02-25

## 2023-02-25 RX ORDER — ONDANSETRON 4 MG/1
4 TABLET, ORALLY DISINTEGRATING ORAL EVERY 6 HOURS PRN
Qty: 15 TABLET | Refills: 0 | Status: SHIPPED | OUTPATIENT
Start: 2023-02-25

## 2023-02-25 RX ORDER — ONDANSETRON 2 MG/ML
4 INJECTION INTRAMUSCULAR; INTRAVENOUS ONCE
Status: COMPLETED | OUTPATIENT
Start: 2023-02-25 | End: 2023-02-25

## 2023-02-25 RX ORDER — MAGNESIUM SULFATE 1 G/100ML
1 INJECTION INTRAVENOUS ONCE
Status: COMPLETED | OUTPATIENT
Start: 2023-02-25 | End: 2023-02-25

## 2023-02-25 RX ADMIN — MAGNESIUM SULFATE HEPTAHYDRATE 1 G: 1 INJECTION, SOLUTION INTRAVENOUS at 18:05

## 2023-02-25 RX ADMIN — SODIUM CHLORIDE 1000 ML: 0.9 INJECTION, SOLUTION INTRAVENOUS at 16:41

## 2023-02-25 RX ADMIN — ONDANSETRON 4 MG: 2 INJECTION INTRAMUSCULAR; INTRAVENOUS at 16:44

## 2023-02-25 RX ADMIN — IOHEXOL 100 ML: 350 INJECTION, SOLUTION INTRAVENOUS at 19:34

## 2023-02-25 RX ADMIN — FAMOTIDINE 20 MG: 10 INJECTION, SOLUTION INTRAVENOUS at 16:39

## 2023-02-25 NOTE — ED PROVIDER NOTES
History  Chief Complaint   Patient presents with   • Abdominal Pain     States 1 hour ago started with feeling shakey , clammy and sweaty  FSBS was 149  Had just eaten a sandwich and a few chips  Upper abdominal pain and nausea  Took her BP which was 171/94  EKG done in triage, denies CP  Started on Augmentin on 2/23 for URI     Patient presents for evaluation of abdominal pain  She states 1 hour ago she started with a pain in her upper abdomen associated with some shaking and feeling clammy  She checked her blood sugar was 149  Patient just eating a sandwich and a few chips  She also recently started Augmentin on 2/23 for a URI  History provided by:  Patient   used: No        Prior to Admission Medications   Prescriptions Last Dose Informant Patient Reported? Taking?    Alogliptin Benzoate 25 MG TABS 2/25/2023  No Yes   Sig: TAKE ONE TABLET BY MOUTH EVERY DAY IN THE MORNING   Misc Natural Products (DAILY HERBS IMMUNE DEFENSE PO) 2/25/2023  Yes Yes   Sig: Take by mouth   Multiple Vitamin (MULTI-VITAMIN DAILY PO) 2/25/2023  Yes Yes   Sig: Take 1 tablet by mouth daily   amLODIPine (NORVASC) 5 mg tablet 2/25/2023  No Yes   Sig: Take 1 tablet (5 mg total) by mouth daily   amoxicillin-clavulanate (AUGMENTIN) 875-125 mg per tablet 2/25/2023  No Yes   Sig: Take 1 tablet by mouth every 12 (twelve) hours for 7 days   clopidogrel (PLAVIX) 75 mg tablet 2/25/2023  No Yes   Sig: Take 1 tablet (75 mg total) by mouth daily   famotidine (PEPCID) 20 mg tablet 2/25/2023  No Yes   Sig: TAKE ONE TABLET BY MOUTH TWICE A DAY   gemfibrozil (LOPID) 600 mg tablet 2/25/2023  No Yes   Sig: TAKE ONE TABLET BY MOUTH TWICE A DAY BEFORE MEALS   glimepiride (AMARYL) 2 mg tablet 2/25/2023  No Yes   Sig: Take 1 tablet (2 mg total) by mouth 2 (two) times a day   losartan-hydrochlorothiazide (HYZAAR) 100-12 5 MG per tablet 2/25/2023  No Yes   Sig: Take 1 tablet by mouth daily   metFORMIN (GLUCOPHAGE) 1000 MG tablet 2/25/2023  Yes Yes   Sig: Take 500 mg by mouth 2 (two) times a day with meals   triamcinolone (KENALOG) 0 1 % cream   No No   Sig: Apply topically 2 (two) times a day Under the left breast as needed for flares  Facility-Administered Medications: None       Past Medical History:   Diagnosis Date   • Diabetes mellitus ([facility] [address] )    • Hypertension    • Stroke (cerebrum) [address] [address] )        Past Surgical History:   Procedure Laterality Date   • CHOLECYSTECTOMY LAPAROSCOPIC     • MOHS SURGERY      Micrographic Surgery Nose, Last assessed: 2/13/17   • TOTAL KNEE ARTHROPLASTY Left 03/08/2017    Last assessed: 12/26/17   • TUBAL LIGATION         Family History   Problem Relation Age of Onset   • Diabetes Father    • Hypertension Father    • Anemia Daughter    • Breast cancer Paternal Aunt      I have reviewed and agree with the history as documented  E-Cigarette/Vaping   • E-Cigarette Use Never User      E-Cigarette/Vaping Substances     Social History     Tobacco Use   • Smoking status: Never   • Smokeless tobacco: Never   • Tobacco comments:     No secondhand smoke exposure   Vaping Use   • Vaping Use: Never used   Substance Use Topics   • Alcohol use: No   • Drug use: Never       Review of Systems   Constitutional: Positive for diaphoresis  Negative for chills and fever  HENT: Negative for ear pain and sore throat  Eyes: Negative for pain and visual disturbance  Respiratory: Negative for cough and shortness of breath  Cardiovascular: Negative for chest pain and palpitations  Gastrointestinal: Positive for abdominal pain and nausea  Negative for vomiting  Genitourinary: Negative for dysuria and hematuria  Musculoskeletal: Negative for arthralgias and back pain  Skin: Negative for color change and rash  Neurological: Negative for seizures and syncope  All other systems reviewed and are negative  Physical Exam  Physical Exam  Vitals and nursing note reviewed     Constitutional:       General: She is not in acute distress  HENT:      Right Ear: External ear normal       Left Ear: External ear normal       Nose: Nose normal       Mouth/Throat:      Mouth: Mucous membranes are moist       Pharynx: Oropharynx is clear  Eyes:      General: No scleral icterus  Conjunctiva/sclera: Conjunctivae normal    Cardiovascular:      Rate and Rhythm: Normal rate and regular rhythm  Pulses: Normal pulses  Pulmonary:      Effort: Pulmonary effort is normal  No respiratory distress  Breath sounds: Normal breath sounds  Abdominal:      General: Abdomen is flat  Bowel sounds are normal  There is no distension  Palpations: Abdomen is soft  Tenderness: There is abdominal tenderness  There is no guarding or rebound  Comments: Epigastric tenderness   Musculoskeletal:         General: No deformity  Normal range of motion  Skin:     Capillary Refill: Capillary refill takes less than 2 seconds  Findings: No rash  Neurological:      General: No focal deficit present  Mental Status: She is alert and oriented to person, place, and time           Vital Signs  ED Triage Vitals [02/25/23 1544]   Temperature Pulse Respirations Blood Pressure SpO2   (!) 96 9 °F (36 1 °C) (!) 108 20 (!) 188/88 99 %      Temp Source Heart Rate Source Patient Position - Orthostatic VS BP Location FiO2 (%)   Tympanic Monitor Sitting Left arm --      Pain Score       4           Vitals:    02/25/23 1544 02/25/23 1628 02/25/23 1907 02/25/23 2047   BP: (!) 188/88 152/80 151/84 127/66   Pulse: (!) 108  91 90   Patient Position - Orthostatic VS: Sitting   Sitting         Visual Acuity      ED Medications  Medications   ondansetron (ZOFRAN) injection 4 mg (4 mg Intravenous Given 2/25/23 1644)   sodium chloride 0 9 % bolus 1,000 mL (0 mL Intravenous Stopped 2/25/23 1906)   Famotidine (PF) (PEPCID) injection 20 mg (20 mg Intravenous Given 2/25/23 1639)   magnesium sulfate IVPB (premix) SOLN 1 g (0 g Intravenous Stopped 2/25/23 1906)   iohexol (OMNIPAQUE) 350 MG/ML injection (SINGLE-DOSE) 100 mL (100 mL Intravenous Given 2/25/23 1934)       Diagnostic Studies  Results Reviewed     Procedure Component Value Units Date/Time    HS Troponin 0hr (reflex protocol) [[id number]]  (Normal) Collected: 02/25/23 1644    Lab Status: Final result Specimen: Blood from Arm, Right Updated: 02/25/23 1714     hs TnI 0hr 4 ng/L     Comprehensive metabolic panel [[id number]]  (Abnormal) Collected: 02/25/23 1644    Lab Status: Final result Specimen: Blood from Arm, Right Updated: 02/25/23 1708     Sodium 133 mmol/L      Potassium 4 0 mmol/L      Chloride 97 mmol/L      CO2 23 mmol/L      ANION GAP 13 mmol/L      BUN 15 mg/dL      Creatinine 0 78 mg/dL      Glucose 211 mg/dL      Calcium 10 2 mg/dL      AST 33 U/L      ALT 35 U/L      Alkaline Phosphatase 70 U/L      Total Protein 7 5 g/dL      Albumin 4 4 g/dL      Total Bilirubin 0 78 mg/dL      eGFR 80 ml/min/1 73sq m     Narrative:      [place] guidelines for Chronic Kidney Disease (CKD):   •  Stage 1 with normal or high GFR (GFR > 90 mL/min/1 73 square meters)  •  Stage 2 Mild CKD (GFR = 60-89 mL/min/1 73 square meters)  •  Stage 3A Moderate CKD (GFR = 45-59 mL/min/1 73 square meters)  •  Stage 3B Moderate CKD (GFR = 30-44 mL/min/1 73 square meters)  •  Stage 4 Severe CKD (GFR = 15-29 mL/min/1 73 square meters)  •  Stage 5 End Stage CKD (GFR <15 mL/min/1 73 square meters)  Note: GFR calculation is accurate only with a steady state creatinine    Lipase [[id number]]  (Normal) Collected: 02/25/23 1644    Lab Status: Final result Specimen: Blood from Arm, Right Updated: 02/25/23 1708     Lipase 64 u/L     Magnesium [[id number]]  (Abnormal) Collected: 02/25/23 1644    Lab Status: Final result Specimen: Blood from Arm, Right Updated: 02/25/23 1708     Magnesium 1 6 mg/dL     Urine Microscopic [[id number]]  (Normal) Collected: 02/25/23 1644    Lab Status: Final result Specimen: Urine, Clean Catch Updated: 02/25/23 1659     RBC, UA None Seen /hpf      WBC, UA 1-2 /hpf      Epithelial Cells Occasional /hpf      Bacteria, UA None Seen /hpf     UA (URINE) with reflex to Scope [[id number]]  (Abnormal) Collected: 02/25/23 1644    Lab Status: Final result Specimen: Urine, Clean Catch Updated: 02/25/23 1652     Color, UA Light Yellow     Clarity, UA Clear     Specific Gravity, UA 1 015     pH, UA 7 0     Leukocytes, UA Negative     Nitrite, UA Negative     Protein,  (2+) mg/dl      Glucose, UA Negative mg/dl      Ketones, UA Negative mg/dl      Urobilinogen, UA 0 2 E U /dl      Bilirubin, UA Negative     Occult Blood, UA Negative    CBC and differential [[id number]]  (Abnormal) Collected: 02/25/23 1644    Lab Status: Final result Specimen: Blood from Arm, Right Updated: 02/25/23 1652     WBC 4 48 Thousand/uL      RBC 4 03 Million/uL      Hemoglobin 13 1 g/dL      Hematocrit 37 0 %      MCV 92 fL      MCH 32 5 pg      MCHC 35 4 g/dL      RDW 12 0 %      MPV 8 8 fL      Platelets 986 Thousands/uL      nRBC 0 /100 WBCs      Neutrophils Relative 42 %      Immat GRANS % 1 %      Lymphocytes Relative 32 %      Monocytes Relative 17 %      Eosinophils Relative 7 %      Basophils Relative 1 %      Neutrophils Absolute 1 97 Thousands/µL      Immature Grans Absolute 0 04 Thousand/uL      Lymphocytes Absolute 1 41 Thousands/µL      Monocytes Absolute 0 74 Thousand/µL      Eosinophils Absolute 0 29 Thousand/µL      Basophils Absolute 0 03 Thousands/µL                  CT abdomen pelvis with contrast   Final Result by Leonor Rodriguez MD (02/25 2036)   Nonobstructive right renal calculus  No acute intra-abdominal finding              Workstation performed: [id number]                    Procedures  ECG 12 Lead Documentation Only    Date/Time: 2/25/2023 4:07 PM  Performed by: Shabnam Walton DO  Authorized by: Shabnam Walton DO     ECG reviewed by me, the ED Provider: yes    Patient location: ED  Interpretation:     Interpretation: abnormal    Rate:     ECG rate:  97    ECG rate assessment: normal    Rhythm:     Rhythm: sinus rhythm    Ectopy:     Ectopy: none    ST segments:     ST segments:  Normal  T waves:     T waves: normal               ED Course             HEART Risk Score    Flowsheet Row Most Recent Value   Heart Score Risk Calculator    History 0 Filed at: 02/25/2023 2022   ECG 0 Filed at: 02/25/2023 2022   Age 1 Filed at: 02/25/2023 2022   Risk Factors 2 Filed at: 02/25/2023 2022   Troponin 0 Filed at: 02/25/2023 2022   HEART Score 3 Filed at: 02/25/2023 2022                        SBIRT 20yo+    Flowsheet Row Most Recent Value   SBIRT (25 yo +)    In order to provide better care to our patients, we are screening all of our patients for alcohol and drug use  Would it be okay to ask you these screening questions? Yes Filed at: 02/25/2023 4500   Initial Alcohol Screen: US AUDIT-C     1  How often do you have a drink containing alcohol? 0 Filed at: 02/25/2023 1628   2  How many drinks containing alcohol do you have on a typical day you are drinking? 0 Filed at: 02/25/2023 1628   3a  Male UNDER 65: How often do you have five or more drinks on one occasion? 0 Filed at: 02/25/2023 1628   3b  FEMALE Any Age, or MALE 65+: How often do you have 4 or more drinks on one occassion? 0 Filed at: 02/25/2023 1628   Audit-C Score 0 Filed at: 02/25/2023 2298   ANGEL: How many times in the past year have you    Used an illegal drug or used a prescription medication for non-medical reasons? Never Filed at: 02/25/2023 1628                    Medical Decision Making  Pulse ox 96% on room air indicating adequate oxygenation  Differential diagnosis includes but not limited to perforated viscus, gastritis, small bowel obstruction, pancreatitis      CT scan and lab results discussed with patient at bedside  Patient has had some improvement with pain medications in the ER  Follow-up outpatient      Abdominal pain: acute illness or injury  Amount and/or Complexity of Data Reviewed  Labs: ordered  Radiology: ordered  ECG/medicine tests: ordered and independent interpretation performed  Risk  Prescription drug management  Disposition  Final diagnoses:   Abdominal pain     Time reflects when diagnosis was documented in both MDM as applicable and the Disposition within this note     Time User Action Codes Description Comment    2/25/2023  8:39 PM Luis Villanueva Add [R10 9] Abdominal pain       ED Disposition     ED Disposition   Discharge    Condition   Stable    Date/Time   Sat Feb 25, 2023  8:39 PM    Comment   Florencia Sicard discharge to home/self care                 Follow-up Information     Follow up With Specialties Details Why Contact Info Additional Information    Ankit Berg MD Family Medicine In 1 week  [address] [facility] [address] [facility] Emergency Department Emergency Medicine  If symptoms worsen [address] [facility] [address]  [address] [address] Emergency Department, [facility], [id number]          Discharge Medication List as of 2/25/2023  8:41 PM      START taking these medications    Details   ondansetron (ZOFRAN-ODT) 4 mg disintegrating tablet Take 1 tablet (4 mg total) by mouth every 6 (six) hours as needed for nausea for up to 15 doses, Starting Sat 2/25/2023, Normal         CONTINUE these medications which have NOT CHANGED    Details   Alogliptin Benzoate 25 MG TABS TAKE ONE TABLET BY MOUTH EVERY DAY IN THE MORNING, Normal      amLODIPine (NORVASC) 5 mg tablet Take 1 tablet (5 mg total) by mouth daily, Starting Wed 12/21/2022, Normal      amoxicillin-clavulanate (AUGMENTIN) 875-125 mg per tablet Take 1 tablet by mouth every 12 (twelve) hours for 7 days, Starting Thu 2/23/2023, Until Thu 3/2/2023, Normal      clopidogrel (PLAVIX) 75 mg tablet Take 1 tablet (75 mg total) by mouth daily, Starting Wed 2/1/2023, Normal      famotidine (PEPCID) 20 mg tablet TAKE ONE TABLET BY MOUTH TWICE A DAY, Normal      gemfibrozil (LOPID) 600 mg tablet TAKE ONE TABLET BY MOUTH TWICE A DAY BEFORE MEALS, Normal      glimepiride (AMARYL) 2 mg tablet Take 1 tablet (2 mg total) by mouth 2 (two) times a day, Starting Tue 11/22/2022, Normal      losartan-hydrochlorothiazide (HYZAAR) 100-12 5 MG per tablet Take 1 tablet by mouth daily, Starting Fri 11/18/2022, Normal      metFORMIN (GLUCOPHAGE) 1000 MG tablet Take 500 mg by mouth 2 (two) times a day with meals, Historical Med      Misc Natural Products (DAILY HERBS IMMUNE DEFENSE PO) Take by mouth, Historical Med      Multiple Vitamin (MULTI-VITAMIN DAILY PO) Take 1 tablet by mouth daily, Starting Wed 7/6/2016, Historical Med      triamcinolone (KENALOG) 0 1 % cream Apply topically 2 (two) times a day Under the left breast as needed for flares  , Starting Thu 12/1/2022, Normal             No discharge procedures on file      PDMP Review     None          ED Provider  Electronically Signed by           Whitney Philip DO  02/27/23 3877

## 2023-03-02 LAB
ATRIAL RATE: 97 BPM
P AXIS: 48 DEGREES
PR INTERVAL: 206 MS
QRS AXIS: -14 DEGREES
QRSD INTERVAL: 90 MS
QT INTERVAL: 362 MS
QTC INTERVAL: 459 MS
T WAVE AXIS: 77 DEGREES
VENTRICULAR RATE: 97 BPM

## 2023-03-23 ENCOUNTER — TELEPHONE (OUTPATIENT)
Dept: FAMILY MEDICINE CLINIC | Facility: CLINIC | Age: 63
End: 2023-03-23

## 2023-03-23 LAB
ALBUMIN SERPL-MCNC: 4.6 G/DL (ref 3.8–4.8)
ALBUMIN/GLOB SERPL: 1.5 {RATIO} (ref 1.2–2.2)
ALP SERPL-CCNC: 93 IU/L (ref 44–121)
ALT SERPL-CCNC: 42 IU/L (ref 0–32)
AST SERPL-CCNC: 33 IU/L (ref 0–40)
BILIRUB SERPL-MCNC: 0.6 MG/DL (ref 0–1.2)
BUN SERPL-MCNC: 14 MG/DL (ref 8–27)
BUN/CREAT SERPL: 15 (ref 12–28)
CALCIUM SERPL-MCNC: 10 MG/DL (ref 8.7–10.3)
CHLORIDE SERPL-SCNC: 96 MMOL/L (ref 96–106)
CHOLEST SERPL-MCNC: 256 MG/DL (ref 100–199)
CO2 SERPL-SCNC: 23 MMOL/L (ref 20–29)
CREAT SERPL-MCNC: 0.91 MG/DL (ref 0.57–1)
EGFR: 70 ML/MIN/1.73
EST. AVERAGE GLUCOSE BLD GHB EST-MCNC: 194 MG/DL
GLOBULIN SER-MCNC: 3 G/DL (ref 1.5–4.5)
GLUCOSE SERPL-MCNC: 209 MG/DL (ref 70–99)
HBA1C MFR BLD: 8.4 % (ref 4.8–5.6)
HDLC SERPL-MCNC: 43 MG/DL
LDLC SERPL CALC-MCNC: 142 MG/DL (ref 0–99)
LDLC/HDLC SERPL: 3.3 RATIO (ref 0–3.2)
MICRODELETION SYND BLD/T FISH: NORMAL
POTASSIUM SERPL-SCNC: 4.5 MMOL/L (ref 3.5–5.2)
PROT SERPL-MCNC: 7.6 G/DL (ref 6–8.5)
SL AMB VLDL CHOLESTEROL CALC: 71 MG/DL (ref 5–40)
SODIUM SERPL-SCNC: 134 MMOL/L (ref 134–144)
TRIGL SERPL-MCNC: 384 MG/DL (ref 0–149)

## 2023-03-23 NOTE — TELEPHONE ENCOUNTER
----- Message from Cori Broderick MD sent at 3/23/2023 12:12 PM EDT -----  Pl, advise pt -  edgar is needed for DM check and lab review

## 2023-03-28 ENCOUNTER — OFFICE VISIT (OUTPATIENT)
Dept: FAMILY MEDICINE CLINIC | Facility: CLINIC | Age: 63
End: 2023-03-28

## 2023-03-28 VITALS
RESPIRATION RATE: 14 BRPM | TEMPERATURE: 98 F | DIASTOLIC BLOOD PRESSURE: 90 MMHG | BODY MASS INDEX: 28.93 KG/M2 | HEART RATE: 92 BPM | SYSTOLIC BLOOD PRESSURE: 130 MMHG | HEIGHT: 66 IN | WEIGHT: 180 LBS

## 2023-03-28 DIAGNOSIS — E11.29 TYPE 2 DIABETES MELLITUS WITH MICROALBUMINURIA, WITHOUT LONG-TERM CURRENT USE OF INSULIN (HCC): Primary | ICD-10-CM

## 2023-03-28 DIAGNOSIS — E78.2 MIXED HYPERLIPIDEMIA: ICD-10-CM

## 2023-03-28 DIAGNOSIS — I10 ESSENTIAL HYPERTENSION: ICD-10-CM

## 2023-03-28 DIAGNOSIS — R80.9 TYPE 2 DIABETES MELLITUS WITH MICROALBUMINURIA, WITHOUT LONG-TERM CURRENT USE OF INSULIN (HCC): Primary | ICD-10-CM

## 2023-03-28 RX ORDER — COLESEVELAM 180 1/1
1875 TABLET ORAL 2 TIMES DAILY WITH MEALS
Qty: 180 TABLET | Refills: 0 | Status: SHIPPED | OUTPATIENT
Start: 2023-03-28 | End: 2023-04-27

## 2023-03-28 NOTE — PROGRESS NOTES
Chief Complaint   Patient presents with   • Diabetes     Saw eye Dr Patrica Gee 12-22 OBGYN Dr Malika Mas saw 3-2023   HLD, HTN     Patient ID: Yovany Mahajan is a 57 y o  female  HPI  Pt is seeing for f/u DM2, HTN, HLD -  Has statins intolerance -  On Lopid -  Lipids are not at goal -  Worsening Hg AIC - trying to follow diet -  no regular exercises     The following portions of the patient's history were reviewed and updated as appropriate: allergies, current medications, past family history, past medical history, past social history, past surgical history and problem list     Review of Systems   Constitutional: Negative  Respiratory: Negative  Cardiovascular: Negative  Gastrointestinal: Negative  Genitourinary: Negative  Musculoskeletal: Negative  Psychiatric/Behavioral: Negative  Current Outpatient Medications   Medication Sig Dispense Refill   • Alogliptin Benzoate 25 MG TABS TAKE ONE TABLET BY MOUTH EVERY DAY IN THE MORNING 30 tablet 1   • amLODIPine (NORVASC) 5 mg tablet Take 1 tablet (5 mg total) by mouth daily 30 tablet 3   • clopidogrel (PLAVIX) 75 mg tablet Take 1 tablet (75 mg total) by mouth daily 90 tablet 3   • famotidine (PEPCID) 20 mg tablet TAKE ONE TABLET BY MOUTH TWICE A DAY 30 tablet 1   • gemfibrozil (LOPID) 600 mg tablet TAKE ONE TABLET BY MOUTH TWICE A DAY BEFORE MEALS 60 tablet 1   • glimepiride (AMARYL) 2 mg tablet Take 1 tablet (2 mg total) by mouth 2 (two) times a day 180 tablet 2   • losartan-hydrochlorothiazide (HYZAAR) 100-12 5 MG per tablet Take 1 tablet by mouth daily 90 tablet 3   • metFORMIN (GLUCOPHAGE) 1000 MG tablet Take 500 mg by mouth 2 (two) times a day with meals     • Misc Natural Products (DAILY HERBS IMMUNE DEFENSE PO) Take by mouth     • Multiple Vitamin (MULTI-VITAMIN DAILY PO) Take 1 tablet by mouth daily     • triamcinolone (KENALOG) 0 1 % cream Apply topically 2 (two) times a day Under the left breast as needed for flares   30 g 0     No "current facility-administered medications for this visit  Objective:    /90 (BP Location: Left arm, Patient Position: Sitting, Cuff Size: Standard)   Pulse 92   Temp 98 °F (36 7 °C) (Temporal)   Resp 14   Ht 5' 6\" (1 676 m)   Wt 81 6 kg (180 lb)   BMI 29 05 kg/m²        Physical Exam  Constitutional:       General: She is not in acute distress  Appearance: She is not ill-appearing  Cardiovascular:      Rate and Rhythm: Normal rate and regular rhythm  Pulses: no weak pulses          Dorsalis pedis pulses are 2+ on the right side and 2+ on the left side  Posterior tibial pulses are 2+ on the right side and 2+ on the left side  Heart sounds: No murmur heard  Pulmonary:      Effort: Pulmonary effort is normal  No respiratory distress  Breath sounds: No wheezing, rhonchi or rales  Musculoskeletal:      Right lower leg: No edema  Left lower leg: No edema  Feet:      Right foot:      Skin integrity: No ulcer, skin breakdown, erythema, warmth, callus or dry skin  Left foot:      Skin integrity: No ulcer, skin breakdown, erythema, warmth, callus or dry skin  Neurological:      General: No focal deficit present  Mental Status: She is alert and oriented to person, place, and time  Diabetic Foot Exam    Patient's shoes and socks removed  Right Foot/Ankle   Right Foot Inspection  Skin Exam: skin normal and skin intact  No dry skin, no warmth, no callus, no erythema, no maceration, no abnormal color, no pre-ulcer, no ulcer and no callus  Toe Exam: ROM and strength within normal limits  Sensory   Proprioception: intact  Monofilament testing: intact    Vascular  The right DP pulse is 2+  The right PT pulse is 2+  Left Foot/Ankle  Left Foot Inspection  Skin Exam: skin normal and skin intact  No dry skin, no warmth, no erythema, no maceration, normal color, no pre-ulcer, no ulcer and no callus       Toe Exam: ROM and strength within normal " limits  Sensory   Proprioception: intact  Monofilament testing: intact    Vascular  The left DP pulse is 2+  The left PT pulse is 2+  Assign Risk Category  No deformity present  No loss of protective sensation  No weak pulses  Risk: 0    Labs in chart were reviewed  Recent Results (from the past 672 hour(s))   Hemoglobin A1C    Collection Time: 03/22/23  9:00 AM   Result Value Ref Range    Hemoglobin A1C 8 4 (H) 4 8 - 5 6 %    Estimated Average Glucose 194 mg/dL   Comprehensive metabolic panel    Collection Time: 03/22/23  9:00 AM   Result Value Ref Range    Glucose, Random 209 (H) 70 - 99 mg/dL    BUN 14 8 - 27 mg/dL    Creatinine 0 91 0 57 - 1 00 mg/dL    eGFR 70 >59 mL/min/1 73    SL AMB BUN/CREATININE RATIO 15 12 - 28    Sodium 134 134 - 144 mmol/L    Potassium 4 5 3 5 - 5 2 mmol/L    Chloride 96 96 - 106 mmol/L    CO2 23 20 - 29 mmol/L    CALCIUM 10 0 8 7 - 10 3 mg/dL    Protein, Total 7 6 6 0 - 8 5 g/dL    Albumin 4 6 3 8 - 4 8 g/dL    Globulin, Total 3 0 1 5 - 4 5 g/dL    Albumin/Globulin Ratio 1 5 1 2 - 2 2    TOTAL BILIRUBIN 0 6 0 0 - 1 2 mg/dL    Alk Phos Isoenzymes 93 44 - 121 IU/L    AST 33 0 - 40 IU/L    ALT 42 (H) 0 - 32 IU/L   Lipid Panel with Direct LDL reflex    Collection Time: 03/22/23  9:00 AM   Result Value Ref Range    Cholesterol, Total 256 (H) 100 - 199 mg/dL    Triglycerides 384 (H) 0 - 149 mg/dL    HDL 43 >39 mg/dL    VLDL Cholesterol Calculated 71 (H) 5 - 40 mg/dL    LDL Calculated 142 (H) 0 - 99 mg/dL    LDl/HDL Ratio 3 3 (H) 0 0 - 3 2 ratio   Cardiovascular Report    Collection Time: 03/22/23  9:00 AM   Result Value Ref Range    Interpretation Note        Assessment/Plan:         Diagnoses and all orders for this visit:    Type 2 diabetes mellitus with microalbuminuria, without long-term current use of insulin (HCC)  -    Will increase med back to 100 mg BID -  metFORMIN (GLUCOPHAGE) 1000 MG tablet;  Take 1 tablet (1,000 mg total) by mouth 2 (two) times a day with meals  - colesevelam (WELCHOL) 625 mg tablet; Take 3 tablets (1,875 mg total) by mouth 2 (two) times a day with meals  -     Hemoglobin A1C; Future  -     Comprehensive metabolic panel; Future  -     Lipid Panel with Direct LDL reflex; Future  -     Hemoglobin A1C  -     Comprehensive metabolic panel  -     Lipid Panel with Direct LDL reflex    Mixed hyperlipidemia  -    Will add  colesevelam (WELCHOL) 625 mg tablet;  Take 3 tablets (1,875 mg total) by mouth 2 (two) times a day with meals    Not a goal   Essential hypertension      stable    rto in 3 m                 Edel Lamb MD

## 2023-03-30 DIAGNOSIS — R10.13 EPIGASTRIC PAIN: ICD-10-CM

## 2023-03-30 RX ORDER — FAMOTIDINE 20 MG/1
TABLET, FILM COATED ORAL
Qty: 30 TABLET | Refills: 1 | Status: SHIPPED | OUTPATIENT
Start: 2023-03-30

## 2023-05-03 DIAGNOSIS — R10.13 EPIGASTRIC PAIN: ICD-10-CM

## 2023-05-04 RX ORDER — FAMOTIDINE 20 MG/1
TABLET, FILM COATED ORAL
Qty: 60 TABLET | Refills: 0 | Status: SHIPPED | OUTPATIENT
Start: 2023-05-04

## 2023-05-05 ENCOUNTER — OFFICE VISIT (OUTPATIENT)
Dept: FAMILY MEDICINE CLINIC | Facility: CLINIC | Age: 63
End: 2023-05-05

## 2023-05-05 VITALS
WEIGHT: 180.6 LBS | HEART RATE: 98 BPM | HEIGHT: 66 IN | TEMPERATURE: 97.7 F | RESPIRATION RATE: 14 BRPM | BODY MASS INDEX: 29.02 KG/M2 | SYSTOLIC BLOOD PRESSURE: 162 MMHG | DIASTOLIC BLOOD PRESSURE: 96 MMHG

## 2023-05-05 DIAGNOSIS — M54.2 NECK PAIN ON RIGHT SIDE: Primary | ICD-10-CM

## 2023-05-05 NOTE — PROGRESS NOTES
"Chief Complaint   Patient presents with    Sore Throat     Patient complains of pain in throat area x a few weeks         Patient ID: Earnest Washington is a 57 y o  female  HPI  Pt is seeing for seeing for R sided neck pain anterior aspect worsening with swallowing \" sometimes\" -  Started 3 wks ago and not better  -  No recent URI, no associated symptoms, no therapy tried     The following portions of the patient's history were reviewed and updated as appropriate: allergies, current medications, past family history, past medical history, past social history, past surgical history and problem list     Review of Systems   Constitutional: Negative  HENT: Negative for congestion, dental problem, ear discharge, ear pain, facial swelling, postnasal drip, rhinorrhea, sinus pressure, sinus pain, sore throat, tinnitus, trouble swallowing and voice change  Respiratory: Negative          Current Outpatient Medications   Medication Sig Dispense Refill    Alogliptin Benzoate 25 MG TABS TAKE ONE TABLET BY MOUTH EVERY DAY IN THE MORNING 30 tablet 1    amLODIPine (NORVASC) 5 mg tablet TAKE ONE TABLET BY MOUTH EVERY DAY 30 tablet 3    clopidogrel (PLAVIX) 75 mg tablet Take 1 tablet (75 mg total) by mouth daily 90 tablet 3    famotidine (PEPCID) 20 mg tablet TAKE ONE TABLET BY MOUTH TWICE A DAY 60 tablet 0    gemfibrozil (LOPID) 600 mg tablet TAKE ONE TABLET BY MOUTH TWICE A DAY BEFORE MEALS 60 tablet 1    glimepiride (AMARYL) 2 mg tablet Take 1 tablet (2 mg total) by mouth 2 (two) times a day 180 tablet 2    losartan-hydrochlorothiazide (HYZAAR) 100-12 5 MG per tablet Take 1 tablet by mouth daily 90 tablet 3    metFORMIN (GLUCOPHAGE) 1000 MG tablet Take 1 tablet (1,000 mg total) by mouth 2 (two) times a day with meals 180 tablet 1    Misc Natural Products (DAILY HERBS IMMUNE DEFENSE PO) Take by mouth      Multiple Vitamin (MULTI-VITAMIN DAILY PO) Take 1 tablet by mouth daily      triamcinolone (KENALOG) 0 1 % " "cream APPLY TOPICALLY TWO TIMES A DAY UNDER THE LEFT BREAST AS NEEDED FOR FLARES 30 g 0    colesevelam (WELCHOL) 625 mg tablet Take 3 tablets (1,875 mg total) by mouth 2 (two) times a day with meals 180 tablet 0     No current facility-administered medications for this visit  Objective:    /96 (BP Location: Left arm, Patient Position: Sitting, Cuff Size: Large)   Pulse 98   Temp 97 7 °F (36 5 °C)   Resp 14   Ht 5' 6\" (1 676 m)   Wt 81 9 kg (180 lb 9 6 oz)   BMI 29 15 kg/m²        Physical Exam  Constitutional:       Appearance: She is not ill-appearing  HENT:      Head: Normocephalic  Nose: No congestion or rhinorrhea  Mouth/Throat:      Pharynx: No oropharyngeal exudate or posterior oropharyngeal erythema  Cardiovascular:      Rate and Rhythm: Normal rate  Pulmonary:      Effort: Pulmonary effort is normal  No respiratory distress  Musculoskeletal:      Cervical back: Normal range of motion and neck supple  No rigidity or tenderness  Lymphadenopathy:      Cervical: No cervical adenopathy  Neurological:      Mental Status: She is alert  Assessment/Plan:         Diagnoses and all orders for this visit:    Neck pain on right side  -     US head neck soft tissue; Future  -     Ambulatory Referral to Otolaryngology; Future    rto prn       BMI Counseling: Body mass index is 29 15 kg/m²                    Hamilton Real MD      "

## 2023-05-09 ENCOUNTER — HOSPITAL ENCOUNTER (OUTPATIENT)
Dept: RADIOLOGY | Facility: HOSPITAL | Age: 63
Discharge: HOME | End: 2023-05-09
Attending: FAMILY MEDICINE

## 2023-05-09 DIAGNOSIS — M54.2 NECK PAIN ON RIGHT SIDE: ICD-10-CM

## 2023-05-19 DIAGNOSIS — J02.0 ACUTE STREPTOCOCCAL PHARYNGITIS: Primary | ICD-10-CM

## 2023-05-19 RX ORDER — AMOXICILLIN AND CLAVULANATE POTASSIUM 875; 125 MG/1; MG/1
1 TABLET, FILM COATED ORAL EVERY 12 HOURS SCHEDULED
Qty: 14 TABLET | Refills: 0 | Status: SHIPPED | OUTPATIENT
Start: 2023-05-19 | End: 2023-05-26

## 2023-06-13 LAB
ALBUMIN SERPL-MCNC: 4.8 G/DL (ref 3.8–4.8)
ALBUMIN/GLOB SERPL: 1.7 {RATIO} (ref 1.2–2.2)
ALP SERPL-CCNC: 70 IU/L (ref 44–121)
ALT SERPL-CCNC: 38 IU/L (ref 0–32)
AST SERPL-CCNC: 32 IU/L (ref 0–40)
BILIRUB SERPL-MCNC: 1 MG/DL (ref 0–1.2)
BUN SERPL-MCNC: 14 MG/DL (ref 8–27)
BUN/CREAT SERPL: 16 (ref 12–28)
CALCIUM SERPL-MCNC: 10.1 MG/DL (ref 8.7–10.3)
CHLORIDE SERPL-SCNC: 93 MMOL/L (ref 96–106)
CHOLEST SERPL-MCNC: 256 MG/DL (ref 100–199)
CO2 SERPL-SCNC: 22 MMOL/L (ref 20–29)
CREAT SERPL-MCNC: 0.85 MG/DL (ref 0.57–1)
EGFR: 76 ML/MIN/1.73
EST. AVERAGE GLUCOSE BLD GHB EST-MCNC: 166 MG/DL
GLOBULIN SER-MCNC: 2.9 G/DL (ref 1.5–4.5)
GLUCOSE SERPL-MCNC: 213 MG/DL (ref 70–99)
HBA1C MFR BLD: 7.4 % (ref 4.8–5.6)
HDLC SERPL-MCNC: 48 MG/DL
LDLC SERPL CALC-MCNC: 131 MG/DL (ref 0–99)
LDLC/HDLC SERPL: 2.7 RATIO (ref 0–3.2)
MICRODELETION SYND BLD/T FISH: NORMAL
POTASSIUM SERPL-SCNC: 4.3 MMOL/L (ref 3.5–5.2)
PROT SERPL-MCNC: 7.7 G/DL (ref 6–8.5)
SL AMB VLDL CHOLESTEROL CALC: 77 MG/DL (ref 5–40)
SODIUM SERPL-SCNC: 132 MMOL/L (ref 134–144)
TRIGL SERPL-MCNC: 430 MG/DL (ref 0–149)

## 2023-06-20 ENCOUNTER — OFFICE VISIT (OUTPATIENT)
Dept: CARDIOLOGY CLINIC | Facility: CLINIC | Age: 63
End: 2023-06-20
Payer: COMMERCIAL

## 2023-06-20 VITALS
WEIGHT: 173 LBS | OXYGEN SATURATION: 97 % | TEMPERATURE: 97.6 F | BODY MASS INDEX: 27.8 KG/M2 | HEART RATE: 95 BPM | SYSTOLIC BLOOD PRESSURE: 120 MMHG | HEIGHT: 66 IN | DIASTOLIC BLOOD PRESSURE: 78 MMHG

## 2023-06-20 DIAGNOSIS — Z87.74 S/P PATENT FORAMEN OVALE CLOSURE: ICD-10-CM

## 2023-06-20 DIAGNOSIS — I10 ESSENTIAL HYPERTENSION: Primary | ICD-10-CM

## 2023-06-20 DIAGNOSIS — E11.8 TYPE 2 DIABETES MELLITUS WITH COMPLICATION, WITHOUT LONG-TERM CURRENT USE OF INSULIN (HCC): ICD-10-CM

## 2023-06-20 DIAGNOSIS — E78.2 MIXED HYPERLIPIDEMIA: ICD-10-CM

## 2023-06-20 DIAGNOSIS — I49.3 FREQUENT PVCS: ICD-10-CM

## 2023-06-20 DIAGNOSIS — Z86.73 HISTORY OF TIA (TRANSIENT ISCHEMIC ATTACK): ICD-10-CM

## 2023-06-20 PROCEDURE — 93000 ELECTROCARDIOGRAM COMPLETE: CPT | Performed by: INTERNAL MEDICINE

## 2023-06-20 PROCEDURE — 99214 OFFICE O/P EST MOD 30 MIN: CPT | Performed by: INTERNAL MEDICINE

## 2023-06-20 RX ORDER — BEMPEDOIC ACID 180 MG/1
1 TABLET, FILM COATED ORAL DAILY
Qty: 30 TABLET | Refills: 5 | Status: SHIPPED | OUTPATIENT
Start: 2023-06-20

## 2023-06-20 NOTE — PROGRESS NOTES
Cardiology   Martin Frausto DO, Mojgan Lacey MD, Karthik Fontenot MD, Kehinde Parr MD, [facility] [place]  -------------------------------------------------------------------  [facility]  [address], [address] [facility],[address] [place] [address], [facility]  [phone]    Cardiology Follow Up  Maria Eugenia Wagner  1960  [id number]          Assessment/Plan:    1  Essential hypertension    2  Mixed hyperlipidemia    3  History of TIA (transient ischemic attack)    4  S/P patent foramen ovale closure    5  Frequent PVCs    6  Type 2 diabetes mellitus with complication, without long-term current use of insulin ([facility] [address] )      -Patient with history of TIA and PFO which was subsequently closed  She has had no symptoms since then  Discussed with her the importance of risk factor reduction  Blood pressure is at goal   Cholesterol levels are elevated  She has multiple statin intolerances and could not take WelChol or fenofibrate  She is using Lovaza intermittently  Will prescribe Nexletol  She will take it if it is not expensive   - follow up with PMD regarding diabetes drug management  Consider discontinuation of glimepiride and beginning [place] or Sharin Nain  Interval History:     Maria Eugenia Wagner is 57 y o  female here for followup of PFO closure and dyslipidemia  Previously, she had a PFO closure several years ago after a transient ischemic attack  During that event, she had intractable nausea and vomiting and then confusion and loss of memory  PFO was closed 6 months later  She has been on Plavix since then  She denies any significant bleeding  She denies any chest pain, shortness of breath or palpitations  No recent neurological symptoms  Her most recent lipid panel  showed an LDL of 131 with triglycerides of 430  She has multiple medication intolerances including to multiple statins    She has also tried Zetia and most recently gemfibrozil which needed to be stopped due to GI side effects  She is on Welchol which she takes once a day  Diet is overall poor  She also has diabetes with proteinuria              The following portions of the patient's history were reviewed and updated as appropriate: allergies, current medications, past family history, past medical history, past social history, past surgical history, and problem list        Current Outpatient Medications:   •  Alogliptin Benzoate 25 MG TABS, TAKE ONE TABLET BY MOUTH EVERY DAY IN THE MORNING, Disp: 30 tablet, Rfl: 1  •  amLODIPine (NORVASC) 5 mg tablet, TAKE ONE TABLET BY MOUTH EVERY DAY, Disp: 30 tablet, Rfl: 3  •  Bempedoic Acid (Nexletol) 180 MG TABS, Take 1 tablet by mouth in the morning, Disp: 30 tablet, Rfl: 5  •  clopidogrel (PLAVIX) 75 mg tablet, Take 1 tablet (75 mg total) by mouth daily, Disp: 90 tablet, Rfl: 3  •  famotidine (PEPCID) 20 mg tablet, TAKE ONE TABLET BY MOUTH TWICE A DAY, Disp: 60 tablet, Rfl: 0  •  gemfibrozil (LOPID) 600 mg tablet, TAKE ONE TABLET BY MOUTH TWICE A DAY BEFORE MEALS, Disp: 60 tablet, Rfl: 1  •  glimepiride (AMARYL) 2 mg tablet, Take 1 tablet (2 mg total) by mouth 2 (two) times a day, Disp: 180 tablet, Rfl: 2  •  losartan-hydrochlorothiazide (HYZAAR) 100-12 5 MG per tablet, Take 1 tablet by mouth daily, Disp: 90 tablet, Rfl: 3  •  metFORMIN (GLUCOPHAGE) 1000 MG tablet, Take 1 tablet (1,000 mg total) by mouth 2 (two) times a day with meals, Disp: 180 tablet, Rfl: 1  •  Misc Natural Products (DAILY HERBS IMMUNE DEFENSE PO), Take by mouth, Disp: , Rfl:   •  Multiple Vitamin (MULTI-VITAMIN DAILY PO), Take 1 tablet by mouth daily, Disp: , Rfl:   •  colesevelam (WELCHOL) 625 mg tablet, Take 3 tablets (1,875 mg total) by mouth 2 (two) times a day with meals, Disp: 180 tablet, Rfl: 0  •  triamcinolone (KENALOG) 0 1 % cream, APPLY TOPICALLY TWO TIMES A DAY UNDER THE LEFT BREAST AS NEEDED FOR FLARES (Patient not taking: Reported on 6/20/2023), "Disp: 30 g, Rfl: 0        Review of Systems:  Review of Systems   Respiratory: Negative for shortness of breath  Cardiovascular: Negative for chest pain, palpitations and leg swelling  Musculoskeletal: Positive for arthralgias  All other systems reviewed and are negative  Physical Exam:  Vitals:  Vitals:    06/20/23 0800   BP: 120/78   BP Location: Right arm   Patient Position: Sitting   Cuff Size: Large   Pulse: 95   Temp: 97 6 °F (36 4 °C)   SpO2: 97%   Weight: 78 5 kg (173 lb)   Height: 5' 6\" (1 676 m)     Physical Exam   Constitutional: She appears healthy  No distress  Eyes: Pupils are equal, round, and reactive to light  Conjunctivae are normal    Neck: No JVD present  Cardiovascular: Normal rate, regular rhythm and normal heart sounds  Exam reveals no gallop and no friction rub  No murmur heard  Pulmonary/Chest: Effort normal and breath sounds normal  She has no wheezes  She has no rales  Musculoskeletal:         General: No tenderness, deformity or edema  Cervical back: Normal range of motion and neck supple  Neurological: She is alert and oriented to person, place, and time  Skin: Skin is warm and dry  Cardiographics:  EKG: Personally reviewed normal sinus rhythm with LVH  Last known EF: 65%    This note was completed in part utilizing M-Modal Fluency Direct Software  Grammatical errors, random word insertions, spelling mistakes, and incomplete sentences can be an occasional consequence of this system secondary to software limitations, ambient noise, and hardware issues  If you have any questions or concerns about the content, text, or information contained within the body of this dictation, please contact the provider for clarification        "

## 2023-06-21 DIAGNOSIS — E11.8 TYPE 2 DIABETES MELLITUS WITH COMPLICATION, WITHOUT LONG-TERM CURRENT USE OF INSULIN (HCC): ICD-10-CM

## 2023-06-21 RX ORDER — ALOGLIPTIN 25 MG/1
TABLET, FILM COATED ORAL
Qty: 30 TABLET | Refills: 1 | Status: SHIPPED | OUTPATIENT
Start: 2023-06-21

## 2023-07-03 ENCOUNTER — TELEPHONE (OUTPATIENT)
Dept: CARDIOLOGY CLINIC | Facility: CLINIC | Age: 63
End: 2023-07-03

## 2023-07-03 NOTE — TELEPHONE ENCOUNTER
Fax sent to the plan  Your PA has been faxed to the plan as a paper copy. Please contact the plan directly if you haven't received a determination in a typical timeframe. You will be notified of the determination via fax.

## 2023-07-05 ENCOUNTER — TELEPHONE (OUTPATIENT)
Dept: ADMINISTRATIVE | Facility: OTHER | Age: 63
End: 2023-07-05

## 2023-07-05 ENCOUNTER — OFFICE VISIT (OUTPATIENT)
Dept: FAMILY MEDICINE CLINIC | Facility: CLINIC | Age: 63
End: 2023-07-05
Payer: COMMERCIAL

## 2023-07-05 VITALS
SYSTOLIC BLOOD PRESSURE: 124 MMHG | BODY MASS INDEX: 29.32 KG/M2 | TEMPERATURE: 97.8 F | HEIGHT: 65 IN | WEIGHT: 176 LBS | RESPIRATION RATE: 16 BRPM | HEART RATE: 88 BPM | DIASTOLIC BLOOD PRESSURE: 84 MMHG

## 2023-07-05 DIAGNOSIS — E11.29 TYPE 2 DIABETES MELLITUS WITH MICROALBUMINURIA, WITHOUT LONG-TERM CURRENT USE OF INSULIN (HCC): ICD-10-CM

## 2023-07-05 DIAGNOSIS — H92.01 EARLOBE PAIN, RIGHT: ICD-10-CM

## 2023-07-05 DIAGNOSIS — R10.13 EPIGASTRIC PAIN: ICD-10-CM

## 2023-07-05 DIAGNOSIS — E78.2 MIXED HYPERLIPIDEMIA: ICD-10-CM

## 2023-07-05 DIAGNOSIS — R80.9 TYPE 2 DIABETES MELLITUS WITH MICROALBUMINURIA, WITHOUT LONG-TERM CURRENT USE OF INSULIN (HCC): ICD-10-CM

## 2023-07-05 DIAGNOSIS — Z00.00 ANNUAL PHYSICAL EXAM: Primary | ICD-10-CM

## 2023-07-05 PROCEDURE — 3079F DIAST BP 80-89 MM HG: CPT | Performed by: FAMILY MEDICINE

## 2023-07-05 PROCEDURE — 3725F SCREEN DEPRESSION PERFORMED: CPT | Performed by: FAMILY MEDICINE

## 2023-07-05 PROCEDURE — 3074F SYST BP LT 130 MM HG: CPT | Performed by: FAMILY MEDICINE

## 2023-07-05 PROCEDURE — 3066F NEPHROPATHY DOC TX: CPT | Performed by: FAMILY MEDICINE

## 2023-07-05 PROCEDURE — 99396 PREV VISIT EST AGE 40-64: CPT | Performed by: FAMILY MEDICINE

## 2023-07-05 RX ORDER — FAMOTIDINE 20 MG/1
20 TABLET, FILM COATED ORAL 2 TIMES DAILY
Qty: 60 TABLET | Refills: 6 | Status: SHIPPED | OUTPATIENT
Start: 2023-07-05

## 2023-07-05 RX ORDER — COLESEVELAM 180 1/1
1875 TABLET ORAL 2 TIMES DAILY WITH MEALS
Qty: 180 TABLET | Refills: 11 | Status: SHIPPED | OUTPATIENT
Start: 2023-07-05 | End: 2023-08-04

## 2023-07-05 RX ORDER — MOMETASONE FUROATE 1 MG/G
CREAM TOPICAL DAILY
Qty: 45 G | Refills: 0 | Status: SHIPPED | OUTPATIENT
Start: 2023-07-05

## 2023-07-05 NOTE — LETTER
Diabetic Eye Exam Form    Date Requested: 23  Patient: Leah Sepulveda  Patient : 1960   Referring Provider: Nimesh Sellers MD      DIABETIC Eye Exam Date _______________________________      Type of Exam MUST be documented for Diabetic Eye Exams. Please CHECK ONE. Retinal Exam       Dilated Retinal Exam       OCT       Optomap-Iris Exam      Fundus Photography       Left Eye - Please check Retinopathy or No Retinopathy        Exam did show retinopathy    Exam did not show retinopathy       Right Eye - Please check Retinopathy or No Retinopathy       Exam did show retinopathy    Exam did not show retinopathy       Comments __________________________________________________________    Practice Providing Exam ______________________________________________    Exam Performed By (print name) _______________________________________      Provider Signature ___________________________________________________      These reports are needed for  compliance. Please fax this completed form and a copy of the Diabetic Eye Exam report to our office located at [address] [place] [address] as soon as possible via Fax [phone] [facility]: Phone [phone]  We thank you for your assistance in treating our mutual patient.

## 2023-07-05 NOTE — LETTER
Diabetic Eye Exam Form 2ND Request!    Date Requested: 07/10/23  Patient: Carina Richardson  Patient : 1960   Referring Provider: Elicia Sesay MD      DIABETIC Eye Exam Date _______________________________      Type of Exam MUST be documented for Diabetic Eye Exams. Please CHECK ONE. Retinal Exam       Dilated Retinal Exam       OCT       Optomap-Iris Exam      Fundus Photography       Left Eye - Please check Retinopathy or No Retinopathy        Exam did show retinopathy    Exam did not show retinopathy       Right Eye - Please check Retinopathy or No Retinopathy       Exam did show retinopathy    Exam did not show retinopathy       Comments __________________________________________________________    Practice Providing Exam ______________________________________________    Exam Performed By (print name) _______________________________________      Provider Signature ___________________________________________________      These reports are needed for  compliance. Please fax this completed form and a copy of the Diabetic Eye Exam report to our office located at [address] [place] [address] as soon as possible via Fax [phone] attention [facility]ien [facility]: Phone [phone]  We thank you for your assistance in treating our mutual patient.

## 2023-07-05 NOTE — PROGRESS NOTES
Chief Complaint   Patient presents with   • Physical Exam     Right ear bothering her alittle         Patient ID: Arley Huitron is a 57 y.o. female. HPI   pt is seeing for annual PE -  Has DM2 -  Improved Hg AIC     The following portions of the patient's history were reviewed and updated as appropriate: allergies, current medications, past family history, past medical history, past social history, past surgical history and problem list.    Review of Systems   Constitutional: Negative. HENT: Positive for ear pain (R earlobe irritation x few days ). Negative for congestion and ear discharge. Eyes: Negative. Respiratory: Negative. Cardiovascular: Negative. Gastrointestinal: Negative. Endocrine: Negative. Genitourinary: Negative. Musculoskeletal: Negative. Skin: Negative. Neurological: Negative. Psychiatric/Behavioral: Negative.         Current Outpatient Medications   Medication Sig Dispense Refill   • Alogliptin Benzoate 25 MG TABS TAKE ONE TABLET BY MOUTH EVERY DAY IN THE MORNING 30 tablet 1   • amLODIPine (NORVASC) 5 mg tablet TAKE ONE TABLET BY MOUTH EVERY DAY 30 tablet 3   • Bempedoic Acid (Nexletol) 180 MG TABS Take 1 tablet by mouth in the morning 30 tablet 5   • clopidogrel (PLAVIX) 75 mg tablet Take 1 tablet (75 mg total) by mouth daily 90 tablet 3   • colesevelam (WELCHOL) 625 mg tablet Take 3 tablets (1,875 mg total) by mouth 2 (two) times a day with meals 180 tablet 0   • famotidine (PEPCID) 20 mg tablet TAKE ONE TABLET BY MOUTH TWICE A DAY 60 tablet 0   • gemfibrozil (LOPID) 600 mg tablet TAKE ONE TABLET BY MOUTH TWICE A DAY BEFORE MEALS 60 tablet 1   • glimepiride (AMARYL) 2 mg tablet Take 1 tablet (2 mg total) by mouth 2 (two) times a day 180 tablet 2   • losartan-hydrochlorothiazide (HYZAAR) 100-12.5 MG per tablet Take 1 tablet by mouth daily 90 tablet 3   • metFORMIN (GLUCOPHAGE) 1000 MG tablet Take 1 tablet (1,000 mg total) by mouth 2 (two) times a day with meals 180 tablet 1   • Misc Natural Products (DAILY HERBS IMMUNE DEFENSE PO) Take by mouth     • Multiple Vitamin (MULTI-VITAMIN DAILY PO) Take 1 tablet by mouth daily     • triamcinolone (KENALOG) 0.1 % cream APPLY TOPICALLY TWO TIMES A DAY UNDER THE LEFT BREAST AS NEEDED FOR FLARES 30 g 0     No current facility-administered medications for this visit. Objective:    /84 Comment: by MD  Pulse 88   Temp 97.8 °F (36.6 °C)   Resp 16   Ht 5' 5" (1.651 m)   Wt 79.8 kg (176 lb)   BMI 29.29 kg/m²        Physical Exam  Constitutional:       General: She is not in acute distress. Appearance: She is well-developed. She is not ill-appearing. HENT:      Head: Normocephalic and atraumatic. Right Ear: Hearing, tympanic membrane and ear canal normal.      Left Ear: Hearing, tympanic membrane, ear canal and external ear normal.      Ears:      Comments: Slight erythema inside R earlobe      Nose: No congestion or rhinorrhea. Mouth/Throat:      Pharynx: No oropharyngeal exudate or posterior oropharyngeal erythema. Eyes:      Extraocular Movements: Extraocular movements intact. Conjunctiva/sclera: Conjunctivae normal.   Neck:      Thyroid: No thyroid mass or thyromegaly. Vascular: No JVD. Cardiovascular:      Rate and Rhythm: Normal rate and regular rhythm. Heart sounds: Normal heart sounds. No murmur heard. No gallop. Pulmonary:      Effort: No respiratory distress. Breath sounds: Normal breath sounds. No wheezing, rhonchi or rales. Abdominal:      General: Bowel sounds are normal.      Palpations: Abdomen is soft. Tenderness: There is no abdominal tenderness. There is no right CVA tenderness or left CVA tenderness. Musculoskeletal:      Cervical back: Neck supple. Right lower leg: No edema. Left lower leg: No edema. Lymphadenopathy:      Cervical: No cervical adenopathy. Skin:     Coloration: Skin is not pale. Findings: No rash.    Neurological: General: No focal deficit present. Mental Status: She is alert and oriented to person, place, and time. Cranial Nerves: No cranial nerve deficit. Psychiatric:         Mood and Affect: Mood normal.         Behavior: Behavior normal.         Thought Content: Thought content normal.         Judgment: Judgment normal.           Labs in chart were reviewed. Assessment/Plan:         Diagnoses and all orders for this visit:    Annual physical exam    Type 2 diabetes mellitus with microalbuminuria, without long-term current use of insulin ([address])  -     colesevelam (WELCHOL) 625 mg tablet; Take 3 tablets (1,875 mg total) by mouth 2 (two) times a day with meals  -     Hemoglobin A1C; Future  -     Comprehensive metabolic panel; Future  -     Lipid Panel with Direct LDL reflex; Future  -     Albumin / creatinine urine ratio; Future  -     Hemoglobin A1C  -     Comprehensive metabolic panel  -     Lipid Panel with Direct LDL reflex  -     Albumin / creatinine urine ratio    Mixed hyperlipidemia  -     colesevelam (WELCHOL) 625 mg tablet; Take 3 tablets (1,875 mg total) by mouth 2 (two) times a day with meals    Epigastric pain  -     famotidine (PEPCID) 20 mg tablet; Take 1 tablet (20 mg total) by mouth 2 (two) times a day    Earlobe pain, right  -     mometasone (ELOCON) 0.1 % cream; Apply topically daily        Was offered Shingrix vaccine -  Will think about it   Will obtain records from [address] and eye doctor   BMI Counseling: Body mass index is 29.29 kg/m². Discussed the patient's BMI with her. The BMI is above normal. Nutrition recommendations include reducing portion sizes, decreasing overall calorie intake, 3-5 servings of fruits/vegetables daily, reducing fast food intake, consuming healthier snacks and decreasing soda and/or juice intake. Exercise recommendations include exercising 3-5 times per week.      rto in 3 m             Gema Farris MD

## 2023-07-05 NOTE — TELEPHONE ENCOUNTER
Upon review of the In Basket request and the patient's chart, initial outreach has been made via fax to facility. Please see Contacts section for details.      Thank you  Mary Perry

## 2023-07-12 NOTE — TELEPHONE ENCOUNTER
Upon review of the In Basket request we were able to locate, review, and update the patient chart as requested for Diabetic Eye Exam.    Any additional questions or concerns should be emailed to the Practice Liaisons via the appropriate education email address, please do not reply via In Basket.     Thank you  Micaela Chandler

## 2023-07-27 ENCOUNTER — OFFICE VISIT (OUTPATIENT)
Dept: OTOLARYNGOLOGY | Facility: CLINIC | Age: 63
End: 2023-07-27
Payer: COMMERCIAL

## 2023-07-27 VITALS — HEIGHT: 65 IN | WEIGHT: 175 LBS | TEMPERATURE: 97.6 F | BODY MASS INDEX: 29.16 KG/M2

## 2023-07-27 DIAGNOSIS — M54.2 NECK PAIN ON RIGHT SIDE: ICD-10-CM

## 2023-07-27 DIAGNOSIS — J34.2 DNS (DEVIATED NASAL SEPTUM): ICD-10-CM

## 2023-07-27 DIAGNOSIS — R09.81 NASAL CONGESTION: Primary | ICD-10-CM

## 2023-07-27 DIAGNOSIS — J31.0 CHRONIC RHINITIS: ICD-10-CM

## 2023-07-27 PROCEDURE — 99204 OFFICE O/P NEW MOD 45 MIN: CPT | Performed by: OTOLARYNGOLOGY

## 2023-07-27 PROCEDURE — 31231 NASAL ENDOSCOPY DX: CPT | Performed by: OTOLARYNGOLOGY

## 2023-07-27 RX ORDER — FLUTICASONE PROPIONATE 50 MCG
2 SPRAY, SUSPENSION (ML) NASAL DAILY
Qty: 16 G | Refills: 11 | Status: SHIPPED | OUTPATIENT
Start: 2023-07-27

## 2023-07-27 RX ORDER — FEXOFENADINE HCL 180 MG/1
180 TABLET ORAL DAILY
Qty: 30 TABLET | Refills: 11 | Status: SHIPPED | OUTPATIENT
Start: 2023-07-27

## 2023-07-27 NOTE — PROGRESS NOTES
Assessment/Plan:  Nasal congestion, likely due to allergies. Trial of Flonase and Allegra. D/c oxymetazoline. Elevate head of bed at night to ameliorate nocturnal congestion. F/u in 1 month. Diagnosis ICD-10-CM Associated Orders   1. Nasal congestion  R09.81 fluticasone (FLONASE) 50 mcg/act nasal spray     fexofenadine (ALLEGRA) 180 MG tablet      2. Neck pain on right side  M54.2 Ambulatory Referral to Otolaryngology      3. Chronic rhinitis  J31.0 fluticasone (FLONASE) 50 mcg/act nasal spray     fexofenadine (ALLEGRA) 180 MG tablet      4. DNS (deviated nasal septum)  J34.2              Subjective:      Patient ID: Abhijeet Benites is a 57 y.o. female. Pt with c/o several months of nasal congestion, ear pressure, especially when she lies down in bed. She has been using oxymetazoline nasal spray nightly for the past three weeks. She was originally scheduled due to c/o neck pain, but that has resolved. The following portions of the patient's history were reviewed and updated as appropriate: allergies, current medications, past family history, past medical history, past social history, past surgical history and problem list.    Review of Systems      Objective:      Temp 97.6 °F (36.4 °C) (Temporal)   Ht 5' 5" (1.651 m)   Wt 79.4 kg (175 lb)   BMI 29.12 kg/m²          Physical Exam  Constitutional:       Appearance: She is well-developed. HENT:      Head: Normocephalic and atraumatic. Right Ear: Tympanic membrane, ear canal and external ear normal. No drainage. No middle ear effusion. Left Ear: Tympanic membrane, ear canal and external ear normal. No drainage. No middle ear effusion. Nose: Septal deviation present. Mouth/Throat:      Pharynx: Uvula midline. No oropharyngeal exudate. Tonsils: 2+ on the right. 2+ on the left. Neck:      Thyroid: No thyroid mass or thyromegaly. Trachea: Trachea normal. No tracheal deviation.    Lymphadenopathy:      Cervical: No cervical adenopathy. Neurological:      Mental Status: She is alert. Flexible Fiberoptic Nasal Endoscopy Procedure Note:  Indication:  Nasal congestion  Verbal consent obtained. Surgeon: Tiera Mchugh MD  Anesthesia: 4% lidocaine, oxymetazoline  Scope passed through nasal cavities bilaterally.   Nasopharynx: normal  Right Nasal Cavity:   Mucosa: normal   Secretions: clear  Left Nasal Cavity:   Mucosa: normal   Secretions: clear  Other findings = DNS  Patient tolerated procedure well without complications

## 2023-08-13 DIAGNOSIS — E11.8 TYPE 2 DIABETES MELLITUS WITH COMPLICATION, WITHOUT LONG-TERM CURRENT USE OF INSULIN (HCC): ICD-10-CM

## 2023-08-14 DIAGNOSIS — I10 ESSENTIAL HYPERTENSION: ICD-10-CM

## 2023-08-14 DIAGNOSIS — I63.531 CEREBROVASCULAR ACCIDENT (CVA) DUE TO STENOSIS OF RIGHT POSTERIOR CEREBRAL ARTERY (HCC): ICD-10-CM

## 2023-08-14 DIAGNOSIS — E78.2 MIXED HYPERLIPIDEMIA: ICD-10-CM

## 2023-08-14 RX ORDER — GEMFIBROZIL 600 MG/1
TABLET, FILM COATED ORAL
Qty: 60 TABLET | Refills: 1 | Status: SHIPPED | OUTPATIENT
Start: 2023-08-14

## 2023-08-14 RX ORDER — AMLODIPINE BESYLATE 5 MG/1
TABLET ORAL
Qty: 30 TABLET | Refills: 3 | Status: SHIPPED | OUTPATIENT
Start: 2023-08-14

## 2023-08-14 RX ORDER — GLIMEPIRIDE 2 MG/1
2 TABLET ORAL 2 TIMES DAILY
Qty: 180 TABLET | Refills: 2 | Status: SHIPPED | OUTPATIENT
Start: 2023-08-14

## 2023-08-14 RX ORDER — CLOPIDOGREL BISULFATE 75 MG/1
75 TABLET ORAL DAILY
Qty: 90 TABLET | Refills: 0 | Status: SHIPPED | OUTPATIENT
Start: 2023-08-14

## 2023-08-21 DIAGNOSIS — E11.8 TYPE 2 DIABETES MELLITUS WITH COMPLICATION, WITHOUT LONG-TERM CURRENT USE OF INSULIN (HCC): ICD-10-CM

## 2023-08-22 RX ORDER — ALOGLIPTIN 25 MG/1
TABLET, FILM COATED ORAL
Qty: 30 TABLET | Refills: 1 | Status: SHIPPED | OUTPATIENT
Start: 2023-08-22

## 2023-09-07 ENCOUNTER — OFFICE VISIT (OUTPATIENT)
Dept: OTOLARYNGOLOGY | Facility: CLINIC | Age: 63
End: 2023-09-07
Payer: COMMERCIAL

## 2023-09-07 VITALS — TEMPERATURE: 97.4 F | WEIGHT: 175 LBS | HEIGHT: 65 IN | BODY MASS INDEX: 29.16 KG/M2

## 2023-09-07 DIAGNOSIS — J31.0 CHRONIC RHINITIS: ICD-10-CM

## 2023-09-07 DIAGNOSIS — R09.81 NASAL CONGESTION: Primary | ICD-10-CM

## 2023-09-07 DIAGNOSIS — J34.2 DNS (DEVIATED NASAL SEPTUM): ICD-10-CM

## 2023-09-07 PROCEDURE — 99213 OFFICE O/P EST LOW 20 MIN: CPT | Performed by: OTOLARYNGOLOGY

## 2023-09-07 NOTE — PROGRESS NOTES
Assessment/Plan:  Improved nasal congestion with Allegra and Flonase. F/u PRN. Diagnosis ICD-10-CM Associated Orders   1. Nasal congestion  R09.81       2. Chronic rhinitis  J31.0       3. DNS (deviated nasal septum)  J34.2              Subjective:      Patient ID: Jamey Moon is a 57 y.o. female. Pt reports significant improvement in her nasal congestion. The following portions of the patient's history were reviewed and updated as appropriate: allergies, current medications, past family history, past medical history, past social history, past surgical history and problem list.    Review of Systems      Objective:      Temp (!) 97.4 °F (36.3 °C) (Temporal)   Ht 5' 5" (1.651 m)   Wt 79.4 kg (175 lb)   BMI 29.12 kg/m²          Physical Exam  Constitutional:       Appearance: She is well-developed. HENT:      Head: Normocephalic and atraumatic. Right Ear: Tympanic membrane, ear canal and external ear normal. No drainage. No middle ear effusion. Left Ear: Tympanic membrane, ear canal and external ear normal. No drainage. No middle ear effusion. Nose: Septal deviation present. Mouth/Throat:      Pharynx: Uvula midline. No oropharyngeal exudate. Tonsils: 2+ on the right. 2+ on the left. Neck:      Thyroid: No thyroid mass or thyromegaly. Trachea: Trachea normal. No tracheal deviation. Lymphadenopathy:      Cervical: No cervical adenopathy. Neurological:      Mental Status: She is alert.

## 2023-09-18 DIAGNOSIS — R80.9 TYPE 2 DIABETES MELLITUS WITH MICROALBUMINURIA, WITHOUT LONG-TERM CURRENT USE OF INSULIN: ICD-10-CM

## 2023-09-18 DIAGNOSIS — E11.29 TYPE 2 DIABETES MELLITUS WITH MICROALBUMINURIA, WITHOUT LONG-TERM CURRENT USE OF INSULIN: ICD-10-CM

## 2023-10-08 DIAGNOSIS — E78.2 MIXED HYPERLIPIDEMIA: ICD-10-CM

## 2023-10-09 RX ORDER — GEMFIBROZIL 600 MG/1
TABLET, FILM COATED ORAL
Qty: 60 TABLET | Refills: 1 | Status: SHIPPED | OUTPATIENT
Start: 2023-10-09

## 2023-10-29 ENCOUNTER — APPOINTMENT (EMERGENCY)
Dept: CT IMAGING | Facility: HOSPITAL | Age: 63
DRG: 422 | End: 2023-10-29
Payer: COMMERCIAL

## 2023-10-29 ENCOUNTER — HOSPITAL ENCOUNTER (INPATIENT)
Facility: HOSPITAL | Age: 63
LOS: 1 days | Discharge: HOME | DRG: 422 | End: 2023-10-30
Attending: EMERGENCY MEDICINE | Admitting: INTERNAL MEDICINE
Payer: COMMERCIAL

## 2023-10-29 DIAGNOSIS — R42 LIGHTHEADEDNESS: ICD-10-CM

## 2023-10-29 DIAGNOSIS — I67.9 CEREBROVASCULAR DISEASE: ICD-10-CM

## 2023-10-29 DIAGNOSIS — I67.1 INTRACRANIAL ANEURYSM: ICD-10-CM

## 2023-10-29 DIAGNOSIS — R29.90 STROKE-LIKE SYMPTOMS: ICD-10-CM

## 2023-10-29 DIAGNOSIS — R29.810 FACIAL DROOP: Primary | ICD-10-CM

## 2023-10-29 DIAGNOSIS — Z86.73 HISTORY OF CVA (CEREBROVASCULAR ACCIDENT): ICD-10-CM

## 2023-10-29 DIAGNOSIS — E87.1 HYPONATREMIA: ICD-10-CM

## 2023-10-29 DIAGNOSIS — R42 DIZZINESS: ICD-10-CM

## 2023-10-29 LAB
2HR DELTA HS TROPONIN: 1 NG/L
4HR DELTA HS TROPONIN: 1 NG/L
ANION GAP SERPL CALCULATED.3IONS-SCNC: 12 MMOL/L
APTT PPP: 28 SECONDS (ref 23–37)
ATRIAL RATE: 102 BPM
BUN SERPL-MCNC: 19 MG/DL (ref 5–25)
CALCIUM SERPL-MCNC: 10.1 MG/DL (ref 8.4–10.2)
CARDIAC TROPONIN I PNL SERPL HS: 3 NG/L
CARDIAC TROPONIN I PNL SERPL HS: 4 NG/L
CARDIAC TROPONIN I PNL SERPL HS: 4 NG/L
CHLORIDE SERPL-SCNC: 91 MMOL/L (ref 96–108)
CO2 SERPL-SCNC: 24 MMOL/L (ref 21–32)
CREAT SERPL-MCNC: 0.89 MG/DL (ref 0.6–1.3)
ERYTHROCYTE [DISTWIDTH] IN BLOOD BY AUTOMATED COUNT: 12 % (ref 11.6–15.1)
FLUAV RNA RESP QL NAA+PROBE: NEGATIVE
FLUBV RNA RESP QL NAA+PROBE: NEGATIVE
GFR SERPL CREATININE-BSD FRML MDRD: 68 ML/MIN/1.73SQ M
GLUCOSE SERPL-MCNC: 134 MG/DL (ref 65–140)
GLUCOSE SERPL-MCNC: 213 MG/DL (ref 65–140)
GLUCOSE SERPL-MCNC: 237 MG/DL (ref 65–140)
HCT VFR BLD AUTO: 38 % (ref 34.8–46.1)
HGB BLD-MCNC: 13.3 G/DL (ref 11.5–15.4)
INR PPP: 0.89 (ref 0.84–1.19)
MCH RBC QN AUTO: 31.8 PG (ref 26.8–34.3)
MCHC RBC AUTO-ENTMCNC: 35 G/DL (ref 31.4–37.4)
MCV RBC AUTO: 91 FL (ref 82–98)
P AXIS: 60 DEGREES
PA ADP BLD-ACNC: 154 PRU (ref 194–418)
PLATELET # BLD AUTO: 295 THOUSANDS/UL (ref 149–390)
PMV BLD AUTO: 8.7 FL (ref 8.9–12.7)
POTASSIUM SERPL-SCNC: 3.9 MMOL/L (ref 3.5–5.3)
PR INTERVAL: 204 MS
PROTHROMBIN TIME: 12.6 SECONDS (ref 11.6–14.5)
QRS AXIS: -9 DEGREES
QRSD INTERVAL: 94 MS
QT INTERVAL: 338 MS
QTC INTERVAL: 440 MS
RBC # BLD AUTO: 4.18 MILLION/UL (ref 3.81–5.12)
RSV RNA RESP QL NAA+PROBE: NEGATIVE
SARS-COV-2 RNA RESP QL NAA+PROBE: NEGATIVE
SODIUM SERPL-SCNC: 127 MMOL/L (ref 135–147)
T WAVE AXIS: 80 DEGREES
VENTRICULAR RATE: 102 BPM
WBC # BLD AUTO: 3.81 THOUSAND/UL (ref 4.31–10.16)

## 2023-10-29 PROCEDURE — 0241U HB NFCT DS VIR RESP RNA 4 TRGT: CPT | Performed by: EMERGENCY MEDICINE

## 2023-10-29 PROCEDURE — 99285 EMERGENCY DEPT VISIT HI MDM: CPT

## 2023-10-29 PROCEDURE — 85576 BLOOD PLATELET AGGREGATION: CPT

## 2023-10-29 PROCEDURE — 99291 CRITICAL CARE FIRST HOUR: CPT | Performed by: PSYCHIATRY & NEUROLOGY

## 2023-10-29 PROCEDURE — 82948 REAGENT STRIP/BLOOD GLUCOSE: CPT

## 2023-10-29 PROCEDURE — 84484 ASSAY OF TROPONIN QUANT: CPT | Performed by: EMERGENCY MEDICINE

## 2023-10-29 PROCEDURE — 99285 EMERGENCY DEPT VISIT HI MDM: CPT | Performed by: EMERGENCY MEDICINE

## 2023-10-29 PROCEDURE — 70496 CT ANGIOGRAPHY HEAD: CPT

## 2023-10-29 PROCEDURE — 85027 COMPLETE CBC AUTOMATED: CPT | Performed by: EMERGENCY MEDICINE

## 2023-10-29 PROCEDURE — 80048 BASIC METABOLIC PNL TOTAL CA: CPT | Performed by: EMERGENCY MEDICINE

## 2023-10-29 PROCEDURE — 93005 ELECTROCARDIOGRAM TRACING: CPT

## 2023-10-29 PROCEDURE — 70498 CT ANGIOGRAPHY NECK: CPT

## 2023-10-29 PROCEDURE — 85610 PROTHROMBIN TIME: CPT | Performed by: EMERGENCY MEDICINE

## 2023-10-29 PROCEDURE — 93010 ELECTROCARDIOGRAM REPORT: CPT | Performed by: INTERNAL MEDICINE

## 2023-10-29 PROCEDURE — 36415 COLL VENOUS BLD VENIPUNCTURE: CPT | Performed by: EMERGENCY MEDICINE

## 2023-10-29 PROCEDURE — 99223 1ST HOSP IP/OBS HIGH 75: CPT | Performed by: INTERNAL MEDICINE

## 2023-10-29 PROCEDURE — 85730 THROMBOPLASTIN TIME PARTIAL: CPT | Performed by: EMERGENCY MEDICINE

## 2023-10-29 RX ORDER — ENOXAPARIN SODIUM 100 MG/ML
40 INJECTION SUBCUTANEOUS DAILY
Status: DISCONTINUED | OUTPATIENT
Start: 2023-10-30 | End: 2023-10-30 | Stop reason: HOSPADM

## 2023-10-29 RX ORDER — LORAZEPAM 2 MG/ML
1 INJECTION INTRAMUSCULAR AS NEEDED
Status: DISCONTINUED | OUTPATIENT
Start: 2023-10-29 | End: 2023-10-30 | Stop reason: HOSPADM

## 2023-10-29 RX ORDER — INSULIN LISPRO 100 [IU]/ML
1-6 INJECTION, SOLUTION INTRAVENOUS; SUBCUTANEOUS
Status: DISCONTINUED | OUTPATIENT
Start: 2023-10-29 | End: 2023-10-30 | Stop reason: HOSPADM

## 2023-10-29 RX ORDER — ATORVASTATIN CALCIUM 40 MG/1
40 TABLET, FILM COATED ORAL EVERY EVENING
Status: DISCONTINUED | OUTPATIENT
Start: 2023-10-29 | End: 2023-10-30

## 2023-10-29 RX ORDER — ACETAMINOPHEN 325 MG/1
650 TABLET ORAL EVERY 4 HOURS PRN
Status: DISCONTINUED | OUTPATIENT
Start: 2023-10-29 | End: 2023-10-30 | Stop reason: HOSPADM

## 2023-10-29 RX ORDER — CLOPIDOGREL BISULFATE 75 MG/1
75 TABLET ORAL DAILY
Status: DISCONTINUED | OUTPATIENT
Start: 2023-10-30 | End: 2023-10-30 | Stop reason: HOSPADM

## 2023-10-29 RX ADMIN — ATORVASTATIN CALCIUM 40 MG: 40 TABLET, FILM COATED ORAL at 17:11

## 2023-10-29 RX ADMIN — IOHEXOL 85 ML: 350 INJECTION, SOLUTION INTRAVENOUS at 10:32

## 2023-10-29 NOTE — ED PROVIDER NOTES
History  Chief Complaint   Patient presents with    CVA/TIA-like Symptoms     Pt reports intermittent dizziness x1 week. Noticed lastnight @8pm slight L side facial droop. Pt reports tingling in both hands for "a couple months" denies vision changes. Hx TIA     Patient is a 57year old female with hx TIA who presented to ED for intermittent dizziness and new onset facial droop. Patient stated that she has been feeling "off" recently, reporting intermittent dizziness and last night at 8 PM noticed a feeling of fullness and weakness in the left side of her face. Patient and  state that they noticed a slight left facial droop that started at this time. She also endorses some mild headaches for the last week and presents with one in addition to nausea and some vomiting. Denies any dizziness at this time. Notably, patient has had a prior TIA several years ago. She also has other risk factors for stroke including hypertension and diabetes, as well as imaging findings concerning for demyelinating disease. She denies any changes with her vision, speech, or lateralizing deficits. NIH stroke scale was performed with a score of 2. One-point was given for the mild left facial asymmetry and one-point for slight decrease sensation over the lateral aspect of the left lower leg. Given the acute onset of the symptoms including sudden facial droop starting last night at 8 PM, stroke alert was initiated. Prior to Admission Medications   Prescriptions Last Dose Informant Patient Reported? Taking?    Alogliptin Benzoate 25 MG TABS   No No   Sig: TAKE ONE TABLET BY MOUTH EVERY DAY IN THE MORNING   Bempedoic Acid (Nexletol) 180 MG TABS   No No   Sig: Take 1 tablet by mouth in the morning   Misc Natural Products (DAILY HERBS IMMUNE DEFENSE PO)  Self Yes No   Sig: Take by mouth   Multiple Vitamin (MULTI-VITAMIN DAILY PO)  Self Yes No   Sig: Take 1 tablet by mouth daily   amLODIPine (NORVASC) 5 mg tablet   No No   Sig: TAKE ONE TABLET BY MOUTH EVERY DAY   clopidogrel (PLAVIX) 75 mg tablet   No No   Sig: Take 1 tablet (75 mg total) by mouth daily   colesevelam (WELCHOL) 625 mg tablet   No No   Sig: Take 3 tablets (1,875 mg total) by mouth 2 (two) times a day with meals   famotidine (PEPCID) 20 mg tablet   No No   Sig: Take 1 tablet (20 mg total) by mouth 2 (two) times a day   fexofenadine (ALLEGRA) 180 MG tablet   No No   Sig: Take 1 tablet (180 mg total) by mouth daily   fluticasone (FLONASE) 50 mcg/act nasal spray   No No   Si sprays into each nostril daily   gemfibrozil (LOPID) 600 mg tablet   No No   Sig: TAKE ONE TABLET BY MOUTH TWICE A DAY BEFORE MEALS   glimepiride (AMARYL) 2 mg tablet   No No   Sig: TAKE ONE TABLET BY MOUTH TWICE A DAY   losartan-hydrochlorothiazide (HYZAAR) 100-12.5 MG per tablet  Self No No   Sig: Take 1 tablet by mouth daily   metFORMIN (GLUCOPHAGE) 1000 MG tablet   No No   Sig: TAKE ONE TABLET BY MOUTH TWICE A DAY WITH MEALS (GENERIC FOR GLUCOPHAGE)   mometasone (ELOCON) 0.1 % cream   No No   Sig: Apply topically daily   triamcinolone (KENALOG) 0.1 % cream   No No   Sig: APPLY TOPICALLY TWO TIMES A DAY UNDER THE LEFT BREAST AS NEEDED FOR FLARES      Facility-Administered Medications: None       Past Medical History:   Diagnosis Date    Diabetes mellitus (HCC)     GERD (gastroesophageal reflux disease)     Take pepcid but still bothersome with certain foods    UEAQZPVB(846.0)     Occasional    Hypertension     Nasal congestion     Espyat night    Otitis media     Occasional    Stroke (cerebrum) (HCC)     Stroke (HCC)     Tia    Tinnitus     Current       Past Surgical History:   Procedure Laterality Date    CHOLECYSTECTOMY LAPAROSCOPIC      MOHS SURGERY      Micrographic Surgery Nose, Last assessed: 17    TOTAL KNEE ARTHROPLASTY Left 2017    Last assessed: 17    TUBAL LIGATION         Family History   Problem Relation Age of Onset    Diabetes Father     Hypertension Father     Stroke Father     Anemia Daughter     Breast cancer Paternal Aunt      I have reviewed and agree with the history as documented. E-Cigarette/Vaping    E-Cigarette Use Never User      E-Cigarette/Vaping Substances    Nicotine No     THC No     CBD No     Flavoring No     Other No     Unknown No      Social History     Tobacco Use    Smoking status: Never    Smokeless tobacco: Never    Tobacco comments:     No secondhand smoke exposure   Vaping Use    Vaping Use: Never used   Substance Use Topics    Alcohol use: Never    Drug use: Never        Review of Systems   Constitutional:  Negative for chills, fatigue and fever. HENT: Negative. Eyes:  Negative for visual disturbance. Respiratory:  Negative for cough and shortness of breath. Cardiovascular:  Negative for chest pain and palpitations. Gastrointestinal:  Negative for abdominal pain, nausea and vomiting. Genitourinary:  Negative for dysuria, frequency and urgency. Musculoskeletal:  Negative for arthralgias and neck pain. Skin:  Negative for color change and pallor. Neurological:  Positive for dizziness, facial asymmetry and light-headedness. Negative for syncope. Left facial droop   Psychiatric/Behavioral:  Negative for agitation, behavioral problems and confusion. All other systems reviewed and are negative.       Physical Exam  ED Triage Vitals   Temperature Pulse Respirations Blood Pressure SpO2   10/29/23 1015 10/29/23 1009 10/29/23 1009 10/29/23 1009 10/29/23 1009   98.1 °F (36.7 °C) 94 18 (!) 179/85 98 %      Temp Source Heart Rate Source Patient Position - Orthostatic VS BP Location FiO2 (%)   10/29/23 1015 10/29/23 1009 10/29/23 1009 10/29/23 1009 --   Oral Monitor Sitting Right arm       Pain Score       10/29/23 1030       3             Orthostatic Vital Signs  Vitals:    10/30/23 0130 10/30/23 0330 10/30/23 0500 10/30/23 0824   BP: 118/80 118/76 115/78 132/62   Pulse:  76 80 85   Patient Position - Orthostatic VS: Lying Lying Lying Lying       Physical Exam  Vitals and nursing note reviewed. Constitutional:       Appearance: Normal appearance. She is not toxic-appearing. HENT:      Head: Normocephalic and atraumatic. Nose: Nose normal.      Mouth/Throat:      Mouth: Mucous membranes are moist.      Pharynx: Oropharynx is clear. Eyes:      Extraocular Movements: Extraocular movements intact. Conjunctiva/sclera: Conjunctivae normal.      Pupils: Pupils are equal, round, and reactive to light. Cardiovascular:      Rate and Rhythm: Normal rate and regular rhythm. Pulses: Normal pulses. Heart sounds: Normal heart sounds. Pulmonary:      Effort: Pulmonary effort is normal.      Breath sounds: Normal breath sounds. Abdominal:      General: Abdomen is flat. Bowel sounds are normal.      Palpations: Abdomen is soft. Skin:     General: Skin is warm and dry. Capillary Refill: Capillary refill takes less than 2 seconds. Neurological:      General: No focal deficit present. Mental Status: She is alert and oriented to person, place, and time. Comments: NIH 2, 1 point for facial asymmetry, 1 point for diminished sensation lateral left lower extremity. Alert and oriented. Tongue midline, 5/5 strength in the proximal and distal upper and lower extremities bilaterally with intact sensation to light touch with the exception of diminished sensation of lateral left lower extremity. CN II-XII intact. Normal finger-to-nose, rapid alternating movements, and heel-to-shin bilaterally. Normal speech, normal gait. No pronator drift. Psychiatric:         Mood and Affect: Mood normal.         Behavior: Behavior normal.         Thought Content:  Thought content normal.         ED Medications  Medications   iohexol (OMNIPAQUE) 350 MG/ML injection (MULTI-DOSE) 85 mL (85 mL Intravenous Given 10/29/23 1032)   sodium chloride 0.9 % bolus 500 mL (500 mL Intravenous New Bag 10/30/23 1302)       Diagnostic Studies  Results Reviewed       Procedure Component Value Units Date/Time    HS Troponin I 4hr [[id number]]  (Normal) Collected: 10/29/23 1536    Lab Status: Final result Specimen: Blood from Arm, Left Updated: 10/29/23 1603     hs TnI 4hr 4 ng/L      Delta 4hr hsTnI 1 ng/L     P2Y12 Platelet inhibitor [[id number]]  (Abnormal) Collected: 10/29/23 1338    Lab Status: Final result Specimen: Blood from Arm, Right Updated: 10/29/23 1424     P2Y12 154 PRU     HS Troponin I 2hr [[id number]]  (Normal) Collected: 10/29/23 1338    Lab Status: Final result Specimen: Blood from Arm, Right Updated: 10/29/23 1415     hs TnI 2hr 4 ng/L      Delta 2hr hsTnI 1 ng/L     FLU/RSV/COVID - if FLU/RSV clinically relevant [[id number]]  (Normal) Collected: 10/29/23 1024    Lab Status: Final result Specimen: Nares from Nose Updated: 10/29/23 1134     SARS-CoV-2 Negative     INFLUENZA A PCR Negative     INFLUENZA B PCR Negative     RSV PCR Negative    Narrative:      FOR PEDIATRIC PATIENTS - copy/paste COVID Guidelines URL to browser: [email or web address] ashx    SARS-CoV-2 assay is a Nucleic Acid Amplification assay intended for the  qualitative detection of nucleic acid from SARS-CoV-2 in nasopharyngeal  swabs. Results are for the presumptive identification of SARS-CoV-2 RNA. Positive results are indicative of infection with SARS-CoV-2, the virus  causing COVID-19, but do not rule out bacterial infection or co-infection  with other viruses. Laboratories within the [facility] and its  territories are required to report all positive results to the appropriate  public health authorities. Negative results do not preclude SARS-CoV-2  infection and should not be used as the sole basis for treatment or other  patient management decisions. Negative results must be combined with  clinical observations, patient history, and epidemiological information.   This test has not been FDA cleared or approved. This test has been authorized by FDA under an Emergency Use Authorization  (EUA). This test is only authorized for the duration of time the  declaration that circumstances exist justifying the authorization of the  emergency use of an in vitro diagnostic tests for detection of SARS-CoV-2  virus and/or diagnosis of COVID-19 infection under section 564(b)(1) of  the Act, 21 U. S.C. 400ZYE-3(L)(4), unless the authorization is terminated  or revoked sooner. The test has been validated but independent review by FDA  and CLIA is pending. Test performed using "[employer] GeneXpert: This RT-PCR assay targets N2,  a region unique to SARS-CoV-2. A conserved region in the E-gene was chosen  for pan-Sarbecovirus detection which includes SARS-CoV-2. According to CMS-2020-01-R, this platform meets the definition of high-throughput technology.     Basic metabolic panel [[id number]]  (Abnormal) Collected: 10/29/23 1024    Lab Status: Final result Specimen: Blood from Arm, Left Updated: 10/29/23 1123     Sodium 127 mmol/L      Potassium 3.9 mmol/L      Chloride 91 mmol/L      CO2 24 mmol/L      ANION GAP 12 mmol/L      BUN 19 mg/dL      Creatinine 0.89 mg/dL      Glucose 213 mg/dL      Calcium 10.1 mg/dL      eGFR 68 ml/min/1.73sq m     Narrative:      [place] guidelines for Chronic Kidney Disease (CKD):     Stage 1 with normal or high GFR (GFR > 90 mL/min/1.73 square meters)    Stage 2 Mild CKD (GFR = 60-89 mL/min/1.73 square meters)    Stage 3A Moderate CKD (GFR = 45-59 mL/min/1.73 square meters)    Stage 3B Moderate CKD (GFR = 30-44 mL/min/1.73 square meters)    Stage 4 Severe CKD (GFR = 15-29 mL/min/1.73 square meters)    Stage 5 End Stage CKD (GFR <15 mL/min/1.73 square meters)  Note: GFR calculation is accurate only with a steady state creatinine    HS Troponin 0hr (reflex protocol) [[id number]]  (Normal) Collected: 10/29/23 1024    Lab Status: Final result Specimen: Blood from Arm, Left Updated: 10/29/23 1103     hs TnI 0hr 3 ng/L     Protime-INR [[id number]]  (Normal) Collected: 10/29/23 1024    Lab Status: Final result Specimen: Blood from Arm, Left Updated: 10/29/23 1058     Protime 12.6 seconds      INR 0.89    APTT [[id number]]  (Normal) Collected: 10/29/23 1024    Lab Status: Final result Specimen: Blood from Arm, Left Updated: 10/29/23 1058     PTT 28 seconds     CBC and Platelet [[id number]]  (Abnormal) Collected: 10/29/23 1024    Lab Status: Final result Specimen: Blood from Arm, Left Updated: 10/29/23 1036     WBC 3.81 Thousand/uL      RBC 4.18 Million/uL      Hemoglobin 13.3 g/dL      Hematocrit 38.0 %      MCV 91 fL      MCH 31.8 pg      MCHC 35.0 g/dL      RDW 12.0 %      Platelets 471 Thousands/uL      MPV 8.7 fL     Fingerstick Glucose (POCT) [[id number]]  (Abnormal) Collected: 10/29/23 1021    Lab Status: Final result Updated: 10/29/23 1022     POC Glucose 237 mg/dl                    MRI brain wo contrast   Final Result by LAURA Lord MD (10/30 0358)      No acute intracranial pathology. Mild chronic microangiopathy. Workstation performed: [id number]         CTA stroke alert (head/neck)   Final Result by Sofia Murcia MD (10/29 1113)      CTA head:   -Focal moderate stenosis of the left M2 segment branch just distal to the bifurcation.   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 Mild irregularity at the proximal V4 segment may be due to atherosclerosis versus short segment dissection. Consider MRI/MRA of the brain if clinically indicated. CTA neck:   -No high-grade stenosis or dissection involving the carotid or vertebral arteries. -Broad-based 3 mm outpouching involving the right V3 segment may represent an aneurysm versus pseudoaneurysm. Recommend consultation with the [facility], a division of [facility] at [phone].       Other:   -Periapical lucency around the right maxillary molar, suggestive of odontogenic disease. Recommend dentistry consult. Findings were directly discussed with Angelica Bruce  at 10:55 a.m on 10/29/2023 by Dr. Malgorzata Hendricks. The study was marked in [facility] for immediate notification. Workstation performed: [id number]         CT stroke alert brain   Final Result by Malgorzata Hendricks MD (10/29 1115)      Focal hypodense within the right corona radiata and questionable hypodensity within the left medulla are nonspecific. Differential includes subacute infarcts, chronic small vessel ischemic disease among other etiologies. Consider MRI of the brain for    further evaluation. No acute intracranial hemorrhage. Findings were directly discussed with Angelica Bruce  at 10:55 a.m on 10/29/2023 by Dr. Malgorzata Hendricks.          Workstation performed: [id number]               Procedures  ECG 12 Lead Documentation Only    Date/Time: 10/29/2023 4:42 PM    Performed by: John Rutledge MD  Authorized by: John Rutledge MD    Indications / Diagnosis:  Stroke workup  ECG reviewed by me, the ED Provider: yes    Patient location:  ED  Interpretation:     Interpretation: non-specific    Rate:     ECG rate:  102    ECG rate assessment: tachycardic    Rhythm:     Rhythm: sinus tachycardia    Ectopy:     Ectopy: none    QRS:     QRS axis:  Normal    QRS intervals:  Normal  Conduction:     Conduction: normal    ST segments:     ST segments:  Non-specific  T waves:     T waves: non-specific          ED Course  ED Course as of 10/31/23 1344   Sun Oct 29, 2023   1113 CTA stroke alert (head/neck)  No acute process as per neuro   1115 CT stroke alert brain  No acute process as per neuro                  Stroke Assessment       Row Name 10/29/23 1045 10/31/23 1323          NIH Stroke Scale    Interval Baseline --     Level of Consciousness (1a.) 0 --     LOC Questions (1b.) 0 --     LOC Commands (1c.) 0 --     Best Gaze (2.) 0 --     Visual (3.) 0 --     Facial Palsy (4.) 1  Mild left facial asymmetry --     Motor Arm, Left (5a.) 0 --     Motor Arm, Right (5b.) 0 --     Motor Leg, Left (6a.) 0 --     Motor Leg, Right (6b.) 0 --     Limb Ataxia (7.) 0 --     Sensory (8.) 1  Mildly decreased sensation over lateral left LE --     Best Language (9.) 0 --     Dysarthria (10.) 0 --     Extinction and Inattention (11.) (Formerly Neglect) 0 --     Total 2 --                   Flowsheet Row Most Recent Value   Thrombolytic Decision Options    Thrombolytic Decision Patient not a candidate. Patient is not a candidate options Unclear time of onset outside appropriate time window. Medical Decision Making  Patient is a 57year old female with hx TIA who presented to ED for intermittent dizziness and new onset facial droop. Patient stated that she has been feeling "off" recently, reporting intermittent dizziness and last night at 8 PM noticed a feeling of fullness and weakness in the left side of her face. Patient and  state that they noticed a slight left facial droop that started at this time. She also endorses some mild headaches for the last week and presents with one in addition to nausea and some vomiting. Denies any dizziness at this time. Notably, patient has had a prior TIA several years ago. She also has other risk factors for stroke including hypertension and diabetes, as well as imaging findings concerning for demyelinating disease. She denies any changes with her vision, speech, or lateralizing deficits. NIH stroke scale was performed with a score of 2. One-point was given for the mild left facial asymmetry and one-point for slight decrease sensation over the lateral aspect of the left lower leg. Given the acute onset of the symptoms including sudden facial droop starting last night at 8 PM, stroke alert was initiated.     Differential diagnosis includes but is not limited to CVA/TIA, demyelinating disease, complex migraine, isolated facial nerve palsy/Bell's palsy. After stroke alert was initiated case was discussed with neurologist who agreed no thrombolytics in the face of onset of time out of window. CT+CTA stroke protocol was unremarkable for any acute findings as per neuro and radiology. Baseline labs not suggestive of a contributory reason for deficits. Patient was admitted to medicine under stroke pathway for further workup. Amount and/or Complexity of Data Reviewed  Labs: ordered. Radiology: ordered. Risk  Prescription drug management. Decision regarding hospitalization. Disposition  Final diagnoses:   Lightheadedness   Facial droop   History of CVA (cerebrovascular accident)     Time reflects when diagnosis was documented in both MDM as applicable and the Disposition within this note       Time User Action Codes Description Comment    10/29/2023 10:17 AM Willis Lozada Add [R42] Lightheadedness     10/29/2023 12:18 PM John Hope Add [R29.810] Facial droop     10/29/2023 12:19 PM John Rutledge Add [Z86.73] History of CVA (cerebrovascular accident)     10/29/2023 12:19 PM Wright Flakes [R42] Lightheadedness     10/29/2023 12:19 PM Wright Flakes [R29.810] Facial droop     10/29/2023  3:40 PM Zayda Arroyo Low Add [R29.90] Stroke-like symptoms     10/30/2023  1:10 PM Held, Heather Add [R42] Dizziness     10/30/2023  1:10 PM Held, Shante Rung Add [I67.9] Cerebrovascular disease     10/30/2023  1:10 PM Held, Heather Add [E87.1] Hyponatremia     10/30/2023  2:29 PM Held, Heather Add [I67.1] Intracranial aneurysm           ED Disposition       ED Disposition   Admit    Condition   Stable    Date/Time   Sun Oct 29, 2023 12:17 PM    Comment   Case was discussed with Dr. Phyllis Bolaños and the patient's admission status was agreed to be Admission Status: observation status to the service of Dr. Phyllis Bolaños .                Follow-up Information       Follow up With Specialties Details Why Contact Info    Mayur Bentley MD [facility] Medicine Schedule an appointment as soon as possible for a visit in 1 week(s)  [address] [facility] [address]  [phone]              Discharge Medication List as of 10/30/2023  2:23 PM        START taking these medications    Details   meclizine (ANTIVERT) 25 mg tablet Take 1 tablet (25 mg total) by mouth every 8 (eight) hours, Starting Mon 10/30/2023, Normal           CONTINUE these medications which have NOT CHANGED    Details   Alogliptin Benzoate 25 MG TABS TAKE ONE TABLET BY MOUTH EVERY DAY IN THE MORNING, Normal      amLODIPine (NORVASC) 5 mg tablet TAKE ONE TABLET BY MOUTH EVERY DAY, Normal      Bempedoic Acid (Nexletol) 180 MG TABS Take 1 tablet by mouth in the morning, Starting Tue 6/20/2023, Normal      clopidogrel (PLAVIX) 75 mg tablet Take 1 tablet (75 mg total) by mouth daily, Starting Mon 8/14/2023, Normal      colesevelam (WELCHOL) 625 mg tablet Take 3 tablets (1,875 mg total) by mouth 2 (two) times a day with meals, Starting Wed 7/5/2023, Until Fri 8/4/2023, Normal      famotidine (PEPCID) 20 mg tablet Take 1 tablet (20 mg total) by mouth 2 (two) times a day, Starting Wed 7/5/2023, Normal      fexofenadine (ALLEGRA) 180 MG tablet Take 1 tablet (180 mg total) by mouth daily, Starting Thu 7/27/2023, Normal      fluticasone (FLONASE) 50 mcg/act nasal spray 2 sprays into each nostril daily, Starting Thu 7/27/2023, Normal      gemfibrozil (LOPID) 600 mg tablet TAKE ONE TABLET BY MOUTH TWICE A DAY BEFORE MEALS, Normal      glimepiride (AMARYL) 2 mg tablet TAKE ONE TABLET BY MOUTH TWICE A DAY, Starting Mon 8/14/2023, Normal      losartan-hydrochlorothiazide (HYZAAR) 100-12.5 MG per tablet Take 1 tablet by mouth daily, Starting Fri 11/18/2022, Normal      metFORMIN (GLUCOPHAGE) 1000 MG tablet TAKE ONE TABLET BY MOUTH TWICE A DAY WITH MEALS (GENERIC FOR GLUCOPHAGE), Normal      Misc Natural Products (DAILY HERBS IMMUNE DEFENSE PO) Take by mouth, Historical Med      mometasone (ELOCON) 0.1 % cream Apply topically daily, Starting Wed 7/5/2023, Normal      Multiple Vitamin (MULTI-VITAMIN DAILY PO) Take 1 tablet by mouth daily, Starting Wed 7/6/2016, Historical Med      triamcinolone (KENALOG) 0.1 % cream APPLY TOPICALLY TWO TIMES A DAY UNDER THE LEFT BREAST AS NEEDED FOR FLARES, Normal           Outpatient Discharge Orders   Basic metabolic panel   Standing Status: Future Standing Exp. Date: 10/30/24     Ambulatory Referral to Otolaryngology   Standing Status: Future Standing Exp. Date: 10/30/24      Ambulatory Referral to Neurology   Standing Status: Future Standing Exp. Date: 10/30/24      Ambulatory Referral to Neurosurgery   Standing Status: Future Standing Exp. Date: 10/30/24      Echo complete w/ contrast if indicated   Standing Status: Future Standing Exp. Date: 10/30/27       PDMP Review       None             ED Provider  Attending physically available and evaluated Alejandra Elliott. I managed the patient along with the ED Attending.     Electronically Signed by           Trish Woods MD  10/31/23 4582

## 2023-10-29 NOTE — ASSESSMENT & PLAN NOTE
Intermittent dizziness and unsteadiness for 1 week. Acute onset left-sided facial droop last night  Seen by neurology in ED. Continue stroke pathway. Continue home dose Plavix.   Permissive hypertension  Pending MRI brain and echo  Will check orthostatic blood pressure

## 2023-10-29 NOTE — ASSESSMENT & PLAN NOTE
Lab Results   Component Value Date    HGBA1C 7.4 (H) 06/12/2023       Recent Labs     10/29/23  1021 10/29/23  1750   POCGLU 237* 134       Blood Sugar Average: Last 72 hrs:  (P) 185.5    Last A1C in July 7.4. Will repeat.  Sliding scale for now

## 2023-10-29 NOTE — ED ATTENDING ATTESTATION
10/29/2023  IAmy MD, saw and evaluated the patient. I have discussed the patient with the resident/non-physician practitioner and agree with the resident's/non-physician practitioner's findings, Plan of Care, and MDM as documented in the resident's/non-physician practitioner's note, except where noted. All available labs and Radiology studies were reviewed. I was present for key portions of any procedure(s) performed by the resident/non-physician practitioner and I was immediately available to provide assistance. At this point I agree with the current assessment done in the Emergency Department. I have conducted an independent evaluation of this patient a history and physical is as follows:    Patient is a 67-year-old female who presents to the emergency department having had intermittent dizziness over the last week and now since 8pm last night feeling of fullness and weakness in the left side of the face. She reports generally feeling "off."  Patient and  both appreciate slight change in appearance of face/mild droop. She does not currently have dizziness and notes that this was worse earlier in the week and with a lightheadedness sensation. No vertiginous sensation. She has not had difficulty with hearing, vision, speech, swallowing or coordination. She has had mild headaches over the last week and has 1 now. No chest discomfort, dyspnea or fever. Symptoms especially concerned patient that she does have history of a mild CVA a number of years ago. At that time she reports most prominent symptom was headache and vomiting. Chart briefly reviewed. Patient does have history of hypertension, diabetes, headaches & CVA. She has additionally had findings on imaging concerning for demyelinating disease. On exam patient is well-appearing. Mucous membranes are moist.  Her speech is clear. Heart sounds regular. Respirations unlabored.   She is able to move all extremities well. See NIH stroke scale on 's chart. Question very slight asymmetry left upper face  (droop)with full expression. Very slight perceived decrease in sensation over the lateral aspect of the left lower leg. No appreciated motor strength. No dysmetria. Given acute onset of left facial symptoms last night stroke alert was initiated. Differential diagnosis includes but is not limited to CVA, TIA, demyelinating disease, complex migraine, isolated facial nerve palsy/Bell's palsy. ED Course  ED Course as of 10/29/23 1228   Sun Oct 29, 2023   1101 No acute abnormality on CTA per Neuro. Patient will continue on her clopidogrel and be brought in for further evaluation on on stroke pathway.     Critical Care Time  Procedures

## 2023-10-29 NOTE — ED NOTES
Patient's  reports he is leaving and taking Patient's purse/wallet with him.       Blake Huntley RN  10/29/23 7600

## 2023-10-29 NOTE — CONSULTS
Consultation - Stroke   Carole Parry 57 y.o. female MRN: [id number]  Unit/Bed#: ED-23 Encounter: [id number]      Assessment/Plan     Stroke-like symptoms  Assessment & Plan    57 y.o. right handed female with diabetes, HTN, TIA, previous stroke in 2018 s/p PFO closure,  who presents [facility] on 10/29/23 as a stroke alert for dizziness and L facial weakness. BP on arrival 179/85. NIHSS 1. CTH revealed no acute infarct. CTA H/N wwo contrast revealed no IR target. Pt not a thrombolytic candidate due to being outside of thrombolytic time window. Pt on Plavix PTA. Plan  - Stroke pathway  Recommend MRI brain   Recommend Echo  Recommend Labs  Lipid Panel  Hemoglobin A1c  TSH  Continue home Plavix 75 mg daily   Atorvastatin 40 mg daily   No role for permissive hypertension as symptoms >24 hours from onset. Goal normotension   Euglycemic, normothermic goal  Continue telemetry  PT/OT/ST  Stroke education  Frequent neuro checks. Continue to monitor and notify neurology with any changes. STAT CT head for any acute change in neuro exam  - Medical management and supportive care per primary team. Correction of any metabolic or infectious disturbances. Thrombolytic Decision: Patient not a candidate. Unclear time of onset outside appropriate time window. **History and physical examination obtained by attending neurologist. AP in room as witness to history and physical examination obtained and acted solely as a scribe for attending neurologist. This AP did not provide any decision making for this encounter. **      Recommendations for outpatient neurological follow up have yet to be determined.     History of Present Illness     Reason for Consult / Principal Problem: stroke alert   Hx and PE limited by: N/A   Patient last known well: 1 week ago  Stroke alert called: 80 AM 10/29/23  Neurology time of arrival: Dr. Shakira Kruger immediately by phone  HPI: Carole Parry is a 57 y.o. right handed female with diabetes, HTN, TIA, previous stroke in 2018 s/p PFO closure,  who presents to [facility] on 10/29/23 as a stroke alert for dizziness and L facial weakness. BP on arrival 179/85. NIHSS 1. CTH revealed no acute infarct. CTA H/N wwo contrast revealed no IR target. Pt not a thrombolytic candidate due to being outside of thrombolytic time window. Pt on Plavix PTA. Per ED: pt reports intermittent dizziness x 1 week. Pt with slight L facial droop noticed at 8PM last night. Pt with tingling in both hands for a few months. Pt reports she started not feeling well 1 week ago. Pt reports she has been having nausea, vomiting, and dizziness. Pt states she has felt unbalanced ambulating and has had to walk "wall to wall." Pt reports her symptoms were worse last night. Additionally, pt's  reports seeing a mild L facial droop on pt. Pt denies tobacco use. Pt denies tobacco use. Previous Neurologic History:  Pt followed with [facility] neurology for stroke. Pt had R occiptal lobe infarct in 2019. Etiology of stroke unclear. Pt was on ASA at time of stroke. At time of stroke, pt's symptoms were transient L arm numbness and headache. Pt with intermittent numbness since stroke. Consult to Neurology  Consult performed by: Daniele Weaver PA-C  Consult ordered by: Rosa Isela Alvarez MD          Review of Systems   Reason unable to perform ROS: acuity of situation.        Historical Information   Past Medical History:   Diagnosis Date    Diabetes mellitus ([address])     GERD (gastroesophageal reflux disease)     Take pepcid but still bothersome with certain foods    RZNFEWYN(860.3)     Occasional    Hypertension     Nasal congestion     Espyat night    Otitis media     Occasional    Stroke (cerebrum) (HCC)     Stroke (HCC)     Tia    Tinnitus     Current     Past Surgical History:   Procedure Laterality Date    CHOLECYSTECTOMY LAPAROSCOPIC      MOHS SURGERY      Micrographic Surgery Nose, Last assessed: 2/13/17 TOTAL KNEE ARTHROPLASTY Left 03/08/2017    Last assessed: 12/26/17    TUBAL LIGATION       Social History   Social History     Substance and Sexual Activity   Alcohol Use Never     Social History     Substance and Sexual Activity   Drug Use Never     E-Cigarette/Vaping    E-Cigarette Use Never User      E-Cigarette/Vaping Substances    Nicotine No     THC No     CBD No     Flavoring No     Other No     Unknown No      Social History     Tobacco Use   Smoking Status Never   Smokeless Tobacco Never   Tobacco Comments    No secondhand smoke exposure     Family History:   Family History   Problem Relation Age of Onset    Diabetes Father     Hypertension Father     Stroke Father     Anemia Daughter     Breast cancer Paternal Aunt        Review of previous medical records was  completed. Meds/Allergies   all current active meds have been reviewed, current meds:   No current facility-administered medications for this encounter. , and PTA meds:   Prior to Admission Medications   Prescriptions Last Dose Informant Patient Reported? Taking?    Alogliptin Benzoate 25 MG TABS   No No   Sig: TAKE ONE TABLET BY MOUTH EVERY DAY IN THE MORNING   Bempedoic Acid (Nexletol) 180 MG TABS   No No   Sig: Take 1 tablet by mouth in the morning   Misc Natural Products (DAILY HERBS IMMUNE DEFENSE PO)  Self Yes No   Sig: Take by mouth   Multiple Vitamin (MULTI-VITAMIN DAILY PO)  Self Yes No   Sig: Take 1 tablet by mouth daily   amLODIPine (NORVASC) 5 mg tablet   No No   Sig: TAKE ONE TABLET BY MOUTH EVERY DAY   clopidogrel (PLAVIX) 75 mg tablet   No No   Sig: Take 1 tablet (75 mg total) by mouth daily   colesevelam (WELCHOL) 625 mg tablet   No No   Sig: Take 3 tablets (1,875 mg total) by mouth 2 (two) times a day with meals   famotidine (PEPCID) 20 mg tablet   No No   Sig: Take 1 tablet (20 mg total) by mouth 2 (two) times a day   fexofenadine (ALLEGRA) 180 MG tablet   No No   Sig: Take 1 tablet (180 mg total) by mouth daily   fluticasone (FLONASE) 50 mcg/act nasal spray   No No   Si sprays into each nostril daily   gemfibrozil (LOPID) 600 mg tablet   No No   Sig: TAKE ONE TABLET BY MOUTH TWICE A DAY BEFORE MEALS   glimepiride (AMARYL) 2 mg tablet   No No   Sig: TAKE ONE TABLET BY MOUTH TWICE A DAY   losartan-hydrochlorothiazide (HYZAAR) 100-12.5 MG per tablet  Self No No   Sig: Take 1 tablet by mouth daily   metFORMIN (GLUCOPHAGE) 1000 MG tablet   No No   Sig: TAKE ONE TABLET BY MOUTH TWICE A DAY WITH MEALS (GENERIC FOR GLUCOPHAGE)   mometasone (ELOCON) 0.1 % cream   No No   Sig: Apply topically daily   triamcinolone (KENALOG) 0.1 % cream   No No   Sig: APPLY TOPICALLY TWO TIMES A DAY UNDER THE LEFT BREAST AS NEEDED FOR FLARES      Facility-Administered Medications: None       Allergies   Allergen Reactions    Bactrim [Sulfamethoxazole-Trimethoprim]     Neomycin-Bacitracin Zn-Polymyx Other (See Comments)     REDNESS    Statins Myalgia       Objective   Vitals:Blood pressure 157/76, pulse 97, temperature 98.1 °F (36.7 °C), temperature source Oral, resp. rate 16, weight 79.6 kg (175 lb 7.8 oz), SpO2 96 %, not currently breastfeeding. ,Body mass index is 29.2 kg/m². No intake or output data in the 24 hours ending 10/29/23 1110    Invasive Devices: Invasive Devices       Peripheral Intravenous Line  Duration             Peripheral IV 10/29/23 Left Antecubital <1 day                    Physical Exam  Vitals and nursing note reviewed. HENT:      Head: Normocephalic and atraumatic. Nose: Nose normal.   Eyes:      General: No scleral icterus. Right eye: No discharge. Left eye: No discharge. Extraocular Movements: Extraocular movements intact and EOM normal.      Conjunctiva/sclera: Conjunctivae normal.   Cardiovascular:      Rate and Rhythm: Normal rate. Neurological:      Mental Status: She is alert.       Coordination: Finger-Nose-Finger Test and Heel to [employer] normal.   Psychiatric:      Comments: Cooperative during exam        Neurologic Exam     Mental Status   Follows 1 step commands. Attention: appropriately attends to provider. Concentration: no redirection nor reorientation required. Speech: (Speech fluent. No dysarthria appreciated on exam )  Level of consciousness: alert  Able to name object. Able to read. Able to repeat. - able to state current age  - able to state current month      Cranial Nerves     CN II   Visual acuity: (grossly intact)  Right visual field deficit: none  Left visual field deficit: none     CN III, IV, VI   Extraocular motions are normal.   Conjugate gaze: present    CN V   Facial sensation intact. Right facial sensation deficit: none  Left facial sensation deficit: none    CN VII   Right facial weakness: none  Left facial weakness: central (mild, noted by , not appreciated by neurology provider)    CN VIII   Hearing impaired: grossly intact. CN XII   CN XII normal.   Tongue: not atrophic  Fasciculations: absent  Tongue deviation: none    Motor Exam - able to raise BUE antigravity without drift x 10 seconds. - able to raise BLE antigravity without drift x 5 seconds. Sensory Exam   Right arm light touch: normal  Left arm light touch: normal  Right leg light touch: normal  Left leg light touch: normal  - extinction absent      Gait, Coordination, and Reflexes     Gait  Gait: (deferred for patient safety)    Coordination   Finger to nose coordination: normal  Heel to shin coordination: normal      NIHSS:  1a.Level of Consciousness: 0 = Alert   1b. LOC Questions: 0 = Answers both correctly   1c. LOC Commands: 0 = Obeys both correctly   2. Best Gaze: 0 = Normal   3. Visual: 0 = No visual field loss   4. Facial Palsy: 1=Minor paralysis (flattened nasolabial fold, asymmetric on smiling)   5a. Motor Right Arm: 0=No drift, limb holds 90 (or 45) degrees for full 10 seconds   5b. Motor Left Arm: 0=No drift, limb holds 90 (or 45) degrees for full 10 seconds   6a.  Motor Right Le=No drift, limb holds 90 (or 45) degrees for full 10 seconds   6b. Motor Left Le=No drift, limb holds 90 (or 45) degrees for full 10 seconds   7. Limb Ataxia:  0=Absent   8. Sensory: 0=Normal; no sensory loss   9. Best Language:  0=No aphasia, normal   10. Dysarthria: 0=Normal articulation   11. Extinction and Inattention (formerly Neglect): 0=No abnormality   Total Score: 1     Time NIHSS was completed: 1047 am 10/29/23    Modified [place] Score:  2 (Unable to carry out all previous activities, but able to look after own affairs without assistance)    Lab Results: I have personally reviewed pertinent reports. , CBC:   Results from last 7 days   Lab Units 10/29/23  1024   WBC Thousand/uL 3.81*   RBC Million/uL 4.18   HEMOGLOBIN g/dL 13.3   HEMATOCRIT % 38.0   MCV fL 91   PLATELETS Thousands/uL 295   , BMP/CMP:       Invalid input(s): "ALBUMIN", Vitamin B12:   , HgBA1C:   , TSH:   , Coagulation:   Results from last 7 days   Lab Units 10/29/23  1024   INR  0.89   , Lipid Profile:   , Ammonia:   , Urinalysis:       Invalid input(s): "URIBILINOGEN", Drug Screen:     Imaging Studies: I have personally reviewed pertinent reports.  , I have personally reviewed pertinent films in PACS, and I have personally reviewed pertinent films in PACS with a Radiologist. Attending neurologist did the aforementioned. EKG, Pathology, and Other Studies: I have personally reviewed pertinent reports. Code Status: No Order  Advance Directive and Living Will:      Power of :    POLST:      Counseling / Coordination of Care  Total Critical Care time spent 46 minutes excluding procedures, teaching and family updates. This note was completed in part utilizing [address] [place] [address]. Grammatical errors, random word insertions, spelling mistakes, and incomplete sentences may be an occasional consequence of this system secondary to software limitations, ambient noise, and hardware issues.   If you have any questions or concerns about the content, text, or information contained within the body of this dictation, please contact the provider for clarification.

## 2023-10-29 NOTE — ASSESSMENT & PLAN NOTE
57 y.o. right handed female with prior stroke in 2018 s/p PFO closure (on Plavix PTA), TIA, diabetes, and HTN who presented to [facility] on 10/29/2023 as a stroke alert for dizziness x1 week and L facial weakness x1 day. BP on arrival 179/85 with max . NIHSS 1.   CT head negative for hemorrhage, but did note will hypodensity within the right corona radiata and questionable hypodensity within the left mid to which are nonspecific but differentials include subacute infarcts or chronic small vessel ischemic disease. 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 Patient was not a TNK candidate due to being outside the appropriate time window. Etiology for symptoms remains unclear. Plan:  - Stroke pathway  MRI brain completed; final report pending (on personal review, no acute infarct noted)  Echo pending  Labs:  P2Y12 154, showing that patient is a Plavix responder  , cholesterol 245, triglycerides 352  Hemoglobin A1C pending  Continue home Plavix 75 mg daily   Patient states she gets bad myalgias with all statins, so Lipitor discontinued. Recommend discussing PCSK9 inhibitors with outpatient PCP  Normotensive goal  Euglycemic, normothermic goal  Continue telemetry  PT/OT/ST  Frequent neuro checks. Continue to monitor and notify neurology with any changes. STAT CT head for any acute change in neuro exam  - Follow up with Neurosurgery outpatient due to aneurysm vs pseudoaneurysm in the R V3 segment  - Medical management and supportive care per primary team. Correction of any metabolic or infectious disturbances.

## 2023-10-29 NOTE — H&P
[facility] [facility]  H&P  Name: Alexx Null 57 y.o. female I MRN: [id number]  Unit/Bed#: S -01 I Date of Admission: 10/29/2023   Date of Service: 10/29/2023 I Hospital Day: 0      Assessment/Plan   * Stroke-like symptoms  Assessment & Plan  Intermittent dizziness and unsteadiness for 1 week. Acute onset left-sided facial droop last night  Seen by neurology in ED. Continue stroke pathway. Continue home dose Plavix. Permissive hypertension  Pending MRI brain and echo  Will check orthostatic blood pressure    History of CVA (cerebrovascular accident)  Assessment & Plan  CVA in 2018 with no residual deficits. Status post PFO closure  Maintained on Plavix    Type 2 diabetes mellitus with complication, without long-term current use of insulin [facility])  Assessment & Plan  Lab Results   Component Value Date    HGBA1C 7.4 (H) 06/12/2023       Recent Labs     10/29/23  1021 10/29/23  1750   POCGLU 237* 134       Blood Sugar Average: Last 72 hrs:  (P) 185.5    Last A1C in July 7.4. Will repeat. Sliding scale for now      Essential hypertension  Assessment & Plan  Takes Losartan/HCTZ and Norvasc at home. Will hold for permissive  hypertension. Labetalol prn        VTE Prophylaxis: Enoxaparin (Lovenox)  / sequential compression device   Code Status: Full code  POLST: POLST form is not discussed and not completed at this time. Discussion with family:     Anticipated Length of Stay:  Patient will be admitted on an Inpatient basis with an anticipated length of stay of  > 2 midnights. Justification for Hospital Stay: Above      Chief Complaint:   Dizziness and left facial droop     History of Present Illness:    Alexx Null is a 57 y.o. female with PMHx of CVA s/p PFO closure, HTN and DMII who presents with dizziness and left facial weakness. Patient started experiencing intermittent dizziness and lightheadedness a week ago associated with unsteadiness.   She states that her dizziness usually worsens after standing up. Denies palpitation, chest pain or dyspnea. last night around 8 PM she noted left-sided facial droop. Stroke alert was called in ED. Review of Systems:    Review of Systems   Constitutional:  Negative for appetite change and fever. Respiratory:  Negative for cough and shortness of breath. Cardiovascular:  Negative for palpitations. Gastrointestinal:  Negative for abdominal pain and nausea. Musculoskeletal:  Positive for gait problem. Neurological:  Positive for dizziness and light-headedness. All other systems reviewed and are negative. Past Medical and Surgical History:     Past Medical History:   Diagnosis Date    Diabetes mellitus ([address])     GERD (gastroesophageal reflux disease)     Take pepcid but still bothersome with certain foods    SDCCWHTX(874.6)     Occasional    Hypertension     Nasal congestion     Espyat night    Otitis media     Occasional    Stroke (cerebrum) (HCC)     Stroke (HCC)     Tia    Tinnitus     Current       Past Surgical History:   Procedure Laterality Date    CHOLECYSTECTOMY LAPAROSCOPIC      MOHS SURGERY      Micrographic Surgery Nose, Last assessed: 2/13/17    TOTAL KNEE ARTHROPLASTY Left 03/08/2017    Last assessed: 12/26/17    TUBAL LIGATION         Meds/Allergies:    Prior to Admission medications    Medication Sig Start Date End Date Taking?  Authorizing Provider   Alogliptin Benzoate 25 MG TABS TAKE ONE TABLET BY MOUTH EVERY DAY IN THE MORNING 8/22/23   Kay Flores MD   amLODIPine (NORVASC) 5 mg tablet TAKE ONE TABLET BY MOUTH EVERY DAY 8/14/23   Kay Flores MD   Bempedoic Acid (Nexletol) 180 MG TABS Take 1 tablet by mouth in the morning 6/20/23   Lauren Yin DO   clopidogrel (PLAVIX) 75 mg tablet Take 1 tablet (75 mg total) by mouth daily 8/14/23   Kay Flores MD   [facility]) 625 mg tablet Take 3 tablets (1,875 mg total) by mouth 2 (two) times a day with meals 7/5/23 8/4/23  Kay Flores MD famotidine (PEPCID) 20 mg tablet Take 1 tablet (20 mg total) by mouth 2 (two) times a day 7/5/23   Austin Hammer MD   fexofenadine (ALLEGRA) 180 MG tablet Take 1 tablet (180 mg total) by mouth daily 7/27/23   Lorrie Yang MD   fluticasone [facility]) 50 mcg/act nasal spray 2 sprays into each nostril daily 7/27/23   Lorrie Yang MD   gemfibrozil (LOPID) 600 mg tablet TAKE ONE TABLET BY MOUTH TWICE A DAY BEFORE MEALS 10/9/23   Austin Hammer MD   glimepiride (AMARYL) 2 mg tablet TAKE ONE TABLET BY MOUTH TWICE A DAY 8/14/23   Austin Hammer MD   losartan-hydrochlorothiazide (HYZAAR) 100-12.5 MG per tablet Take 1 tablet by mouth daily 11/18/22   Austin Hammer MD   metFORMIN (GLUCOPHAGE) 1000 MG tablet TAKE ONE TABLET BY MOUTH TWICE A DAY WITH MEALS (GENERIC FOR GLUCOPHAGE) 9/18/23   Austin Hammer MD   Misc Natural Products (DAILY HERBS IMMUNE DEFENSE PO) Take by mouth    Historical Provider, MD   mometasone (ELOCON) 0.1 % cream Apply topically daily 7/5/23   Austin Hammer MD   Multiple Vitamin (MULTI-VITAMIN DAILY PO) Take 1 tablet by mouth daily 7/6/16   Historical Provider, MD   triamcinolone (KENALOG) 0.1 % cream APPLY TOPICALLY TWO TIMES A DAY UNDER THE LEFT BREAST AS NEEDED FOR FLARES 4/17/23   Mike Turner MD       Allergies:    Allergies   Allergen Reactions    Bactrim [Sulfamethoxazole-Trimethoprim]     Neomycin-Bacitracin Zn-Polymyx Other (See Comments)     REDNESS    Statins Myalgia       Social History:     Marital Status:   Occupation:   Patient Pre-hospital Living Situation: Home  Patient Pre-hospital Level of Mobility:   Patient Pre-hospital Diet Restrictions:   Substance Use History:   Social History     Substance and Sexual Activity   Alcohol Use Never     Social History     Tobacco Use   Smoking Status Never   Smokeless Tobacco Never   Tobacco Comments    No secondhand smoke exposure     Social History     Substance and Sexual Activity Drug Use Never       Family History:    non-contributory    Physical Exam:     Vitals:   Blood Pressure: 125/76 (10/29/23 1738)  Pulse: 102 (10/29/23 1738)  Temperature: 98.1 °F (36.7 °C) (10/29/23 1015)  Temp Source: Oral (10/29/23 1015)  Respirations: 18 (10/29/23 1738)  Weight - Scale: 79.6 kg (175 lb 7.8 oz) (10/29/23 1015)  SpO2: 94 % (10/29/23 1730)    Physical Exam  Constitutional:       General: She is not in acute distress. Appearance: She is not ill-appearing, toxic-appearing or diaphoretic. Eyes:      General:         Right eye: No discharge. Left eye: No discharge. Cardiovascular:      Rate and Rhythm: Normal rate and regular rhythm. Pulses: Normal pulses. Pulmonary:      Effort: Pulmonary effort is normal.      Breath sounds: Normal breath sounds. Abdominal:      General: Bowel sounds are normal.      Palpations: Abdomen is soft. Musculoskeletal:         General: No swelling. Skin:     General: Skin is warm. Neurological:      Mental Status: She is oriented to person, place, and time. GCS: GCS eye subscore is 4. GCS verbal subscore is 5. GCS motor subscore is 6. Cranial Nerves: No dysarthria.    Psychiatric:         Mood and Affect: Mood normal.         Behavior: Behavior normal.             Additional Data:     Lab Results:   Results from last 7 days   Lab Units 10/29/23  1024   WBC Thousand/uL 3.81*   HEMOGLOBIN g/dL 13.3   HEMATOCRIT % 38.0   PLATELETS Thousands/uL 295     Results from last 7 days   Lab Units 10/29/23  1024   SODIUM mmol/L 127*   POTASSIUM mmol/L 3.9   CHLORIDE mmol/L 91*   CO2 mmol/L 24   BUN mg/dL 19   CREATININE mg/dL 0.89   ANION GAP mmol/L 12   CALCIUM mg/dL 10.1   GLUCOSE RANDOM mg/dL 213*     Results from last 7 days   Lab Units 10/29/23  1024   INR  0.89     Results from last 7 days   Lab Units 10/29/23  1750 10/29/23  1021   POC GLUCOSE mg/dl 134 237*               Imaging:     CTA stroke alert (head/neck)   Final Result by Cece Green Lianne Manning MD (10/29 1113)      CTA head:   -Focal moderate stenosis of the left M2 segment branch just distal to the bifurcation.   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 Mild irregularity at the proximal V4 segment may be due to atherosclerosis versus short segment dissection. Consider MRI/MRA of the brain if clinically indicated. CTA neck:   -No high-grade stenosis or dissection involving the carotid or vertebral arteries. -Broad-based 3 mm outpouching involving the right V3 segment may represent an aneurysm versus pseudoaneurysm. Recommend consultation with the [facility], a division of [facility] at [phone]. Other:   -Periapical lucency around the right maxillary molar, suggestive of odontogenic disease. Recommend dentistry consult. Findings were directly discussed with Angelica Bruce  at 10:55 a.m on 10/29/2023 by Dr. Jeffrey Willard. The study was marked in [facility] for immediate notification. Workstation performed: [id number]         CT stroke alert brain   Final Result by Jeffrey Willard MD (10/29 1115)      Focal hypodense within the right corona radiata and questionable hypodensity within the left medulla are nonspecific. Differential includes subacute infarcts, chronic small vessel ischemic disease among other etiologies. Consider MRI of the brain for    further evaluation. No acute intracranial hemorrhage. Findings were directly discussed with Angelica Bruce  at 10:55 a.m on 10/29/2023 by Dr. Jeffrey Willard. Workstation performed: [id number]         MRI Inpatient Order    (Results Pending)       EKG, Pathology, and Other Studies Reviewed on Admission:   EKG: Sinus tachycardia    Allscripts / Epic Records Reviewed: Yes     ** Please Note: This note has been constructed using a voice recognition system.  **

## 2023-10-30 ENCOUNTER — APPOINTMENT (INPATIENT)
Dept: MRI IMAGING | Facility: HOSPITAL | Age: 63
DRG: 422 | End: 2023-10-30
Payer: COMMERCIAL

## 2023-10-30 VITALS
RESPIRATION RATE: 17 BRPM | OXYGEN SATURATION: 98 % | DIASTOLIC BLOOD PRESSURE: 62 MMHG | HEART RATE: 85 BPM | TEMPERATURE: 97.4 F | SYSTOLIC BLOOD PRESSURE: 132 MMHG | WEIGHT: 175.49 LBS | BODY MASS INDEX: 29.2 KG/M2

## 2023-10-30 PROBLEM — I67.1 INTRACRANIAL ANEURYSM: Status: ACTIVE | Noted: 2020-02-01

## 2023-10-30 PROBLEM — E87.1 HYPONATREMIA: Status: ACTIVE | Noted: 2023-10-30

## 2023-10-30 LAB
ANION GAP SERPL CALCULATED.3IONS-SCNC: 10 MMOL/L
BASOPHILS # BLD AUTO: 0.04 THOUSANDS/ÂΜL (ref 0–0.1)
BASOPHILS NFR BLD AUTO: 1 % (ref 0–1)
BUN SERPL-MCNC: 19 MG/DL (ref 5–25)
CALCIUM SERPL-MCNC: 9.3 MG/DL (ref 8.4–10.2)
CHLORIDE SERPL-SCNC: 95 MMOL/L (ref 96–108)
CHOLEST SERPL-MCNC: 245 MG/DL
CO2 SERPL-SCNC: 24 MMOL/L (ref 21–32)
CREAT SERPL-MCNC: 0.75 MG/DL (ref 0.6–1.3)
EOSINOPHIL # BLD AUTO: 0.23 THOUSAND/ÂΜL (ref 0–0.61)
EOSINOPHIL NFR BLD AUTO: 5 % (ref 0–6)
ERYTHROCYTE [DISTWIDTH] IN BLOOD BY AUTOMATED COUNT: 12.1 % (ref 11.6–15.1)
EST. AVERAGE GLUCOSE BLD GHB EST-MCNC: 177 MG/DL
GFR SERPL CREATININE-BSD FRML MDRD: 84 ML/MIN/1.73SQ M
GLUCOSE SERPL-MCNC: 169 MG/DL (ref 65–140)
GLUCOSE SERPL-MCNC: 199 MG/DL (ref 65–140)
GLUCOSE SERPL-MCNC: 210 MG/DL (ref 65–140)
HBA1C MFR BLD: 7.8 %
HCT VFR BLD AUTO: 33.9 % (ref 34.8–46.1)
HDLC SERPL-MCNC: 44 MG/DL
HGB BLD-MCNC: 12.1 G/DL (ref 11.5–15.4)
IMM GRANULOCYTES # BLD AUTO: 0.04 THOUSAND/UL (ref 0–0.2)
IMM GRANULOCYTES NFR BLD AUTO: 1 % (ref 0–2)
LDLC SERPL CALC-MCNC: 131 MG/DL (ref 0–100)
LYMPHOCYTES # BLD AUTO: 1.31 THOUSANDS/ÂΜL (ref 0.6–4.47)
LYMPHOCYTES NFR BLD AUTO: 28 % (ref 14–44)
MCH RBC QN AUTO: 32.4 PG (ref 26.8–34.3)
MCHC RBC AUTO-ENTMCNC: 35.7 G/DL (ref 31.4–37.4)
MCV RBC AUTO: 91 FL (ref 82–98)
MONOCYTES # BLD AUTO: 0.75 THOUSAND/ÂΜL (ref 0.17–1.22)
MONOCYTES NFR BLD AUTO: 16 % (ref 4–12)
NEUTROPHILS # BLD AUTO: 2.38 THOUSANDS/ÂΜL (ref 1.85–7.62)
NEUTS SEG NFR BLD AUTO: 49 % (ref 43–75)
NRBC BLD AUTO-RTO: 0 /100 WBCS
PLATELET # BLD AUTO: 270 THOUSANDS/UL (ref 149–390)
PMV BLD AUTO: 8.8 FL (ref 8.9–12.7)
POTASSIUM SERPL-SCNC: 3.6 MMOL/L (ref 3.5–5.3)
RBC # BLD AUTO: 3.73 MILLION/UL (ref 3.81–5.12)
SODIUM SERPL-SCNC: 129 MMOL/L (ref 135–147)
TRIGL SERPL-MCNC: 352 MG/DL
WBC # BLD AUTO: 4.75 THOUSAND/UL (ref 4.31–10.16)

## 2023-10-30 PROCEDURE — NC001 PR NO CHARGE: Performed by: PHYSICIAN ASSISTANT

## 2023-10-30 PROCEDURE — 80061 LIPID PANEL: CPT | Performed by: INTERNAL MEDICINE

## 2023-10-30 PROCEDURE — 82948 REAGENT STRIP/BLOOD GLUCOSE: CPT

## 2023-10-30 PROCEDURE — 85025 COMPLETE CBC W/AUTO DIFF WBC: CPT | Performed by: INTERNAL MEDICINE

## 2023-10-30 PROCEDURE — 99238 HOSP IP/OBS DSCHRG MGMT 30/<: CPT | Performed by: PHYSICIAN ASSISTANT

## 2023-10-30 PROCEDURE — 70551 MRI BRAIN STEM W/O DYE: CPT

## 2023-10-30 PROCEDURE — 99232 SBSQ HOSP IP/OBS MODERATE 35: CPT | Performed by: PSYCHIATRY & NEUROLOGY

## 2023-10-30 PROCEDURE — 80048 BASIC METABOLIC PNL TOTAL CA: CPT | Performed by: INTERNAL MEDICINE

## 2023-10-30 PROCEDURE — 3051F HG A1C>EQUAL 7.0%<8.0%: CPT | Performed by: OTOLARYNGOLOGY

## 2023-10-30 PROCEDURE — 83036 HEMOGLOBIN GLYCOSYLATED A1C: CPT | Performed by: INTERNAL MEDICINE

## 2023-10-30 RX ORDER — MECLIZINE HYDROCHLORIDE 25 MG/1
25 TABLET ORAL EVERY 8 HOURS SCHEDULED
Status: DISCONTINUED | OUTPATIENT
Start: 2023-10-30 | End: 2023-10-30 | Stop reason: HOSPADM

## 2023-10-30 RX ORDER — MECLIZINE HYDROCHLORIDE 25 MG/1
25 TABLET ORAL EVERY 8 HOURS SCHEDULED
Qty: 30 TABLET | Refills: 0 | Status: SHIPPED | OUTPATIENT
Start: 2023-10-30

## 2023-10-30 RX ADMIN — INSULIN LISPRO 2 UNITS: 100 INJECTION, SOLUTION INTRAVENOUS; SUBCUTANEOUS at 12:34

## 2023-10-30 RX ADMIN — CLOPIDOGREL BISULFATE 75 MG: 75 TABLET ORAL at 08:17

## 2023-10-30 RX ADMIN — INSULIN LISPRO 2 UNITS: 100 INJECTION, SOLUTION INTRAVENOUS; SUBCUTANEOUS at 08:19

## 2023-10-30 RX ADMIN — LORAZEPAM 1 MG: 2 INJECTION INTRAMUSCULAR; INTRAVENOUS at 07:37

## 2023-10-30 RX ADMIN — ENOXAPARIN SODIUM 40 MG: 40 INJECTION SUBCUTANEOUS at 08:19

## 2023-10-30 RX ADMIN — SODIUM CHLORIDE 500 ML: 0.9 INJECTION, SOLUTION INTRAVENOUS at 13:02

## 2023-10-30 RX ADMIN — ACETAMINOPHEN 650 MG: 325 TABLET, FILM COATED ORAL at 07:32

## 2023-10-30 NOTE — ASSESSMENT & PLAN NOTE
57 y.o. right handed female with prior stroke in 2018 s/p PFO closure (on Plavix PTA), TIA, diabetes, and HTN who presented [facility] on 10/29/2023 as a stroke alert for dizziness x1 week (described as off balance) and L facial swelling/weakness x1 day which resolved. BP on arrival 179/85 with max . NIHSS 1.   CT head negative for hemorrhage, but did note will hypodensity within the right corona radiata and questionable hypodensity within the left mid to which are nonspecific but differentials include subacute infarcts or chronic small vessel ischemic disease. 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 Patient was not a TNK candidate due to being outside the appropriate time window. MRI brain negative for acute infarct. Patient continues to feel slightly off balance, but symptoms are much better compared to when she first arrived. Etiology for dizziness likely peripheral vestibulopathy. Etiology for transient left facial swelling/possible weakness remains unclear. Plan:  - Can discontinue stroke pathway  Echo pending; can be completed oupatient  Labs:  P2Y12 154, showing that patient is a Plavix responder  , cholesterol 245, triglycerides 352  Hemoglobin A1C pending  Continue home Plavix 75 mg daily   Patient states she gets bad myalgias with all statins, so Lipitor discontinued.   Recommend discussing PCSK9 inhibitors with outpatient PCP  Normotensive goal  Euglycemic, normothermic goal  Continue telemetry  PT/OT/ST; patient may benefit from vestibular therapy  - Follow up with Neurosurgery outpatient due to aneurysm vs pseudoaneurysm in the R V3 segment  - Follow up with ENT outpatient; Meclizine PRN  - normal appearance , without tenderness upon palpation , no deformities , trachea midline , Thyroid normal size , no masses , thyroid nontender Medical management and supportive care per primary team. Correction of any metabolic or infectious disturbances.

## 2023-10-30 NOTE — ASSESSMENT & PLAN NOTE
CTH showed outpouching involving the right V3 segment concerning for aneurysm versus pseudoaneurysm as outlined above   Neuro recommended outpatient follow-up with neurosurgery, referral placed on DC

## 2023-10-30 NOTE — ASSESSMENT & PLAN NOTE
Lab Results   Component Value Date    HGBA1C 7.8 (H) 10/30/2023       Recent Labs     10/29/23  1021 10/29/23  1750 10/30/23  0816 10/30/23  1154   POCGLU 237* 134 210* 199*         Blood Sugar Average: Last 72 hrs:  (P) 195    Hold oral medications while inpatient (metformin, Amaryl), resume on discharge   Patient notes she can improve diet/lifestyle modifications and prefers not to start insulin therapy at this time as she does not have insurance   Encouraged close follow-up with PCP for further management

## 2023-10-30 NOTE — ASSESSMENT & PLAN NOTE
POA with Na 127 and component of pseudohyponatremia in the setting of hyperglycemia   Improved today without intervention, Na corrects to 130   Patient does endorse decreased oral intake x 1 week   Status post IV fluid bolus with normal saline today   Ensure adequate glucose control, see related plan   Repeat BMP in 1 week

## 2023-10-30 NOTE — ASSESSMENT & PLAN NOTE
Presented with intermittent dizziness and unsteadiness for 1 week. Acute onset left-sided facial droop last night. Admitted to stroke pathway. CT head: Focal hypodense within the right corona radiata and questionable hypodensity within the left medulla are nonspecific. Differential includes subacute infarcts, chronic small vessel ischemic disease among other etiologies. CTA head/neck: Focal moderate stenosis of the left M2 segment branch, hypoplastic left vertebral artery with irregularity at the proximal V4 segment (atherosclerosis versus short segment dissection), and 3 mm outpouching involving the right V3 segment (aneurysm versus pseudoaneurysm).     , A1c 7.4   MRI brain negative   Echocardiogram ordered however patient requesting this be done in the outpatient setting   Telemetry monitoring unremarkable   Appreciate neurology consult and recommendations   P2Y12 154, continue home dose Plavix   Started on Lipitor 40 mg daily however patient reports intolerance to statin   Outpatient follow-up with neurosurgery, referral placed on discharge

## 2023-10-30 NOTE — ASSESSMENT & PLAN NOTE
Lightheadedness/dizziness ongoing x 1 week although improving in severity and frequency. Denies room spinning sensation. Associated with nausea and diminished oral intake.    Ortho BP negative   Echo to be completed outpatient per patient request   Telemetry monitoring unremarkable   Possibly related to dehydration, will provide IV fluid bolus today   No needs per therapy

## 2023-10-30 NOTE — DISCHARGE SUMMARY
[facility]  Discharge- N[place]mett Null 1960, 57 y.o. female MRN: [id number]  Unit/Bed#: S -[place] Encounter: [id number]  Primary Care Provider: Adam Vides MD   Date and time admitted to hospital: 10/29/2023 10:09 AM    * Stroke-like symptoms  Assessment & Plan  Presented with intermittent dizziness and unsteadiness for 1 week. Acute onset left-sided facial droop last night. Admitted to stroke pathway. CT head: Focal hypodense within the right corona radiata and questionable hypodensity within the left medulla are nonspecific. Differential includes subacute infarcts, chronic small vessel ischemic disease among other etiologies. CTA head/neck: Focal moderate stenosis of the left M2 segment branch, hypoplastic left vertebral artery with irregularity at the proximal V4 segment (atherosclerosis versus short segment dissection), and 3 mm outpouching involving the right V3 segment (aneurysm versus pseudoaneurysm). , A1c 7.4   MRI brain negative   Echocardiogram ordered however patient requesting this be done in the outpatient setting   Telemetry monitoring unremarkable   Appreciate neurology consult and recommendations   P2Y12 154, continue home dose Plavix   Started on Lipitor 40 mg daily however patient reports intolerance to statin   Outpatient follow-up with neurosurgery, referral placed on discharge     Hyponatremia  Assessment & Plan  POA with Na 127 and component of pseudohyponatremia in the setting of hyperglycemia   Improved today without intervention, Na corrects to 130   Patient does endorse decreased oral intake x 1 week   Status post IV fluid bolus with normal saline today   Ensure adequate glucose control, see related plan   Repeat BMP in 1 week       Dizziness  Assessment & Plan  Lightheadedness/dizziness ongoing x 1 week although improving in severity and frequency. Denies room spinning sensation. Associated with nausea and diminished oral intake. Ortho BP negative   Echo to be completed outpatient per patient request   Telemetry monitoring unremarkable   Possibly related to dehydration, s/p IV fluid bolus today   Neuro recommending scheduled meclizine with ENT follow-up   No needs per therapy     History of CVA (cerebrovascular accident)  Assessment & Plan  CVA in 2018 with no residual deficits.  Status post PFO closure  Maintained on Plavix prior to admission   See plan above     Type 2 diabetes mellitus with complication, without long-term current use of insulin [facility])  Assessment & Plan  Lab Results   Component Value Date    HGBA1C 7.8 (H) 10/30/2023       Recent Labs     10/29/23  1021 10/29/23  1750 10/30/23  0816 10/30/23  1154   POCGLU 237* 134 210* 199*         Blood Sugar Average: Last 72 hrs:  (P) 195    Hold oral medications while inpatient (metformin, Amaryl), resume on discharge   Patient notes she can improve diet/lifestyle modifications and prefers not to start insulin therapy at this time as she does not have insurance   Encouraged close follow-up with PCP for further management       Essential hypertension  Assessment & Plan  BP controlled  Orthostatics negative   Resume home dose Losartan/HCTZ and Norvasc on discharge     Mixed hyperlipidemia  Assessment & Plan  , cholesterol 245, triglycerides 352  Patient declining statin therapy 2/2 side effect of myalgia         Medical Problems       Resolved Problems  Date Reviewed: 10/30/2023   None       Discharging Physician / Practitioner: Katharina Wheeler PA-C  PCP: Erlin Eller MD  Admission Date:   Admission Orders (From admission, onward)       Ordered        10/29/23 1220  INPATIENT ADMISSION  Once                          Discharge Date: 10/30/23    Consultations During Hospital Stay:  Neurology     Procedures Performed:   None     Significant Findings / Test Results:   Outlined above     Incidental Findings:   Intracranial aneurysm vs pseudoaneurysm   I reviewed the above mentioned incidental findings with the patient and/or family and they expressed understanding. Test Results Pending at Discharge (will require follow up): None      Outpatient Tests Requested: Follow-up with neurology, neurosurgery, ENT, PCP     Complications:  none     Reason for Admission: stroke like symptoms     Hospital Course:   García Obrien is a 57 y.o. female patient who originally presented to the hospital on 10/29/2023 due to 1 week history of intermittent dizziness and new onset left sided facial droop. She was admitted to stroke pathway and seen in consultation by neurology. MRI brain negative for CVA. CTA head/neck was abnormal with results outlined above. Neurology recommended outpatient follow-up with neurosurgery due to concern for aneurysm vs pseudoaneurysm. Patient recommended statin due to uncontrolled mixed hyperlipidemia however she reports intolerance to these medications. She also requested echocardiogram be completed in the outpatient setting, as she is currently between insurances. T2DM remains poorly controlled however patient plans to improve diet and lifestyle. In regard to her intermittent dizziness, overall symptoms have improved. There is suspicion for BPPV and she was stated on meclizine and recommended follow-up with ENT. She also had hyponatremia suspected 2/2 dehydration and received IV fluids prior to discharge. She was recommended repeat BMP in 1 week and close follow-up with her PCP after discharge. Please see above list of diagnoses and related plan for additional information. Condition at Discharge: fair    Discharge Day Visit / Exam:   * Please refer to separate progress note for these details *    Discussion with Family: Updated  () at bedside. Discharge instructions/Information to patient and family:   See after visit summary for information provided to patient and family.       Provisions for Follow-Up Care:  See after visit summary for information related to follow-up care and any pertinent home health orders. Disposition:   Home    Planned Readmission: no     Discharge Statement:  I spent 35 minutes discharging the patient. This time was spent on the day of discharge. I had direct contact with the patient on the day of discharge. Greater than 50% of the total time was spent examining patient, answering all patient questions, arranging and discussing plan of care with patient as well as directly providing post-discharge instructions. Additional time then spent on discharge activities. Discharge Medications:  See after visit summary for reconciled discharge medications provided to patient and/or family.       **Please Note: This note may have been constructed using a voice recognition system**

## 2023-10-30 NOTE — ASSESSMENT & PLAN NOTE
CVA in 2018 with no residual deficits.  Status post PFO closure  Maintained on Plavix prior to admission   See plan above

## 2023-10-30 NOTE — PHYSICAL THERAPY NOTE
Physical Therapy Screen Note     10/30/23 0957   Note Type   Note type Screen   Additional Comments referral received for PT eval and tx. Emiliana Moore OT reports pt is ambulating independently and not needing PT. discontinue PT. notified Carolyne SPENCE.      Justin , PT

## 2023-10-30 NOTE — PLAN OF CARE
Problem: Potential for Falls  Goal: Patient will remain free of falls  Description: INTERVENTIONS:  - Educate patient/family on patient safety including physical limitations  - Instruct patient to call for assistance with activity   - Consult OT/PT to assist with strengthening/mobility   - Keep Call bell within reach  - Keep bed low and locked with side rails adjusted as appropriate  - Keep care items and personal belongings within reach  - Initiate and maintain comfort rounds  - Make Fall Risk Sign visible to staff  - Offer Toileting every  Hours, in advance of need  - Initiate/Maintain alarm  - Obtain necessary fall risk management equipment:   - Apply yellow socks and bracelet for high fall risk patients  - Consider moving patient to room near nurses station  Outcome: Progressing     Problem: PAIN - ADULT  Goal: Verbalizes/displays adequate comfort level or baseline comfort level  Description: Interventions:  - Encourage patient to monitor pain and request assistance  - Assess pain using appropriate pain scale  - Administer analgesics based on type and severity of pain and evaluate response  - Implement non-pharmacological measures as appropriate and evaluate response  - Consider cultural and social influences on pain and pain management  - Notify physician/advanced practitioner if interventions unsuccessful or patient reports new pain  Outcome: Progressing     Problem: INFECTION - ADULT  Goal: Absence or prevention of progression during hospitalization  Description: INTERVENTIONS:  - Assess and monitor for signs and symptoms of infection  - Monitor lab/diagnostic results  - Monitor all insertion sites, i.e. indwelling lines, tubes, and drains  - Monitor endotracheal if appropriate and nasal secretions for changes in amount and color  - [place] appropriate cooling/warming therapies per order  - Administer medications as ordered  - Instruct and encourage patient and family to use good hand hygiene technique  - Identify and instruct in appropriate isolation precautions for identified infection/condition  Outcome: Progressing  Goal: Absence of fever/infection during neutropenic period  Description: INTERVENTIONS:  - Monitor WBC    Outcome: Progressing     Problem: SAFETY ADULT  Goal: Patient will remain free of falls  Description: INTERVENTIONS:  - Educate patient/family on patient safety including physical limitations  - Instruct patient to call for assistance with activity   - Consult OT/PT to assist with strengthening/mobility   - Keep Call bell within reach  - Keep bed low and locked with side rails adjusted as appropriate  - Keep care items and personal belongings within reach  - Initiate and maintain comfort rounds  - Make Fall Risk Sign visible to staff  - Offer Toileting every  Hours, in advance of need  - Initiate/Maintain alarm  - Obtain necessary fall risk management equipment:   - Apply yellow socks and bracelet for high fall risk patients  - Consider moving patient to room near nurses station  Outcome: Progressing  Goal: Maintain or return to baseline ADL function  Description: INTERVENTIONS:  -  Assess patient's ability to carry out ADLs; assess patient's baseline for ADL function and identify physical deficits which impact ability to perform ADLs (bathing, care of mouth/teeth, toileting, grooming, dressing, etc.)  - Assess/evaluate cause of self-care deficits   - Assess range of motion  - Assess patient's mobility; develop plan if impaired  - Assess patient's need for assistive devices and provide as appropriate  - Encourage maximum independence but intervene and supervise when necessary  - Involve family in performance of ADLs  - Assess for home care needs following discharge   - Consider OT consult to assist with ADL evaluation and planning for discharge  - Provide patient education as appropriate  Outcome: Progressing  Goal: Maintains/Returns to pre admission functional level  Description: INTERVENTIONS:  - Perform BMAT or MOVE assessment daily.   - Set and communicate daily mobility goal to care team and patient/family/caregiver. - Collaborate with rehabilitation services on mobility goals if consulted  - Perform Range of Motion  times a day. - Reposition patient every  hours. - Dangle patient  times a day  - Stand patient  times a day  - Ambulate patient  times a day  - Out of bed to chair  times a day   - Out of bed for meals  times a day  - Out of bed for toileting  - Record patient progress and toleration of activity level   Outcome: Progressing     Problem: DISCHARGE PLANNING  Goal: Discharge to home or other facility with appropriate resources  Description: INTERVENTIONS:  - Identify barriers to discharge w/patient and caregiver  - Arrange for needed discharge resources and transportation as appropriate  - Identify discharge learning needs (meds, wound care, etc.)  - Arrange for interpretive services to assist at discharge as needed  - Refer to Case Management Department for coordinating discharge planning if the patient needs post-hospital services based on physician/advanced practitioner order or complex needs related to functional status, cognitive ability, or social support system  Outcome: Progressing     Problem: Knowledge Deficit  Goal: Patient/family/caregiver demonstrates understanding of disease process, treatment plan, medications, and discharge instructions  Description: Complete learning assessment and assess knowledge base.   Interventions:  - Provide teaching at level of understanding  - Provide teaching via preferred learning methods  Outcome: Progressing

## 2023-10-30 NOTE — PROGRESS NOTES
[facility]  Progress Note  Name: Vito Roberts  MRN: [id number]  Unit/Bed#: S -01 I Date of Admission: 10/29/2023   Date of Service: 10/30/2023 I Hospital Day: 1    Assessment/Plan   * Stroke-like symptoms  Assessment & Plan  Presented with intermittent dizziness and unsteadiness for 1 week. Acute onset left-sided facial droop last night. Admitted to stroke pathway. CT head: Focal hypodense within the right corona radiata and questionable hypodensity within the left medulla are nonspecific. Differential includes subacute infarcts, chronic small vessel ischemic disease among other etiologies. CTA head/neck: Focal moderate stenosis of the left M2 segment branch, hypoplastic left vertebral artery with irregularity at the proximal V4 segment (atherosclerosis versus short segment dissection), and 3 mm outpouching involving the right V3 segment (aneurysm versus pseudoaneurysm). , A1c pending   MRI brain negative   Echocardiogram ordered and pending however patient requesting this be done in the outpatient setting   Telemetry monitoring unremarkable thus far   Appreciate neurology consult and recommendations   P2Y12 154, continue home dose Plavix   Started on Lipitor 40 mg daily however patient reports intolerance to statin     Hyponatremia  Assessment & Plan  POA with Na 127 and component of pseudohyponatremia in the setting of hyperglycemia   Improved today without intervention, Na corrects to 130   Patient does endorse decreased oral intake x 1 week   Will provide IV fluids with normal saline today   Ensure adequate glucose control, see related plan   Trend BMP      Dizziness  Assessment & Plan  Lightheadedness/dizziness ongoing x 1 week although improving in severity and frequency. Denies room spinning sensation. Associated with nausea and diminished oral intake.    Ortho BP negative   Echo to be completed outpatient per patient request   Telemetry monitoring unremarkable   Possibly related to dehydration, will provide IV fluid bolus today   No needs per therapy     History of CVA (cerebrovascular accident)  Assessment & Plan  CVA in 2018 with no residual deficits. Status post PFO closure  Maintained on Plavix prior to admission   See plan above     Type 2 diabetes mellitus with complication, without long-term current use of insulin [facility])  Assessment & Plan  Lab Results   Component Value Date    HGBA1C 7.4 (H) 06/12/2023       Recent Labs     10/29/23  1021 10/29/23  1750 10/30/23  0816   POCGLU 237* 134 210*       Blood Sugar Average: Last 72 hrs:  (P) 089.9225387937875110    Hold oral medications while inpatient (metformin, Amaryl)  Patient notes she can improve diet/lifestyle modifications and prefers not to start insulin therapy at this time and she does not have insurance   QID accuchecks with SSI coverage for now   Diabetic diet   Hypoglycemia protocol       Essential hypertension  Assessment & Plan  BP controlled  Orthostatics negative   Home dose Losartan/HCTZ and Norvasc currently on hold for to allow for permissive hypertension    Mixed hyperlipidemia  Assessment & Plan  , cholesterol 245, triglycerides 352  Started on Lipitor 40 mg daily            VTE Pharmacologic Prophylaxis: VTE Score: 8 High Risk (Score >/= 5) - Pharmacological DVT Prophylaxis Ordered: enoxaparin (Lovenox). Sequential Compression Devices Ordered. Patient Centered Rounds: I performed bedside rounds with nursing staff today. Discussions with Specialists or Other Care Team Provider: will f/u neurology recommendations     Education and Discussions with Family / Patient: Updated  () at bedside. Total Time Spent on Date of Encounter in care of patient: 30 mins.  This time was spent on one or more of the following: performing physical exam; counseling and coordination of care; obtaining or reviewing history; documenting in the medical record; reviewing/ordering tests, medications or procedures; communicating with other healthcare professionals and discussing with patient's family/caregivers. Current Length of Stay: 1 day(s)  Current Patient Status: Inpatient   Certification Statement: The patient will continue to require additional inpatient hospital stay due to pending final neurology clearance  Discharge Plan: Anticipate discharge later today or tomorrow to home. Code Status: Level 1 - Full Code    Subjective:   Patient notes improvement in dizziness, notes she had an episode yesterday when having bowel movement but otherwise seems to be improving in severity and frequency. Both patient and  at bedside note resolution of facial droop. She reports chronic bilateral finger "tingling" but otherwise denies weakness or new numbness. She denies room spinning sensation. She was having nausea and poor oral intake last week which is improving. Tells me she was on statins in the past but does not tolerate due to "body aches". Also notes she does not currently have insurance and does not want echo or initiation of insulin therapy at this time. Discussed high cholesterol and uncontrolled diabetes are risk factors for recurrent stroke, she expressed understanding. Objective:     Vitals:   Temp (24hrs), Av.7 °F (36.5 °C), Min:97.4 °F (36.3 °C), Max:98 °F (36.7 °C)    Temp:  [97.4 °F (36.3 °C)-98 °F (36.7 °C)] 97.4 °F (36.3 °C)  HR:  [] 85  Resp:  [16-20] 17  BP: (115-157)/(62-89) 132/62  SpO2:  [94 %-98 %] 98 %  Body mass index is 29.2 kg/m². Input and Output Summary (last 24 hours):   No intake or output data in the 24 hours ending 10/30/23 1213    Physical Exam:   Physical Exam  Vitals reviewed. Constitutional:       General: She is not in acute distress. Cardiovascular:      Rate and Rhythm: Normal rate. Pulmonary:      Effort: Pulmonary effort is normal. No respiratory distress. Neurological:      Mental Status: She is alert.    Psychiatric: Mood and Affect: Affect is flat. Additional Data:     Labs:  Results from last 7 days   Lab Units 10/30/23  0539   WBC Thousand/uL 4.75   HEMOGLOBIN g/dL 12.1   HEMATOCRIT % 33.9*   PLATELETS Thousands/uL 270   NEUTROS PCT % 49   LYMPHS PCT % 28   MONOS PCT % 16*   EOS PCT % 5     Results from last 7 days   Lab Units 10/30/23  0539   SODIUM mmol/L 129*   POTASSIUM mmol/L 3.6   CHLORIDE mmol/L 95*   CO2 mmol/L 24   BUN mg/dL 19   CREATININE mg/dL 0.75   ANION GAP mmol/L 10   CALCIUM mg/dL 9.3   GLUCOSE RANDOM mg/dL 169*     Results from last 7 days   Lab Units 10/29/23  1024   INR  0.89     Results from last 7 days   Lab Units 10/30/23  1154 10/30/23  0816 10/29/23  1750 10/29/23  1021   POC GLUCOSE mg/dl 199* 210* 134 237*     Results from last 7 days   Lab Units 10/30/23  0539   HEMOGLOBIN A1C % 7.8*           Lines/Drains:  Invasive Devices       Peripheral Intravenous Line  Duration             Peripheral IV 10/29/23 Left Antecubital 1 day                      Telemetry:  Telemetry Orders (From admission, onward)               24 Hour Telemetry Monitoring  Continuous x 24 Hours (Telem)        Expiring   Question:  Reason for 24 Hour Telemetry  Answer:  TIA/Suspected CVA/ Confirmed CVA                     Telemetry Reviewed: Normal Sinus Rhythm  Indication for Continued Telemetry Use: No indication for continued use. Will discontinue. Imaging: No pertinent imaging reviewed.     Recent Cultures (last 7 days):         Last 24 Hours Medication List:   Current Facility-Administered Medications   Medication Dose Route Frequency Provider Last Rate    acetaminophen  650 mg Oral Q4H PRN Esha Mar MD      atorvastatin  40 mg Oral QPM Esha Mar MD      clopidogrel  75 mg Oral Daily Esha Mar MD      enoxaparin  40 mg Subcutaneous Daily Esha Mar MD      insulin lispro  1-6 Units Subcutaneous TID [facility] Esha Mar MD      LORazepam  1 mg Intravenous PRN Sobeida Du MD      sodium chloride  500 mL Intravenous Once Pablo Shook PA-C          Today, Patient Was Seen By: Halina Ritchie PA-C    **Please Note: This note may have been constructed using a voice recognition system. **

## 2023-10-30 NOTE — OCCUPATIONAL THERAPY NOTE
Occupational Therapy Screen Note         Patient Name: Cathy Espinoza  [id number] Date: 10/30/2023           10/30/23 0930   OT Last Visit   OT Visit Date 10/30/23   Note Type   Note type Screen   Additional Comments OT orders received. Chart review performed. Based on conversation with SHAW Block, pt appears to be performing at functional baseline. Pt has been (I) in room and getting to/from bathroom without assistance. Pt reports that she has no concerns about being able to complete functional tasks + routine on d/c. Pt does not demonstrate need for skilled OT at this time. Pt will not be seen further by OT services. DC OT orders. If pt's functional status declines please re-consult.      Jaye Esteban MS, OTR/L

## 2023-10-30 NOTE — ASSESSMENT & PLAN NOTE
Presented with intermittent dizziness and unsteadiness for 1 week. Acute onset left-sided facial droop last night. Admitted to stroke pathway. CT head: Focal hypodense within the right corona radiata and questionable hypodensity within the left medulla are nonspecific. Differential includes subacute infarcts, chronic small vessel ischemic disease among other etiologies. CTA head/neck: Focal moderate stenosis of the left M2 segment branch, hypoplastic left vertebral artery with irregularity at the proximal V4 segment (atherosclerosis versus short segment dissection), and 3 mm outpouching involving the right V3 segment (aneurysm versus pseudoaneurysm).     , A1c pending   MRI brain negative   Echocardiogram ordered and pending however patient requesting this be done in the outpatient setting   Telemetry monitoring unremarkable thus far   Appreciate neurology consult and recommendations   P2Y12 154, continue home dose Plavix   Started on Lipitor 40 mg daily however patient reports intolerance to statin

## 2023-10-30 NOTE — ASSESSMENT & PLAN NOTE
BP controlled  Orthostatics negative   Home dose Losartan/HCTZ and Norvasc currently on hold for to allow for permissive hypertension

## 2023-10-30 NOTE — ASSESSMENT & PLAN NOTE
POA with Na 127 and component of pseudohyponatremia in the setting of hyperglycemia   Improved today without intervention, Na corrects to 130   Patient does endorse decreased oral intake x 1 week   Will provide IV fluids with normal saline today   Ensure adequate glucose control, see related plan   Trend BMP

## 2023-10-30 NOTE — UTILIZATION REVIEW
Initial Clinical Review    Admission: Date/Time/Statement:   Admission Orders (From admission, onward)       Ordered        10/29/23 1220  INPATIENT ADMISSION  Once                          Orders Placed This Encounter   Procedures    INPATIENT ADMISSION     Standing Status:   Standing     Number of Occurrences:   1     Order Specific Question:   Level of Care     Answer:   Med Surg [16]     Order Specific Question:   Estimated length of stay     Answer:   More than 2 Midnights     Order Specific Question:   Certification     Answer:   I certify that inpatient services are medically necessary for this patient for a duration of greater than two midnights. See H&P and MD Progress Notes for additional information about the patient's course of treatment. ED Arrival Information       Expected   -    Arrival   10/29/2023 10:01    Acuity   Emergent              Means of arrival   Walk-In    Escorted by   Spouse    Service   Hospitalist    Admission type   Emergency              Arrival complaint   Facial Droop, Dizziness (hx mini stroke)             Chief Complaint   Patient presents with    CVA/TIA-like Symptoms     Pt reports intermittent dizziness x1 week. Noticed lastnight @8pm slight L side facial droop. Pt reports tingling in both hands for "a couple months" denies vision changes. Hx TIA       Initial Presentation: 57 y.o. female with PMHx of CVA s/p PFO closure, HTN and DMII who presents with dizziness and left facial weakness. Patient started experiencing intermittent dizziness and lightheadedness a week ago associated with unsteadiness. She states that her dizziness usually worsens after standing up. Plan: Inpatient admission for evaluation and treatment of stroke like symptoms, hx of CVA, DM, HTN: Neurology consult, stroke pathway, continue home Plavix, permissive HTN, MRI brain and Echo, orthostatic vitals, initiate SSI, hold home Losartan/HCTZ and Norvasc.     Neurology consult: no tNKtPA as last known well over 48 hours ago, permissive HTN, MRI brain/MRA head, Echo, telemetry. Date: 10/30   Day 2:     Internal medicine: MRI brain negative, Echo ordered and pending however patient requesting this be done in the outpatient setting , telemetry, Neurology consult, continue home Plavix, started on Lipitor 40 mg daily however patient reports intolerance to statin , initiate IV fluids, trend BMP, orthostatic BP negative, telemetry, diabetic diet, continue permissive HTN.      ED Triage Vitals   Temperature Pulse Respirations Blood Pressure SpO2   10/29/23 1015 10/29/23 1009 10/29/23 1009 10/29/23 1009 10/29/23 1009   98.1 °F (36.7 °C) 94 18 (!) 179/85 98 %      Temp Source Heart Rate Source Patient Position - Orthostatic VS BP Location FiO2 (%)   10/29/23 1015 10/29/23 1009 10/29/23 1009 10/29/23 1009 --   Oral Monitor Sitting Right arm       Pain Score       10/29/23 1030       3          Wt Readings from Last 1 Encounters:   10/29/23 79.6 kg (175 lb 7.8 oz)     Additional Vital Signs:     Date/Time Temp Pulse Resp BP MAP (mmHg) SpO2 O2 Device Patient Position - Orthostatic VS   10/30/23 0824 97.4 °F (36.3 °C) Abnormal  85 -- 132/62 91 98 % None (Room air) Lying   10/30/23 0500 98 °F (36.7 °C) 80 17 115/78 87 94 % None (Room air) Lying   10/30/23 0330 -- 76 --  118/76 -- -- -- Lying   Resp: 16 at 10/30/23 0330   10/30/23 0130 -- -- 18 118/80 -- -- -- Lying   10/29/23 2330 -- 100 18 124/76 -- 96 % None (Room air) Lying   10/29/23 2130 -- 92 18 126/78 -- 94 % None (Room air) --   10/29/23 1930 -- 98 18 124/74 -- 95 % None (Room air) --   10/29/23 1738 -- 102 18 125/76 98 -- -- Standing for 3 minutes - Orthostatic VS   10/29/23 1733 -- 109 Abnormal  20 132/76 84 -- -- Standing - Orthostatic VS   10/29/23 1732 -- 96 18 118/72 89 -- -- Sitting - Orthostatic VS   10/29/23 1730 -- 92 18 126/87 92 94 % -- Lying - Orthostatic VS   10/29/23 1630 -- 94 18 143/89 102 94 % Nasal cannula Lying   10/29/23 1500 -- 92 16 128/70 92 96 % None (Room air) Lying   10/29/23 1400 -- 100 16 132/72 97 97 % None (Room air) Lying   10/29/23 1358 -- 95 16 132/72 97 95 % None (Room air) Lying   10/29/23 1230 -- 94 16 157/81 111 95 % None (Room air) Lying   10/29/23 1200 -- 94 16 139/68 98 96 % None (Room air) Lying   10/29/23 1110 -- 90 18 146/86 111 95 % None (Room air) Lying   10/29/23 1100 -- 98 16 -- -- 98 % None (Room air) --   10/29/23 1045 -- 97 16 157/76 108 96 % None (Room air) Lying   10/29/23 1039 -- 99 16 182/80 Abnormal  115 95 % None (Room air) Lying   10/29/23 1030 -- 96 16 154/75 107 96 % None (Room air) Lying   10/29/23 1015 98.1 °F (36.7 °C) 98 16 194/93 Abnormal  134 97 % None (Room air) Lying     Pertinent Labs/Diagnostic Test Results:   MRI brain wo contrast   Final Result by LAURA Adams MD (10/30 0517)      No acute intracranial pathology. Mild chronic microangiopathy. Workstation performed: [id number]         CTA stroke alert (head/neck)   Final Result by Mata Antonio MD (10/29 1113)      CTA head:   -Focal moderate stenosis of the left M2 segment branch just distal to the bifurcation.   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 Mild irregularity at the proximal V4 segment may be due to atherosclerosis versus short segment dissection. Consider MRI/MRA of the brain if clinically indicated. CTA neck:   -No high-grade stenosis or dissection involving the carotid or vertebral arteries. -Broad-based 3 mm outpouching involving the right V3 segment may represent an aneurysm versus pseudoaneurysm. Recommend consultation with the [facility], a division of [facility] for Neuroscience at [phone]. Other:   -Periapical lucency around the right maxillary molar, suggestive of odontogenic disease. Recommend dentistry consult. Findings were directly discussed with Angelica Bruce  at 10:55 a.m on 10/29/2023 by Dr. Mata Antonio.       The study was marked in [facility] for immediate notification. Workstation performed: [id number]         CT stroke alert brain   Final Result by Kait Patel MD (10/29 1115)      Focal hypodense within the right corona radiata and questionable hypodensity within the left medulla are nonspecific. Differential includes subacute infarcts, chronic small vessel ischemic disease among other etiologies. Consider MRI of the brain for    further evaluation. No acute intracranial hemorrhage. Findings were directly discussed with Angelica Bruce  at 10:55 a.m on 10/29/2023 by Dr. Kait Patel.          Workstation performed: [id number]           10/29 EKG:  Sinus tachycardia  ST & T wave abnormality, consider lateral ischemia  Abnormal ECG  When compared with ECG of 25-FEB-2023 15:38,  No significant change was found    Results from last 7 days   Lab Units 10/29/23  1024   SARS-COV-2  Negative     Results from last 7 days   Lab Units 10/30/23  0539 10/29/23  1024   WBC Thousand/uL 4.75 3.81*   HEMOGLOBIN g/dL 12.1 13.3   HEMATOCRIT % 33.9* 38.0   PLATELETS Thousands/uL 270 295   NEUTROS ABS Thousands/µL 2.38  --          Results from last 7 days   Lab Units 10/30/23  0539 10/29/23  1024   SODIUM mmol/L 129* 127*   POTASSIUM mmol/L 3.6 3.9   CHLORIDE mmol/L 95* 91*   CO2 mmol/L 24 24   ANION GAP mmol/L 10 12   BUN mg/dL 19 19   CREATININE mg/dL 0.75 0.89   EGFR ml/min/1.73sq m 84 68   CALCIUM mg/dL 9.3 10.1         Results from last 7 days   Lab Units 10/30/23  1154 10/30/23  0816 10/29/23  1750 10/29/23  1021   POC GLUCOSE mg/dl 199* 210* 134 237*     Results from last 7 days   Lab Units 10/30/23  0539 10/29/23  1024   GLUCOSE RANDOM mg/dL 169* 213*         Results from last 7 days   Lab Units 10/30/23  0539   HEMOGLOBIN A1C % 7.8*   EAG mg/dl 177           Results from last 7 days   Lab Units 10/29/23  1536 10/29/23  1338 10/29/23  1024   HS TNI 0HR ng/L  --   --  3   HS TNI 2HR ng/L  --  4  --    HSTNI D2 ng/L  --  1  --    HS TNI 4HR ng/L 4  --   --    HSTNI D4 ng/L 1  --   --          Results from last 7 days   Lab Units 10/29/23  1024   PROTIME seconds 12.6   INR  0.89   PTT seconds 28           Results from last 7 days   Lab Units 10/29/23  1024   INFLUENZA A PCR  Negative   INFLUENZA B PCR  Negative   RSV PCR  Negative         ED Treatment:   Medication Administration from 10/29/2023 1001 to 10/29/2023 1625         Date/Time Order Dose Route Action     10/29/2023 1032 EDT iohexol (OMNIPAQUE) 350 MG/ML injection (MULTI-DOSE) 85 mL 85 mL Intravenous Given          Past Medical History:   Diagnosis Date    Diabetes mellitus ([address])     GERD (gastroesophageal reflux disease)     Take pepcid but still bothersome with certain foods    MYPCGXMV(176.7)     Occasional    Hypertension     Nasal congestion     Espyat night    Otitis media     Occasional    Stroke (cerebrum) (HCC)     Stroke (HCC)     Tia    Tinnitus     Current     Present on Admission:   Essential hypertension   Type 2 diabetes mellitus with complication, without long-term current use of insulin (HCC)   Mixed hyperlipidemia   Dizziness      Admitting Diagnosis: Lightheadedness [R42]  Facial droop [R29.810]  History of CVA (cerebrovascular accident) [Z86.73]  Stroke-like symptoms [R29.90]  Symptoms of cerebrovascular accident (CVA) [R09.89]  Age/Sex: 57 y.o. female  Admission Orders:  Scheduled Medications:  clopidogrel, 75 mg, Oral, Daily  enoxaparin, 40 mg, Subcutaneous, Daily  insulin lispro, 1-6 Units, Subcutaneous, TID AC  meclizine, 25 mg, Oral, Q8H CHERELLE  sodium chloride, 500 mL, Intravenous, Once      Continuous IV Infusions:     PRN Meds:  acetaminophen, 650 mg, Oral, Q4H PRN  LORazepam, 1 mg, Intravenous, PRN        IP CONSULT TO NEUROLOGY  IP CONSULT TO CASE MANAGEMENT  IP CONSULT TO NUTRITION SERVICES    Network Utilization Review Department  ATTENTION: Please call with any questions or concerns to [phone] and carefully listen to the prompts so that you are directed to the right person. All voicemails are confidential.   For Discharge needs, contact Care Management DC Support Team at [phone] opt. 2  Send all requests for admission clinical reviews, approved or denied determinations and any other requests to dedicated fax number below belonging to the campus where the patient is receiving treatment.  List of dedicated fax numbers for the Facilities:  [place] DENIALS (Administrative/Medical Necessity) [phone]   DISCHARGE SUPPORT TEAM (NETWORK) [id number] [address] (Maternity/NICU/Pediatrics) [phone]   [facility] [address] [place] [address] [address] [facility] [phone]   [address] [place] [address] [address] [address] [facility] [address] [facility] [address] [phone]

## 2023-10-30 NOTE — ASSESSMENT & PLAN NOTE
Lightheadedness/dizziness ongoing x 1 week although improving in severity and frequency. Denies room spinning sensation. Associated with nausea and diminished oral intake.    Ortho BP negative   Echo to be completed outpatient per patient request   Telemetry monitoring unremarkable   Possibly related to dehydration, s/p IV fluid bolus today   Neuro recommending scheduled meclizine with ENT follow-up   No needs per therapy

## 2023-10-30 NOTE — PROGRESS NOTES
Progress Note - Neurology   Keiko Zeng 57 y.o. female [id number]  Unit/Bed#: S /S -01    Assessment/Plan:    Dizziness  Assessment & Plan  57 y.o. right handed female with prior stroke in 2018 s/p PFO closure (on Plavix PTA), TIA, diabetes, and HTN who [facility] on 10/29/2023 as a stroke alert for dizziness x1 week (described as off balance) and L facial swelling/weakness x1 day which resolved. BP on arrival 179/85 with max . NIHSS 1.   CT head negative for hemorrhage, but did note will hypodensity within the right corona radiata and questionable hypodensity within the left mid to which are nonspecific but differentials include subacute infarcts or chronic small vessel ischemic disease. 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 Patient was not a TNK candidate due to being outside the appropriate time window. MRI brain negative for acute infarct. Patient continues to feel slightly off balance, but symptoms are much better compared to when she first arrived. Etiology for dizziness likely peripheral vestibulopathy. Etiology for transient left facial swelling/possible weakness remains unclear. Plan:  - Can discontinue stroke pathway  Echo pending; can be completed oupatient  Labs:  P2Y12 154, showing that patient is a Plavix responder  , cholesterol 245, triglycerides 352  Hemoglobin A1C pending  Continue home Plavix 75 mg daily   Patient states she gets bad myalgias with all statins, so Lipitor discontinued.   Recommend discussing PCSK9 inhibitors with outpatient PCP  Normotensive goal  Euglycemic, normothermic goal  Continue telemetry  PT/OT/ST; patient may benefit from vestibular therapy  - Follow up with Neurosurgery outpatient due to aneurysm vs pseudoaneurysm in the R V3 segment  - Follow up with ENT outpatient; Meclizine PRN  - Medical management and supportive care per primary team. Correction of any metabolic or infectious disturbances. Cindy Townsend will need follow up in 6-8 weeks with general attending or AP . She will not require outpatient neurological testing. Subjective:   Patient seen and examined at bedside. She states that she is feeling much better, but does still have slight unsteadiness/feels off balance. Symptoms worsen with position changes and nearly resolved while at rest.  Significant other at bedside states that patient's facial weakness has resolved. Her face appeared "puffy" the other day as if she were stung by a bee. Patient denies any headache, vision disturbances, coordination issues, numbness, tingling, arm/leg weakness, chest pain, shortness of breath, or abdominal pain. She does have tinnitus. She has not had an audiogram.  She had a similar episode years ago.         Past Medical History:   Diagnosis Date    Diabetes mellitus ([address])     GERD (gastroesophageal reflux disease)     Take pepcid but still bothersome with certain foods    SCHRGNLL(945.5)     Occasional    Hypertension     Nasal congestion     Espyat night    Otitis media     Occasional    Stroke (cerebrum) (HCC)     Stroke (HCC)     Tia    Tinnitus     Current     Past Surgical History:   Procedure Laterality Date    CHOLECYSTECTOMY LAPAROSCOPIC      MOHS SURGERY      Micrographic Surgery Nose, Last assessed: 2/13/17    TOTAL KNEE ARTHROPLASTY Left 03/08/2017    Last assessed: 12/26/17    TUBAL LIGATION       Family History   Problem Relation Age of Onset    Diabetes Father     Hypertension Father     Stroke Father     Anemia Daughter     Breast cancer Paternal Aunt      Social History     Socioeconomic History    Marital status: /Civil Union     Spouse name: None Number of children: None    Years of education: None    Highest education level: None   Occupational History    Occupation: currently working   Tobacco Use    Smoking status: Never    Smokeless tobacco: Never    Tobacco comments:     No secondhand smoke exposure   Vaping Use    Vaping Use: Never used   Substance and Sexual Activity    Alcohol use: Never    Drug use: Never    Sexual activity: Yes     Partners: Male     Birth control/protection: None   Other Topics Concern    None   Social History Narrative    Feels safe at home     Social Determinants of Health     Financial Resource Strain: Not on file   Food Insecurity: Not on file   Transportation Needs: Not on file   Physical Activity: Not on file   Stress: Not on file   Social Connections: Not on file   Intimate Partner Violence: Not on file   Housing Stability: Not on file     E-Cigarette/Vaping    E-Cigarette Use Never User      E-Cigarette/Vaping Substances    Nicotine No     THC No     CBD No     Flavoring No     Other No     Unknown No          Medications: All current active meds have been reviewed and current meds:  Scheduled Meds:  Current Facility-Administered Medications   Medication Dose Route Frequency Provider Last Rate    acetaminophen  650 mg Oral Q4H PRN Sobeida Du MD      atorvastatin  40 mg Oral QPM Sobeida Du MD      clopidogrel  75 mg Oral Daily Sobeida Du MD      enoxaparin  40 mg Subcutaneous Daily Sobeida Du MD      insulin lispro  1-6 Units Subcutaneous TID [facility] Angie Ramos MD      LORazepam  1 mg Intravenous PRN Sobeida uD MD       Continuous Infusions:   PRN Meds:.  acetaminophen    LORazepam       ROS:   Review of Systems   HENT:  Positive for tinnitus. Neurological:  Positive for dizziness and light-headedness. All other systems reviewed and are negative.             Vitals:   /62 (BP Location: Right arm)   Pulse 85   Temp (!) 97.4 °F (36.3 °C) (Oral) Resp 17   Wt 79.6 kg (175 lb 7.8 oz)   SpO2 98%   BMI 29.20 kg/m²     Physical Exam:   Physical Exam  Vitals and nursing note reviewed. Constitutional:       Appearance: Normal appearance. Comments: Patient lying comfortably in bed in no acute distress   HENT:      Head: Normocephalic and atraumatic. Mouth/Throat:      Mouth: Mucous membranes are moist.      Pharynx: Oropharynx is clear. Eyes:      Extraocular Movements: Extraocular movements intact. Conjunctiva/sclera: Conjunctivae normal.      Pupils: Pupils are equal, round, and reactive to light. Pulmonary:      Effort: Pulmonary effort is normal.   Musculoskeletal:         General: Normal range of motion. Skin:     General: Skin is warm and dry. Neurological:      General: No focal deficit present. Mental Status: She is alert and oriented to person, place, and time. Comments: See full neuro exam below       Neurologic Exam     Mental Status   Oriented to person, place, and time. Patient awake and alert. Oriented to person, place, month, and year. No dysarthria or aphasia. Able to follow simple midline and appendicular commands. Cranial Nerves     CN III, IV, VI   Pupils are equal, round, and reactive to light. Pupils 3 mm, round, reactive to light bilaterally. EOMs intact without nystagmus. No visual field deficits. Facial sensation to light touch intact throughout. Facial expressions full and symmetric. Tongue midline. Soft palate raises symmetrically. Hearing grossly intact. Motor Exam   Muscle bulk: normal  Overall muscle tone: normal  Right arm pronator drift: absent  Left arm pronator drift: absent  5/5 strength in bilateral upper and lower extremities. Sensory Exam   Sensation to light touch, temperature, and vibration intact throughout.      Gait, Coordination, and Reflexes     Gait  Gait: (deferred for patient's safety)  No ataxia with finger to nose bilaterally  No resting or action tremor  No ankle clonus bilaterally           Labs: I have personally reviewed pertinent reports.    Recent Results (from the past 24 hour(s))   ECG 12 lead    Collection Time: 10/29/23 10:17 AM   Result Value Ref Range    Ventricular Rate 102 BPM    Atrial Rate 102 BPM    SC Interval 204 ms    QRSD Interval 94 ms    QT Interval 338 ms    QTC Interval 440 ms    P Axis 60 degrees    QRS Axis -9 degrees    T Wave Axis 80 degrees   Fingerstick Glucose (POCT)    Collection Time: 10/29/23 10:21 AM   Result Value Ref Range    POC Glucose 237 (H) 65 - 140 mg/dl   Basic metabolic panel    Collection Time: 10/29/23 10:24 AM   Result Value Ref Range    Sodium 127 (L) 135 - 147 mmol/L    Potassium 3.9 3.5 - 5.3 mmol/L    Chloride 91 (L) 96 - 108 mmol/L    CO2 24 21 - 32 mmol/L    ANION GAP 12 mmol/L    BUN 19 5 - 25 mg/dL    Creatinine 0.89 0.60 - 1.30 mg/dL    Glucose 213 (H) 65 - 140 mg/dL    Calcium 10.1 8.4 - 10.2 mg/dL    eGFR 68 ml/min/1.73sq m   CBC and Platelet    Collection Time: 10/29/23 10:24 AM   Result Value Ref Range    WBC 3.81 (L) 4.31 - 10.16 Thousand/uL    RBC 4.18 3.81 - 5.12 Million/uL    Hemoglobin 13.3 11.5 - 15.4 g/dL    Hematocrit 38.0 34.8 - 46.1 %    MCV 91 82 - 98 fL    MCH 31.8 26.8 - 34.3 pg    MCHC 35.0 31.4 - 37.4 g/dL    RDW 12.0 11.6 - 15.1 %    Platelets 095 506 - 418 Thousands/uL    MPV 8.7 (L) 8.9 - 12.7 fL   Protime-INR    Collection Time: 10/29/23 10:24 AM   Result Value Ref Range    Protime 12.6 11.6 - 14.5 seconds    INR 0.89 0.84 - 1.19   APTT    Collection Time: 10/29/23 10:24 AM   Result Value Ref Range    PTT 28 23 - 37 seconds   HS Troponin 0hr (reflex protocol)    Collection Time: 10/29/23 10:24 AM   Result Value Ref Range    hs TnI 0hr 3 "Refer to ACS Flowchart"- see link ng/L   FLU/RSV/COVID - if FLU/RSV clinically relevant    Collection Time: 10/29/23 10:24 AM    Specimen: Nose; Nares   Result Value Ref Range    SARS-CoV-2 Negative Negative    INFLUENZA A PCR Negative Negative INFLUENZA B PCR Negative Negative    RSV PCR Negative Negative   HS Troponin I 2hr    Collection Time: 10/29/23  1:38 PM   Result Value Ref Range    hs TnI 2hr 4 "Refer to ACS Flowchart"- see link ng/L    Delta 2hr hsTnI 1 <20 ng/L   P2Y12 Platelet inhibitor    Collection Time: 10/29/23  1:38 PM   Result Value Ref Range    P2Y12 154 (L) 194 - 418 PRU   HS Troponin I 4hr    Collection Time: 10/29/23  3:36 PM   Result Value Ref Range    hs TnI 4hr 4 "Refer to ACS Flowchart"- see link ng/L    Delta 4hr hsTnI 1 <20 ng/L   Fingerstick Glucose (POCT)    Collection Time: 10/29/23  5:50 PM   Result Value Ref Range    POC Glucose 134 65 - 140 mg/dl   Lipid Panel with Direct LDL reflex    Collection Time: 10/30/23  5:39 AM   Result Value Ref Range    Cholesterol 245 (H) See Comment mg/dL    Triglycerides 352 (H) See Comment mg/dL    HDL, Direct 44 (L) >=50 mg/dL    LDL Calculated 131 (H) 0 - 100 mg/dL   Basic metabolic panel    Collection Time: 10/30/23  5:39 AM   Result Value Ref Range    Sodium 129 (L) 135 - 147 mmol/L    Potassium 3.6 3.5 - 5.3 mmol/L    Chloride 95 (L) 96 - 108 mmol/L    CO2 24 21 - 32 mmol/L    ANION GAP 10 mmol/L    BUN 19 5 - 25 mg/dL    Creatinine 0.75 0.60 - 1.30 mg/dL    Glucose 169 (H) 65 - 140 mg/dL    Calcium 9.3 8.4 - 10.2 mg/dL    eGFR 84 ml/min/1.73sq m   CBC and differential    Collection Time: 10/30/23  5:39 AM   Result Value Ref Range    WBC 4.75 4.31 - 10.16 Thousand/uL    RBC 3.73 (L) 3.81 - 5.12 Million/uL    Hemoglobin 12.1 11.5 - 15.4 g/dL    Hematocrit 33.9 (L) 34.8 - 46.1 %    MCV 91 82 - 98 fL    MCH 32.4 26.8 - 34.3 pg    MCHC 35.7 31.4 - 37.4 g/dL    RDW 12.1 11.6 - 15.1 %    MPV 8.8 (L) 8.9 - 12.7 fL    Platelets 449 335 - 098 Thousands/uL    nRBC 0 /100 WBCs    Neutrophils Relative 49 43 - 75 %    Immat GRANS % 1 0 - 2 %    Lymphocytes Relative 28 14 - 44 %    Monocytes Relative 16 (H) 4 - 12 %    Eosinophils Relative 5 0 - 6 %    Basophils Relative 1 0 - 1 %    Neutrophils Absolute 2.38 1.85 - 7.62 Thousands/µL    Immature Grans Absolute 0.04 0.00 - 0.20 Thousand/uL    Lymphocytes Absolute 1.31 0.60 - 4.47 Thousands/µL    Monocytes Absolute 0.75 0.17 - 1.22 Thousand/µL    Eosinophils Absolute 0.23 0.00 - 0.61 Thousand/µL    Basophils Absolute 0.04 0.00 - 0.10 Thousands/µL   Fingerstick Glucose (POCT)    Collection Time: 10/30/23  8:16 AM   Result Value Ref Range    POC Glucose 210 (H) 65 - 140 mg/dl       Imaging: I have personally reviewed pertinent imaging in PACS, including MRI brain, CT head, CTA head/neck,  and I have personally reviewed PACS reports. EKG, Pathology, and Other Studies: I have personally reviewed pertinent reports.        VTE Prophylaxis: Sequential compression device (Venodyne)

## 2023-10-30 NOTE — ASSESSMENT & PLAN NOTE
Lab Results   Component Value Date    HGBA1C 7.4 (H) 06/12/2023       Recent Labs     10/29/23  1021 10/29/23  1750 10/30/23  0816   POCGLU 237* 134 210*       Blood Sugar Average: Last 72 hrs:  (P) [id number]    Hold oral medications while inpatient (metformin, Amaryl)  Patient notes she can improve diet/lifestyle modifications and prefers not to start insulin therapy at this time and she does not have insurance   QID accuchecks with SSI coverage for now   Diabetic diet   Hypoglycemia protocol

## 2023-10-30 NOTE — CASE MANAGEMENT
Case Management Assessment    Patient name Sinan Hyatt  Location S /S -89 MRN [id number]  : 1960 Date 10/30/2023       Current Admission Date: 10/29/2023  Current Admission Diagnosis:Stroke-like symptoms   Patient Active Problem List    Diagnosis Date Noted    Hyponatremia 10/30/2023    Stroke-like symptoms 10/29/2023    Vitamin D deficiency 2020    Episodic cluster headache, not intractable 10/13/2020    Dizziness 10/13/2020    Cardiovascular risk factor 2020    Frequent PVCs 2020    Palpitations 2020    Statin intolerance 2020    Cerebrovascular disease 2020    Thyroid nodule 2019    History of CVA (cerebrovascular accident) 2018    PFO (patent foramen ovale) 2018    Type 2 diabetes mellitus with complication, without long-term current use of insulin ([address]) 2018    Total bilirubin, elevated 2018    Migraine 2018    Fatty infiltration of liver 2017    Mixed incontinence 2017    Pelvic floor weakness in female 2017    Vaginal atrophy 2017    Left knee DJD 2017    Basal cell carcinoma 2017    Demyelinating disease of central nervous system ([address]) 2015    Pineal gland cyst 2015    Microalbuminuria 2013    Psoriasis 2012    Essential hypertension 11/10/2012    Mixed hyperlipidemia 2012    Chondromalacia patellae 2008      LOS (days): 1  Geometric Mean LOS (GMLOS) (days):   Days to GMLOS:     OBJECTIVE:    Risk of Unplanned Readmission Score: 10.92         Current admission status: Inpatient       Preferred Pharmacy:   [facility] [address]  [address] [place] [id number]  Phone: [phone] Fax: [phone]    Primary Care Provider: Ryan Tabor MD    Primary Insurance: [id number]  Secondary Insurance:     ASSESSMENT:  [place] Proxies    There are no active Health Care Proxies on file. Patient Information  Admitted from[de-identified] Home  Mental Status: Alert  During Assessment patient was accompanied by: Spouse  Assessment information provided by[de-identified] Patient, Spouse  Primary Caregiver: Self  Support Systems: Spouse/significant other, [address] of Residence: [address] [facility] [address] do you live in?: [address] [facility] entry access options.  Select all that apply.: Stairs  Number of steps to enter home.: 2  Type of Current Residence: Ranch  In the last 12 months, was there a time when you were not able to pay the mortgage or rent on time?: No  In the last 12 months, was there a time when you did not have a steady place to sleep or slept in a shelter (including now)?: No  Homeless/housing insecurity resource given?: N/A  Living Arrangements: Lives w/ Spouse/significant other    Activities of Daily Living Prior to Admission  Functional Status: Independent  Completes ADLs independently?: Yes  Ambulates independently?: Yes  Does patient use assisted devices?: No  Does patient currently own DME?: No  Does patient have a history of Outpatient Therapy (PT/OT)?: No  Does the patient have a history of Short-Term Rehab?: No  Does patient have a history of HHC?: No  Does patient currently have [facility]?: No         Patient Information Continued  Within the past 12 months, you worried that your food would run out before you got the money to buy more.: Never true  Within the past 12 months, the food you bought just didn't last and you didn't have money to get more.: Never true  Food insecurity resource given?: N/A         Means of Transportation  Means of Transport to Appts[de-identified] Drives Self  In the past 12 months, has lack of transportation kept you from medical appointments or from getting medications?: No  In the past 12 months, has lack of transportation kept you from meetings, work, or from getting things needed for daily living?: No  Was application for public transport provided?: N/A    CM met with patient and pt's  at bedside re: stroke consult received. Patient lives with spouse in ranch home attached to dtr's home, 2 [address]. Patient independent with ADLS, does not use nor own any dme, reported no hx therapies, and drives self to appointments. Confirmed PCP Sivler Lawson) and preferred pharmacy ([facility]). Spouse to provide pt transport home when ready for d/c. Pt relayed questions re: finances - email sent to hospital financial counselors for f/u. Patient aware financial counselors to reach out to her directly. No CM d/c needs currently identified, CM remains available.

## 2023-11-01 ENCOUNTER — TRANSITIONAL CARE MANAGEMENT (OUTPATIENT)
Dept: FAMILY MEDICINE CLINIC | Facility: CLINIC | Age: 63
End: 2023-11-01

## 2023-11-07 ENCOUNTER — TELEPHONE (OUTPATIENT)
Dept: NEUROSURGERY | Facility: CLINIC | Age: 63
End: 2023-11-07

## 2023-11-08 ENCOUNTER — TELEPHONE (OUTPATIENT)
Age: 63
End: 2023-11-08

## 2023-11-08 ENCOUNTER — TELEMEDICINE (OUTPATIENT)
Dept: FAMILY MEDICINE CLINIC | Facility: CLINIC | Age: 63
End: 2023-11-08
Payer: COMMERCIAL

## 2023-11-08 ENCOUNTER — NURSE TRIAGE (OUTPATIENT)
Age: 63
End: 2023-11-08

## 2023-11-08 VITALS — OXYGEN SATURATION: 97 % | RESPIRATION RATE: 16 BRPM | HEART RATE: 101 BPM | TEMPERATURE: 101.8 F

## 2023-11-08 DIAGNOSIS — U07.1 COVID-19: Primary | ICD-10-CM

## 2023-11-08 PROCEDURE — 99213 OFFICE O/P EST LOW 20 MIN: CPT | Performed by: FAMILY MEDICINE

## 2023-11-08 RX ORDER — NIRMATRELVIR AND RITONAVIR 300-100 MG
3 KIT ORAL 2 TIMES DAILY
Qty: 30 TABLET | Refills: 0 | Status: SHIPPED | OUTPATIENT
Start: 2023-11-08 | End: 2023-11-13

## 2023-11-08 NOTE — TELEPHONE ENCOUNTER
Please see triage note. Patient tested positive Covid today. Symptoms started 11/6. Patient c/o headache,cough ,sore throat, temp 100.3. No sob or chest pain noted. Care advise given to patient.

## 2023-11-08 NOTE — PROGRESS NOTES
COVID-19 Outpatient Progress Note    Assessment/Plan:    Problem List Items Addressed This Visit    None  Visit Diagnoses     COVID-19    -  Primary    Relevant Medications    nirmatrelvir & ritonavir (Paxlovid, 300/100,) tablet therapy pack      Rto prn      Disposition:     Patient meets criteria for Paxlovid and they have been counseled appropriately regarding risks, benefits, side effects, and alternative treatment options. After discussion, patient agrees to treatment. Possible side effects of Paxlovid? Possible side effects of Paxlovid are:  - Liver Problems. Notify us right away if you start to experience loss of appetite, yellowing of your skin and the whites of eyes (jaundice), dark-colored urine, pale colored stools and itchy skin, stomach area (abdominal) pain. - Resistance to HIV Medicines. If you have untreated HIV infection, Paxlovid may lead to some HIV medicines not working as well in the future. - Other possible side effects include: altered sense of taste, diarrhea, high blood pressure, or muscle aches. I have spent a total time of 10 minutes on the day of the encounter for this patient including discussing prognosis and risks and benefits of treatment options. Encounter provider: Mayur Bentley MD     Provider located at: [address] [address]     Recent Visits  No visits were found meeting these conditions. Showing recent visits within past 7 days and meeting all other requirements  Today's Visits  Date Type Provider Dept   11/08/23 Telemedicine Mayur Bentley MD [address] [address] today's visits and meeting all other requirements  Future Appointments  No visits were found meeting these conditions.   Showing future appointments within next 150 days and meeting all other requirements     This virtual check-in was done via [employer] and patient was informed that this is a secure, HIPAA-compliant platform. She agrees to proceed. Patient agrees to participate in a virtual check in via telephone or video visit instead of presenting to the office to address urgent/immediate medical needs. Patient is aware this is a billable service. She acknowledged consent and understanding of privacy and security of the video platform. The patient has agreed to participate and understands they can discontinue the visit at any time. After connecting through [facility], the patient was identified by name and date of birth. Oniel Joyner was informed that this was a telemedicine visit and that the exam was being conducted confidentially over secure lines. Oniel Joyner acknowledged consent and understanding of privacy and security of the telemedicine visit. I informed the patient that I have reviewed her record in Epic and presented the opportunity for her to ask any questions regarding the visit today. The patient agreed to participate. Verification of patient location:  Patient is located in the following state in which I hold an active license: [place]    Subjective:   Oniel Joyner is a 70 y.o. female who is concerned about COVID-19. Patient's symptoms include fever, chills, fatigue, malaise, nasal congestion, rhinorrhea, cough and headache. Patient denies sore throat, anosmia, loss of taste, shortness of breath, chest tightness, abdominal pain, nausea, vomiting, diarrhea and myalgias.      - Date of symptom onset: 11/6/2023  - Date of exposure: 11/5/2023      COVID-19 vaccination status: Fully vaccinated (primary series)    Exposure:   Contact with a person who is under investigation (PUI) for or who is positive for COVID-19 within the last 14 days?: Yes    Hospitalized recently for fever and/or lower respiratory symptoms?: No      Currently a healthcare worker that is involved in direct patient care?: No      Works in a special setting where the risk of COVID-19 transmission may be high? (this may include long-term care, correctional and group home facilities; homeless shelters; assisted-living facilities and group homes.): No      Resident in a special setting where the risk of COVID-19 transmission may be high? (this may include long-term care, correctional and group home facilities; homeless shelters; assisted-living facilities and group homes.): No      COVID home test + today     Lab Results   Component Value Date    SARSCOV2 Negative 10/29/2023    [address] [facility] Not Detected 02/08/2021       Review of Systems   Constitutional:  Positive for chills, fatigue and fever. HENT:  Positive for congestion and rhinorrhea. Negative for sore throat. Respiratory:  Positive for cough. Negative for chest tightness and shortness of breath. Gastrointestinal:  Negative for abdominal pain, diarrhea, nausea and vomiting. Musculoskeletal:  Negative for myalgias. Neurological:  Positive for headaches.      Current Outpatient Medications on File Prior to Visit   Medication Sig   • Alogliptin Benzoate 25 MG TABS TAKE ONE TABLET BY MOUTH EVERY DAY IN THE MORNING   • amLODIPine (NORVASC) 5 mg tablet TAKE ONE TABLET BY MOUTH EVERY DAY   • Bempedoic Acid (Nexletol) 180 MG TABS Take 1 tablet by mouth in the morning   • clopidogrel (PLAVIX) 75 mg tablet Take 1 tablet (75 mg total) by mouth daily   • famotidine (PEPCID) 20 mg tablet Take 1 tablet (20 mg total) by mouth 2 (two) times a day   • fexofenadine (ALLEGRA) 180 MG tablet Take 1 tablet (180 mg total) by mouth daily   • fluticasone (FLONASE) 50 mcg/act nasal spray 2 sprays into each nostril daily   • gemfibrozil (LOPID) 600 mg tablet TAKE ONE TABLET BY MOUTH TWICE A DAY BEFORE MEALS   • glimepiride (AMARYL) 2 mg tablet TAKE ONE TABLET BY MOUTH TWICE A DAY   • losartan-hydrochlorothiazide (HYZAAR) 100-12.5 MG per tablet Take 1 tablet by mouth daily   • metFORMIN (GLUCOPHAGE) 1000 MG tablet TAKE ONE TABLET BY MOUTH TWICE A DAY WITH MEALS (GENERIC FOR GLUCOPHAGE)   • Misc Natural Products (DAILY HERBS IMMUNE DEFENSE PO) Take by mouth   • Multiple Vitamin (MULTI-VITAMIN DAILY PO) Take 1 tablet by mouth daily   • triamcinolone (KENALOG) 0.1 % cream APPLY TOPICALLY TWO TIMES A DAY UNDER THE LEFT BREAST AS NEEDED FOR FLARES   • [DISCONTINUED] colesevelam (WELCHOL) 625 mg tablet Take 3 tablets (1,875 mg total) by mouth 2 (two) times a day with meals   • [DISCONTINUED] meclizine (ANTIVERT) 25 mg tablet Take 1 tablet (25 mg total) by mouth every 8 (eight) hours   • [DISCONTINUED] mometasone (ELOCON) 0.1 % cream Apply topically daily       Objective:    Pulse 101   Temp (!) 101.8 °F (38.8 °C)   Resp 16   SpO2 97%        Physical Exam  Constitutional:       General: She is not in acute distress. Appearance: She is not ill-appearing. Cardiovascular:      Rate and Rhythm: Tachycardia present. Pulmonary:      Effort: Pulmonary effort is normal. No respiratory distress. Neurological:      Mental Status: She is alert and oriented to person, place, and time.    Psychiatric:         Mood and Affect: Mood normal.       Benny Loyola MD

## 2023-11-08 NOTE — TELEPHONE ENCOUNTER
Reason for Disposition  • [1] IJGME-19 diagnosed by positive lab test (e.g., PCR, rapid self-test kit) AND [2] mild symptoms (e.g., cough, fever, others) AND [4] no complications or SOB    Answer Assessment - Initial Assessment Questions  1. COVID-19 DIAGNOSIS: "Who made your COVID-19 diagnosis?" "Was it confirmed by a positive lab test or self-test?" If not diagnosed by a doctor (or NP/PA), ask "Are there lots of cases (community spread) where you live?" Note: See [facility] health department website, if unsure. Home test   2. COVID-19 EXPOSURE: "Was there any known exposure to COVID before the symptoms began?" CDC Definition of close contact: within 6 feet (2 meters) for a total of 15 minutes or more over a 24-hour period. yes  3. ONSET: "When did the COVID-19 symptoms start?"       11/6  4. WORST SYMPTOM: "What is your worst symptom?" (e.g., cough, fever, shortness of breath, muscle aches)      Headache  5. COUGH: "Do you have a cough?" If Yes, ask: "How bad is the cough?"        yes  6. FEVER: "Do you have a fever?" If Yes, ask: "What is your temperature, how was it measured, and when did it start?"      Yes 100.3  7. RESPIRATORY STATUS: "Describe your breathing?" (e.g., shortness of breath, wheezing, unable to speak)       no  8. BETTER-SAME-WORSE: "Are you getting better, staying the same or getting worse compared to yesterday?"  If getting worse, ask, "In what way?"      same  9. HIGH RISK DISEASE: "Do you have any chronic medical problems?" (e.g., asthma, heart or lung disease, weak immune system, obesity, etc.)      no  10. VACCINE: "Have you had the COVID-19 vaccine?" If Yes, ask: "Which one, how many shots, when did you get it?"        yes  11. BOOSTER: "Have you received your COVID-19 booster?" If Yes, ask: "Which one and when did you get it?"        Yes     13.  OTHER SYMPTOMS: "Do you have any other symptoms?"  (e.g., chills, fatigue, headache, loss of smell or taste, muscle pain, sore throat) Headaches,sore throat,body aches       14.  O2 SATURATION MONITOR:  "Do you use an oxygen saturation monitor (pulse oximeter) at home?" If Yes, ask "What is your reading (oxygen level) today?" "What is your usual oxygen saturation reading?" (e.g., 95%)        no    Protocols used: Coronavirus (COVID-19) Diagnosed or Suspected-ADULT-[place]

## 2023-11-08 NOTE — TELEPHONE ENCOUNTER
Regarding: Covid Positive  ----- Message from Isabelle Board sent at 11/8/2023  2:37 PM EST -----  Patient called stating she is covid positive today and wanted to know if she should be alternating Tylenol and ibuprofen until she is seen in the office on Friday. Please call patient back to discuss.

## 2023-11-10 ENCOUNTER — TELEPHONE (OUTPATIENT)
Dept: NEUROSURGERY | Facility: CLINIC | Age: 63
End: 2023-11-10

## 2023-11-10 NOTE — TELEPHONE ENCOUNTER
Pt covid + and has symptoms on 11/10/23, moved appt to 12/11/23 at [phone] in [facility], pt on wait list for appt 11/23 at the earliest

## 2023-11-15 ENCOUNTER — HOSPITAL ENCOUNTER (OUTPATIENT)
Dept: NON INVASIVE DIAGNOSTICS | Facility: HOSPITAL | Age: 63
Discharge: HOME | End: 2023-11-15
Payer: COMMERCIAL

## 2023-11-15 VITALS
DIASTOLIC BLOOD PRESSURE: 62 MMHG | BODY MASS INDEX: 29.16 KG/M2 | SYSTOLIC BLOOD PRESSURE: 132 MMHG | HEIGHT: 65 IN | WEIGHT: 175 LBS

## 2023-11-15 DIAGNOSIS — R42 DIZZINESS: ICD-10-CM

## 2023-11-15 LAB
AORTIC ROOT: 3.4 CM
APICAL FOUR CHAMBER EJECTION FRACTION: 49 %
AV LVOT PEAK GRADIENT: 2 MMHG
AV PEAK GRADIENT: 4 MMHG
DOP CALC LVOT AREA: 3.46 CM2
DOP CALC LVOT DIAMETER: 2.1 CM
E WAVE DECELERATION TIME: 203 MS
E/A RATIO: 0.52
FRACTIONAL SHORTENING: 27 (ref 28–44)
INTERVENTRICULAR SEPTUM IN DIASTOLE (PARASTERNAL SHORT AXIS VIEW): 1 CM
INTERVENTRICULAR SEPTUM: 1 CM (ref 0.6–1.1)
LAAS-AP2: 20.2 CM2
LAAS-AP4: 13 CM2
LEFT ATRIUM SIZE: 3 CM
LEFT ATRIUM VOLUME (MOD BIPLANE): 46 ML
LEFT ATRIUM VOLUME INDEX (MOD BIPLANE): 24.6 ML/M2
LEFT INTERNAL DIMENSION IN SYSTOLE: 3.2 CM (ref 2.1–4)
LEFT VENTRICLE DIASTOLIC VOLUME (MOD BIPLANE): 72 ML
LEFT VENTRICLE SYSTOLIC VOLUME (MOD BIPLANE): 33 ML
LEFT VENTRICULAR INTERNAL DIMENSION IN DIASTOLE: 4.4 CM (ref 3.5–6)
LEFT VENTRICULAR POSTERIOR WALL IN END DIASTOLE: 1 CM
LEFT VENTRICULAR STROKE VOLUME: 46 ML
LV EF: 55 %
LVSV (TEICH): 46 ML
MV E'TISSUE VEL-LAT: 10 CM/S
MV E'TISSUE VEL-SEP: 8 CM/S
MV PEAK A VEL: 0.83 M/S
MV PEAK E VEL: 43 CM/S
MV STENOSIS PRESSURE HALF TIME: 59 MS
MV VALVE AREA P 1/2 METHOD: 3.73
RIGHT ATRIUM AREA SYSTOLE A4C: 11.5 CM2
RIGHT VENTRICLE ID DIMENSION: 2.5 CM
SL CV LEFT ATRIUM LENGTH A2C: 4.8 CM
SL CV PED ECHO LEFT VENTRICLE DIASTOLIC VOLUME (MOD BIPLANE) 2D: 87 ML
SL CV PED ECHO LEFT VENTRICLE SYSTOLIC VOLUME (MOD BIPLANE) 2D: 41 ML
TRICUSPID ANNULAR PLANE SYSTOLIC EXCURSION: 1.3 CM

## 2023-11-15 PROCEDURE — 93306 TTE W/DOPPLER COMPLETE: CPT | Performed by: INTERNAL MEDICINE

## 2023-11-15 PROCEDURE — 93306 TTE W/DOPPLER COMPLETE: CPT

## 2023-12-10 PROBLEM — R93.89 ABNORMAL COMPUTED TOMOGRAPHY ANGIOGRAPHY (CTA): Status: ACTIVE | Noted: 2023-12-10

## 2023-12-10 NOTE — ASSESSMENT & PLAN NOTE
CTA head/neck: Focal moderate stenosis of the left M2 segment branch, hypoplastic left vertebral artery with irregularity at the proximal V4 segment (atherosclerosis versus short segment dissection), and 3 mm outpouching involving the right V3 segment (aneurysm versus pseudoaneurysm).     , A1c 7.4   MRI brain negative   Echocardiogram ordered however patient requesting this be done in the outpatient setting   Telemetry monitoring unremarkable   Appreciate neurology consult and recommendations   [id number] 154, continue home dose Plavix   Started on Lipitor 40 mg daily however patient reports intolerance to statin   Outpatient follow-up with neurosurgery, referral placed on discharge

## 2023-12-10 NOTE — ASSESSMENT & PLAN NOTE
History of pineal cyst seen on imaging incidental finding and 2018. MRI brain 10/29/23 BRAIN PARENCHYMA:  There is no discrete mass, mass effect or midline shift. There is no intracranial hemorrhage. There is no evidence of acute infarction and diffusion imaging is unremarkable. T2/FLAIR hyperintensities in the periventricular and subcortical white matter are nonspecific but likely represent mild chronic microangiopathy. Peripherally calcified pineal cyst measuring 1.6 x 1.1 cm that was described and stable on multiple outside reports and measured 1.8 cm on report for MRI brain dated 6/1/2015. Plan   No neurosurgical intervention or surveillance indicated.

## 2023-12-11 ENCOUNTER — OFFICE VISIT (OUTPATIENT)
Dept: NEUROSURGERY | Facility: CLINIC | Age: 63
End: 2023-12-11
Payer: COMMERCIAL

## 2023-12-11 ENCOUNTER — TELEPHONE (OUTPATIENT)
Dept: NEUROLOGY | Facility: CLINIC | Age: 63
End: 2023-12-11

## 2023-12-11 VITALS
WEIGHT: 175 LBS | OXYGEN SATURATION: 97 % | TEMPERATURE: 98.1 F | HEIGHT: 65 IN | HEART RATE: 91 BPM | RESPIRATION RATE: 16 BRPM | SYSTOLIC BLOOD PRESSURE: 136 MMHG | BODY MASS INDEX: 29.16 KG/M2 | DIASTOLIC BLOOD PRESSURE: 88 MMHG

## 2023-12-11 DIAGNOSIS — R93.89 ABNORMAL COMPUTED TOMOGRAPHY ANGIOGRAPHY (CTA): ICD-10-CM

## 2023-12-11 DIAGNOSIS — I67.1 INTRACRANIAL ANEURYSM: ICD-10-CM

## 2023-12-11 DIAGNOSIS — I72.6 PSEUDOANEURYSM OF VERTEBRAL ARTERY (HCC): ICD-10-CM

## 2023-12-11 DIAGNOSIS — E34.8 PINEAL GLAND CYST: Primary | ICD-10-CM

## 2023-12-11 PROCEDURE — 99203 OFFICE O/P NEW LOW 30 MIN: CPT | Performed by: NURSE PRACTITIONER

## 2023-12-11 NOTE — TELEPHONE ENCOUNTER
Martinez Fontana,    Can someone please reach out to this patient to schedule a HFU asap? Please see Freida's attached message. Patient was admitted from 10/29/23-10/30/23 and a referral was placed to neurology. Per inpatient neurology notes:  "Gonzalez Virk will need follow up in 6-8 weeks with general attending or AP .   She will not require outpatient neurological testing."    Thank you,  Kimberly Thornton

## 2023-12-11 NOTE — TELEPHONE ENCOUNTER
----- Message from [address] sent at 12/11/2023 10:19 AM EST -----  Regarding: neurology referral  Cathy Espinoza  Seen in ed 12/29-12/30    Neurology consult for stroke alert . HO prior stroke . Ordered neurology referral and f/u  after discharge . Patient has not received call form neurology for follow-up .     Thank You ema

## 2023-12-11 NOTE — ASSESSMENT & PLAN NOTE
As addressed in HPI  Chronic Plavix  use, continues   Non focal examination  Has not consulted with neurology  since d/c , referral completed and seen by neurology dirubg admit      Imagining  CTA head/neck: Focal moderate stenosis of the left M2 segment branch, hypoplastic left vertebral artery with irregularity at the proximal V4 segment (atherosclerosis versus short segment dissection), and 3 mm outpouching involving the right V3 segment (aneurysm versus pseudoaneurysm). Collaborative reviewing discussion with Dr. Samira Abdi, no neurosurgical intervention indicated. Continue Plavix. 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 Need f/u with neurology for secondary stroke prevention   Checkout notified neurology of referral since 10/30/2023, with no follow-up appointment scheduled. Inbox message sent to neurology stroke nurse navigator Annie Barksdale. RTO prn  AVS --stroke   Advised to contact neurosurgery with additional questions or concerns.   Continue plavis

## 2023-12-11 NOTE — PROGRESS NOTES
Assessment/Plan:    Pineal gland cyst  History of pineal cyst seen on imaging incidental finding and 2018. MRI brain 10/29/23 BRAIN PARENCHYMA:  There is no discrete mass, mass effect or midline shift. There is no intracranial hemorrhage. There is no evidence of acute infarction and diffusion imaging is unremarkable. T2/FLAIR hyperintensities in the periventricular and subcortical white matter are nonspecific but likely represent mild chronic microangiopathy. Peripherally calcified pineal cyst measuring 1.6 x 1.1 cm that was described and stable on multiple outside reports and measured 1.8 cm on report for MRI brain dated 6/1/2015. Plan   No neurosurgical intervention or surveillance indicated. Pseudoaneurysm of vertebral artery (HCC)  As addressed in HPI  Chronic Plavix  use, continues   Non focal examination  Has not consulted with neurology  since d/c , referral completed and seen by neurology dirubg admit      Imagining  CTA head/neck: Focal moderate stenosis of the left M2 segment branch, hypoplastic left vertebral artery with irregularity at the proximal V4 segment (atherosclerosis versus short segment dissection), and 3 mm outpouching involving the right V3 segment (aneurysm versus pseudoaneurysm). Collaborative reviewing discussion with Dr. Kelle Andres, no neurosurgical intervention indicated. Continue Plavix. 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 Need f/u with neurology for secondary stroke prevention   Checkout notified neurology of referral since 10/30/2023, with no follow-up appointment scheduled. Inbox message sent to neurology stroke nurse navigator Caleb Manzanares. RTO prn  AVS --stroke   Advised to contact neurosurgery with additional questions or concerns.   Continue plavis           Subjective: Consult referral from ED for incidental finding on CTA head and neck. Initial neurosurgery visit    Patient ID: Bautista Brown is a 70 y.o. female PM/SH :diabetes, HTN, TIA, previous stroke in 2018 s/p PFO closure,    HPI   Presented with intermittent dizziness and unsteadiness for 1 week. Acute onset left-sided facial droop last night. BP on arrival 179/85. Is right handed. Pt not a thrombolytic candidate due to being outside of thrombolytic time window. Pt on Plavix PTA  Consulted with neurology, ordered f/u referral post discharge . Tiana Richard On chronic Plavix  since stroke 2018 . CTA stroke alert 10/29/23 RIGHT VERTEBRAL ARTERY CERVICAL SEGMENT:  Normal origin. Dominant vertebral. Focal broad-based 3 mm outpouching involving the right V3 segment (301:151) may represent focal aneurysm versus pseudoaneurysm. Otherwise the vessel is normal in caliber throughout the neck. Referral ordered for neurosurgery outpatient follow-up postdischarge. She presents today for initial neurosurgery visit. Social   Retired , worked PT as a sectary. Lives  with . Financial support [employer] benefit and SS  Smoke --no   Drink ---no     REVIEW OF SYSTEMS  Review of Systems   HENT:  Positive for tinnitus (B/L not new). Eyes:  Positive for visual disturbance (occasional blurriness). Negative for pain. Respiratory:  Negative for chest tightness and shortness of breath. Gastrointestinal: Negative. Genitourinary: Negative. Musculoskeletal:  Negative for gait problem. Skin: Negative. Allergic/Immunologic: Positive for environmental allergies. Negative for food allergies. Neurological:  Positive for numbness (N/T hands/fingers R>L) and headaches (dull, most motnings). Negative for dizziness, seizures, speech difficulty and weakness. Hematological:  Bruises/bleeds easily. Plavix   Psychiatric/Behavioral:  Negative for confusion and decreased concentration.           Meds/Allergies     Current Outpatient Medications   Medication Sig Dispense Refill amLODIPine (NORVASC) 5 mg tablet TAKE ONE TABLET BY MOUTH EVERY DAY 30 tablet 3    clopidogrel (PLAVIX) 75 mg tablet Take 1 tablet (75 mg total) by mouth daily 90 tablet 0    famotidine (PEPCID) 20 mg tablet Take 1 tablet (20 mg total) by mouth 2 (two) times a day 60 tablet 6    fluticasone (FLONASE) 50 mcg/act nasal spray 2 sprays into each nostril daily (Patient taking differently: 2 sprays into each nostril as needed) 16 g 11    gemfibrozil (LOPID) 600 mg tablet TAKE ONE TABLET BY MOUTH TWICE A DAY BEFORE MEALS 60 tablet 1    glimepiride (AMARYL) 2 mg tablet TAKE ONE TABLET BY MOUTH TWICE A  tablet 2    losartan-hydrochlorothiazide (HYZAAR) 100-12.5 MG per tablet Take 1 tablet by mouth daily 90 tablet 3    metFORMIN (GLUCOPHAGE) 1000 MG tablet TAKE ONE TABLET BY MOUTH TWICE A DAY WITH MEALS (GENERIC FOR GLUCOPHAGE) 180 tablet 1    Misc Natural Products (DAILY HERBS IMMUNE DEFENSE PO) Take by mouth      Multiple Vitamin (MULTI-VITAMIN DAILY PO) Take 1 tablet by mouth daily      triamcinolone (KENALOG) 0.1 % cream APPLY TOPICALLY TWO TIMES A DAY UNDER THE LEFT BREAST AS NEEDED FOR FLARES 30 g 0    Alogliptin Benzoate 25 MG TABS TAKE ONE TABLET BY MOUTH EVERY DAY IN THE MORNING (Patient not taking: Reported on 12/11/2023) 30 tablet 1    Bempedoic Acid (Nexletol) 180 MG TABS Take 1 tablet by mouth in the morning 30 tablet 5    fexofenadine (ALLEGRA) 180 MG tablet Take 1 tablet (180 mg total) by mouth daily (Patient not taking: Reported on 12/11/2023) 30 tablet 11     No current facility-administered medications for this visit.        Allergies   Allergen Reactions    Bactrim [Sulfamethoxazole-Trimethoprim]     Neomycin-Bacitracin Zn-Polymyx Other (See Comments)     REDNESS    Statins Myalgia       PAST HISTORY    Past Medical History:   Diagnosis Date    Diabetes mellitus ([address])     GERD (gastroesophageal reflux disease)     Take pepcid but still bothersome with certain foods    CEASBPUI(766.9)     Occasional Hypertension     Nasal congestion     Espyat night    Otitis media     Occasional    Stroke (cerebrum) (HCC)     Stroke (HCC)     Tia    Tinnitus     Current       Past Surgical History:   Procedure Laterality Date    CHOLECYSTECTOMY LAPAROSCOPIC      MOHS SURGERY      Micrographic Surgery Nose, Last assessed: 2/13/17    TOTAL KNEE ARTHROPLASTY Left 03/08/2017    Last assessed: 12/26/17    TUBAL LIGATION         Social History     Tobacco Use    Smoking status: Never    Smokeless tobacco: Never    Tobacco comments:     No secondhand smoke exposure   Vaping Use    Vaping Use: Never used   Substance Use Topics    Alcohol use: Never    Drug use: Never       Family History   Problem Relation Age of Onset    Diabetes Father     Hypertension Father     Stroke Father     Anemia Daughter     Breast cancer Paternal Aunt        The following portions of the patient's history were reviewed and updated as appropriate: allergies, current medications, past family history, past medical history, past social history, past surgical history and problem list.      EXAM    Vitals:Blood pressure 136/88, pulse 91, temperature 98.1 °F (36.7 °C), temperature source Temporal, resp. rate 16, height 5' 5" (1.651 m), weight 79.4 kg (175 lb), SpO2 97 %, not currently breastfeeding. ,Body mass index is 29.12 kg/m². Physical Exam  Vitals and nursing note reviewed. Constitutional:       General: She is not in acute distress. Appearance: Normal appearance. She is not ill-appearing or toxic-appearing. HENT:      Head: Normocephalic and atraumatic. Pulmonary:      Effort: Pulmonary effort is normal. No respiratory distress. Musculoskeletal:      Right lower leg: No edema. Left lower leg: No edema. Skin:     General: Skin is warm and dry. Neurological:      General: No focal deficit present. Mental Status: She is alert and oriented to person, place, and time. Motor: Motor strength is normal.     Gait: Gait is intact. Psychiatric:         Mood and Affect: Mood normal.         Behavior: Behavior normal.         Neurologic Exam     Mental Status   Oriented to person, place, and time. Level of consciousness: alert    Motor Exam     Strength   Strength 5/5 throughout. Sensory Exam   Light touch normal.     Gait, Coordination, and Reflexes     Gait  Gait: normal      Imaging Studies  Echo complete w/ contrast if indicated    Result Date: 11/15/2023  Narrative:   Left Ventricle: Left ventricular cavity size is normal. Wall thickness is mildly increased. There is mild concentric hypertrophy. The left ventricular ejection fraction is 65%. Systolic function is normal. Wall motion is normal. Diastolic function is mildly abnormal, consistent with grade I (abnormal) relaxation. Right Ventricle: Systolic function visually appears normal.   Tricuspid Valve: There is trace regurgitation. Pulmonic Valve: There is trace regurgitation. Prior TTE study available for comparison. Prior study date: 12/23/2022. No significant changes noted compared to the prior study. I have personally reviewed pertinent reports. and I have personally reviewed pertinent films in PACS    I have spent a total time of 30 minutes on 12/11/23 in caring for this patient including Diagnostic results, Risks and benefits of tx options, Instructions for management, Patient and family education, Importance of tx compliance, Risk factor reductions, Impressions, Counseling / Coordination of care, Documenting in the medical record, Reviewing / ordering tests, medicine, procedures  , Obtaining or reviewing history  , and Communicating with other healthcare professionals .

## 2023-12-11 NOTE — TELEPHONE ENCOUNTER
I have called the patient no answer, Left a detailed message as we are able to offer Julia Jimenez, U, 12/18/2023, 4 pm .    I have scheduled and informed the patient to please call us back and confirm this date and time if that date and time doesn't work we can re-schedule to a date and time that does. Also sending a Featherlight. Thank you in advance,     Leonora Chi       HFU/ JELANI SONA/ Dizziness , Stroke-like symptoms     DC- HOME- 10/30/2023      Rasta Michaels will need follow up in 6-8 weeks with general attending or AP . She will not require outpatient neurological testing.

## 2023-12-12 NOTE — TELEPHONE ENCOUNTER
Patient called and has confirmed HFU with Tania Chavez in [facility] on 12/18/23 @ 4pm

## 2023-12-13 ENCOUNTER — TELEPHONE (OUTPATIENT)
Dept: NEUROLOGY | Facility: CLINIC | Age: 63
End: 2023-12-13

## 2023-12-13 NOTE — TELEPHONE ENCOUNTER
I left voicemail for Patient to call the office to confirm her 12/18 appointment with Everlean Gilford

## 2024-01-03 DIAGNOSIS — I10 ESSENTIAL HYPERTENSION: ICD-10-CM

## 2024-01-04 RX ORDER — AMLODIPINE BESYLATE 5 MG/1
5 TABLET ORAL DAILY
Qty: 30 TABLET | Refills: 5 | Status: SHIPPED | OUTPATIENT
Start: 2024-01-04

## 2024-01-12 DIAGNOSIS — L40.9 PSORIASIS: ICD-10-CM

## 2024-01-12 RX ORDER — TRIAMCINOLONE ACETONIDE 1 MG/G
CREAM TOPICAL
Qty: 30 G | Refills: 2 | Status: SHIPPED | OUTPATIENT
Start: 2024-01-12

## 2024-01-12 NOTE — TELEPHONE ENCOUNTER
Reason for call:   [x] Refill   [] Prior Auth  [] Other:     Office:   [x] PCP/Provider - Dr Cortez  [] Specialty/Provider -     Medication: triamcinolone    Dose/Frequency: 0.1% BID as needed    Quantity: 30g tube     Pharmacy: [facility] [place]    Does the patient have enough for 3 days?   [] Yes   [x] No - Send as HP to POD

## 2024-02-07 ENCOUNTER — TELEPHONE (OUTPATIENT)
Dept: NEUROLOGY | Facility: CLINIC | Age: 64
End: 2024-02-07

## 2024-02-21 ENCOUNTER — OFFICE VISIT (OUTPATIENT)
Dept: NEUROLOGY | Facility: CLINIC | Age: 64
End: 2024-02-21
Payer: COMMERCIAL

## 2024-02-21 VITALS
BODY MASS INDEX: 28.82 KG/M2 | HEIGHT: 65 IN | SYSTOLIC BLOOD PRESSURE: 164 MMHG | WEIGHT: 173 LBS | HEART RATE: 105 BPM | DIASTOLIC BLOOD PRESSURE: 100 MMHG

## 2024-02-21 DIAGNOSIS — R42 DIZZINESS: ICD-10-CM

## 2024-02-21 DIAGNOSIS — Z78.9 STATIN INTOLERANCE: ICD-10-CM

## 2024-02-21 DIAGNOSIS — E11.8 TYPE 2 DIABETES MELLITUS WITH COMPLICATION, WITHOUT LONG-TERM CURRENT USE OF INSULIN (HCC): Primary | ICD-10-CM

## 2024-02-21 DIAGNOSIS — Z86.73 HISTORY OF CVA (CEREBROVASCULAR ACCIDENT): ICD-10-CM

## 2024-02-21 DIAGNOSIS — E78.2 MIXED HYPERLIPIDEMIA: ICD-10-CM

## 2024-02-21 DIAGNOSIS — R93.89 ABNORMAL COMPUTED TOMOGRAPHY ANGIOGRAPHY (CTA): ICD-10-CM

## 2024-02-21 PROCEDURE — 99214 OFFICE O/P EST MOD 30 MIN: CPT | Performed by: NURSE PRACTITIONER

## 2024-02-21 RX ORDER — OMEPRAZOLE 20 MG/1
CAPSULE, DELAYED RELEASE ORAL
COMMUNITY

## 2024-02-21 NOTE — PATIENT INSTRUCTIONS
Continue with long term Plavix 75mg daily  D/W cardiology medication option for high cholesterol to assist in vascular risk management  Goal LDL <70  Work with cardiology on BP management for goal BP about 135/60-80  Continue to work with your endocrinologist re: better blood glucose control - referral provided  Goal A1C is <7.0  Can use over the counter wrist splint to the Rt hand at night time to alleviate the carpal tunnel, if no effect can refer to ortho hand for poss cortisone injection or other forms of treatment.   Can use meclizine for prolonged vertigo symptoms, if continues with onset of headache and vomiting along with weakness, speech changes or sensory changes go to ER for evaluation.  Follow up with Neurology office in 6 months or sooner if needed.

## 2024-02-21 NOTE — PROGRESS NOTES
Patient ID: Juliana Hernandez is a 63 y.o. female.    Assessment/Plan:       Diagnoses and all orders for this visit:    Type 2 diabetes mellitus with complication, without long-term current use of insulin (HCC)  -     Ambulatory Referral to Endocrinology; Future    Statin intolerance    Mixed hyperlipidemia    History of CVA (cerebrovascular accident)    Dizziness    Abnormal computed tomography angiography (CTA)     Continue with long term Plavix 75mg daily  D/W cardiology medication option for high cholesterol to assist in vascular risk management  Goal LDL <70  Work with cardiology on BP management for goal BP about 135/60-80  Continue to work with your endocrinologist re: better blood glucose control - referral provided  Goal A1C is <7.0  Can use over the counter wrist splint to the Rt hand at night time to alleviate the carpal tunnel, if no effect can refer to ortho hand for poss cortisone injection or other forms of treatment.   Can use meclizine for prolonged vertigo symptoms, if continues with onset of headache and vomiting along with weakness, speech changes or sensory changes go to ER for evaluation.  Follow up with Neurology office in 6 months or sooner if needed.     Subjective/HPI:  Juliana Hernandez is a 62yo female who comes to the office today as a hospital follow-up, seen in October 2023.  Patient has PMH of PFO closure on Plavix prior to her admission, TIA, DM, HTN and prior Rt PCA territory CVA.    X 1 week described as feeling off balance with left facial swelling and weakness x 1 day patient had an onset of dizziness.   Upon arrival to the ER, patient was a stroke alert and had MRI of the brain which was negative for any acute infarcts.  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  She had symptoms of gait and balance as well as dizziness, likely peripheral vestibulopathy and was advised to use  meclizine as needed.  Recommended outpatient vestibular rehabilitation and an endovascular surgery follow-up regarding her aneurysm.  Further hyperlipidemia managed as per PCP.  Patient's LDL was 131, goal to be less than  70, P2 Y12 was 154 showing good Plavix responder and patient's A1c was 7.8.  Upon discharge, patient was continued on Plavix 75 mg, given meclizine to use for persistent vertiginous dizziness.    Patient does have significant vascular disease for which she was left up to the PCP for management of hyperlipidemia.  Patient was seen by neurosurgery, was to continue her Plavix for chronic use.  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  This was stable on her prior MRI during her hospitalization.    Patient reports to the neurology office today as a hospital follow-up from October.  She notes that she has had no further issues with vertiginous type imbalance or dizziness.  She has not needed to use meclizine since her discharge but does report having it available in case she needs it.  Patient continues on her Plavix 75 mg daily, she has no significant issues with bleeding however on occasion does get nosebleeds which she uses saline spray and humidifier for.  Patient has remained off of statins, she has had history of severe side effects with statins, she has not gotten PCP regarding alternatives.  Patient does have an appointment upcoming with her cardiologist, will have conversations with them regarding alternatives to statin regimens.  Patient was advised of her LDL goalof less than 70, advised she has significant stenosis and is at risk for additional vascular disease therefore encouraged to continue working on obtaining and maintaining appropriate vascular risk management.  Patient's BP is elevated today, she does take Norvasc at home, does not appear as though she is on losartan/hydrochlorothiazide anymore.  Last prescription was written in November 2022.  Patient states  she is not sure if the orders are in there correctly, this may be why her blood pressure notes on a regular basis she tends to have elevated blood pressures at the beginning of her office appointments, they do trend down by the end of the appointment.  Patient will have this conversation regarding blood pressure management with her cardiologist in the upcoming appointment as well.  Patient's A1c's remain elevated, she is above her vascular goal range of less than 7.0.  Patient states that her PCP made changes to her diabetic medication and has had difficulties obtaining control since that time.  She has significant vascular risks therefore with regards to diabetes management, we did provide a referral for endocrinology for further evaluation and medication regimens.  Currently patient denies any lightheadedness, dizziness, headaches, visual field loss/diplopia, speech or swallowing changes, weaknesses, imbalances or falls.  She does have some blurring of her vision and is due for an eye doctor appointment.  She does report having some paresthesias, notes the first 3 fingers in the right hand with numbness and tingling which has been ongoing and can be painful at times.  Patient does not report any pain or numbness tingling up the arm, primarily in the first 3 fingers on the right hand.  Patient has a history of being a , she has not worked for some time and noted no pain or symptoms during that timeframe.  This is all been since she is no longer working.  Patient also reports some pain to the heel on the left foot, states that if she is on her feet too long, she will develop pain in that area.  Suspect this to be plantar fasciitis given the description patient made.  She has not trialed any bracing or seen orthopedics for this, suspect possible CTS of the right hand,  Encouraged her to do stretching and if ineffective can follow-up with her PCP regarding musculoskeletal changes.    Patient will continue  conversations with her outpatient support team including cardiology and PCP for management of her vascular risk factors.  Patient was counseled on signs and symptoms of additional stroke activity given her prior PCA stroke in her recent bouts of vertigo.  She will continue on Plavix 75 mg daily will need to continue talking to cardiology regarding cholesterol control.  She will follow-up in the outpatient neurology office in 6 months or sooner if needed.      The following portions of the patient's history were reviewed and updated as appropriate: allergies, current medications, past family history, past medical history, past social history, past surgical history, and problem list.      Past Medical History:   Diagnosis Date    Diabetes mellitus (HCC)     GERD (gastroesophageal reflux disease)     Take pepcid but still bothersome with certain foods    Headache(784.0)     Occasional    Hypertension     Nasal congestion     Espyat night    Otitis media     Occasional    Stroke (cerebrum) (HCC)     Stroke (HCC)     Tia    Tinnitus     Current       Past Surgical History:   Procedure Laterality Date    CHOLECYSTECTOMY LAPAROSCOPIC      MOHS SURGERY      Micrographic Surgery Nose, Last assessed: 2/13/17    TOTAL KNEE ARTHROPLASTY Left 03/08/2017    Last assessed: 12/26/17    TUBAL LIGATION         Social History     Socioeconomic History    Marital status: /Civil Union     Spouse name: None    Number of children: None    Years of education: None    Highest education level: None   Occupational History    Occupation: currently working   Tobacco Use    Smoking status: Never    Smokeless tobacco: Never    Tobacco comments:     No secondhand smoke exposure   Vaping Use    Vaping status: Never Used   Substance and Sexual Activity    Alcohol use: Never    Drug use: Never    Sexual activity: Yes     Partners: Male     Birth control/protection: None   Other Topics Concern    None   Social History Narrative    Feels safe at  home     Social Determinants of Health     Financial Resource Strain: Not on file   Food Insecurity: No Food Insecurity (10/30/2023)    Hunger Vital Sign     Worried About Running Out of Food in the Last Year: Never true     Ran Out of Food in the Last Year: Never true   Transportation Needs: No Transportation Needs (10/30/2023)    PRAPARE - Transportation     Lack of Transportation (Medical): No     Lack of Transportation (Non-Medical): No   Physical Activity: Not on file   Stress: Not on file   Social Connections: Not on file   Intimate Partner Violence: Not on file   Housing Stability: Unknown (10/30/2023)    Housing Stability Vital Sign     Unable to Pay for Housing in the Last Year: No     Number of Places Lived in the Last Year: Not on file     Unstable Housing in the Last Year: No       Family History   Problem Relation Age of Onset    Diabetes Father     Hypertension Father     Stroke Father     Anemia Daughter     Breast cancer Paternal Aunt          Current Outpatient Medications:     amLODIPine (NORVASC) 5 mg tablet, Take 1 tablet by mouth once daily, Disp: 30 tablet, Rfl: 5    Bempedoic Acid (Nexletol) 180 MG TABS, Take 1 tablet by mouth in the morning, Disp: 30 tablet, Rfl: 5    clopidogrel (PLAVIX) 75 mg tablet, Take 1 tablet (75 mg total) by mouth daily, Disp: 90 tablet, Rfl: 0    famotidine (PEPCID) 20 mg tablet, Take 1 tablet (20 mg total) by mouth 2 (two) times a day, Disp: 60 tablet, Rfl: 6    glimepiride (AMARYL) 2 mg tablet, TAKE ONE TABLET BY MOUTH TWICE A DAY, Disp: 180 tablet, Rfl: 2    metFORMIN (GLUCOPHAGE) 1000 MG tablet, TAKE ONE TABLET BY MOUTH TWICE A DAY WITH MEALS (GENERIC FOR GLUCOPHAGE), Disp: 180 tablet, Rfl: 1    Misc Natural Products (DAILY HERBS IMMUNE DEFENSE PO), Take by mouth, Disp: , Rfl:     Multiple Vitamin (MULTI-VITAMIN DAILY PO), Take 1 tablet by mouth daily, Disp: , Rfl:     omeprazole (PriLOSEC) 20 mg delayed release capsule, , Disp: , Rfl:     triamcinolone (KENALOG)  "0.1 % cream, APPLY TOPICALLY TWO TIMES A DAY UNDER THE LEFT BREAST AS NEEDED FOR FLARES, Disp: 30 g, Rfl: 2    Alogliptin Benzoate 25 MG TABS, TAKE ONE TABLET BY MOUTH EVERY DAY IN THE MORNING (Patient not taking: Reported on 12/11/2023), Disp: 30 tablet, Rfl: 1    fexofenadine (ALLEGRA) 180 MG tablet, Take 1 tablet (180 mg total) by mouth daily (Patient not taking: Reported on 12/11/2023), Disp: 30 tablet, Rfl: 11    fluticasone (FLONASE) 50 mcg/act nasal spray, 2 sprays into each nostril daily (Patient not taking: Reported on 2/21/2024), Disp: 16 g, Rfl: 11    gemfibrozil (LOPID) 600 mg tablet, TAKE ONE TABLET BY MOUTH TWICE A DAY BEFORE MEALS (Patient not taking: Reported on 2/21/2024), Disp: 60 tablet, Rfl: 1    losartan-hydrochlorothiazide (HYZAAR) 100-12.5 MG per tablet, Take 1 tablet by mouth daily, Disp: 90 tablet, Rfl: 3    Allergies   Allergen Reactions    Bactrim [Sulfamethoxazole-Trimethoprim]     Neomycin-Bacitracin Zn-Polymyx Other (See Comments)     REDNESS    Statins Myalgia        Blood pressure 164/100, pulse 105, height 5' 5\" (1.651 m), weight 78.5 kg (173 lb), not currently breastfeeding.               Objective:    Blood pressure 164/100, pulse 105, height 5' 5\" (1.651 m), weight 78.5 kg (173 lb), not currently breastfeeding.    Physical Exam  Vitals reviewed.   Constitutional:       Appearance: Normal appearance. She is well-developed.   HENT:      Head: Normocephalic.      Nose: Nose normal.      Mouth/Throat:      Mouth: Mucous membranes are moist.   Eyes:      General: Lids are normal.      Extraocular Movements: Extraocular movements intact.      Pupils: Pupils are equal, round, and reactive to light.   Cardiovascular:      Rate and Rhythm: Normal rate.   Pulmonary:      Effort: Pulmonary effort is normal.   Abdominal:      Palpations: Abdomen is soft.   Musculoskeletal:      Cervical back: Normal range of motion.   Skin:     General: Skin is warm and dry.   Neurological:      Mental " Status: She is alert.      Motor: Motor strength is normal.     Coordination: Romberg sign negative.      Deep Tendon Reflexes: Reflexes are normal and symmetric.   Psychiatric:         Attention and Perception: Attention and perception normal.         Mood and Affect: Mood and affect normal.         Speech: Speech normal.         Behavior: Behavior normal. Behavior is cooperative.         Thought Content: Thought content normal.         Cognition and Memory: Cognition and memory normal.         Judgment: Judgment normal.         Neurological Exam  Mental Status  Alert. Oriented to person, place, time and situation. Memory is normal. Recent and remote memory are intact. Speech is normal. Language is fluent with no aphasia. Attention and concentration are normal. Fund of knowledge is appropriate for level of education.    Cranial Nerves  CN II: Visual acuity is normal. Visual fields full to confrontation.  CN III, IV, VI: Extraocular movements intact bilaterally. Normal lids and orbits bilaterally. Pupils equal round and reactive to light bilaterally.  CN V: Facial sensation is normal.  CN VII: Full and symmetric facial movement.  CN VIII: Hearing is normal.  CN IX, X: Palate elevates symmetrically. Normal gag reflex.  CN XI: Shoulder shrug strength is normal.  CN XII: Tongue midline without atrophy or fasciculations.    Motor  Normal muscle bulk throughout. Normal muscle tone. No abnormal involuntary movements. Strength is 5/5 throughout all four extremities.    Sensory  Light touch is normal in upper and lower extremities. Temperature is normal in upper and lower extremities. Vibration is normal in upper and lower extremities. Proprioception is normal in upper and lower extremities.     Reflexes  Deep tendon reflexes are 2+ and symmetric in all four extremities.    Right pathological reflexes: Papi's absent.  Left pathological reflexes: Papi's absent.    Coordination  Right: Finger-to-nose normal. Rapid  alternating movement normal. Heel-to-shin normal.Left: Finger-to-nose normal. Rapid alternating movement normal. Heel-to-shin normal.    Gait  Casual gait is normal including stance, stride, and arm swing.Normal toe walking. Normal heel walking. Normal tandem gait. Romberg is absent. Able to rise from chair without using arms.        ROS:    Review of Systems   Constitutional:  Negative for appetite change, fatigue and fever.   HENT:  Positive for tinnitus. Negative for hearing loss, trouble swallowing and voice change.    Eyes:  Positive for visual disturbance (cloudy). Negative for photophobia and pain.   Respiratory: Negative.  Negative for shortness of breath.    Cardiovascular: Negative.  Negative for palpitations.   Gastrointestinal: Negative.  Negative for nausea and vomiting.   Endocrine: Negative.  Negative for cold intolerance.   Genitourinary: Negative.  Negative for dysuria, frequency and urgency.   Musculoskeletal:  Negative for back pain, gait problem, myalgias, neck pain and neck stiffness.   Skin: Negative.  Negative for rash.   Allergic/Immunologic: Negative.    Neurological:  Positive for numbness (right hand and fingers). Negative for dizziness, tremors, seizures, syncope, facial asymmetry, speech difficulty, weakness, light-headedness and headaches.   Hematological: Negative.  Does not bruise/bleed easily.   Psychiatric/Behavioral: Negative.  Negative for confusion, hallucinations and sleep disturbance.

## 2024-02-23 ENCOUNTER — OFFICE VISIT (OUTPATIENT)
Dept: FAMILY MEDICINE CLINIC | Facility: CLINIC | Age: 64
End: 2024-02-23
Payer: COMMERCIAL

## 2024-02-23 VITALS
OXYGEN SATURATION: 97 % | HEIGHT: 65 IN | BODY MASS INDEX: 29.12 KG/M2 | RESPIRATION RATE: 20 BRPM | HEART RATE: 99 BPM | WEIGHT: 174.8 LBS | TEMPERATURE: 97.7 F

## 2024-02-23 DIAGNOSIS — B37.31 VAGINAL CANDIDA: Primary | ICD-10-CM

## 2024-02-23 DIAGNOSIS — E11.8 TYPE 2 DIABETES MELLITUS WITH COMPLICATION, WITHOUT LONG-TERM CURRENT USE OF INSULIN (HCC): ICD-10-CM

## 2024-02-23 PROCEDURE — 99213 OFFICE O/P EST LOW 20 MIN: CPT | Performed by: FAMILY MEDICINE

## 2024-02-23 RX ORDER — FLUCONAZOLE 150 MG/1
150 TABLET ORAL
Qty: 2 TABLET | Refills: 0 | Status: SHIPPED | OUTPATIENT
Start: 2024-02-23 | End: 2024-02-27

## 2024-02-23 RX ORDER — ALOGLIPTIN 25 MG/1
1 TABLET, FILM COATED ORAL EVERY MORNING
Qty: 30 TABLET | Refills: 1 | Status: SHIPPED | OUTPATIENT
Start: 2024-02-23 | End: 2024-02-28 | Stop reason: ALTCHOICE

## 2024-02-23 NOTE — PROGRESS NOTES
Chief Complaint   Patient presents with   • Vaginal Itching     Discomfort//started 3.5 weeks ago//creams and wipes help relieve the symptoms a little bit        Patient ID: Juliana Hernandez is a 63 y.o. female.    HPI  Pt is seeing for vaginal itchiness x 3 wks -  has DM -  no recent labs -  no recent AB june -  tried OTC cream with no help     The following portions of the patient's history were reviewed and updated as appropriate: allergies, current medications, past family history, past medical history, past social history, past surgical history and problem list.    Review of Systems   Constitutional: Negative.    Respiratory: Negative.     Cardiovascular: Negative.    Gastrointestinal: Negative.    Genitourinary:  Negative for decreased urine volume, difficulty urinating, dyspareunia and vaginal pain.   Musculoskeletal: Negative.    Skin: Negative.    Neurological: Negative.        Current Outpatient Medications   Medication Sig Dispense Refill   • amLODIPine (NORVASC) 5 mg tablet Take 1 tablet by mouth once daily 30 tablet 5   • Bempedoic Acid (Nexletol) 180 MG TABS Take 1 tablet by mouth in the morning 30 tablet 5   • clopidogrel (PLAVIX) 75 mg tablet Take 1 tablet (75 mg total) by mouth daily 90 tablet 0   • famotidine (PEPCID) 20 mg tablet Take 1 tablet (20 mg total) by mouth 2 (two) times a day 60 tablet 6   • glimepiride (AMARYL) 2 mg tablet TAKE ONE TABLET BY MOUTH TWICE A  tablet 2   • metFORMIN (GLUCOPHAGE) 1000 MG tablet TAKE ONE TABLET BY MOUTH TWICE A DAY WITH MEALS (GENERIC FOR GLUCOPHAGE) 180 tablet 1   • Misc Natural Products (DAILY HERBS IMMUNE DEFENSE PO) Take by mouth     • Multiple Vitamin (MULTI-VITAMIN DAILY PO) Take 1 tablet by mouth daily     • omeprazole (PriLOSEC) 20 mg delayed release capsule      • triamcinolone (KENALOG) 0.1 % cream APPLY TOPICALLY TWO TIMES A DAY UNDER THE LEFT BREAST AS NEEDED FOR FLARES 30 g 2   • Alogliptin Benzoate 25 MG TABS TAKE ONE TABLET BY MOUTH EVERY  "DAY IN THE MORNING (Patient not taking: Reported on 12/11/2023) 30 tablet 1   • fexofenadine (ALLEGRA) 180 MG tablet Take 1 tablet (180 mg total) by mouth daily (Patient not taking: Reported on 12/11/2023) 30 tablet 11   • fluticasone (FLONASE) 50 mcg/act nasal spray 2 sprays into each nostril daily (Patient not taking: Reported on 2/21/2024) 16 g 11   • gemfibrozil (LOPID) 600 mg tablet TAKE ONE TABLET BY MOUTH TWICE A DAY BEFORE MEALS (Patient not taking: Reported on 2/21/2024) 60 tablet 1   • losartan-hydrochlorothiazide (HYZAAR) 100-12.5 MG per tablet Take 1 tablet by mouth daily 90 tablet 3     No current facility-administered medications for this visit.       Objective:    Pulse 99   Temp 97.7 °F (36.5 °C) (Temporal)   Resp 20   Ht 5' 5\" (1.651 m)   Wt 79.3 kg (174 lb 12.8 oz)   SpO2 97%   BMI 29.09 kg/m²        Physical Exam  Constitutional:       General: She is not in acute distress.     Appearance: She is not ill-appearing.   Cardiovascular:      Rate and Rhythm: Normal rate.   Pulmonary:      Effort: Pulmonary effort is normal. No respiratory distress.   Neurological:      General: No focal deficit present.      Mental Status: She is alert and oriented to person, place, and time.           Labs in chart were reviewed.      Assessment/Plan:         Diagnoses and all orders for this visit:    Vaginal candida  -     fluconazole (DIFLUCAN) 150 mg tablet; Take 1 tablet (150 mg total) by mouth every third day for 2 doses        Rto in 2 wks for AWV and DM check                     Dana Kaur MD      "

## 2024-02-25 DIAGNOSIS — R10.13 EPIGASTRIC PAIN: Primary | ICD-10-CM

## 2024-02-25 RX ORDER — FAMOTIDINE 20 MG/1
20 TABLET, FILM COATED ORAL 2 TIMES DAILY
Qty: 90 TABLET | Refills: 3 | Status: SHIPPED | OUTPATIENT
Start: 2024-02-25

## 2024-02-26 ENCOUNTER — OFFICE VISIT (OUTPATIENT)
Dept: CARDIOLOGY CLINIC | Facility: CLINIC | Age: 64
End: 2024-02-26
Payer: COMMERCIAL

## 2024-02-26 ENCOUNTER — APPOINTMENT (OUTPATIENT)
Dept: LAB | Facility: HOSPITAL | Age: 64
End: 2024-02-26
Payer: COMMERCIAL

## 2024-02-26 ENCOUNTER — TELEPHONE (OUTPATIENT)
Age: 64
End: 2024-02-26

## 2024-02-26 VITALS
SYSTOLIC BLOOD PRESSURE: 134 MMHG | HEART RATE: 80 BPM | BODY MASS INDEX: 29.16 KG/M2 | DIASTOLIC BLOOD PRESSURE: 80 MMHG | WEIGHT: 175 LBS | HEIGHT: 65 IN | OXYGEN SATURATION: 97 %

## 2024-02-26 DIAGNOSIS — E11.29 TYPE 2 DIABETES MELLITUS WITH OTHER DIABETIC KIDNEY COMPLICATION (HCC): ICD-10-CM

## 2024-02-26 DIAGNOSIS — Z86.73 HISTORY OF TIA (TRANSIENT ISCHEMIC ATTACK): Primary | ICD-10-CM

## 2024-02-26 DIAGNOSIS — E11.8 TYPE 2 DIABETES MELLITUS WITH COMPLICATION, WITHOUT LONG-TERM CURRENT USE OF INSULIN (HCC): ICD-10-CM

## 2024-02-26 DIAGNOSIS — I10 ESSENTIAL HYPERTENSION: ICD-10-CM

## 2024-02-26 DIAGNOSIS — E78.2 MIXED HYPERLIPIDEMIA: ICD-10-CM

## 2024-02-26 LAB
ALBUMIN SERPL BCP-MCNC: 4.1 G/DL (ref 3.5–5)
ALP SERPL-CCNC: 82 U/L (ref 34–104)
ALT SERPL W P-5'-P-CCNC: 29 U/L (ref 7–52)
ANION GAP SERPL CALCULATED.3IONS-SCNC: 9 MMOL/L
AST SERPL W P-5'-P-CCNC: 26 U/L (ref 13–39)
BILIRUB SERPL-MCNC: 1.06 MG/DL (ref 0.2–1)
BUN SERPL-MCNC: 16 MG/DL (ref 5–25)
CALCIUM SERPL-MCNC: 9.3 MG/DL (ref 8.4–10.2)
CHLORIDE SERPL-SCNC: 98 MMOL/L (ref 96–108)
CHOLEST SERPL-MCNC: 217 MG/DL
CO2 SERPL-SCNC: 27 MMOL/L (ref 21–32)
CREAT SERPL-MCNC: 0.76 MG/DL (ref 0.6–1.3)
CREAT UR-MCNC: 137 MG/DL
EST. AVERAGE GLUCOSE BLD GHB EST-MCNC: 200 MG/DL
GFR SERPL CREATININE-BSD FRML MDRD: 82 ML/MIN/1.73SQ M
GLUCOSE P FAST SERPL-MCNC: 252 MG/DL (ref 65–99)
HBA1C MFR BLD: 8.6 %
HDLC SERPL-MCNC: 40 MG/DL
LDLC SERPL CALC-MCNC: 102 MG/DL (ref 0–100)
MICROALBUMIN UR-MCNC: 1781.9 MG/L
MICROALBUMIN/CREAT 24H UR: 1301 MG/G CREATININE (ref 0–30)
POTASSIUM SERPL-SCNC: 4.1 MMOL/L (ref 3.5–5.3)
PROT SERPL-MCNC: 7.4 G/DL (ref 6.4–8.4)
SODIUM SERPL-SCNC: 134 MMOL/L (ref 135–147)
TRIGL SERPL-MCNC: 376 MG/DL

## 2024-02-26 PROCEDURE — 36415 COLL VENOUS BLD VENIPUNCTURE: CPT

## 2024-02-26 PROCEDURE — 82570 ASSAY OF URINE CREATININE: CPT

## 2024-02-26 PROCEDURE — 80061 LIPID PANEL: CPT

## 2024-02-26 PROCEDURE — 80053 COMPREHEN METABOLIC PANEL: CPT

## 2024-02-26 PROCEDURE — 99214 OFFICE O/P EST MOD 30 MIN: CPT | Performed by: INTERNAL MEDICINE

## 2024-02-26 PROCEDURE — 82043 UR ALBUMIN QUANTITATIVE: CPT

## 2024-02-26 PROCEDURE — 83036 HEMOGLOBIN GLYCOSYLATED A1C: CPT

## 2024-02-26 RX ORDER — BEMPEDOIC ACID 180 MG/1
1 TABLET, FILM COATED ORAL DAILY
Qty: 30 TABLET | Refills: 5 | Status: SHIPPED | OUTPATIENT
Start: 2024-02-26 | End: 2024-02-28 | Stop reason: ALTCHOICE

## 2024-02-26 NOTE — TELEPHONE ENCOUNTER
PA for Alogliptin Benzoate 25MG tabletsApproved   Date(s) approved 01/01/2024-12/31/2024  Case #[id number]    Patient advised by [x] Digital H2Ot Message                      [x] Phone call LMOM      Pharmacy advised by [x]Fax                                     []Phone call    Approval letter scanned into Media No Not available at time of decision

## 2024-02-26 NOTE — TELEPHONE ENCOUNTER
PA for Alogliptin Benzoate    Submitted via     [x]CMM-KEY  [id number]  []SurescriFleck-Case ID #   []Faxed to plan   []Other website   []Phone call Case ID #     Office notes sent, clinical questions answered. Awaiting determination    Turnaround time for your insurance to make a decision on your Prior Authorization can take 7-21 business days.

## 2024-02-26 NOTE — PROGRESS NOTES
Cardiology   Lauren Yin DO, [facility]  Maximiliano Onofre MD, [facility]  Giancarlo Quintana MD, [facility]  Jamshid Lincoln MD, [facility]  -------------------------------------------------------------------  [facility]  [address], [address], [address]  [place], [place], [address]  [phone]    Cardiology Follow Up  Juliana Hernandez  1960  [id number]          Assessment/Plan:    1. History of TIA (transient ischemic attack)    2. Mixed hyperlipidemia    3. Type 2 diabetes mellitus with other diabetic kidney complication (HCC)    4. Essential hypertension        -Patient with history of TIA and PFO which was subsequently closed.  She has had no symptoms since then.  Discussed with her the importance of risk factor reduction.  Blood pressure is at goal.  Cholesterol levels are elevated.  She has multiple statin intolerances and could not take WelChol or fenofibrate.  Nexletol was prescribed last visit.  She believes she is not taking it because of expense.  Has a new insurance now.  Will prescribe Nexletol again and if not covered, consider Leqvio or Repatha.    -Previously, hydrochlorothiazide was stopped because of hyponatremia.  Repeat blood work was also ordered for today.     - follow up with PMD regarding diabetes drug management.  Consider discontinuation of glimepiride and beginning Jardiance or Farxiga.        Interval History:     Juliana Hernandez is 63 y.o. female here for followup of PFO closure and dyslipidemia.  Previously, she had a PFO closure several years ago after a transient ischemic attack.  During that event, she had intractable nausea and vomiting and then confusion and loss of memory.  PFO was closed 6 months later.  She has been on Plavix since then.  She denies any significant bleeding.  Since her last visit, she has been feeling well.  she denies any palpitations, chest pain, shortness of breath, LE edema, orthopnea or PND.   Diet is overall unchanged.   There has not been a significant change in weight.   No recent neurological symptoms.      Her most recent lipid panel  showed an LDL of 131 with triglycerides of 352.  She had blood work done today.    She has multiple medication intolerances including to multiple statins.  She has also tried Zetia and most recently gemfibrozil which needed to be stopped due to GI side effects.  During her last visit, Nexletol was prescribed but she could not take because of expense.  Diet is overall poor.  She also has diabetes with proteinuria.        The following portions of the patient's history were reviewed and updated as appropriate: allergies, current medications, past family history, past medical history, past social history, past surgical history, and problem list.       Current Outpatient Medications:     Alogliptin Benzoate 25 MG TABS, Take 1 tablet (25 mg total) by mouth every morning, Disp: 30 tablet, Rfl: 1    amLODIPine (NORVASC) 5 mg tablet, Take 1 tablet by mouth once daily, Disp: 30 tablet, Rfl: 5    Bempedoic Acid (Nexletol) 180 MG TABS, Take 1 tablet by mouth in the morning, Disp: 30 tablet, Rfl: 5    clopidogrel (PLAVIX) 75 mg tablet, Take 1 tablet (75 mg total) by mouth daily, Disp: 90 tablet, Rfl: 0    famotidine (PEPCID) 20 mg tablet, Take 1 tablet by mouth twice daily, Disp: 90 tablet, Rfl: 3    fexofenadine (ALLEGRA) 180 MG tablet, Take 1 tablet (180 mg total) by mouth daily (Patient taking differently: Take 180 mg by mouth as needed), Disp: 30 tablet, Rfl: 11    fluconazole (DIFLUCAN) 150 mg tablet, Take 1 tablet (150 mg total) by mouth every third day for 2 doses, Disp: 2 tablet, Rfl: 0    fluticasone (FLONASE) 50 mcg/act nasal spray, 2 sprays into each nostril daily (Patient taking differently: 2 sprays into each nostril as needed), Disp: 16 g, Rfl: 11    glimepiride (AMARYL) 2 mg tablet, TAKE ONE TABLET BY MOUTH TWICE A DAY, Disp: 180 tablet, Rfl: 2    metFORMIN (GLUCOPHAGE) 1000 MG tablet, TAKE ONE  "TABLET BY MOUTH TWICE A DAY WITH MEALS (GENERIC FOR GLUCOPHAGE), Disp: 180 tablet, Rfl: 1    Misc Natural Products (DAILY HERBS IMMUNE DEFENSE PO), Take by mouth, Disp: , Rfl:     Multiple Vitamin (MULTI-VITAMIN DAILY PO), Take 1 tablet by mouth daily, Disp: , Rfl:     omeprazole (PriLOSEC) 20 mg delayed release capsule, , Disp: , Rfl:     triamcinolone (KENALOG) 0.1 % cream, APPLY TOPICALLY TWO TIMES A DAY UNDER THE LEFT BREAST AS NEEDED FOR FLARES, Disp: 30 g, Rfl: 2        Review of Systems:  Review of Systems   Respiratory:  Negative for shortness of breath.    Cardiovascular:  Negative for chest pain, palpitations and leg swelling.   Musculoskeletal:  Positive for arthralgias.   All other systems reviewed and are negative.        Physical Exam:  Vitals:  Vitals:    02/26/24 0825   BP: 134/80   BP Location: Right arm   Patient Position: Sitting   Cuff Size: Standard   Pulse: 80   SpO2: 97%   Weight: 79.4 kg (175 lb)   Height: 5' 5\" (1.651 m)     Physical Exam   Constitutional: She appears healthy. No distress.   Eyes: Pupils are equal, round, and reactive to light. Conjunctivae are normal.   Neck: No JVD present.   Cardiovascular: Normal rate, regular rhythm and normal heart sounds. Exam reveals no gallop and no friction rub.   No murmur heard.  Pulmonary/Chest: Effort normal and breath sounds normal. She has no wheezes. She has no rales.   Musculoskeletal:         General: No tenderness, deformity or edema.      Cervical back: Normal range of motion and neck supple.   Neurological: She is alert and oriented to person, place, and time.   Skin: Skin is warm and dry.        Cardiographics:  EKG: Personally reviewed normal sinus rhythm with LVH  Last known EF: 65%    This note was completed in part utilizing M-Modal Fluency Direct Software.  Grammatical errors, random word insertions, spelling mistakes, and incomplete sentences can be an occasional consequence of this system secondary to software limitations, " ambient noise, and hardware issues.  If you have any questions or concerns about the content, text, or information contained within the body of this dictation, please contact the provider for clarification.

## 2024-02-27 ENCOUNTER — TELEPHONE (OUTPATIENT)
Age: 64
End: 2024-02-27

## 2024-02-27 NOTE — TELEPHONE ENCOUNTER
Pl, advise pt -  edgar is needed to discuss results -  pt has an edgar with Dr Meredith in 3 wks -  or she can see me sooner if she wants to

## 2024-02-27 NOTE — TELEPHONE ENCOUNTER
Called patient. She does not understand whey appointment is needed to go over lab results which she reviewed online and notes high creatine level. Says when she was seen on 2/23 she complained of urinary symptoms and was treated for yeast infection. She has appointment with new provider on 3/12 and will go over results then.

## 2024-02-27 NOTE — TELEPHONE ENCOUNTER
Pt called, she is concerned about her lab results that she saw on MyChart, especially the creatinine/albumin ratio.  She is asking for someone to review and let her know what she needs to do.

## 2024-02-28 ENCOUNTER — OFFICE VISIT (OUTPATIENT)
Dept: FAMILY MEDICINE CLINIC | Facility: CLINIC | Age: 64
End: 2024-02-28
Payer: COMMERCIAL

## 2024-02-28 VITALS
WEIGHT: 172 LBS | SYSTOLIC BLOOD PRESSURE: 138 MMHG | BODY MASS INDEX: 28.66 KG/M2 | OXYGEN SATURATION: 99 % | DIASTOLIC BLOOD PRESSURE: 82 MMHG | HEIGHT: 65 IN | HEART RATE: 98 BPM

## 2024-02-28 DIAGNOSIS — T46.6X5A MYALGIA DUE TO STATIN: ICD-10-CM

## 2024-02-28 DIAGNOSIS — I10 ESSENTIAL HYPERTENSION: ICD-10-CM

## 2024-02-28 DIAGNOSIS — G37.9 DEMYELINATING DISEASE OF CENTRAL NERVOUS SYSTEM (HCC): ICD-10-CM

## 2024-02-28 DIAGNOSIS — Z86.73 HISTORY OF CVA (CEREBROVASCULAR ACCIDENT): ICD-10-CM

## 2024-02-28 DIAGNOSIS — R80.9 MICROALBUMINURIA: ICD-10-CM

## 2024-02-28 DIAGNOSIS — E78.2 MIXED HYPERLIPIDEMIA: ICD-10-CM

## 2024-02-28 DIAGNOSIS — R30.0 DYSURIA: ICD-10-CM

## 2024-02-28 DIAGNOSIS — C44.311 BASAL CELL CARCINOMA (BCC) OF SKIN OF NOSE: ICD-10-CM

## 2024-02-28 DIAGNOSIS — N39.0 URINARY TRACT INFECTION WITHOUT HEMATURIA, SITE UNSPECIFIED: Primary | ICD-10-CM

## 2024-02-28 DIAGNOSIS — E11.8 TYPE 2 DIABETES MELLITUS WITH COMPLICATION, WITHOUT LONG-TERM CURRENT USE OF INSULIN (HCC): ICD-10-CM

## 2024-02-28 DIAGNOSIS — I72.6 PSEUDOANEURYSM OF VERTEBRAL ARTERY (HCC): ICD-10-CM

## 2024-02-28 DIAGNOSIS — M79.10 MYALGIA DUE TO STATIN: ICD-10-CM

## 2024-02-28 LAB
SL AMB  POCT GLUCOSE, UA: ABNORMAL
SL AMB LEUKOCYTE ESTERASE,UA: NEGATIVE
SL AMB POCT BILIRUBIN,UA: NEGATIVE
SL AMB POCT BLOOD,UA: NEGATIVE
SL AMB POCT CLARITY,UA: CLEAR
SL AMB POCT COLOR,UA: YELLOW
SL AMB POCT KETONES,UA: NEGATIVE
SL AMB POCT NITRITE,UA: NEGATIVE
SL AMB POCT PH,UA: 7
SL AMB POCT SPECIFIC GRAVITY,UA: 1.02
SL AMB POCT URINE PROTEIN: >=300
SL AMB POCT UROBILINOGEN: 0.2

## 2024-02-28 PROCEDURE — G2211 COMPLEX E/M VISIT ADD ON: HCPCS | Performed by: PHYSICIAN ASSISTANT

## 2024-02-28 PROCEDURE — 99214 OFFICE O/P EST MOD 30 MIN: CPT | Performed by: PHYSICIAN ASSISTANT

## 2024-02-28 PROCEDURE — 81002 URINALYSIS NONAUTO W/O SCOPE: CPT | Performed by: PHYSICIAN ASSISTANT

## 2024-02-28 PROCEDURE — 87086 URINE CULTURE/COLONY COUNT: CPT | Performed by: PHYSICIAN ASSISTANT

## 2024-02-28 RX ORDER — LISINOPRIL 5 MG/1
5 TABLET ORAL DAILY
Qty: 90 TABLET | Refills: 3 | Status: SHIPPED | OUTPATIENT
Start: 2024-02-28

## 2024-02-28 RX ORDER — CIPROFLOXACIN 500 MG/1
500 TABLET, FILM COATED ORAL EVERY 12 HOURS SCHEDULED
Qty: 14 TABLET | Refills: 0 | Status: SHIPPED | OUTPATIENT
Start: 2024-02-28 | End: 2024-03-06

## 2024-02-28 NOTE — PATIENT INSTRUCTIONS
Assessment/plan:  1.  Urinary tract infection-patient tried Diflucan and Monistat and Vagisil without much benefit thus far.  Urinalysis will be sent for culture and sensitivity.  In the meantime we will try Cipro 500 mg twice daily for 7 days.  Patient was cautioned of possible side effects of the medication.  2.  Uncontrolled type 2 diabetes-hemoglobin A1c of 8.6 on metformin and glimepiride therapy.  We did briefly discussed other treatment options such as adding Januvia or Farxiga however patient does have appointment scheduled with endocrinology in the next months.  Will await their evaluation and recommendations.  3.  Microalbuminuria-patient not currently on any treatment.  Patient is agreeable to restarting lisinopril 5 mg daily.  4.  Mixed hyperlipidemia-patient has had myalgia due to statin.  She continues gemfibrozil.  5.  History of CVA-currently on Plavix 75 mg daily.  6.  Demyelinating disease-continue follow-up with neurology.  7.  Basal cell carcinoma-continue follow-up with dermatology regularly.  8.  Benign essential hypertension-presently stable with amlodipine  Patient states she has follow-up scheduled with Lesvia Presley for regular physical in the coming months.

## 2024-02-28 NOTE — PROGRESS NOTES
Name: Juliana Hernandez      : 1960      MRN: [id number]  Encounter Provider: Gallito Daniel PA-C  Encounter Date: 2024   Encounter department: [facility]    Assessment & Plan     Patient Instructions   Assessment/plan:  1.  Urinary tract infection-patient tried Diflucan and Monistat and Vagisil without much benefit thus far.  Urinalysis will be sent for culture and sensitivity.  In the meantime we will try Cipro 500 mg twice daily for 7 days.  Patient was cautioned of possible side effects of the medication.  2.  Uncontrolled type 2 diabetes-hemoglobin A1c of 8.6 on metformin and glimepiride therapy.  We did briefly discussed other treatment options such as adding Januvia or Farxiga however patient does have appointment scheduled with endocrinology in the next months.  Will await their evaluation and recommendations.  3.  Microalbuminuria-patient not currently on any treatment.  Patient is agreeable to restarting lisinopril 5 mg daily.  4.  Mixed hyperlipidemia-patient has had myalgia due to statin.  She continues gemfibrozil.  5.  History of CVA-currently on Plavix 75 mg daily.  6.  Demyelinating disease-continue follow-up with neurology.  7.  Basal cell carcinoma-continue follow-up with dermatology regularly.  8.  Benign essential hypertension-presently stable with amlodipine  Patient states she has follow-up scheduled with Lesvia Presley for regular physical in the coming months.    1. Urinary tract infection without hematuria, site unspecified  -     Urine culture; Future  -     Urine culture  -     ciprofloxacin (CIPRO) 500 mg tablet; Take 1 tablet (500 mg total) by mouth every 12 (twelve) hours for 7 days    2. Type 2 diabetes mellitus with complication, without long-term current use of insulin (HCC)    3. Demyelinating disease of central nervous system (HCC)    4. Mixed hyperlipidemia    5. Microalbuminuria  -     lisinopril (ZESTRIL) 5 mg tablet; Take 1 tablet (5 mg  total) by mouth daily    6. History of CVA (cerebrovascular accident)    7. Basal cell carcinoma (BCC) of skin of nose    8. Essential hypertension    9. Myalgia due to statin    10. Dysuria  -     POCT urine dip  -     Urine culture; Future  -     Urine culture    11. Pseudoaneurysm of vertebral artery (HCC)           Subjective      HPI: This is a 63-year-old female who presents to the office with 3 to 4 weeks of ongoing urinary frequency and burning.  Patient has not had any flank pain associated with this.  She did have some irritation behind the vaginal area and tried using some over-the-counter vaginocele and Monistat creams for this which were not helpful.  She did see her previous family physician and was prescribed Diflucan which did not seem to make any difference.  She is concerned that she may have a urinary tract infection.  She does have history of type 2 diabetes which has been poorly controlled recently with her recent A1c of 8.6.  She also notes that her urine had significant amounts of protein present.  She is currently on metformin 1000 mg twice daily as well as glimepiride.  She states that she has not been taking any ACE inhibitor for microalbuminuria since she was seeing her last PCP.  She had been on something years ago however.  She also continues to follow with cardiology regularly and has been referred to endocrinology and has appointment in the next month for evaluation.  She also has history of demyelinating disease and follows with neurology.  She does see an eye doctor once per year at least and has upcoming appointment.      Review of Systems   Constitutional:  Negative for chills, fatigue and fever.   HENT:  Negative for congestion, ear pain and sinus pressure.    Eyes:  Negative for visual disturbance.   Respiratory:  Negative for cough, chest tightness and shortness of breath.    Cardiovascular:  Negative for chest pain and palpitations.   Gastrointestinal:  Negative for diarrhea,  nausea and vomiting.   Endocrine: Negative for polyuria.   Genitourinary:  Positive for dysuria, frequency and urgency.   Musculoskeletal:  Negative for arthralgias and myalgias.   Skin:  Negative for pallor and rash.   Neurological:  Negative for dizziness, weakness, light-headedness, numbness and headaches.   Psychiatric/Behavioral:  Negative for agitation, behavioral problems and sleep disturbance.    All other systems reviewed and are negative.      Current Outpatient Medications on File Prior to Visit   Medication Sig   • amLODIPine (NORVASC) 5 mg tablet Take 1 tablet by mouth once daily   • clopidogrel (PLAVIX) 75 mg tablet Take 1 tablet (75 mg total) by mouth daily   • famotidine (PEPCID) 20 mg tablet Take 1 tablet by mouth twice daily   • glimepiride (AMARYL) 2 mg tablet TAKE ONE TABLET BY MOUTH TWICE A DAY   • metFORMIN (GLUCOPHAGE) 1000 MG tablet TAKE ONE TABLET BY MOUTH TWICE A DAY WITH MEALS (GENERIC FOR GLUCOPHAGE)   • Misc Natural Products (DAILY HERBS IMMUNE DEFENSE PO) Take by mouth   • Multiple Vitamin (MULTI-VITAMIN DAILY PO) Take 1 tablet by mouth daily   • omeprazole (PriLOSEC) 20 mg delayed release capsule    • triamcinolone (KENALOG) 0.1 % cream APPLY TOPICALLY TWO TIMES A DAY UNDER THE LEFT BREAST AS NEEDED FOR FLARES   • [] fluconazole (DIFLUCAN) 150 mg tablet Take 1 tablet (150 mg total) by mouth every third day for 2 doses   • [DISCONTINUED] Alogliptin Benzoate 25 MG TABS Take 1 tablet (25 mg total) by mouth every morning   • [DISCONTINUED] Bempedoic Acid (Nexletol) 180 MG TABS Take 1 tablet by mouth in the morning   • [DISCONTINUED] fexofenadine (ALLEGRA) 180 MG tablet Take 1 tablet (180 mg total) by mouth daily (Patient taking differently: Take 180 mg by mouth as needed)   • [DISCONTINUED] fluticasone (FLONASE) 50 mcg/act nasal spray 2 sprays into each nostril daily (Patient taking differently: 2 sprays into each nostril as needed)       Objective     /82 (BP Location: Left  "arm, Patient Position: Sitting)   Pulse 98   Ht 5' 5\" (1.651 m)   Wt 78 kg (172 lb)   SpO2 99%   BMI 28.62 kg/m²     Physical Exam  Vitals and nursing note reviewed.   Constitutional:       General: She is not in acute distress.     Appearance: She is well-developed.   HENT:      Head: Normocephalic and atraumatic.      Right Ear: External ear normal.      Left Ear: External ear normal.      Nose: Nose normal.      Mouth/Throat:      Pharynx: No oropharyngeal exudate.   Eyes:      Conjunctiva/sclera: Conjunctivae normal.      Pupils: Pupils are equal, round, and reactive to light.   Neck:      Thyroid: No thyromegaly.      Trachea: No tracheal deviation.   Cardiovascular:      Rate and Rhythm: Normal rate and regular rhythm.      Heart sounds: Normal heart sounds. No murmur heard.     No friction rub.   Pulmonary:      Effort: Pulmonary effort is normal. No respiratory distress.      Breath sounds: Normal breath sounds. No wheezing or rales.   Abdominal:      General: Bowel sounds are normal. There is no distension.      Palpations: Abdomen is soft.      Tenderness: There is no abdominal tenderness. There is no guarding or rebound.   Musculoskeletal:         General: No tenderness. Normal range of motion.      Cervical back: Normal range of motion and neck supple.   Lymphadenopathy:      Cervical: No cervical adenopathy.   Skin:     General: Skin is warm and dry.      Findings: No erythema or rash.   Neurological:      Mental Status: She is alert and oriented to person, place, and time.      Cranial Nerves: No cranial nerve deficit.      Coordination: Coordination normal.   Psychiatric:         Behavior: Behavior normal.         Thought Content: Thought content normal.       Gallito Daniel PA-C    "

## 2024-02-29 LAB — BACTERIA UR CULT: NORMAL

## 2024-03-05 ENCOUNTER — TELEPHONE (OUTPATIENT)
Dept: CARDIOLOGY CLINIC | Facility: CLINIC | Age: 64
End: 2024-03-05

## 2024-03-05 NOTE — TELEPHONE ENCOUNTER
Drug  Nexletol 180MG tablets    Approved on February 27  PA Case: [id number], Status: Approved, Coverage Starts on: 1/1/2024 12:00:00 AM, Coverage Ends on: 12/31/2024 12:00:00 AM. Questions? Contact [phone].  Authorization Expiration Date: 12/30/2024  
Home

## 2024-03-15 LAB
LEFT EYE DIABETIC RETINOPATHY: NORMAL
RIGHT EYE DIABETIC RETINOPATHY: NORMAL

## 2024-03-18 ENCOUNTER — OFFICE VISIT (OUTPATIENT)
Dept: FAMILY MEDICINE CLINIC | Facility: CLINIC | Age: 64
End: 2024-03-18
Payer: COMMERCIAL

## 2024-03-18 VITALS
DIASTOLIC BLOOD PRESSURE: 82 MMHG | SYSTOLIC BLOOD PRESSURE: 134 MMHG | HEIGHT: 65 IN | WEIGHT: 171.2 LBS | BODY MASS INDEX: 28.52 KG/M2 | HEART RATE: 90 BPM

## 2024-03-18 DIAGNOSIS — G37.9 DEMYELINATING DISEASE OF CENTRAL NERVOUS SYSTEM (HCC): ICD-10-CM

## 2024-03-18 DIAGNOSIS — C44.311 BASAL CELL CARCINOMA (BCC) OF SKIN OF NOSE: ICD-10-CM

## 2024-03-18 DIAGNOSIS — I10 ESSENTIAL HYPERTENSION: ICD-10-CM

## 2024-03-18 DIAGNOSIS — E55.9 VITAMIN D DEFICIENCY: ICD-10-CM

## 2024-03-18 DIAGNOSIS — Z00.00 WELCOME TO MEDICARE PREVENTIVE VISIT: ICD-10-CM

## 2024-03-18 DIAGNOSIS — Z78.0 MENOPAUSE: ICD-10-CM

## 2024-03-18 DIAGNOSIS — Z12.31 ENCOUNTER FOR SCREENING MAMMOGRAM FOR MALIGNANT NEOPLASM OF BREAST: ICD-10-CM

## 2024-03-18 DIAGNOSIS — E11.8 TYPE 2 DIABETES MELLITUS WITH COMPLICATION, WITHOUT LONG-TERM CURRENT USE OF INSULIN (HCC): Primary | ICD-10-CM

## 2024-03-18 DIAGNOSIS — E78.2 MIXED HYPERLIPIDEMIA: ICD-10-CM

## 2024-03-18 DIAGNOSIS — I72.6 PSEUDOANEURYSM OF VERTEBRAL ARTERY (HCC): ICD-10-CM

## 2024-03-18 DIAGNOSIS — R80.9 MICROALBUMINURIA: ICD-10-CM

## 2024-03-18 PROCEDURE — 99214 OFFICE O/P EST MOD 30 MIN: CPT | Performed by: PHYSICIAN ASSISTANT

## 2024-03-18 PROCEDURE — G0438 PPPS, INITIAL VISIT: HCPCS | Performed by: PHYSICIAN ASSISTANT

## 2024-03-18 NOTE — ASSESSMENT & PLAN NOTE
Refused prevnar 20 or shingles, given living will to complete. Due for mammo and DEXA- orders placed.

## 2024-03-18 NOTE — PROGRESS NOTES
Assessment and Plan:     Problem List Items Addressed This Visit        Cardiovascular and Mediastinum    Essential hypertension     Patient's BP stable on Amlodipine 5 mg and Lisinopril 5 mg. Since her BP is stable we will not change any dosing today. We will allow her cardiologist to make changes to dosing in the future if needed.          Pseudoaneurysm of vertebral artery (HCC)     Recently saw cardiologist and neurologist. Recommended that patient stay on plavix due to risk factors.             Endocrine    Type 2 diabetes mellitus with complication, without long-term current use of insulin (HCC) - Primary     Patient's recent labs significant for impaired glucose and high cholesterol/triglycerides/LDL. Cholesterol 217. Trigs 376. A1C 8.6 from 7.8 1 month ago. Seeing Endocrine Friday 03/22/2024. Our plan was to refer the patient to Endocrine, but she already has appointment set up. Currently taking Metformin 1000 mg and Amaryl 2 mg b.I.d. Taking these medications as prescribed. Further changes to medications will be deferred to Endocrine at her visit on Friday Dr. Lincoln ([facility])  Lab Results   Component Value Date    HGBA1C 8.6 (H) 02/26/2024          Relevant Orders    Ambulatory Referral to Nephrology       Nervous and Auditory    Demyelinating disease of central nervous system (HCC)     MRI showed there were several demyelinating lesions which were of greater than 3-5m in size in a periventricular and subcortical region. Continue with neuro.            Musculoskeletal and Integument    Basal cell carcinoma     Last visited Dermatologist 2 years ago when moved to [place]. Basal cell carcinoma was on nose and treated. Will place an ambulatory referral to dermatology to ensure that the patient has regular skin exams to evaluate for recurrence or new lesions.          Relevant Orders    Ambulatory Referral to Dermatology       Other    Mixed hyperlipidemia     Allergic to statins. Intolerant to  fenofibrate. Cardiology was supposed to call in nexletol and medication was approved. Patient had trouble picking up medication. I will message the cardiologist and discuss why patient has not started medication and how he would like to proceed.          Microalbuminuria     Albumin/Cr ration 1300. Patient's GFR 83 today indicating kidney function in tact. Due to the high level of albuminuria, referral to nephrology warranted to evaluate kidney function .          Relevant Orders    Ambulatory Referral to Nephrology    Vitamin D deficiency     Has not had Vit. D labs since 2021. Last labs drawn show Vit. D deficiency. Currently still taking vit. D supplements. Will order for patient to have updated panel.          Relevant Orders    Vitamin D 25 hydroxy    Welcome to Medicare preventive visit     Refused prevnar 20 or shingles, given living will to complete. Due for mammo and DEXA- orders placed.          Relevant Orders    DXA bone density spine hip and pelvis    Mammo screening bilateral w 3d & cad   Other Visit Diagnoses     Menopause        Relevant Orders    DXA bone density spine hip and pelvis    Encounter for screening mammogram for malignant neoplasm of breast        Relevant Orders    Mammo screening bilateral w 3d & cad          Depression Screening and Follow-up Plan: Patient was screened for depression during today's encounter. They screened negative with a PHQ-2 score of 0.      Preventive health issues were discussed with patient, and age appropriate screening tests were ordered as noted in patient's After Visit Summary.  Personalized health advice and appropriate referrals for health education or preventive services given if needed, as noted in patient's After Visit Summary.     History of Present Illness:     Patient presents for a Medicare Wellness Visit    Juliana is a 63 y.o. female with a PMHx significant for HTN, DM, HLD, Vit. D deficiency, demyelinating disease, pseudoaneurysm of vertebral  artery, microalbuminuria and basal cell carcinoma of the nose presenting to the office for her initial medicare wellness visit. Pt was hospitalized in Oct. 2023 for dizziness, vertigo, n/v, and facial droop. She had a stroke workup which was negative. She was discharged and sent home with close follow-up. Since her last visit, patient reports that she has moved to [place] and was being seen by a different PCP for the last 2 years. Due to the staff and medication changes that were made by the staff, patient decided to return to our practice. During this time, patient was off of her Lisinopril 5 mg daily for 2 years until coming back to our practice. She does not have any current complaints at her visit today. Appointment with endocrinology coming up Friday this week.       Juliana Hernandez is here for chronic conditions f/u. Pt. had labs done prior to today's visit which included Recent Results (from the past 672 hour(s))  -Hemoglobin A1C:   Collection Time: 02/26/24  7:54 AM       Result                      Value             Ref Range           Hemoglobin A1C              8.6 (H)           Normal 4.0-5*       EAG                         200               mg/dl          -Comprehensive metabolic panel:   Collection Time: 02/26/24  7:54 AM       Result                      Value             Ref Range           Sodium                      134 (L)           135 - 147 mm*       Potassium                   4.1               3.5 - 5.3 mm*       Chloride                    98                96 - 108 mmo*       CO2                         27                21 - 32 mmol*       ANION GAP                   9                 mmol/L              BUN                         16                5 - 25 mg/dL        Creatinine                  0.76              0.60 - 1.30 *       Glucose, Fasting            252 (H)           65 - 99 mg/dL       Calcium                     9.3               8.4 - 10.2 m*       AST                         26                 13 - 39 U/L         ALT                         29                7 - 52 U/L          Alkaline Phosphatase        82                34 - 104 U/L        Total Protein               7.4               6.4 - 8.4 g/*       Albumin                     4.1               3.5 - 5.0 g/*       Total Bilirubin             1.06 (H)          0.20 - 1.00 *       eGFR                        82                ml/min/1.73s*  -Lipid Panel with Direct LDL reflex:   Collection Time: 02/26/24  7:54 AM       Result                      Value             Ref Range           Cholesterol                 217 (H)           See Comment *       Triglycerides               376 (H)           See Comment *       HDL, Direct                 40 (L)            >=50 mg/dL          LDL Calculated              102 (H)           0 - 100 mg/dL  -Albumin / creatinine urine ratio:   Collection Time: 02/26/24  9:35 AM       Result                      Value             Ref Range           Creatinine, Ur              137.0             Reference ra*       Albumin,U,Random            1,781.9 (H)       <20.0 mg/L          Albumin Creat Ratio         1,301 (H)         0 - 30 mg/g *  -POCT urine dip:   Collection Time: 02/28/24 11:16 AM       Result                      Value             Ref Range           LEUKOCYTE ESTERASE,UA       Negative                              NITRITE,UA                  Negative                              SL AMB POCT UROBILINOG*     0.2                                   POCT URINE PROTEIN          >=300                                  PH,UA                      7.0                                   BLOOD,UA                    Negative                              SPECIFIC GRAVITY,UA         1.020                                 KETONES,UA                  Negative                              BILIRUBIN,UA                Negative                              GLUCOSE, UA                 500mg                                   COLOR,UA                   Yellow                                CLARITY,UA                  Clear                            -Urine culture:   Collection Time: 02/28/24 11:27 AM  Specimen: Urine, Clean Catch       Result                      Value             Ref Range           Urine Culture                                                 70,000-79,000 cfu/ml         Patient Care Team:  Jada Presley PA-C as PCP - General (Family Medicine)  Dana Kaur MD as PCP - [facility] ([facility])     Review of Systems:     Review of Systems   Constitutional:  Negative for chills and fever.   HENT:  Negative for ear pain and sore throat.    Eyes:  Negative for pain and visual disturbance.   Respiratory:  Negative for cough and shortness of breath.    Cardiovascular:  Negative for chest pain and palpitations.   Gastrointestinal:  Negative for abdominal pain and vomiting.   Genitourinary:  Negative for dysuria and hematuria.   Musculoskeletal:  Negative for arthralgias and back pain.   Skin:  Negative for color change and rash.   Neurological:  Negative for seizures and syncope.   All other systems reviewed and are negative.       Problem List:     Patient Active Problem List   Diagnosis   • Basal cell carcinoma   • Demyelinating disease of central nervous system (HCC)   • Fatty infiltration of liver   • Mixed hyperlipidemia   • Left knee DJD   • Essential hypertension   • Mixed incontinence   • Pineal gland cyst   • Pelvic floor weakness in female   • Psoriasis   • Type 2 diabetes mellitus with complication, without long-term current use of insulin (HCC)   • History of CVA (cerebrovascular accident)   • PFO (patent foramen ovale)   • Vaginal atrophy   • Total bilirubin, elevated   • Migraine   • Microalbuminuria   • Chondromalacia patellae   • Thyroid nodule   • Frequent PVCs   • Palpitations   • Statin intolerance   • Intracranial aneurysm   • Cardiovascular risk factor   • Episodic cluster headache, not  intractable   • Dizziness   • Vitamin D deficiency   • Stroke-like symptoms   • Hyponatremia   • Abnormal computed tomography angiography (CTA)   • Pseudoaneurysm of vertebral artery (HCC)   • Myalgia due to statin   • Welcome to Medicare preventive visit      Past Medical and Surgical History:     Past Medical History:   Diagnosis Date   • Diabetes mellitus (HCC)    • GERD (gastroesophageal reflux disease)     Take pepcid but still bothersome with certain foods   • Headache(784.0)     Occasional   • Hypertension    • Nasal congestion     Espyat night   • Otitis media     Occasional   • Stroke (cerebrum) (HCC)    • Stroke (HCC)     Tia   • Tinnitus     Current     Past Surgical History:   Procedure Laterality Date   • CHOLECYSTECTOMY LAPAROSCOPIC     • MOHS SURGERY      Micrographic Surgery Nose, Last assessed: 2/13/17   • TOTAL KNEE ARTHROPLASTY Left 03/08/2017    Last assessed: 12/26/17   • TUBAL LIGATION        Family History:     Family History   Problem Relation Age of Onset   • Diabetes Father    • Hypertension Father    • Stroke Father    • Anemia Daughter    • Breast cancer Paternal Aunt       Social History:     Social History     Socioeconomic History   • Marital status: /Civil Union     Spouse name: None   • Number of children: None   • Years of education: None   • Highest education level: None   Occupational History   • Occupation: currently working   Tobacco Use   • Smoking status: Never   • Smokeless tobacco: Never   • Tobacco comments:     No secondhand smoke exposure   Vaping Use   • Vaping status: Never Used   Substance and Sexual Activity   • Alcohol use: Never   • Drug use: Never   • Sexual activity: Yes     Partners: Male     Birth control/protection: None   Other Topics Concern   • None   Social History Narrative    Feels safe at home     Social Determinants of Health     Financial Resource Strain: Not on file   Food Insecurity: No Food Insecurity (3/13/2024)    Hunger Vital Sign    •  Worried About Running Out of Food in the Last Year: Never true    • Ran Out of Food in the Last Year: Never true   Transportation Needs: No Transportation Needs (3/13/2024)    PRAPARE - Transportation    • Lack of Transportation (Medical): No    • Lack of Transportation (Non-Medical): No   Physical Activity: Not on file   Stress: Not on file   Social Connections: Not on file   Intimate Partner Violence: Not on file   Housing Stability: Low Risk  (3/13/2024)    Housing Stability Vital Sign    • Unable to Pay for Housing in the Last Year: No    • Number of Places Lived in the Last Year: 1    • Unstable Housing in the Last Year: No      Medications and Allergies:     Current Outpatient Medications   Medication Sig Dispense Refill   • amLODIPine (NORVASC) 5 mg tablet Take 1 tablet by mouth once daily 30 tablet 5   • clopidogrel (PLAVIX) 75 mg tablet Take 1 tablet (75 mg total) by mouth daily 90 tablet 0   • famotidine (PEPCID) 20 mg tablet Take 1 tablet by mouth twice daily 90 tablet 3   • glimepiride (AMARYL) 2 mg tablet TAKE ONE TABLET BY MOUTH TWICE A  tablet 2   • lisinopril (ZESTRIL) 5 mg tablet Take 1 tablet (5 mg total) by mouth daily 90 tablet 3   • metFORMIN (GLUCOPHAGE) 1000 MG tablet TAKE ONE TABLET BY MOUTH TWICE A DAY WITH MEALS (GENERIC FOR GLUCOPHAGE) 180 tablet 1   • Misc Natural Products (DAILY HERBS IMMUNE DEFENSE PO) Take by mouth     • Multiple Vitamin (MULTI-VITAMIN DAILY PO) Take 1 tablet by mouth daily     • triamcinolone (KENALOG) 0.1 % cream APPLY TOPICALLY TWO TIMES A DAY UNDER THE LEFT BREAST AS NEEDED FOR FLARES 30 g 2     No current facility-administered medications for this visit.     Allergies   Allergen Reactions   • Bactrim [Sulfamethoxazole-Trimethoprim]    • Neomycin-Bacitracin Zn-Polymyx Other (See Comments)     REDNESS   • Statins Myalgia      Immunizations:     Immunization History   Administered Date(s) Administered   • COVID-19 PFIZER VACCINE 0.3 ML IM 10/13/2021,  11/06/2021   • INFLUENZA 11/03/2018   • Influenza Quadrivalent Preservative Free 3 years and older IM 11/09/2015   • Influenza, recombinant, quadrivalent,injectable, preservative free 11/12/2019, 11/18/2022   • Influenza, seasonal, injectable 11/05/2016      Health Maintenance:         Topic Date Due   • Hepatitis C Screening  Never done   • Cervical Cancer Screening  11/19/2020   • Breast Cancer Screening: Mammogram  01/19/2024   • Colorectal Cancer Screening  11/13/2025   • HIV Screening  Completed         Topic Date Due   • Pneumococcal Vaccine: 65+ Years (1 of 2 - PCV) Never done   • Influenza Vaccine (1) 09/01/2023   • COVID-19 Vaccine (3 - 2023-24 season) 09/01/2023      Medicare Screening Tests and Risk Assessments:     Juliana is here for her Welcome to Medicare visit.     Health Risk Assessment:   Patient rates overall health as fair. Patient feels that their physical health rating is same. Patient is satisfied with their life. Eyesight was rated as slightly worse. Hearing was rated as same. Patient feels that their emotional and mental health rating is same. Patients states they are never, rarely angry. Patient states they are sometimes unusually tired/fatigued. Pain experienced in the last 7 days has been none. Patient states that she has experienced no weight loss or gain in last 6 months.     Depression Screening:   PHQ-2 Score: 0      Fall Risk Screening:   In the past year, patient has experienced: no history of falling in past year      Urinary Incontinence Screening:   Patient has leaked urine accidently in the last six months.     Home Safety:  Patient does not have trouble with stairs inside or outside of their home. Patient has working smoke alarms and has working carbon monoxide detector. Home safety hazards include: none.     Nutrition:   Current diet is Diabetic, Low Carb and Limited junk food.     Medications:   Patient is currently taking over-the-counter supplements. OTC medications  include: see medication list. Patient is able to manage medications.     Activities of Daily Living (ADLs)/Instrumental Activities of Daily Living (IADLs):   Walk and transfer into and out of bed and chair?: Yes  Dress and groom yourself?: Yes    Bathe or shower yourself?: Yes    Feed yourself? Yes  Do your laundry/housekeeping?: Yes  Manage your money, pay your bills and track your expenses?: Yes  Make your own meals?: Yes    Do your own shopping?: Yes    Previous Hospitalizations:   Any hospitalizations or ED visits within the last 12 months?: Yes    How many hospitalizations have you had in the last year?: 1-2    Advance Care Planning:   Living will: No    Durable POA for healthcare: No    Advanced directive: No    Advanced directive counseling given: Yes    ACP document given: Yes      Cognitive Screening:   Provider or family/friend/caregiver concerned regarding cognition?: No    PREVENTIVE SCREENINGS      Cardiovascular Screening:    General: Screening Not Indicated, History Lipid Disorder and Screening Current      Diabetes Screening:     General: Screening Not Indicated, History Diabetes and Screening Current      Colorectal Cancer Screening:     General: Screening Current      Breast Cancer Screening:     General: Screening Current      Cervical Cancer Screening:    General: Screening Not Indicated      Osteoporosis Screening:      Due for: Bone Density Ultrasound      Abdominal Aortic Aneurysm (AAA) Screening:        General: Screening Not Indicated      Lung Cancer Screening:     General: Screening Not Indicated    Screening, Brief Intervention, and Referral to Treatment (SBIRT)    Screening  Typical number of drinks in a day: 0  Typical number of drinks in a week: 0  Interpretation: Low risk drinking behavior.    AUDIT-C Screenin) How often did you have a drink containing alcohol in the past year? never  2) How many drinks did you have on a typical day when you were drinking in the past year? 0  3)  "How often did you have 6 or more drinks on one occasion in the past year? never    AUDIT-C Score: 0  Interpretation: Score 0-2 (female): Negative screen for alcohol misuse    Single Item Drug Screening:  How often have you used an illegal drug (including marijuana) or a prescription medication for non-medical reasons in the past year? never    Single Item Drug Screen Score: 0  Interpretation: Negative screen for possible drug use disorder    Vision Screening    Right eye Left eye Both eyes   Without correction 20/40 20/40 20/25   With correction           Physical Exam:     /82 (BP Location: Left arm, Patient Position: Sitting, Cuff Size: Standard)   Pulse 90   Ht 5' 5\" (1.651 m)   Wt 77.7 kg (171 lb 3.2 oz)   BMI 28.49 kg/m²     Physical Exam  Vitals and nursing note reviewed.   Constitutional:       General: She is not in acute distress.     Appearance: She is well-developed.   HENT:      Head: Normocephalic and atraumatic.   Eyes:      Conjunctiva/sclera: Conjunctivae normal.   Cardiovascular:      Rate and Rhythm: Normal rate and regular rhythm.      Heart sounds: No murmur heard.  Pulmonary:      Effort: Pulmonary effort is normal. No respiratory distress.      Breath sounds: Normal breath sounds.   Abdominal:      Palpations: Abdomen is soft.      Tenderness: There is no abdominal tenderness.   Musculoskeletal:         General: No swelling.      Cervical back: Neck supple.   Skin:     General: Skin is warm and dry.      Capillary Refill: Capillary refill takes less than 2 seconds.   Neurological:      Mental Status: She is alert.   Psychiatric:         Mood and Affect: Mood normal.          Jada Presley PA-C  "

## 2024-03-18 NOTE — ASSESSMENT & PLAN NOTE
Last visited Dermatologist 2 years ago when moved to [place]. Basal cell carcinoma was on nose and treated. Will place an ambulatory referral to dermatology to ensure that the patient has regular skin exams to evaluate for recurrence or new lesions.

## 2024-03-18 NOTE — ASSESSMENT & PLAN NOTE
Albumin/Cr ration 1300. Patient's GFR 83 today indicating kidney function in tact. Due to the high level of albuminuria, referral to nephrology warranted to evaluate kidney function .

## 2024-03-18 NOTE — ASSESSMENT & PLAN NOTE
Patient's recent labs significant for impaired glucose and high cholesterol/triglycerides/LDL. Cholesterol 217. Trigs 376. A1C 8.6 from 7.8 1 month ago. Seeing Endocrine Friday 03/22/2024. Our plan was to refer the patient to Endocrine, but she already has appointment set up. Currently taking Metformin 1000 mg and Amaryl 2 mg b.I.d. Taking these medications as prescribed. Further changes to medications will be deferred to Endocrine at her visit on Friday Dr. Lincoln ([facility])  Lab Results   Component Value Date    HGBA1C 8.6 (H) 02/26/2024

## 2024-03-18 NOTE — PATIENT INSTRUCTIONS
1. Type 2 diabetes mellitus with complication, without long-term current use of insulin ([facility])  Assessment & Plan:  Patient's recent labs significant for impaired glucose and high cholesterol/triglycerides/LDL. Cholesterol 217. Trigs 376. A1C 8.6 from 7.8 1 month ago. Seeing Endocrine Friday 03/22/2024. Our plan was to refer the patient to Endocrine, but she already has appointment set up. Currently taking Metformin 1000 mg and Amaryl 2 mg b.I.d. Taking these medications as prescribed. Further changes to medications will be deferred to Endocrine at [facility]  Lab Results   Component Value Date    HGBA1C 8.6 (H) 02/26/2024       Orders:  -     Ambulatory Referral to Nephrology; Future    2. Microalbuminuria  Assessment & Plan:  Albumin/Cr ration 1300. Patient's GFR 83 today indicating kidney function in tact. Due to the high level of albuminuria, referral to nephrology warranted to evaluate kidney function .     Orders:  -     Ambulatory Referral to Nephrology; Future    3. Mixed hyperlipidemia  Assessment & Plan:  Allergic to statins. Intolerant to fenofibrate. Cardiology was supposed to call in nexletol and medication was approved. Patient had trouble picking up medication. I will message the cardiologist and discuss why patient has not started medication and how he would like to proceed.       4. Essential hypertension  Assessment & Plan:  Patient's BP stable on Amlodipine 5 mg and Lisinopril 5 mg. Since her BP is stable we will not change any dosing today. We will allow her cardiologist to make changes to dosing in the future if needed.       5. Demyelinating disease of central nervous system (HCC)  Assessment & Plan:  MRI showed there were several demyelinating lesions which were of greater than 3-5m in size in a periventricular and subcortical region. Continue with neuro.      6. Vitamin D deficiency  Assessment & Plan:  Has not had Vit. D labs since 2021. Last labs  drawn show Vit. D deficiency. Currently still taking vit. D supplements. Will order for patient to have updated panel.     Orders:  -     Vitamin D 25 hydroxy; Future; Expected date: 09/18/2024    7. Pseudoaneurysm of vertebral artery (HCC)  Assessment & Plan:  Recently saw cardiologist and neurologist. Recommended that patient stay on plavix due to risk factors.       8. Basal cell carcinoma (BCC) of skin of nose  Assessment & Plan:  Last visited Dermatologist 2 years ago when moved to [place]. Basal cell carcinoma was on nose and treated. Will place an ambulatory referral to dermatology to ensure that the patient has regular skin exams to evaluate for recurrence or new lesions.     Orders:  -     Ambulatory Referral to Dermatology; Future    9. Menopause  -     DXA bone density spine hip and pelvis; Future; Expected date: 03/18/2024    10. Encounter for screening mammogram for malignant neoplasm of breast  -     Mammo screening bilateral w 3d & cad; Future    11. Welcome to Medicare preventive visit  Assessment & Plan:  Refused prevnar 20 or shingles, given living will to complete. Due for mammo and DEXA- orders placed.     Orders:  -     DXA bone density spine hip and pelvis; Future; Expected date: 03/18/2024  -     Mammo screening bilateral w 3d & cad; Future

## 2024-03-18 NOTE — PROGRESS NOTES
Assessment and Plan:     Problem List Items Addressed This Visit        Cardiovascular and Mediastinum    Essential hypertension     Patient's BP stable on Amlodipine 5 mg and Lisinopril 5 mg. Since her BP is stable we will not change any dosing today. We will allow her cardiologist to make changes to dosing in the future if needed.          Pseudoaneurysm of vertebral artery (HCC)     Recently saw cardiologist and neurologist. Recommended that patient stay on plavix due to risk factors.             Endocrine    Type 2 diabetes mellitus with complication, without long-term current use of insulin ([facility]) - Primary     Patient's recent labs significant for impaired glucose and high cholesterol/triglycerides/LDL. Cholesterol 217. Trigs 376. A1C 8.6 from 7.8 1 month ago. Seeing Endocrine Friday 03/22/2024. Our plan was to refer the patient to Endocrine, but she already has appointment set up. Currently taking Metformin 1000 mg and Amaryl 2 mg b.I.d. Taking these medications as prescribed. Further changes to medications will be deferred to Endocrine at her visit on Friday Dr. Lincoln ([facility])  Lab Results   Component Value Date    HGBA1C 8.6 (H) 02/26/2024          Relevant Orders    Ambulatory Referral to Nephrology       Nervous and Auditory    Demyelinating disease of central nervous system (HCC)     Personally reviewed MRI with patient. Seen were several demyelinating lesions which were of greater than 3-5m in size in a periventricular and subcortical region.             Musculoskeletal and Integument    Basal cell carcinoma     Last visited Dermatologist 2 years ago when moved to [place]. Basal cell carcinoma was on nose and treated. Will place an ambulatory referral to dermatology to ensure that the patient has regular skin exams to evaluate for recurrence or new lesions.          Relevant Orders    Ambulatory Referral to Dermatology       Other    Mixed hyperlipidemia     Allergic to statins.  Intolerant to fenofibrate. Cardiology was supposed to call in nexletol and medication was approved. Patient had trouble picking up medication. I will message the cardiologist and discuss why patient has not started medication and how he would like to proceed.          Microalbuminuria     Albumin/Cr ration 1300. Patient's GFR 83 today indicating kidney function in tact. Due to the high level of albuminuria, referral to nephrology warranted to evaluate kidney function .          Relevant Orders    Ambulatory Referral to Nephrology    Vitamin D deficiency     Has not had Vit. D labs since 2021. Last labs drawn show Vit. D deficiency. Currently still taking vit. D supplements. Will order for patient to have updated panel.          Relevant Orders    Vitamin D 25 hydroxy    Welcome to Medicare preventive visit    Relevant Orders    DXA bone density spine hip and pelvis    Mammo screening bilateral w 3d & cad   Other Visit Diagnoses     Menopause        Relevant Orders    DXA bone density spine hip and pelvis    Encounter for screening mammogram for malignant neoplasm of breast        Relevant Orders    Mammo screening bilateral w 3d & cad           Preventive health issues were discussed with patient, and age appropriate screening tests were ordered as noted in patient's After Visit Summary.  Personalized health advice and appropriate referrals for health education or preventive services given if needed, as noted in patient's After Visit Summary.     History of Present Illness:     Patient presents for a Medicare Wellness Visit    HPI   Patient Care Team:  Jada Presley PA-C as PCP - General (Family Medicine)  Dana Kaur MD as PCP - PCP-[facility] ([facility])     Review of Systems:     Review of Systems     Problem List:     Patient Active Problem List   Diagnosis   • Basal cell carcinoma   • Demyelinating disease of central nervous system (HCC)   • Fatty infiltration of liver   • Mixed hyperlipidemia   • Left  knee DJD   • Essential hypertension   • Mixed incontinence   • Pineal gland cyst   • Pelvic floor weakness in female   • Psoriasis   • Type 2 diabetes mellitus with complication, without long-term current use of insulin (HCC)   • History of CVA (cerebrovascular accident)   • PFO (patent foramen ovale)   • Vaginal atrophy   • Total bilirubin, elevated   • Migraine   • Microalbuminuria   • Chondromalacia patellae   • Thyroid nodule   • Frequent PVCs   • Palpitations   • Statin intolerance   • Intracranial aneurysm   • Cardiovascular risk factor   • Episodic cluster headache, not intractable   • Dizziness   • Vitamin D deficiency   • Stroke-like symptoms   • Hyponatremia   • Abnormal computed tomography angiography (CTA)   • Pseudoaneurysm of vertebral artery (HCC)   • Myalgia due to statin   • Welcome to Medicare preventive visit      Past Medical and Surgical History:     Past Medical History:   Diagnosis Date   • Diabetes mellitus (HCC)    • GERD (gastroesophageal reflux disease)     Take pepcid but still bothersome with certain foods   • Headache(784.0)     Occasional   • Hypertension    • Nasal congestion     Espyat night   • Otitis media     Occasional   • Stroke (cerebrum) (HCC)    • Stroke (HCC)     Tia   • Tinnitus     Current     Past Surgical History:   Procedure Laterality Date   • CHOLECYSTECTOMY LAPAROSCOPIC     • MOHS SURGERY      Micrographic Surgery Nose, Last assessed: 2/13/17   • TOTAL KNEE ARTHROPLASTY Left 03/08/2017    Last assessed: 12/26/17   • TUBAL LIGATION        Family History:     Family History   Problem Relation Age of Onset   • Diabetes Father    • Hypertension Father    • Stroke Father    • Anemia Daughter    • Breast cancer Paternal Aunt       Social History:     Social History     Socioeconomic History   • Marital status: /Civil Union     Spouse name: None   • Number of children: None   • Years of education: None   • Highest education level: None   Occupational History   •  Occupation: currently working   Tobacco Use   • Smoking status: Never   • Smokeless tobacco: Never   • Tobacco comments:     No secondhand smoke exposure   Vaping Use   • Vaping status: Never Used   Substance and Sexual Activity   • Alcohol use: Never   • Drug use: Never   • Sexual activity: Yes     Partners: Male     Birth control/protection: None   Other Topics Concern   • None   Social History Narrative    Feels safe at home     Social Determinants of Health     Financial Resource Strain: Not on file   Food Insecurity: No Food Insecurity (3/13/2024)    Hunger Vital Sign    • Worried About Running Out of Food in the Last Year: Never true    • Ran Out of Food in the Last Year: Never true   Transportation Needs: No Transportation Needs (3/13/2024)    PRAPARE - Transportation    • Lack of Transportation (Medical): No    • Lack of Transportation (Non-Medical): No   Physical Activity: Not on file   Stress: Not on file   Social Connections: Not on file   Intimate Partner Violence: Not on file   Housing Stability: Low Risk  (3/13/2024)    Housing Stability Vital Sign    • Unable to Pay for Housing in the Last Year: No    • Number of Places Lived in the Last Year: 1    • Unstable Housing in the Last Year: No      Medications and Allergies:     Current Outpatient Medications   Medication Sig Dispense Refill   • amLODIPine (NORVASC) 5 mg tablet Take 1 tablet by mouth once daily 30 tablet 5   • clopidogrel (PLAVIX) 75 mg tablet Take 1 tablet (75 mg total) by mouth daily 90 tablet 0   • famotidine (PEPCID) 20 mg tablet Take 1 tablet by mouth twice daily 90 tablet 3   • glimepiride (AMARYL) 2 mg tablet TAKE ONE TABLET BY MOUTH TWICE A  tablet 2   • lisinopril (ZESTRIL) 5 mg tablet Take 1 tablet (5 mg total) by mouth daily 90 tablet 3   • metFORMIN (GLUCOPHAGE) 1000 MG tablet TAKE ONE TABLET BY MOUTH TWICE A DAY WITH MEALS (GENERIC FOR GLUCOPHAGE) 180 tablet 1   • Misc Natural Products (DAILY HERBS IMMUNE DEFENSE PO)  Take by mouth     • Multiple Vitamin (MULTI-VITAMIN DAILY PO) Take 1 tablet by mouth daily     • triamcinolone (KENALOG) 0.1 % cream APPLY TOPICALLY TWO TIMES A DAY UNDER THE LEFT BREAST AS NEEDED FOR FLARES 30 g 2     No current facility-administered medications for this visit.     Allergies   Allergen Reactions   • Bactrim [Sulfamethoxazole-Trimethoprim]    • Neomycin-Bacitracin Zn-Polymyx Other (See Comments)     REDNESS   • Statins Myalgia      Immunizations:     Immunization History   Administered Date(s) Administered   • COVID-19 PFIZER VACCINE 0.3 ML IM 10/13/2021, 11/06/2021   • INFLUENZA 11/03/2018   • Influenza Quadrivalent Preservative Free 3 years and older IM 11/09/2015   • Influenza, recombinant, quadrivalent,injectable, preservative free 11/12/2019, 11/18/2022   • Influenza, seasonal, injectable 11/05/2016      Health Maintenance:         Topic Date Due   • Hepatitis C Screening  Never done   • Cervical Cancer Screening  11/19/2020   • Breast Cancer Screening: Mammogram  01/19/2024   • Colorectal Cancer Screening  11/13/2025   • HIV Screening  Completed         Topic Date Due   • Pneumococcal Vaccine: 65+ Years (1 of 2 - PCV) Never done   • DTaP,Tdap,and Td Vaccines (1 - Tdap) Never done   • Influenza Vaccine (1) 09/01/2023   • COVID-19 Vaccine (3 - 2023-24 season) 09/01/2023      Medicare Screening Tests and Risk Assessments:     Juliana is here for her Welcome to Medicare visit.     Health Risk Assessment:   Patient rates overall health as fair. Patient feels that their physical health rating is same. Patient is satisfied with their life. Eyesight was rated as slightly worse. Hearing was rated as same. Patient feels that their emotional and mental health rating is same. Patients states they are never, rarely angry. Patient states they are sometimes unusually tired/fatigued. Pain experienced in the last 7 days has been none. Patient states that she has experienced no weight loss or gain in last 6  months.     Depression Screening:   PHQ-2 Score: 0      Fall Risk Screening:   In the past year, patient has experienced: no history of falling in past year      Urinary Incontinence Screening:   Patient has leaked urine accidently in the last six months.     Home Safety:  Patient does not have trouble with stairs inside or outside of their home. Patient has working smoke alarms and has working carbon monoxide detector. Home safety hazards include: none.     Nutrition:   Current diet is Diabetic, Low Carb and Limited junk food.     Medications:   Patient is currently taking over-the-counter supplements. OTC medications include: see medication list. Patient is able to manage medications.     Activities of Daily Living (ADLs)/Instrumental Activities of Daily Living (IADLs):   Walk and transfer into and out of bed and chair?: Yes  Dress and groom yourself?: Yes    Bathe or shower yourself?: Yes    Feed yourself? Yes  Do your laundry/housekeeping?: Yes  Manage your money, pay your bills and track your expenses?: Yes  Make your own meals?: Yes    Do your own shopping?: Yes    Previous Hospitalizations:   Any hospitalizations or ED visits within the last 12 months?: Yes    How many hospitalizations have you had in the last year?: 1-2    Advance Care Planning:   Living will: No    Durable POA for healthcare: No    Advanced directive: No      PREVENTIVE SCREENINGS      Cardiovascular Screening:    General: Screening Not Indicated and History Lipid Disorder      Diabetes Screening:     General: Screening Not Indicated and History Diabetes      Colorectal Cancer Screening:     General: Screening Current      Breast Cancer Screening:     General: Screening Current      Cervical Cancer Screening:    General: Screening Not Indicated      Lung Cancer Screening:     General: Screening Not Indicated    Screening, Brief Intervention, and Referral to Treatment (SBIRT)    Screening  Typical number of drinks in a day: 0  Typical number  "of drinks in a week: 0  Interpretation: Low risk drinking behavior.    AUDIT-C Screenin) How often did you have a drink containing alcohol in the past year? never  2) How many drinks did you have on a typical day when you were drinking in the past year? 0  3) How often did you have 6 or more drinks on one occasion in the past year? never    AUDIT-C Score: 0  Interpretation: Score 0-2 (female): Negative screen for alcohol misuse    Single Item Drug Screening:  How often have you used an illegal drug (including marijuana) or a prescription medication for non-medical reasons in the past year? never    Single Item Drug Screen Score: 0  Interpretation: Negative screen for possible drug use disorder    Vision Screening    Right eye Left eye Both eyes   Without correction 20/40 20/40 20/25   With correction           Physical Exam:     /82 (BP Location: Left arm, Patient Position: Sitting, Cuff Size: Standard)   Pulse 90   Ht 5' 5\" (1.651 m)   Wt 77.7 kg (171 lb 3.2 oz)   BMI 28.49 kg/m²     Physical Exam     Jada Presley PA-C  "

## 2024-03-18 NOTE — ASSESSMENT & PLAN NOTE
Recently saw cardiologist and neurologist. Recommended that patient stay on plavix due to risk factors.

## 2024-03-18 NOTE — ASSESSMENT & PLAN NOTE
Has not had Vit. D labs since 2021. Last labs drawn show Vit. D deficiency. Currently still taking vit. D supplements. Will order for patient to have updated panel.

## 2024-03-18 NOTE — ASSESSMENT & PLAN NOTE
Allergic to statins. Intolerant to fenofibrate. Cardiology was supposed to call in nexletol and medication was approved. Patient had trouble picking up medication. I will message the cardiologist and discuss why patient has not started medication and how he would like to proceed.

## 2024-03-18 NOTE — ASSESSMENT & PLAN NOTE
MRI showed there were several demyelinating lesions which were of greater than 3-5m in size in a periventricular and subcortical region. Continue with neuro.

## 2024-03-18 NOTE — ASSESSMENT & PLAN NOTE
Patient's BP stable on Amlodipine 5 mg and Lisinopril 5 mg. Since her BP is stable we will not change any dosing today. We will allow her cardiologist to make changes to dosing in the future if needed.

## 2024-03-19 ENCOUNTER — CONSULT (OUTPATIENT)
Dept: NEPHROLOGY | Facility: CLINIC | Age: 64
End: 2024-03-19
Payer: COMMERCIAL

## 2024-03-19 ENCOUNTER — TELEPHONE (OUTPATIENT)
Age: 64
End: 2024-03-19

## 2024-03-19 VITALS
SYSTOLIC BLOOD PRESSURE: 142 MMHG | DIASTOLIC BLOOD PRESSURE: 84 MMHG | BODY MASS INDEX: 28.66 KG/M2 | WEIGHT: 172 LBS | HEIGHT: 65 IN

## 2024-03-19 DIAGNOSIS — I10 ESSENTIAL HYPERTENSION: ICD-10-CM

## 2024-03-19 DIAGNOSIS — E87.1 HYPONATREMIA: ICD-10-CM

## 2024-03-19 DIAGNOSIS — E78.2 MIXED HYPERLIPIDEMIA: Primary | ICD-10-CM

## 2024-03-19 DIAGNOSIS — R80.9 MICROALBUMINURIA: ICD-10-CM

## 2024-03-19 DIAGNOSIS — R80.1 PERSISTENT PROTEINURIA: Primary | ICD-10-CM

## 2024-03-19 DIAGNOSIS — N18.2 STAGE 2 CHRONIC KIDNEY DISEASE: ICD-10-CM

## 2024-03-19 DIAGNOSIS — Z86.73 HISTORY OF TIA (TRANSIENT ISCHEMIC ATTACK): ICD-10-CM

## 2024-03-19 LAB
SL AMB  POCT GLUCOSE, UA: ABNORMAL
SL AMB LEUKOCYTE ESTERASE,UA: ABNORMAL
SL AMB POCT BILIRUBIN,UA: ABNORMAL
SL AMB POCT BLOOD,UA: ABNORMAL
SL AMB POCT CLARITY,UA: CLEAR
SL AMB POCT COLOR,UA: YELLOW
SL AMB POCT KETONES,UA: ABNORMAL
SL AMB POCT NITRITE,UA: ABNORMAL
SL AMB POCT PH,UA: 5
SL AMB POCT SPECIFIC GRAVITY,UA: 1.03
SL AMB POCT URINE PROTEIN: ABNORMAL
SL AMB POCT UROBILINOGEN: 0.2

## 2024-03-19 PROCEDURE — 81002 URINALYSIS NONAUTO W/O SCOPE: CPT | Performed by: INTERNAL MEDICINE

## 2024-03-19 PROCEDURE — 99204 OFFICE O/P NEW MOD 45 MIN: CPT | Performed by: INTERNAL MEDICINE

## 2024-03-19 RX ORDER — LISINOPRIL 10 MG/1
10 TABLET ORAL DAILY
Qty: 30 TABLET | Refills: 5 | Status: SHIPPED | OUTPATIENT
Start: 2024-03-19

## 2024-03-19 NOTE — TELEPHONE ENCOUNTER
Patient called to schedule as a NP for a skin check. She was offered soonest available in [facility]. She reports history of BCC.

## 2024-03-19 NOTE — PROGRESS NOTES
Nexletol the cardologist renewed the RX  Answers submitted by the patient for this visit:  Medicare Annual Wellness Visit (Submitted on 3/13/2024)  How would you rate your overall health?: fair  Compared to last year, how is your physical health?: same  In general, how satisfied are you with your life?: satisfied  Compared to last year, how is your eyesight?: slightly worse  Compared to last year, how is your hearing?: same  Compared to last year, how is your emotional/mental health?: same  How often is anger a problem for you?: never, rarely  How often do you feel unusually tired/fatigued?: sometimes  In the past 7 days, how much pain have you experienced?: none  In the past 6 months, have you lost or gained 10 pounds without trying?: No  One or more falls in the last year: No  In the past 6 months, have you accidentally leaked urine?: Yes  Do you have trouble with the stairs inside or outside your home?: No  Does your home have working smoke alarms?: Yes  Does your home have a carbon monoxide monitor?: Yes  Which safety hazards (if any) have you experienced in your home? Please select all that apply.: none  How would you describe your current diet? Please select all that apply.: Diabetic, Low Carb, Limited junk food  In addition to prescription medications, are you taking any over-the-counter supplements?: Yes  If yes, what supplements are you taking?: Multiple vitamin  Can you manage your medications?: Yes  Are you currently taking any opioid medications?: No  Can you walk and transfer into and out of your bed and chair?: Yes  Can you dress and groom yourself?: Yes  Can you bathe or shower yourself?: Yes  Can you feed yourself?: Yes  Can you do your laundry/ housekeeping?: Yes  Can you manage your money, pay your bills, and track your expenses?: Yes  Can you make your own meals?: Yes  Can you do your own shopping?: Yes  Within the last 12 months, have you had any hospitalizations or Emergency Department visits?:  Yes  If yes, how many times have you been hospitalized within the past year?: 1-2  Do you have a living will?: No  Do you have a Durable POA (Power of ) for healthcare decisions?: No  Do you have an Advanced Directive for end of life decisions?: No  How often have you used an illegal drug (including marijuana) or a prescription medication for non-medical reasons in the past year?: never  What is the typical number of drinks you consume in a day?: 0  What is the typical number of drinks you consume in a week?: 0  How often did you have a drink containing alcohol in the past year?: never  How many drinks did you have on a typical day  when you were drinking in the past year?: 0  How often did you have 6 or more drinks on one occasion in the past year?: never

## 2024-03-19 NOTE — PATIENT INSTRUCTIONS
-Increase lisinopril from 5 mg to 10 mg daily  -Blood and urine test in 1 to 2 weeks after increasing lisinopril  -Repeat labs before next office visit  -Check blood pressure at home and call back if remains persistently greater than 135/85  -Discuss with endocrine doctor if Farxiga or Jardiance can be started

## 2024-03-19 NOTE — TELEPHONE ENCOUNTER
Patient called and stated that when she was in the office at the end of February, Dr. Yin prescribed Nexletol 180 mg, but it was canceled 2 days later.  Patient saw PCP yesterday and PCP mentioned this cancellation.  While investigating why it was discontinued, it looks like it may have been accidentally discontinued by someone in the PCP office on February 28 (2 days after being prescribed).  Patient asked if there was any way I could reinstate this with the pharmacy.  I assured patient that I can place the prescription back into her chart and send it to [facility] pharmacy.

## 2024-03-22 ENCOUNTER — CONSULT (OUTPATIENT)
Dept: ENDOCRINOLOGY | Facility: CLINIC | Age: 64
End: 2024-03-22
Payer: COMMERCIAL

## 2024-03-22 ENCOUNTER — TELEPHONE (OUTPATIENT)
Age: 64
End: 2024-03-22

## 2024-03-22 VITALS
HEIGHT: 66 IN | OXYGEN SATURATION: 98 % | BODY MASS INDEX: 27.48 KG/M2 | DIASTOLIC BLOOD PRESSURE: 62 MMHG | SYSTOLIC BLOOD PRESSURE: 118 MMHG | WEIGHT: 171 LBS | HEART RATE: 82 BPM

## 2024-03-22 DIAGNOSIS — E66.3 OVERWEIGHT (BMI 25.0-29.9): ICD-10-CM

## 2024-03-22 DIAGNOSIS — T46.6X5A MYALGIA DUE TO STATIN: ICD-10-CM

## 2024-03-22 DIAGNOSIS — I10 ESSENTIAL HYPERTENSION: ICD-10-CM

## 2024-03-22 DIAGNOSIS — Z86.73 HISTORY OF CVA (CEREBROVASCULAR ACCIDENT): ICD-10-CM

## 2024-03-22 DIAGNOSIS — E04.1 THYROID NODULE: ICD-10-CM

## 2024-03-22 DIAGNOSIS — E11.65 TYPE 2 DIABETES MELLITUS WITH HYPERGLYCEMIA, WITHOUT LONG-TERM CURRENT USE OF INSULIN (HCC): Primary | ICD-10-CM

## 2024-03-22 DIAGNOSIS — E78.2 MIXED HYPERLIPIDEMIA: ICD-10-CM

## 2024-03-22 DIAGNOSIS — N18.2 STAGE 2 CHRONIC KIDNEY DISEASE: ICD-10-CM

## 2024-03-22 DIAGNOSIS — M79.10 MYALGIA DUE TO STATIN: ICD-10-CM

## 2024-03-22 DIAGNOSIS — E11.8 TYPE 2 DIABETES MELLITUS WITH COMPLICATION, WITHOUT LONG-TERM CURRENT USE OF INSULIN (HCC): ICD-10-CM

## 2024-03-22 PROCEDURE — 99204 OFFICE O/P NEW MOD 45 MIN: CPT | Performed by: INTERNAL MEDICINE

## 2024-03-22 RX ORDER — BLOOD SUGAR DIAGNOSTIC
STRIP MISCELLANEOUS
Qty: 100 EACH | Refills: 3 | Status: SHIPPED | OUTPATIENT
Start: 2024-03-22

## 2024-03-22 RX ORDER — BLOOD-GLUCOSE METER
1 EACH MISCELLANEOUS 2 TIMES DAILY
Qty: 1 EACH | Refills: 0 | Status: SHIPPED | OUTPATIENT
Start: 2024-03-22

## 2024-03-22 RX ORDER — LANCETS
EACH MISCELLANEOUS
Qty: 100 EACH | Refills: 3 | Status: SHIPPED | OUTPATIENT
Start: 2024-03-22

## 2024-03-22 NOTE — TELEPHONE ENCOUNTER
Pt. Wants to know when do you want her to get her labs done since there is no expected date on order. Also she stated to tell you she is starting her jardiance tomorrow. Thanks

## 2024-03-22 NOTE — PROGRESS NOTES
Juliana Hernandez  63 y.o.  Female MRN: [id number]  Encounter: [id number]     New Patient Consult Note      CC: Type 2 diabetes mellitus      Referring Provider  Olive Salazar  [address] [place] [address]       ASSESSMENT AND PLAN  Assessment:   Juliana Hernandez is a [id number]-ugp female with type 2 diabetes mellitus complicated by proteinuria, hypertension, hyperlipidemia, CKD stage III, history of TIA, and thyroid nodule.      Plan:  Type 2 diabetes mellitus   - A1c: 8.6% (2/26/24)  - Urine microalb/creat: 1301 (2/26/24)   - Continue metformin 1000 mg twice daily   - Initiate Jardiance 10 mg daily and patient was advised to remain well-hydrated while taking this medication  - Continue glimepiride 2 mg twice daily for now and once patient has picked up the Jardiance she can reduce glimepiride to once daily   - Advised patient to check blood glucose consistently at least twice daily and send this office a log sheet two weeks after she has started taking Jardiance for review  - A new glucometer was prescribed during today's visit   - Refer to diabetes education for MNT and patient was advised to bring a 3-day food log to that visit  - Counseled patient on following an ADA diet with aerobic exercise for at least 30 minutes per day, 5 days per week as tolerated  - Recommend annual retinopathy screening  - Recommend regular self-foot exams; ambulatory referral to podiatry also provided   - Reviewed hypoglycemic symptoms and management   - RTC in 3 months for a follow-up visit with lab work for BMP and A1c to be done 1 week prior to this visit     2.   Hyperlipidemia  - LDL: 102 (2/26/24)  - Will be starting bempedoic acid soon  - Continue regular follow-up with PCP     3.   Hypertension  - Taking and tolerating lisinopril well  - Continue regular follow-up with PCP     4.   Thyroid nodule  - Since the thyroid nodule in 2019 was TR3 with suspicious features, will repeat a thyroid  "ultrasound now for re-evaluation of the nodule  - Repeat TSH and free T4      HISTORY OF PRESENT ILLNESS  HPI:  Juliana Hernandez is a 63-year-old female with a past medical history significant for type 2 diabetes mellitus complicated by proteinuria, hypertension, hyperlipidemia, chronic kidney disease stage II, history of TIA, thyroid nodule, PFO closure, and pineal gland cyst who presents for initial consultation. She is referred by her PCP, Ede JUÁREZ.     Diagnosed: 15 years ago     Initial treatment:    Metformin     Current medications:    Metformin 1000 mg twice daily    Amaryl 2 mg twice daily     Recent hospitalizations: 10/2023 for possible stroke  Recent steroid use: Denies   Recent hyper- or hypoglycemia: N/A   Gestational diabetes: Denies    Glycemic Control   A1c: 8.6% (2/26/24)   Self-Monitored Blood Glucose     Fingersticks: Rarely, if feeling \"off\" (lightheaded, dizzy, tired)      Glucometer: Cannot recall, but old     Ranges: 200s     Hypoglycemia: Denies        Hospitalization: Denies     Lifestyle    Diabetes education: No    Diet: 2-3 meals, usually skips lunch;        Breakfast (8 am): eggs w/cheese and toast (Arnaud's bread)         Lunch (12:30-1 pm): salad, vegetables, sandwich        Dinner (5-6 pm): meat w/potatoes and vegetables         Snacks: chips and dip, popcorn       Drinks: coffee w/flavored creamer, water, SF drinks, diet coke     Physical activity: walking 15-30 minutes couple of times per week    Complications     Retinopathy/macular edema: Eye exam recently done at [facility] ([phone]); no retinopathy      Nephropathy: urine alb/cr ratio 1301 (2/26/24)      Neuropathy: Present in hands, possibly CTS      Macrovascular: TIA     ASCVD: Will be starting bempedoic acid 180 mg daily     Hypertension: On lisinopril 10 mg daily     Diabetic foot ulcers:  Denies      Dental disease: Denies     Vaccinations:    Influenza: UTD      Diabetic ROS: " polyuria, occasionally, blurry vision     History of thyroid disorders: History of fluctuating TSH and thyroid nodule. A right lower pole 0.9 x 0.6 x 0.7 cm nodule was seen on thyroid ultrasound on 12/27/19. This was deemed to not meet ACR criteria for biopsy or follow-up.   History of pancreatitis: Denies     Family history is significant for diabetes mellitus in: Father, maternal grandparents  Tobacco: Denies   Alcohol: Denies   Illicit drugs: Denies   Lives with:   Occupation: Retired, used to work as a        Review of Systems   Constitutional:  Negative for appetite change, chills, fever and unexpected weight change.   HENT:  Negative for ear pain and sore throat.    Eyes:  Positive for visual disturbance (occasionally blurry). Negative for pain.   Respiratory:  Negative for cough and shortness of breath.    Cardiovascular:  Negative for chest pain and palpitations.   Gastrointestinal:  Negative for abdominal pain, diarrhea, nausea and vomiting.   Endocrine: Positive for polyuria. Negative for polydipsia and polyphagia.   Genitourinary:  Negative for dysuria and hematuria.   Musculoskeletal:  Negative for arthralgias and back pain.   Skin:  Negative for color change and rash.   Neurological:  Negative for dizziness, tremors, seizures, syncope, light-headedness and numbness.   All other systems reviewed and are negative.      Patient Active Problem List   Diagnosis    Basal cell carcinoma    Demyelinating disease of central nervous system (HCC)    Fatty infiltration of liver    Mixed hyperlipidemia    Left knee DJD    Essential hypertension    Mixed incontinence    Pineal gland cyst    Pelvic floor weakness in female    Psoriasis    Type 2 diabetes mellitus with complication, without long-term current use of insulin (HCC)    History of CVA (cerebrovascular accident)    PFO (patent foramen ovale)    Vaginal atrophy    Total bilirubin, elevated    Migraine    Persistent proteinuria     Chondromalacia patellae    Thyroid nodule    Frequent PVCs    Palpitations    Statin intolerance    Intracranial aneurysm    Cardiovascular risk factor    Episodic cluster headache, not intractable    Dizziness    Vitamin D deficiency    Stroke-like symptoms    Hyponatremia    Abnormal computed tomography angiography (CTA)    Pseudoaneurysm of vertebral artery (HCC)    Myalgia due to statin    Welcome to Medicare preventive visit    Stage 2 chronic kidney disease      Past Medical History:   Diagnosis Date    Diabetes mellitus (HCC)     GERD (gastroesophageal reflux disease)     Take pepcid but still bothersome with certain foods    Headache(784.0)     Occasional    Hypertension     Nasal congestion     Espyat night    Otitis media     Occasional    Stroke (cerebrum) (HCC)     Stroke (HCC)     Tia    Tinnitus     Current      Past Surgical History:   Procedure Laterality Date    CHOLECYSTECTOMY LAPAROSCOPIC      MOHS SURGERY      Micrographic Surgery Nose, Last assessed: 2/13/17    TOTAL KNEE ARTHROPLASTY Left 03/08/2017    Last assessed: 12/26/17    TUBAL LIGATION        Family History   Problem Relation Age of Onset    Diabetes Father     Hypertension Father     Stroke Father     Diabetes type II Father     Hyperlipidemia Brother     No Known Problems Maternal Aunt     No Known Problems Maternal Aunt     No Known Problems Maternal Uncle     Breast cancer Paternal Aunt     No Known Problems Maternal Grandmother     No Known Problems Maternal Grandfather     No Known Problems Paternal Grandmother     No Known Problems Paternal Grandfather     Anemia Daughter     No Known Problems Daughter     No Known Problems Daughter     No Known Problems Daughter     No Known Problems Daughter     No Known Problems Half-Sister     No Known Problems Half-Sister      Social History     Tobacco Use    Smoking status: Never    Smokeless tobacco: Never    Tobacco comments:     No secondhand smoke exposure   Substance Use Topics     "Alcohol use: No     Allergies   Allergen Reactions    Bactrim [Sulfamethoxazole-Trimethoprim]     Neomycin-Bacitracin Zn-Polymyx Other (See Comments)     REDNESS    Statins Myalgia       Current Outpatient Medications:     amLODIPine (NORVASC) 5 mg tablet, Take 1 tablet by mouth once daily, Disp: 30 tablet, Rfl: 5    Bempedoic Acid 180 MG TABS, Take 180 mg by mouth every morning, Disp: 30 tablet, Rfl: 5    clopidogrel (PLAVIX) 75 mg tablet, Take 1 tablet (75 mg total) by mouth daily, Disp: 90 tablet, Rfl: 0    famotidine (PEPCID) 20 mg tablet, Take 1 tablet by mouth twice daily, Disp: 90 tablet, Rfl: 3    glimepiride (AMARYL) 2 mg tablet, TAKE ONE TABLET BY MOUTH TWICE A DAY, Disp: 180 tablet, Rfl: 2    lisinopril (ZESTRIL) 10 mg tablet, Take 1 tablet (10 mg total) by mouth daily, Disp: 30 tablet, Rfl: 5    metFORMIN (GLUCOPHAGE) 1000 MG tablet, TAKE ONE TABLET BY MOUTH TWICE A DAY WITH MEALS (GENERIC FOR GLUCOPHAGE), Disp: 180 tablet, Rfl: 1    Misc Natural Products (DAILY HERBS IMMUNE DEFENSE PO), Take by mouth, Disp: , Rfl:     Multiple Vitamin (MULTI-VITAMIN DAILY PO), Take 1 tablet by mouth daily, Disp: , Rfl:     triamcinolone (KENALOG) 0.1 % cream, APPLY TOPICALLY TWO TIMES A DAY UNDER THE LEFT BREAST AS NEEDED FOR FLARES, Disp: 30 g, Rfl: 2      OBJECTIVE  Visit Vitals  /62 (BP Location: Left arm, Patient Position: Sitting, Cuff Size: Large)   Pulse 82   Ht 5' 6\" (1.676 m)   Wt 77.6 kg (171 lb)   SpO2 98%   BMI 27.60 kg/m²   OB Status Postmenopausal   Smoking Status Never   BSA 1.87 m²     Wt Readings from Last 3 Encounters:   03/22/24 77.6 kg (171 lb)   03/19/24 78 kg (172 lb)   03/18/24 77.7 kg (171 lb 3.2 oz)       Physical Exam  Constitutional:       Appearance: Normal appearance.   HENT:      Head: Normocephalic and atraumatic. Hair is normal.      Mouth/Throat:      Mouth: Mucous membranes are moist.      Pharynx: Oropharynx is clear.   Eyes:      Extraocular Movements: Extraocular movements " intact.      Pupils: Pupils are equal, round, and reactive to light.   Neck:      Thyroid: No thyromegaly or thyroid tenderness.      Trachea: No tracheal tenderness or tracheal deviation.   Cardiovascular:      Rate and Rhythm: Normal rate and regular rhythm.      Pulses: Normal pulses. no weak pulses.           Dorsalis pedis pulses are 2+ on the right side and 2+ on the left side.        Posterior tibial pulses are 2+ on the right side and 2+ on the left side.      Heart sounds: S1 normal and S2 normal.   Pulmonary:      Effort: No respiratory distress.      Breath sounds: Normal breath sounds.   Abdominal:      General: Abdomen is flat. Bowel sounds are normal.      Palpations: Abdomen is soft.      Tenderness: There is no abdominal tenderness.   Musculoskeletal:      Cervical back: Full passive range of motion without pain, normal range of motion and neck supple. No tenderness.      Right lower leg: Edema present.      Left lower leg: Edema present.   Feet:      Right foot:      Skin integrity: No ulcer, skin breakdown, erythema, warmth, callus or dry skin.      Left foot:      Skin integrity: No ulcer, skin breakdown, erythema, warmth, callus or dry skin.   Lymphadenopathy:      Cervical: No cervical adenopathy.   Skin:     General: Skin is warm and dry.   Neurological:      Mental Status: She is alert and oriented to person, place, and time.      Motor: No tremor.      Deep Tendon Reflexes: Reflexes are normal and symmetric.   Psychiatric:         Mood and Affect: Mood normal.         Thought Content: Thought content normal.         Judgment: Judgment normal.         Diabetic Foot Exam  Patient's shoes and socks removed.    Right Foot/Ankle   Right Foot Inspection  Skin Exam: skin normal and skin intact. No dry skin, no warmth, no callus, no erythema, no maceration, no abnormal color, no pre-ulcer, no ulcer and no callus.     Toe Exam: ROM and strength within normal limits.     Sensory   Vibration:  intact  Proprioception: intact  Monofilament testing: intact    Vascular  Capillary refills: < 3 seconds  The right DP pulse is 2+. The right PT pulse is 2+.     Left Foot/Ankle  Left Foot Inspection  Skin Exam: skin normal and skin intact. No dry skin, no warmth, no erythema, no maceration, normal color, no pre-ulcer, no ulcer and no callus.     Toe Exam: ROM and strength within normal limits.     Sensory   Vibration: intact  Proprioception: intact  Monofilament testing: intact    Vascular  Capillary refills: < 3 seconds  The left DP pulse is 2+. The left PT pulse is 2+.     Assign Risk Category  No deformity present  No loss of protective sensation  No weak pulses  Risk: 0        Labs:   Lab Results   Component Value Date    HGBA1C 8.6 (H) 02/26/2024    HGBA1C 7.8 (H) 10/30/2023    HGBA1C 7.4 (H) 06/12/2023     Lab Results   Component Value Date    GLUC 169 (H) 10/30/2023       Lab Results   Component Value Date    CREATININE 0.76 02/26/2024    CREATININE 0.75 10/30/2023    CREATININE 0.89 10/29/2023    BUN 16 02/26/2024     (L) 11/29/2017    K 4.1 02/26/2024    CL 98 02/26/2024    CO2 27 02/26/2024    EGFR 82 02/26/2024     Albumin Creat Ratio   Date Value Ref Range Status   02/26/2024 1,301 (H) 0 - 30 mg/g creatinine Final   12/04/2018 216.1 (H) <30 mg/gm CREA Final       Lab Results   Component Value Date    CHOL 274 (H) 11/29/2017    HDL 40 (L) 02/26/2024    TRIG 376 (H) 02/26/2024       Lab Results   Component Value Date    ALT 29 02/26/2024    AST 26 02/26/2024    ALKPHOS 82 02/26/2024    BILITOT 1.0 11/29/2017       Lab Results   Component Value Date    TSH 4.40 (H) 12/09/2019    FREET4 0.9 10/07/2013       Discussed with the patient and all questioned fully answered. She will call me if any problems arise.    Counseled patient on diagnostic results, prognosis, risk and benefit of treatment options, instruction for management, importance of treatment compliance, risk factor reduction, and  impressions.

## 2024-03-22 NOTE — TELEPHONE ENCOUNTER
Pt called in wanting to ask questions about labs and medication. Please give pt a call back in regards to this. I did try to reach out to the office but no one was available to answer the call. [phone]

## 2024-03-22 NOTE — TELEPHONE ENCOUNTER
Marilu from [facility] calling asking provider to send rx for Accu check soft click lancets.

## 2024-03-22 NOTE — PATIENT INSTRUCTIONS
- Continue taking metformin 1000 mg 1 tablet twice a day and Amaryl 2 mg 1 tablet twice a day for now  - Once you start taking Jardiance, reduce the Amaryl to 1 tablet a day  - Send us your blood sugar log in 2 weeks for review once you have started Jardiance  - See the diabetes educator and bring a 3-day food log and glucose log to that visit

## 2024-03-23 DIAGNOSIS — E11.8 TYPE 2 DIABETES MELLITUS WITH COMPLICATION, WITHOUT LONG-TERM CURRENT USE OF INSULIN (HCC): ICD-10-CM

## 2024-03-24 NOTE — TELEPHONE ENCOUNTER
Please ask patient to confirm what glucometer she has.  Did she get a new one? we put in a prescription for compatible glucometer, lancets and strips.

## 2024-03-25 ENCOUNTER — HOSPITAL ENCOUNTER (OUTPATIENT)
Dept: RADIOLOGY | Facility: HOSPITAL | Age: 64
Discharge: HOME | End: 2024-03-25
Payer: COMMERCIAL

## 2024-03-25 ENCOUNTER — APPOINTMENT (OUTPATIENT)
Dept: LAB | Facility: HOSPITAL | Age: 64
End: 2024-03-25
Payer: COMMERCIAL

## 2024-03-25 DIAGNOSIS — E04.1 THYROID NODULE: ICD-10-CM

## 2024-03-25 DIAGNOSIS — E11.8 TYPE 2 DIABETES MELLITUS WITH COMPLICATION, WITHOUT LONG-TERM CURRENT USE OF INSULIN (HCC): ICD-10-CM

## 2024-03-25 DIAGNOSIS — R80.1 PERSISTENT PROTEINURIA: ICD-10-CM

## 2024-03-25 DIAGNOSIS — N18.2 STAGE 2 CHRONIC KIDNEY DISEASE: ICD-10-CM

## 2024-03-25 LAB
ANION GAP SERPL CALCULATED.3IONS-SCNC: 10 MMOL/L (ref 4–13)
BUN SERPL-MCNC: 18 MG/DL (ref 5–25)
CALCIUM SERPL-MCNC: 10.1 MG/DL (ref 8.4–10.2)
CHLORIDE SERPL-SCNC: 97 MMOL/L (ref 96–108)
CO2 SERPL-SCNC: 27 MMOL/L (ref 21–32)
CREAT SERPL-MCNC: 0.89 MG/DL (ref 0.6–1.3)
EST. AVERAGE GLUCOSE BLD GHB EST-MCNC: 200 MG/DL
GFR SERPL CREATININE-BSD FRML MDRD: 68 ML/MIN/1.73SQ M
GLUCOSE P FAST SERPL-MCNC: 200 MG/DL (ref 65–99)
HBA1C MFR BLD: 8.6 %
POTASSIUM SERPL-SCNC: 4.2 MMOL/L (ref 3.5–5.3)
SODIUM SERPL-SCNC: 134 MMOL/L (ref 135–147)
T4 FREE SERPL-MCNC: 0.57 NG/DL (ref 0.61–1.12)
TSH SERPL DL<=0.05 MIU/L-ACNC: 3.42 UIU/ML (ref 0.45–4.5)

## 2024-03-25 PROCEDURE — 84439 ASSAY OF FREE THYROXINE: CPT

## 2024-03-25 PROCEDURE — 84443 ASSAY THYROID STIM HORMONE: CPT

## 2024-03-25 PROCEDURE — 36415 COLL VENOUS BLD VENIPUNCTURE: CPT | Performed by: INTERNAL MEDICINE

## 2024-03-25 PROCEDURE — 84166 PROTEIN E-PHORESIS/URINE/CSF: CPT

## 2024-03-25 PROCEDURE — 83516 IMMUNOASSAY NONANTIBODY: CPT

## 2024-03-25 PROCEDURE — 86334 IMMUNOFIX E-PHORESIS SERUM: CPT | Performed by: INTERNAL MEDICINE

## 2024-03-25 PROCEDURE — 76536 US EXAM OF HEAD AND NECK: CPT

## 2024-03-25 PROCEDURE — 83036 HEMOGLOBIN GLYCOSYLATED A1C: CPT

## 2024-03-25 PROCEDURE — 80048 BASIC METABOLIC PNL TOTAL CA: CPT

## 2024-03-25 PROCEDURE — 3052F HG A1C>EQUAL 8.0%<EQUAL 9.0%: CPT | Performed by: INTERNAL MEDICINE

## 2024-03-25 PROCEDURE — 84165 PROTEIN E-PHORESIS SERUM: CPT | Performed by: INTERNAL MEDICINE

## 2024-03-26 ENCOUNTER — TELEPHONE (OUTPATIENT)
Dept: OTHER | Facility: HOSPITAL | Age: 64
End: 2024-03-26

## 2024-03-26 DIAGNOSIS — E11.8 TYPE 2 DIABETES MELLITUS WITH COMPLICATION, WITHOUT LONG-TERM CURRENT USE OF INSULIN (HCC): Primary | ICD-10-CM

## 2024-03-26 LAB
ALBUMIN SERPL ELPH-MCNC: 4.18 G/DL (ref 3.2–5.1)
ALBUMIN SERPL ELPH-MCNC: 56.5 % (ref 48–70)
ALBUMIN UR ELPH-MCNC: 100 %
ALPHA1 GLOB MFR UR ELPH: 0 %
ALPHA1 GLOB SERPL ELPH-MCNC: 0.23 G/DL (ref 0.15–0.47)
ALPHA1 GLOB SERPL ELPH-MCNC: 3.1 % (ref 1.8–7)
ALPHA2 GLOB MFR UR ELPH: 0 %
ALPHA2 GLOB SERPL ELPH-MCNC: 0.7 G/DL (ref 0.42–1.04)
ALPHA2 GLOB SERPL ELPH-MCNC: 9.4 % (ref 5.9–14.9)
B-GLOBULIN MFR UR ELPH: 0 %
BETA GLOB ABNORMAL SERPL ELPH-MCNC: 0.53 G/DL (ref 0.31–0.57)
BETA1 GLOB SERPL ELPH-MCNC: 7.2 % (ref 4.7–7.7)
BETA2 GLOB SERPL ELPH-MCNC: 6.6 % (ref 3.1–7.9)
BETA2+GAMMA GLOB SERPL ELPH-MCNC: 0.49 G/DL (ref 0.2–0.58)
GAMMA GLOB ABNORMAL SERPL ELPH-MCNC: 1.27 G/DL (ref 0.4–1.66)
GAMMA GLOB MFR UR ELPH: 0 %
GAMMA GLOB SERPL ELPH-MCNC: 17.2 % (ref 6.9–22.3)
IGG/ALB SER: 1.3 {RATIO} (ref 1.1–1.8)
INTERPRETATION UR IFE-IMP: NORMAL
PROT PATTERN SERPL ELPH-IMP: NORMAL
PROT PATTERN UR ELPH-IMP: NORMAL
PROT SERPL-MCNC: 7.4 G/DL (ref 6.4–8.2)
PROT UR-MCNC: 23.6 MG/DL

## 2024-03-26 PROCEDURE — 84165 PROTEIN E-PHORESIS SERUM: CPT | Performed by: PATHOLOGY

## 2024-03-26 PROCEDURE — 86334 IMMUNOFIX E-PHORESIS SERUM: CPT | Performed by: PATHOLOGY

## 2024-03-26 PROCEDURE — 84166 PROTEIN E-PHORESIS/URINE/CSF: CPT | Performed by: PATHOLOGY

## 2024-03-26 RX ORDER — LANCETS
EACH MISCELLANEOUS
Qty: 100 EACH | Refills: 2 | Status: SHIPPED | OUTPATIENT
Start: 2024-03-26 | End: 2024-03-28

## 2024-03-26 NOTE — TELEPHONE ENCOUNTER
Creatinine remained stable after recently increasing lisinopril.  She should follow-up with PCP due to ongoing higher blood sugar levels.

## 2024-03-27 LAB — PLA2R IGG SER IA-ACNC: 2.7 RU/ML (ref 0–19.9)

## 2024-03-28 ENCOUNTER — TELEPHONE (OUTPATIENT)
Age: 64
End: 2024-03-28

## 2024-03-28 DIAGNOSIS — E11.8 TYPE 2 DIABETES MELLITUS WITH COMPLICATION, WITHOUT LONG-TERM CURRENT USE OF INSULIN (HCC): ICD-10-CM

## 2024-03-28 RX ORDER — LANCETS
1 EACH MISCELLANEOUS 2 TIMES DAILY
Qty: 200 EACH | Refills: 3 | Status: CANCELLED | OUTPATIENT
Start: 2024-03-28

## 2024-03-28 RX ORDER — BLOOD-GLUCOSE METER
EACH MISCELLANEOUS
COMMUNITY
Start: 2024-03-22

## 2024-03-28 RX ORDER — LANCETS
EACH MISCELLANEOUS
Qty: 100 EACH | Refills: 3 | OUTPATIENT
Start: 2024-03-28

## 2024-03-28 RX ORDER — LANCETS
1 EACH MISCELLANEOUS 2 TIMES DAILY
COMMUNITY
End: 2024-04-02

## 2024-03-28 NOTE — TELEPHONE ENCOUNTER
[facility] called bc they need a new script for:    Accuchek soft click lancets, 2 times daily BUT DO NOT add as direct     I did not see this brand of lancets on the current medication list

## 2024-04-02 DIAGNOSIS — E11.8 TYPE 2 DIABETES MELLITUS WITH COMPLICATION, WITHOUT LONG-TERM CURRENT USE OF INSULIN (HCC): Primary | ICD-10-CM

## 2024-04-02 RX ORDER — LANCETS
1 EACH MISCELLANEOUS 2 TIMES DAILY
Qty: 200 EACH | Refills: 3 | Status: SHIPPED | OUTPATIENT
Start: 2024-04-02

## 2024-04-03 ENCOUNTER — TELEPHONE (OUTPATIENT)
Dept: ENDOCRINOLOGY | Facility: CLINIC | Age: 64
End: 2024-04-03

## 2024-04-25 ENCOUNTER — OFFICE VISIT (OUTPATIENT)
Age: 64
End: 2024-04-25
Payer: COMMERCIAL

## 2024-04-25 VITALS
RESPIRATION RATE: 17 BRPM | WEIGHT: 171 LBS | DIASTOLIC BLOOD PRESSURE: 79 MMHG | SYSTOLIC BLOOD PRESSURE: 120 MMHG | HEART RATE: 98 BPM | BODY MASS INDEX: 27.48 KG/M2 | HEIGHT: 66 IN

## 2024-04-25 DIAGNOSIS — B07.0 PLANTAR WARTS: ICD-10-CM

## 2024-04-25 DIAGNOSIS — M72.2 PLANTAR FASCIITIS: Primary | ICD-10-CM

## 2024-04-25 DIAGNOSIS — M21.962 ACQUIRED DEFORMITY OF BOTH FEET: ICD-10-CM

## 2024-04-25 DIAGNOSIS — I63.531 CEREBROVASCULAR ACCIDENT (CVA) DUE TO STENOSIS OF RIGHT POSTERIOR CEREBRAL ARTERY (HCC): ICD-10-CM

## 2024-04-25 DIAGNOSIS — M79.672 LEFT FOOT PAIN: ICD-10-CM

## 2024-04-25 DIAGNOSIS — E11.8 TYPE 2 DIABETES MELLITUS WITH COMPLICATION, WITHOUT LONG-TERM CURRENT USE OF INSULIN (HCC): ICD-10-CM

## 2024-04-25 DIAGNOSIS — M21.961 ACQUIRED DEFORMITY OF BOTH FEET: ICD-10-CM

## 2024-04-25 PROCEDURE — 99203 OFFICE O/P NEW LOW 30 MIN: CPT | Performed by: PODIATRIST

## 2024-04-25 NOTE — PROGRESS NOTES
Assessment/Plan: Patient with diabetes without pedal complication.  Xerosis of skin.  Plantar verruca left heel.  History of Planter fasciitis left foot.  Acquired deformity of foot/pes planus bilateral.    Plan.  Chart reviewed.  Lab work reviewed.  PCP notes reviewed.  Diabetic foot exam performed.  Patient educated on care of the diabetic foot.  For Planter fasciitis she will stretch daily take Tylenol as needed.  For warts she will use topical salicylic acid.  She will moisturize daily after bathing.  Patient educated on care of the diabetic foot and watch for signs of infection.  Aftercare instruction given.  Return for follow-up.         Diagnoses and all orders for this visit:    Acquired deformity of both feet    Type 2 diabetes mellitus with complication, without long-term current use of insulin ([facility])  -     Ambulatory referral to Podiatry    Plantar fasciitis    Left foot pain    Plantar warts          Subjective: Patient is diabetic.  She is seen on referral from endocrinology.  Patient does not have any numbness or tingling in her feet.  She does have history of Planter fasciitis.  She has warts on her feet.  She is concerned about dry skin.            Allergies   Allergen Reactions    Bactrim [Sulfamethoxazole-Trimethoprim]     Neomycin-Bacitracin Zn-Polymyx Other (See Comments)     REDNESS    Statins Myalgia         Current Outpatient Medications:     Accu-Chek Softclix Lancets lancets, Use 1 each 2 (two) times a day, Disp: 200 each, Rfl: 3    amLODIPine (NORVASC) 5 mg tablet, Take 1 tablet by mouth once daily, Disp: 30 tablet, Rfl: 5    Bempedoic Acid 180 MG TABS, Take 180 mg by mouth every morning, Disp: 30 tablet, Rfl: 5    Blood Glucose Monitoring Suppl (Accu-Chek Guide) w/Device KIT, USE 1 TWICE DAILY, Disp: , Rfl:     clopidogrel (PLAVIX) 75 mg tablet, Take 1 tablet (75 mg total) by mouth daily, Disp: 90 tablet, Rfl: 0    Empagliflozin (Jardiance) 10 MG TABS tablet, Take 1 tablet (10 mg total) by  mouth every morning, Disp: 30 tablet, Rfl: 3    famotidine (PEPCID) 20 mg tablet, Take 1 tablet by mouth twice daily, Disp: 90 tablet, Rfl: 3    glimepiride (AMARYL) 2 mg tablet, TAKE ONE TABLET BY MOUTH TWICE A DAY, Disp: 180 tablet, Rfl: 2    glucose blood (OneTouch Verio) test strip, Check blood sugars twice a day, Disp: 100 each, Rfl: 3    lisinopril (ZESTRIL) 10 mg tablet, Take 1 tablet (10 mg total) by mouth daily, Disp: 30 tablet, Rfl: 5    metFORMIN (GLUCOPHAGE) 1000 MG tablet, TAKE ONE TABLET BY MOUTH TWICE A DAY WITH MEALS (GENERIC FOR GLUCOPHAGE), Disp: 180 tablet, Rfl: 1    Multiple Vitamin (MULTI-VITAMIN DAILY PO), Take 1 tablet by mouth daily, Disp: , Rfl:     triamcinolone (KENALOG) 0.1 % cream, APPLY TOPICALLY TWO TIMES A DAY UNDER THE LEFT BREAST AS NEEDED FOR FLARES, Disp: 30 g, Rfl: 2    Patient Active Problem List   Diagnosis    Basal cell carcinoma    Demyelinating disease of central nervous system (HCC)    Fatty infiltration of liver    Mixed hyperlipidemia    Left knee DJD    Essential hypertension    Mixed incontinence    Pineal gland cyst    Pelvic floor weakness in female    Psoriasis    Type 2 diabetes mellitus with complication, without long-term current use of insulin (HCC)    History of CVA (cerebrovascular accident)    PFO (patent foramen ovale)    Vaginal atrophy    Total bilirubin, elevated    Migraine    Persistent proteinuria    Chondromalacia patellae    Thyroid nodule    Frequent PVCs    Palpitations    Statin intolerance    Intracranial aneurysm    Cardiovascular risk factor    Episodic cluster headache, not intractable    Dizziness    Vitamin D deficiency    Stroke-like symptoms    Hyponatremia    Abnormal computed tomography angiography (CTA)    Pseudoaneurysm of vertebral artery (HCC)    Myalgia due to statin    Welcome to Medicare preventive visit    Stage 2 chronic kidney disease    Overweight (BMI 25.0-29.9)          Patient ID: Juliana Hernandez is a 63 y.o.  female.    HPI    The following portions of the patient's history were reviewed and updated as appropriate: She  has a past medical history of Diabetes mellitus (HCC), GERD (gastroesophageal reflux disease), Headache(784.0), Hypertension, Nasal congestion, Otitis media, Stroke (cerebrum) (HCC), Stroke (HCC), and Tinnitus.  She   Patient Active Problem List    Diagnosis Date Noted    Overweight (BMI 25.0-29.9) 03/22/2024    Stage 2 chronic kidney disease 03/19/2024    Welcome to Medicare preventive visit 03/18/2024    Myalgia due to statin 02/28/2024    Pseudoaneurysm of vertebral artery (HCC) 12/11/2023    Abnormal computed tomography angiography (CTA) 12/10/2023    Hyponatremia 10/30/2023    Stroke-like symptoms 10/29/2023    Vitamin D deficiency 12/29/2020    Episodic cluster headache, not intractable 10/13/2020    Dizziness 10/13/2020    Cardiovascular risk factor 06/24/2020    Frequent PVCs 02/22/2020    Palpitations 02/22/2020    Statin intolerance 02/01/2020    Intracranial aneurysm 02/01/2020    Thyroid nodule 12/18/2019    History of CVA (cerebrovascular accident) 03/14/2018    PFO (patent foramen ovale) 03/14/2018    Type 2 diabetes mellitus with complication, without long-term current use of insulin (HCC) 03/13/2018    Total bilirubin, elevated 03/01/2018    Migraine 03/01/2018    Fatty infiltration of liver 12/26/2017    Mixed incontinence 12/01/2017    Pelvic floor weakness in female 12/01/2017    Vaginal atrophy 12/01/2017    Left knee DJD 03/08/2017    Basal cell carcinoma 02/13/2017    Demyelinating disease of central nervous system (HCC) 08/05/2015    Pineal gland cyst 08/05/2015    Persistent proteinuria 09/16/2013    Psoriasis 11/27/2012    Essential hypertension 11/10/2012    Mixed hyperlipidemia 09/27/2012    Chondromalacia patellae 12/30/2008     She  has a past surgical history that includes CHOLECYSTECTOMY LAPAROSCOPIC; Total knee arthroplasty (Left, 03/08/2017); Mohs surgery; and Tubal  ligation.  Her family history includes Anemia in her daughter; Breast cancer in her paternal aunt; Diabetes in her father; Diabetes type II in her father; Hyperlipidemia in her brother; Hypertension in her father; No Known Problems in her daughter, daughter, daughter, daughter, half-sister, half-sister, maternal aunt, maternal aunt, maternal grandfather, maternal grandmother, maternal uncle, paternal grandfather, and paternal grandmother; Stroke in her father.  She  reports that she has never smoked. She has never used smokeless tobacco. She reports that she does not drink alcohol and does not use drugs.  Current Outpatient Medications   Medication Sig Dispense Refill    Accu-Chek Softclix Lancets lancets Use 1 each 2 (two) times a day 200 each 3    amLODIPine (NORVASC) 5 mg tablet Take 1 tablet by mouth once daily 30 tablet 5    Bempedoic Acid 180 MG TABS Take 180 mg by mouth every morning 30 tablet 5    Blood Glucose Monitoring Suppl (Accu-Chek Guide) w/Device KIT USE 1 TWICE DAILY      clopidogrel (PLAVIX) 75 mg tablet Take 1 tablet (75 mg total) by mouth daily 90 tablet 0    Empagliflozin (Jardiance) 10 MG TABS tablet Take 1 tablet (10 mg total) by mouth every morning 30 tablet 3    famotidine (PEPCID) 20 mg tablet Take 1 tablet by mouth twice daily 90 tablet 3    glimepiride (AMARYL) 2 mg tablet TAKE ONE TABLET BY MOUTH TWICE A  tablet 2    glucose blood (OneTouch Verio) test strip Check blood sugars twice a day 100 each 3    lisinopril (ZESTRIL) 10 mg tablet Take 1 tablet (10 mg total) by mouth daily 30 tablet 5    metFORMIN (GLUCOPHAGE) 1000 MG tablet TAKE ONE TABLET BY MOUTH TWICE A DAY WITH MEALS (GENERIC FOR GLUCOPHAGE) 180 tablet 1    Multiple Vitamin (MULTI-VITAMIN DAILY PO) Take 1 tablet by mouth daily      triamcinolone (KENALOG) 0.1 % cream APPLY TOPICALLY TWO TIMES A DAY UNDER THE LEFT BREAST AS NEEDED FOR FLARES 30 g 2     No current facility-administered medications for this visit.      Current Outpatient Medications on File Prior to Visit   Medication Sig    Accu-Chek Softclix Lancets lancets Use 1 each 2 (two) times a day    amLODIPine (NORVASC) 5 mg tablet Take 1 tablet by mouth once daily    Bempedoic Acid 180 MG TABS Take 180 mg by mouth every morning    Blood Glucose Monitoring Suppl (Accu-Chek Guide) w/Device KIT USE 1 TWICE DAILY    clopidogrel (PLAVIX) 75 mg tablet Take 1 tablet (75 mg total) by mouth daily    Empagliflozin (Jardiance) 10 MG TABS tablet Take 1 tablet (10 mg total) by mouth every morning    famotidine (PEPCID) 20 mg tablet Take 1 tablet by mouth twice daily    glimepiride (AMARYL) 2 mg tablet TAKE ONE TABLET BY MOUTH TWICE A DAY    glucose blood (OneTouch Verio) test strip Check blood sugars twice a day    lisinopril (ZESTRIL) 10 mg tablet Take 1 tablet (10 mg total) by mouth daily    metFORMIN (GLUCOPHAGE) 1000 MG tablet TAKE ONE TABLET BY MOUTH TWICE A DAY WITH MEALS (GENERIC FOR GLUCOPHAGE)    Multiple Vitamin (MULTI-VITAMIN DAILY PO) Take 1 tablet by mouth daily    triamcinolone (KENALOG) 0.1 % cream APPLY TOPICALLY TWO TIMES A DAY UNDER THE LEFT BREAST AS NEEDED FOR FLARES     No current facility-administered medications on file prior to visit.     She is allergic to bactrim [sulfamethoxazole-trimethoprim], neomycin-bacitracin zn-polymyx, and statins..    Vitals:    04/25/24 1014   BP: 120/79   Pulse: 98   Resp: 17       Review of Systems      Objective:  Patient's shoes and socks removed.   Foot Exam    General  General Appearance: appears stated age and healthy   Orientation: alert and oriented to person, place, and time   Affect: appropriate   Gait: unimpaired       Right Foot/Ankle     Inspection and Palpation  Swelling: dorsum   Arch: pes planus  Hallux limitus: yes  Skin Exam: dry skin;     Neurovascular  Dorsalis pedis: 3+  Posterior tibial: 3+  Saphenous nerve sensation: normal  Tibial nerve sensation: normal  Superficial peroneal nerve sensation:  normal  Deep peroneal nerve sensation: normal  Sural nerve sensation: normal      Left Foot/Ankle      Inspection and Palpation  Swelling: dorsum   Arch: pes planus  Hallux limitus: yes  Skin Exam: dry skin and warts;     Neurovascular  Dorsalis pedis: 3+  Posterior tibial: 3+  Saphenous nerve sensation: normal  Tibial nerve sensation: normal  Superficial peroneal nerve sensation: normal  Deep peroneal nerve sensation: normal  Sural nerve sensation: normal      Physical Exam  Vitals and nursing note reviewed.   Constitutional:       Appearance: Normal appearance.   Cardiovascular:      Rate and Rhythm: Normal rate and regular rhythm.      Pulses: no weak pulses.           Dorsalis pedis pulses are 3+ on the right side and 3+ on the left side.        Posterior tibial pulses are 3+ on the right side and 3+ on the left side.   Musculoskeletal:      Right lower leg: Edema present.      Left lower leg: Edema present.   Feet:      Right foot:      Skin integrity: Dry skin present.      Left foot:      Skin integrity: Dry skin present.   Skin:     Capillary Refill: Capillary refill takes less than 2 seconds.      Comments: Patient has xerosis of skin.  No occult tinea noted.  Plantar lateral left heel demonstrates 2 small verruca.   Neurological:      Mental Status: She is alert.   Psychiatric:         Mood and Affect: Mood normal.         Behavior: Behavior normal.         Thought Content: Thought content normal.         Judgment: Judgment normal.     Patient's shoes and socks removed.    Right Foot/Ankle   Right Foot Inspection  Skin Exam: dry skin.     Toe Exam: right toe deformity.     Sensory   Vibration: intact  Proprioception: intact  Monofilament testing: intact    Vascular  Capillary refills: < 3 seconds  The right DP pulse is 3+. The right PT pulse is 3+.     Right Toe  - Comprehensive Exam  Arch: pes planus  Hallux limitus: yes  Swelling: dorsum       Left Foot/Ankle  Left Foot Inspection  Skin Exam: dry skin.      Toe Exam: left toe deformity.     Sensory   Vibration: intact  Proprioception: intact  Monofilament testing: intact    Vascular  Capillary refills: < 3 seconds  The left DP pulse is 3+. The left PT pulse is 3+.     Left Toe  - Comprehensive Exam  Arch: pes planus  Hallux limitus: yes  Swelling: dorsum       Assign Risk Category  Deformity present  No loss of protective sensation  No weak pulses  Risk: 0

## 2024-04-26 RX ORDER — GLIMEPIRIDE 2 MG/1
2 TABLET ORAL 2 TIMES DAILY
Qty: 360 TABLET | Refills: 1 | Status: SHIPPED | OUTPATIENT
Start: 2024-04-26

## 2024-04-26 RX ORDER — CLOPIDOGREL BISULFATE 75 MG/1
75 TABLET ORAL DAILY
Qty: 90 TABLET | Refills: 1 | Status: SHIPPED | OUTPATIENT
Start: 2024-04-26

## 2024-04-29 PROBLEM — G72.0 DRUG-INDUCED MYOPATHY: Status: ACTIVE | Noted: 2024-04-29

## 2024-05-01 PROBLEM — Z00.00 WELCOME TO MEDICARE PREVENTIVE VISIT: Status: RESOLVED | Noted: 2024-03-18 | Resolved: 2024-05-01

## 2024-05-10 DIAGNOSIS — R80.9 TYPE 2 DIABETES MELLITUS WITH MICROALBUMINURIA, WITHOUT LONG-TERM CURRENT USE OF INSULIN (HCC): ICD-10-CM

## 2024-05-10 DIAGNOSIS — E11.8 TYPE 2 DIABETES MELLITUS WITH COMPLICATION, WITHOUT LONG-TERM CURRENT USE OF INSULIN (HCC): ICD-10-CM

## 2024-05-10 DIAGNOSIS — E11.29 TYPE 2 DIABETES MELLITUS WITH MICROALBUMINURIA, WITHOUT LONG-TERM CURRENT USE OF INSULIN (HCC): ICD-10-CM

## 2024-05-10 RX ORDER — GLIMEPIRIDE 2 MG/1
2 TABLET ORAL
Qty: 90 TABLET | Refills: 1 | Status: SHIPPED | OUTPATIENT
Start: 2024-05-10

## 2024-05-20 DIAGNOSIS — E11.8 TYPE 2 DIABETES MELLITUS WITH COMPLICATION, WITHOUT LONG-TERM CURRENT USE OF INSULIN (HCC): Primary | ICD-10-CM

## 2024-05-23 ENCOUNTER — OFFICE VISIT (OUTPATIENT)
Dept: DIABETES SERVICES | Facility: CLINIC | Age: 64
End: 2024-05-23
Payer: COMMERCIAL

## 2024-05-23 VITALS — BODY MASS INDEX: 26.79 KG/M2 | WEIGHT: 166 LBS

## 2024-05-23 DIAGNOSIS — E11.8 TYPE 2 DIABETES MELLITUS WITH COMPLICATION, WITHOUT LONG-TERM CURRENT USE OF INSULIN (HCC): Primary | ICD-10-CM

## 2024-05-23 PROCEDURE — 97802 MEDICAL NUTRITION INDIV IN: CPT

## 2024-05-23 NOTE — PROGRESS NOTES
"Medical Nutrition Therapy        Assessment    Visit Type: Initial visit  Chief complaint/Medical Diagnosis/reason for visit E11.8 Type 2 Diabetes Mellitus with complication without long term current use of insulin    HPI Juliana came in today for her initial Medical Nutrition Therapy appointment. Juliana stated her goal is to lower her A1C and blood sugars. Obtained a 24 hour food recall. Problems identified in food recall include irregular timing of meals and snacks, going too long without eating, and unbalanced meals and snacks.  Provided patient with a 1500 calorie balanced individualized meal plan to assist with consistency, balance and portion control.  Encouraged the consumption of balanced meals and snacks at appropriate times.  Advised patient to keep carbohydrate intake to 30 grams per meal and 15 grams per snack to assist with glycemic control.  Suggested keeping protein intake balanced with carbohydrate intake and having heart healthy fats to assist with lipid management and calorie control. My Plate, food models, portion booklet and food labels were used to teach basic carbohydrate counting. Patient stated she will call at a later date if she desires additional Medical Nutrition Therapy education. RD will remain available for further dietary questions/concerns.     Ht Readings from Last 1 Encounters:   04/25/24 5' 6\" (1.676 m)     Wt Readings from Last 3 Encounters:   05/23/24 75.3 kg (166 lb)   04/25/24 77.6 kg (171 lb)   03/22/24 77.6 kg (171 lb)     Weight Change: Yes lost 5 lbs since last chart encounter.    Barriers to Learning: no barriers    Do you follow any special diet presently?: Yes - tries to eat healthy.  Who shops: patient  Who cooks: patient    Food Log: Completed via the method of food recall    Breakfast:8AM - 2 eggs with cheese + 2 sl [employer] thin bread, coffee with creamer; 1/2 c granola + 1/2 c 1% milk; protein drink  Morning Snack:None  Lunch:4x/week No lunch, possibly a " snack of salad or avocado tomato, cucumber, onion combination with tortilla chips, water  Afternoon Snack: None  Dinner:4PM - meat/ potato or pasta/ veggie, water  Evening Snack:Most nights None; occasionally - popcorn  Beverages: water, coffee  Eating out/Take out:2x/month  Exercise helps with grandchildren, lives with daughter and her family    Calorie needs 1500kcals/day Carbs: 30g/meal, 15g/snack     Fat: heart healthy   Protein:balanced with carbohydrates    Nutrition Diagnosis:  Food and nutrition related knowledge deficit  related to Lack of prior exposure to accurate nutrition related information as evidenced by Verbalizes inaccurate or incomplete information    Intervention: plate method, label reading, carbohydrate counting, increased protein intake, meal timing, weight/ BMI goals, meal planning, individualized meal plan, and monitoring portion control     Treatment Goals: Patient will consume 3 meals a day, Patient will consume snacks, and Patient will count carbohydrates    Monitoring and evaluation:    Term code indicator  FH 1.3.2 Food Intake Criteria: Consume 3 balanced meals/day at appropriate times.  Term code indicator  FH 1.6.3 Carbohydrate Intake Criteria: 30 grams carbohydrate/meal and 15 grams carbohydrate/snack.  Term code indicator  FH 1.3.2 Food Intake Criteria: Consume 2 balanced snacks/day at appropriate times.    Materials Provided: Portion booklet, CHO counting, individualized meal plan    Patient’s Response to Instruction:  Comprehensiongood  Motivationgood  Expected Compliancegood    Begin Time: 11:30 AM  End Time: 12:30 PM  Referring Provider: Meenakshi Brand DO    Thank you for coming to the [facility] for education today.  Please feel free to call with any questions or concerns.    Poornima Monk  [address] [id number] [phone]

## 2024-05-23 NOTE — PATIENT INSTRUCTIONS
Consume 3 balanced meals/day at appropriate times.  30 grams carbohydrate/meal and 15 grams carbohydrate/snack.  Consume 2 balanced snacks/day at appropriate times.

## 2024-07-03 DIAGNOSIS — I10 ESSENTIAL HYPERTENSION: ICD-10-CM

## 2024-07-03 RX ORDER — AMLODIPINE BESYLATE 5 MG/1
5 TABLET ORAL DAILY
Qty: 30 TABLET | Refills: 5 | Status: SHIPPED | OUTPATIENT
Start: 2024-07-03

## 2024-07-09 ENCOUNTER — HOSPITAL ENCOUNTER (OUTPATIENT)
Dept: RADIOLOGY | Facility: HOSPITAL | Age: 64
Discharge: HOME | End: 2024-07-09
Payer: COMMERCIAL

## 2024-07-09 ENCOUNTER — APPOINTMENT (OUTPATIENT)
Dept: LAB | Facility: HOSPITAL | Age: 64
End: 2024-07-09
Payer: COMMERCIAL

## 2024-07-09 VITALS — HEIGHT: 66 IN | WEIGHT: 166 LBS | BODY MASS INDEX: 26.68 KG/M2

## 2024-07-09 DIAGNOSIS — E11.8 TYPE 2 DIABETES MELLITUS WITH COMPLICATION, WITHOUT LONG-TERM CURRENT USE OF INSULIN (HCC): ICD-10-CM

## 2024-07-09 DIAGNOSIS — Z00.00 WELCOME TO MEDICARE PREVENTIVE VISIT: ICD-10-CM

## 2024-07-09 DIAGNOSIS — R80.1 PERSISTENT PROTEINURIA: ICD-10-CM

## 2024-07-09 DIAGNOSIS — I10 ESSENTIAL HYPERTENSION: ICD-10-CM

## 2024-07-09 DIAGNOSIS — Z12.31 ENCOUNTER FOR SCREENING MAMMOGRAM FOR MALIGNANT NEOPLASM OF BREAST: ICD-10-CM

## 2024-07-09 DIAGNOSIS — E87.1 HYPONATREMIA: ICD-10-CM

## 2024-07-09 DIAGNOSIS — N18.2 STAGE 2 CHRONIC KIDNEY DISEASE: ICD-10-CM

## 2024-07-09 DIAGNOSIS — Z78.0 MENOPAUSE: ICD-10-CM

## 2024-07-09 LAB
ALBUMIN SERPL BCG-MCNC: 4.2 G/DL (ref 3.5–5)
ALP SERPL-CCNC: 63 U/L (ref 34–104)
ALT SERPL W P-5'-P-CCNC: 16 U/L (ref 7–52)
ANION GAP SERPL CALCULATED.3IONS-SCNC: 8 MMOL/L (ref 4–13)
AST SERPL W P-5'-P-CCNC: 16 U/L (ref 13–39)
BILIRUB SERPL-MCNC: 1.17 MG/DL (ref 0.2–1)
BUN SERPL-MCNC: 24 MG/DL (ref 5–25)
CALCIUM SERPL-MCNC: 9.9 MG/DL (ref 8.4–10.2)
CHLORIDE SERPL-SCNC: 100 MMOL/L (ref 96–108)
CO2 SERPL-SCNC: 25 MMOL/L (ref 21–32)
CREAT SERPL-MCNC: 0.88 MG/DL (ref 0.6–1.3)
CREAT UR-MCNC: 89.6 MG/DL
CREAT UR-MCNC: 91 MG/DL
ERYTHROCYTE [DISTWIDTH] IN BLOOD BY AUTOMATED COUNT: 12.2 % (ref 11.6–15.1)
EST. AVERAGE GLUCOSE BLD GHB EST-MCNC: 154 MG/DL
GFR SERPL CREATININE-BSD FRML MDRD: 69 ML/MIN/1.73SQ M
GLUCOSE P FAST SERPL-MCNC: 155 MG/DL (ref 65–99)
HBA1C MFR BLD: 7 %
HCT VFR BLD AUTO: 39.3 % (ref 34.8–46.1)
HGB BLD-MCNC: 13.2 G/DL (ref 11.5–15.4)
MCH RBC QN AUTO: 31.4 PG (ref 26.8–34.3)
MCHC RBC AUTO-ENTMCNC: 33.6 G/DL (ref 31.4–37.4)
MCV RBC AUTO: 93 FL (ref 82–98)
MICROALBUMIN UR-MCNC: 76.9 MG/L
MICROALBUMIN/CREAT 24H UR: 86 MG/G CREATININE (ref 0–30)
PLATELET # BLD AUTO: 267 THOUSANDS/UL (ref 149–390)
PMV BLD AUTO: 8.6 FL (ref 8.9–12.7)
POTASSIUM SERPL-SCNC: 4.3 MMOL/L (ref 3.5–5.3)
PROT SERPL-MCNC: 8.1 G/DL (ref 6.4–8.4)
PROT UR-MCNC: 23 MG/DL
PROT/CREAT UR: 0.25 MG/G{CREAT} (ref 0–0.1)
RBC # BLD AUTO: 4.21 MILLION/UL (ref 3.81–5.12)
SODIUM SERPL-SCNC: 133 MMOL/L (ref 135–147)
T4 FREE SERPL-MCNC: 0.66 NG/DL (ref 0.61–1.12)
TSH SERPL DL<=0.05 MIU/L-ACNC: 5.55 UIU/ML (ref 0.45–4.5)
WBC # BLD AUTO: 4.35 THOUSAND/UL (ref 4.31–10.16)

## 2024-07-09 PROCEDURE — 84156 ASSAY OF PROTEIN URINE: CPT

## 2024-07-09 PROCEDURE — 36415 COLL VENOUS BLD VENIPUNCTURE: CPT

## 2024-07-09 PROCEDURE — 77080 DXA BONE DENSITY AXIAL: CPT

## 2024-07-09 PROCEDURE — 82570 ASSAY OF URINE CREATININE: CPT

## 2024-07-09 PROCEDURE — 84439 ASSAY OF FREE THYROXINE: CPT

## 2024-07-09 PROCEDURE — 84443 ASSAY THYROID STIM HORMONE: CPT

## 2024-07-09 PROCEDURE — 82043 UR ALBUMIN QUANTITATIVE: CPT

## 2024-07-09 PROCEDURE — 77067 SCR MAMMO BI INCL CAD: CPT

## 2024-07-09 PROCEDURE — 83036 HEMOGLOBIN GLYCOSYLATED A1C: CPT

## 2024-07-09 PROCEDURE — 80053 COMPREHEN METABOLIC PANEL: CPT

## 2024-07-09 PROCEDURE — 77063 BREAST TOMOSYNTHESIS BI: CPT

## 2024-07-09 PROCEDURE — 85027 COMPLETE CBC AUTOMATED: CPT

## 2024-07-09 PROCEDURE — 3066F NEPHROPATHY DOC TX: CPT | Performed by: PHYSICIAN ASSISTANT

## 2024-07-11 ENCOUNTER — OFFICE VISIT (OUTPATIENT)
Dept: ENDOCRINOLOGY | Facility: CLINIC | Age: 64
End: 2024-07-11
Payer: COMMERCIAL

## 2024-07-11 VITALS
HEIGHT: 66 IN | WEIGHT: 167 LBS | OXYGEN SATURATION: 97 % | BODY MASS INDEX: 26.84 KG/M2 | SYSTOLIC BLOOD PRESSURE: 120 MMHG | DIASTOLIC BLOOD PRESSURE: 80 MMHG | HEART RATE: 90 BPM

## 2024-07-11 DIAGNOSIS — E11.8 TYPE 2 DIABETES MELLITUS WITH COMPLICATION, WITHOUT LONG-TERM CURRENT USE OF INSULIN (HCC): ICD-10-CM

## 2024-07-11 DIAGNOSIS — I10 ESSENTIAL HYPERTENSION: ICD-10-CM

## 2024-07-11 DIAGNOSIS — E04.1 THYROID NODULE: ICD-10-CM

## 2024-07-11 DIAGNOSIS — E78.2 MIXED HYPERLIPIDEMIA: Primary | ICD-10-CM

## 2024-07-11 DIAGNOSIS — Z78.9 STATIN INTOLERANCE: ICD-10-CM

## 2024-07-11 DIAGNOSIS — M85.89 OSTEOPENIA OF MULTIPLE SITES: Primary | ICD-10-CM

## 2024-07-11 PROCEDURE — G2211 COMPLEX E/M VISIT ADD ON: HCPCS | Performed by: NURSE PRACTITIONER

## 2024-07-11 PROCEDURE — 99214 OFFICE O/P EST MOD 30 MIN: CPT | Performed by: NURSE PRACTITIONER

## 2024-07-11 RX ORDER — LISINOPRIL 10 MG/1
10 TABLET ORAL DAILY
COMMUNITY

## 2024-07-11 RX ORDER — METFORMIN HYDROCHLORIDE EXTENDED-RELEASE TABLETS 500 MG/1
1000 TABLET, FILM COATED, EXTENDED RELEASE ORAL 2 TIMES DAILY WITH MEALS
COMMUNITY
End: 2024-07-11 | Stop reason: SDUPTHER

## 2024-07-11 NOTE — PROGRESS NOTES
Established Patient Progress Note    Chief Complaint:  Diabetes follow up visit    Impression & Plan:    Problem List Items Addressed This Visit          Cardiovascular and Mediastinum    Essential hypertension     BP at goal on current regimen.  On ACE-I.  Followed by Cardiology.          Relevant Medications    lisinopril (Prinivil) 10 mg tablet       Endocrine    Type 2 diabetes mellitus with complication, without long-term current use of insulin ([facility])       Lab Results   Component Value Date    HGBA1C 7.0 (H) 07/09/2024     HGA1C at goal. Provided patient assistance application for Jardiance as this is very costly.     Treatment regimen: Continue current regimen.     Discussed risks/complications associated with uncontrolled diabetes including organ involvement, heart attack, stroke, death.      Advised lifestyle modifications including attention to diet including the amount and types of carbohydrates consumed and regular activity.     Call for blood sugars less than 70 mg/dl or patterns over 250 mg/dl.     Monitor blood glucose levels at least 2 times a day at alternating times.     Discussed symptoms and treatment of hypoglycemia.  Reviewed risks associated with hypoglycemia. Always carry rapid acting carbohydrates and a glucometer (a way to check your blood sugar).    Recommendation for medical identification either bracelet, necklace.    Routine follow up for diabetic eye and foot exams.     Ordered blood work to complete prior to next visit.    Follow up in 3 months.            Relevant Orders    Comprehensive metabolic panel    Hemoglobin A1C    Thyroid nodule     Ultrasound from March 2024 demonstrates stability.   Can follow clinically.  Denies neck compressive symptoms.  TSH mildly elevated with normal free T4.   Clinically euthyroid.  Recommend repeat TSH and free T4 in 3 months.          Relevant Orders    TSH, 3rd generation    T4, free       Other    Mixed hyperlipidemia - Primary     Followed  with cardiology.  Plan to start bempedoic acid.          Statin intolerance       History of Present Illness:   Juliana Hernandez is a 63 y.o. female presents for follow up of diabetes mellitus diagnosed for approximately 15 years with history of hypertension, hyperlipidemia, CKD, TIA, thyroid nodule, and proteinuria.     Blood sugars: Checking 2-3 times per day. Denies hypoglycemia.      Denies symptoms consistent with hypoglycemia.      Current regimen:  Jardiance 25 mg daily  Amaryl 2 mg daily  Metformin 1000 mg twice daily    No ACE/ARB  Has hyperlipidemia: History of statin intolerance. Follows with cardiology who recommended bempedoic acid.     Thyroid nodule: Ultrasound thyroid from March 2024 identified 1.2 x 0.7 x 0.9 cm TR-2 thyroid nodule with minimal increase in size in cystic component. No recommend biopsy or further ultrasound. Denies neck compressive symptoms. Denies symptoms consistent with hypo/hyperthyroidism.     DXA scan from July 2024 demonstrate osteopenia with low FRAX. Daily calcium intake at goal 1200 mg daily. Managed by PCP.     Patient Active Problem List   Diagnosis    Basal cell carcinoma    Demyelinating disease of central nervous system (HCC)    Fatty infiltration of liver    Mixed hyperlipidemia    Left knee DJD    Essential hypertension    Mixed incontinence    Pineal gland cyst    Pelvic floor weakness in female    Psoriasis    Type 2 diabetes mellitus with complication, without long-term current use of insulin (HCC)    History of CVA (cerebrovascular accident)    PFO (patent foramen ovale)    Vaginal atrophy    Total bilirubin, elevated    Migraine    Persistent proteinuria    Chondromalacia patellae    Thyroid nodule    Frequent PVCs    Palpitations    Statin intolerance    Intracranial aneurysm    Cardiovascular risk factor    Episodic cluster headache, not intractable    Dizziness    Vitamin D deficiency    Stroke-like symptoms    Hyponatremia    Abnormal computed tomography  angiography (CTA)    Pseudoaneurysm of vertebral artery (HCC)    Myalgia due to statin    Stage 2 chronic kidney disease    Overweight (BMI 25.0-29.9)    Drug-induced myopathy    Osteopenia of multiple sites      Past Medical History:   Diagnosis Date    Diabetes mellitus (HCC)     GERD (gastroesophageal reflux disease)     Take pepcid but still bothersome with certain foods    Headache(784.0)     Occasional    Hypertension     Nasal congestion     Espyat night    Otitis media     Occasional    Stroke (cerebrum) (HCC)     Stroke (HCC)     Tia    Tinnitus     Current      Past Surgical History:   Procedure Laterality Date    CHOLECYSTECTOMY LAPAROSCOPIC      MOHS SURGERY      Micrographic Surgery Nose, Last assessed: 2/13/17    TOTAL KNEE ARTHROPLASTY Left 03/08/2017    Last assessed: 12/26/17    TUBAL LIGATION        Family History   Problem Relation Age of Onset    Diabetes Father     Hypertension Father     Stroke Father     Diabetes type II Father     Hyperlipidemia Brother     No Known Problems Maternal Aunt     No Known Problems Maternal Aunt     No Known Problems Maternal Uncle     Breast cancer Paternal Aunt     No Known Problems Maternal Grandmother     No Known Problems Maternal Grandfather     No Known Problems Paternal Grandmother     No Known Problems Paternal Grandfather     Anemia Daughter     No Known Problems Daughter     No Known Problems Daughter     No Known Problems Daughter     No Known Problems Daughter     No Known Problems Half-Sister     No Known Problems Half-Sister      Social History     Tobacco Use    Smoking status: Never    Smokeless tobacco: Never    Tobacco comments:     No secondhand smoke exposure   Substance Use Topics    Alcohol use: No     Allergies   Allergen Reactions    Bactrim [Sulfamethoxazole-Trimethoprim]     Neomycin-Bacitracin Zn-Polymyx Other (See Comments)     REDNESS    Statins Myalgia         Current Outpatient Medications:     Accu-Chek Softclix Lancets lancets,  "Use 1 each 2 (two) times a day, Disp: 200 each, Rfl: 3    amLODIPine (NORVASC) 5 mg tablet, Take 1 tablet by mouth once daily, Disp: 30 tablet, Rfl: 5    Bempedoic Acid 180 MG TABS, Take 180 mg by mouth every morning, Disp: 30 tablet, Rfl: 5    clopidogrel (PLAVIX) 75 mg tablet, Take 1 tablet by mouth once daily, Disp: 90 tablet, Rfl: 1    Empagliflozin (Jardiance) 25 MG TABS, Take 1 tablet (25 mg total) by mouth every morning, Disp: 90 tablet, Rfl: 3    famotidine (PEPCID) 20 mg tablet, Take 1 tablet by mouth twice daily, Disp: 90 tablet, Rfl: 3    glimepiride (AMARYL) 2 mg tablet, Take 1 tablet (2 mg total) by mouth daily with breakfast, Disp: 90 tablet, Rfl: 1    glucose blood (OneTouch Verio) test strip, Check blood sugars twice a day, Disp: 100 each, Rfl: 3    lisinopril (Prinivil) 10 mg tablet, Take 10 mg by mouth daily, Disp: , Rfl:     lisinopril (ZESTRIL) 10 mg tablet, Take 1 tablet (10 mg total) by mouth daily, Disp: 30 tablet, Rfl: 5    metFORMIN (GLUCOPHAGE) 1000 MG tablet, Take 1 tablet (1,000 mg total) by mouth 2 (two) times a day with meals, Disp: 180 tablet, Rfl: 1    Multiple Vitamin (MULTI-VITAMIN DAILY PO), Take 1 tablet by mouth daily, Disp: , Rfl:     Blood Glucose Monitoring Suppl (Accu-Chek Guide) w/Device KIT, USE 1 TWICE DAILY, Disp: , Rfl:     triamcinolone (KENALOG) 0.1 % cream, APPLY TOPICALLY TWO TIMES A DAY UNDER THE LEFT BREAST AS NEEDED FOR FLARES, Disp: 30 g, Rfl: 2    Review of Systems  See HPI.   All other systems reviewed and are negative.      Physical Exam:  Body mass index is 26.95 kg/m².  /80 (BP Location: Left arm, Patient Position: Sitting, Cuff Size: Large)   Pulse 90   Ht 5' 6\" (1.676 m)   Wt 75.8 kg (167 lb)   SpO2 97%   BMI 26.95 kg/m²    Wt Readings from Last 3 Encounters:   07/11/24 75.8 kg (167 lb)   07/09/24 75.3 kg (166 lb)   05/23/24 75.3 kg (166 lb)       Physical Exam   Constitutional: She is oriented to person, place, and time. She appears " well-developed and well-nourished. No distress.   HENT:   Head: Normocephalic and atraumatic.   Neck: Normal range of motion.   Pulmonary/Chest: Effort normal.   Musculoskeletal: Normal range of motion.   Neurological: She is alert and oriented to person, place, and time. No tremors.   Skin: She is not diaphoretic.   Psychiatric: She has a normal mood and affect. Her behavior is normal.       Labs:   Lab Results   Component Value Date    HGBA1C 7.0 (H) 07/09/2024    HGBA1C 8.6 (H) 03/25/2024    HGBA1C 8.6 (H) 02/26/2024     Lab Results   Component Value Date    CREATININE 0.88 07/09/2024    CREATININE 0.89 03/25/2024    CREATININE 0.76 02/26/2024    BUN 24 07/09/2024     (L) 11/29/2017    K 4.3 07/09/2024     07/09/2024    CO2 25 07/09/2024     GFR, Calculated   Date Value Ref Range Status   05/22/2020 75 >60 mL/min/1.73m2 Final     Comment:     mL/min per 1.73 square meters                                            Normal Function or Mild Renal    Disease (if clinically at risk):  >or=60  Moderately Decreased:                30-59  Severely Decreased:                  15-29  Renal Failure:                         <15                                            -American GFR: multiply reported GFR by 1.16    Please note that the eGFR is based on the CKD-EPI calculation, and is not intended to be used for drug dosing.     eGFR   Date Value Ref Range Status   07/09/2024 69 ml/min/1.73sq m Final     Lab Results   Component Value Date    CHOL 274 (H) 11/29/2017    HDL 40 (L) 02/26/2024    TRIG 376 (H) 02/26/2024     Lab Results   Component Value Date    ALT 16 07/09/2024    AST 16 07/09/2024    ALKPHOS 63 07/09/2024    BILITOT 1.0 11/29/2017     Lab Results   Component Value Date    PSG8KSVCBTDQ 5.551 (H) 07/09/2024    RWP5QSDROGUB 3.425 03/25/2024    TTB6HRMVMMFD 2.136 10/07/2013     Lab Results   Component Value Date    FREET4 0.66 07/09/2024       Discussed with the patient and all questioned fully  answered. She will call me if any problems arise.    Follow-up appointment in 3 months.     Counseled patient on diagnostic results, prognosis, risk and benefit of treatment options, instruction for management, importance of treatment compliance, Risk  factor reduction and impressions    MARQUITA Crowley

## 2024-07-11 NOTE — PATIENT INSTRUCTIONS
"Low blood sugar in people with diabetes   The Basics   Written by the doctors and editors at [facility]   What is low blood sugar? -- This is when the level of sugar in a person's blood gets too low. It is also called \"hypoglycemia.\"  Low blood sugar can cause symptoms ranging from sweating and feeling hungry to passing out.  Low blood sugar can happen in people with diabetes who take certain medicines, including insulin, other medicines given as shots, and some types of pills.  When can people with diabetes get low blood sugar? -- People with diabetes can get low blood sugar when they:   Take too much medicine, including insulin, other medicines given as shots, or certain diabetes pills   Do not eat enough food   Exercise too much without eating a snack or reducing their insulin dose   Wait too long between meals   Drink too much alcohol or drink alcohol on an empty stomach  What are the symptoms of low blood sugar? -- The symptoms can be different from person to person, and can change over time. During the early stages of low blood sugar, a person can:   Sweat or tremble   Feel hungry   Feel worried  People who have early symptoms should check their blood sugar level to see if it is low and needs to be treated. If low blood sugar levels are not treated, severe symptoms can occur. These can include:   Trouble walking or feeling weak   Trouble seeing clearly   Being confused or acting in a strange way   Passing out or having a seizure  Some people do not get symptoms during the early stages of low blood sugar. Doctors sometimes call this \"hypoglycemia unawareness.\" People with hypoglycemia unawareness are more likely to have severe symptoms, because they might not know that they have low blood sugar until they have severe symptoms. Hypoglycemia unawareness is more likely in people who:   Have had type 1 diabetes for more than 5 to 10 years   Have frequent episodes of low blood sugar   Use insulin to keep their blood " sugar level tightly managed   Are tired   Drink a lot of alcohol   Take certain medicines for high blood pressure or diabetes  How is low blood sugar treated? -- It can be treated with:   Quick sources of sugar - People can eat or drink quick sources of sugar (table 1). Foods that have fat, such as chocolate or cheese, do not treat low blood sugar as quickly. You and a family member should carry a quick source of sugar at all times.   A dose of glucagon - Glucagon is a hormone that can quickly raise blood sugar levels and stop severe symptoms. It comes as a shot (figure 1) or a nose spray. If your doctor recommends that you carry glucagon with you, they will tell you when and how to use it. If possible, it's also a good idea to have a family member, friend, or roommate learn how to give you glucagon. That way, they can give it to you if you can't do it yourself.  After treating low blood sugar, it is very important to recheck your blood sugar level to make sure that it rises and stays in the normal range. Once your blood sugar is normal, eat a small snack that contains protein, fat, and carbohydrate. This can help keep your blood sugar stable.  What should I do after treatment? -- After treatment for low blood sugar, most people can get back to their usual routine. But your doctor or nurse might recommend that you check your blood sugar level more often during the next 2 to 3 days.  If your low blood sugar was treated with glucagon, call your doctor or nurse. They might change the dose of your diabetes medicine.  How can I prevent low blood sugar? -- The best way is to:   Check your blood sugar levels often - Your doctor or nurse will tell you how and when to check your blood sugar levels at home. They will also tell you what your blood sugar levels should be, and when to treat low blood sugar.   Learn the symptoms of low blood sugar, and be ready to treat it in the early stages. Treating low blood sugar early can  "prevent severe symptoms.  When should I go to a hospital or call for an ambulance? -- A family member or friend should take you to a hospital or call for an ambulance (in the [place] and [place], call 9-1-1) if you:   Are still confused 15 minutes after being treated with a dose of glucagon   Have passed out, and there is no glucagon nearby   Still have low blood sugar after treatment  If you have low blood sugar, do not try to drive yourself to the hospital. Driving with low blood sugar can be dangerous.  All topics are updated as new evidence becomes available and our peer review process is complete.  This topic retrieved from [employer] on: Apr 11, 2024.  Topic 04462 Version 22.0  Release: 32.3.2 - C32.100  © 2024 UpToDate, Inc. and/or its affiliates. All rights reserved.  table 1: Quick sources of sugar to treat low blood sugar  3 or 4 glucose tablets   ½ cup of juice or regular soda (not sugar-free)   2 tablespoons of raisins   4 or 5 saltine crackers   1 tablespoon of sugar   1 tablespoon of honey or corn syrup   6 to 8 hard candies   These sources of sugar act quickly to treat low blood sugar levels. People with diabetes who use insulin or certain other diabetes medicines should carry at least 1 of these items at all times.  Graphic 48078 Version 4.0  figure 1: Glucagon autoinjector     Some people carry glucagon in the form of an autoinjector \"pen.\" This makes it easy to give a dose into the upper arm, thigh, or belly.  Graphic 338073 Version 2.0  Consumer Information Use and Disclaimer   Disclaimer: This generalized information is a limited summary of diagnosis, treatment, and/or medication information. It is not meant to be comprehensive and should be used as a tool to help the user understand and/or assess potential diagnostic and treatment options. It does NOT include all information about conditions, treatments, medications, side effects, or risks that may apply to a specific patient. It is not intended to be " medical advice or a substitute for the medical advice, diagnosis, or treatment of a health care provider based on the health care provider's examination and assessment of a patient's specific and unique circumstances. Patients must speak with a health care provider for complete information about their health, medical questions, and treatment options, including any risks or benefits regarding use of medications. This information does not endorse any treatments or medications as safe, effective, or approved for treating a specific patient. UpToDate, Inc. and its affiliates disclaim any warranty or liability relating to this information or the use thereof.The use of this information is governed by the Terms of Use, available at https://www.AllBusiness.comtersArrayCommuwer.com/en/know/clinical-effectiveness-terms. 2024© UpToDate, Inc. and its affiliates and/or licensors. All rights reserved.  Copyright   © 2024 UpToDate, Inc. and/or its affiliates. All rights reserved.

## 2024-07-11 NOTE — ASSESSMENT & PLAN NOTE
Ultrasound from March 2024 demonstrates stability.   Can follow clinically.  Denies neck compressive symptoms.  TSH mildly elevated with normal free T4.   Clinically euthyroid.  Recommend repeat TSH and free T4 in 3 months.

## 2024-07-11 NOTE — ASSESSMENT & PLAN NOTE
Lab Results   Component Value Date    HGBA1C 7.0 (H) 07/09/2024     HGA1C at goal. Provided patient assistance application for Jardiance as this is very costly.     Treatment regimen: Continue current regimen.     Discussed risks/complications associated with uncontrolled diabetes including organ involvement, heart attack, stroke, death.      Advised lifestyle modifications including attention to diet including the amount and types of carbohydrates consumed and regular activity.     Call for blood sugars less than 70 mg/dl or patterns over 250 mg/dl.     Monitor blood glucose levels at least 2 times a day at alternating times.     Discussed symptoms and treatment of hypoglycemia.  Reviewed risks associated with hypoglycemia. Always carry rapid acting carbohydrates and a glucometer (a way to check your blood sugar).    Recommendation for medical identification either bracelet, necklace.    Routine follow up for diabetic eye and foot exams.     Ordered blood work to complete prior to next visit.    Follow up in 3 months.

## 2024-07-11 NOTE — RESULT ENCOUNTER NOTE
Please tell the patient that her DEXA scan shows that she has osteopenia which is a thinning of the bone and the stage before osteoporosis.  She should increase weight-bearing exercises and avoid excessive alcohol and caffeine.  One 600 mg calcium twice daily is recommended and to continue vitamin-D as directed if needed.  I have placed an order for a repeat DEXA to be done in 2 years.  Thank you.

## 2024-07-31 ENCOUNTER — OFFICE VISIT (OUTPATIENT)
Dept: FAMILY MEDICINE CLINIC | Facility: CLINIC | Age: 64
End: 2024-07-31
Payer: COMMERCIAL

## 2024-07-31 VITALS
WEIGHT: 163 LBS | HEART RATE: 106 BPM | TEMPERATURE: 97.3 F | DIASTOLIC BLOOD PRESSURE: 72 MMHG | SYSTOLIC BLOOD PRESSURE: 102 MMHG | HEIGHT: 66 IN | BODY MASS INDEX: 26.2 KG/M2

## 2024-07-31 DIAGNOSIS — B37.31 YEAST VAGINITIS: Primary | ICD-10-CM

## 2024-07-31 DIAGNOSIS — N39.0 URINARY TRACT INFECTION WITHOUT HEMATURIA, SITE UNSPECIFIED: ICD-10-CM

## 2024-07-31 LAB
SL AMB  POCT GLUCOSE, UA: >1000
SL AMB LEUKOCYTE ESTERASE,UA: NORMAL
SL AMB POCT BILIRUBIN,UA: NORMAL
SL AMB POCT BLOOD,UA: NORMAL
SL AMB POCT CLARITY,UA: CLEAR
SL AMB POCT COLOR,UA: YELLOW
SL AMB POCT KETONES,UA: NORMAL
SL AMB POCT NITRITE,UA: NORMAL
SL AMB POCT PH,UA: 6
SL AMB POCT SPECIFIC GRAVITY,UA: 1.02
SL AMB POCT URINE PROTEIN: NORMAL
SL AMB POCT UROBILINOGEN: 0.2

## 2024-07-31 PROCEDURE — 3074F SYST BP LT 130 MM HG: CPT | Performed by: PHYSICIAN ASSISTANT

## 2024-07-31 PROCEDURE — 1159F MED LIST DOCD IN RCRD: CPT | Performed by: PHYSICIAN ASSISTANT

## 2024-07-31 PROCEDURE — 3061F NEG MICROALBUMINURIA REV: CPT | Performed by: PHYSICIAN ASSISTANT

## 2024-07-31 PROCEDURE — 3078F DIAST BP <80 MM HG: CPT | Performed by: PHYSICIAN ASSISTANT

## 2024-07-31 PROCEDURE — 99214 OFFICE O/P EST MOD 30 MIN: CPT | Performed by: PHYSICIAN ASSISTANT

## 2024-07-31 PROCEDURE — 1160F RVW MEDS BY RX/DR IN RCRD: CPT | Performed by: PHYSICIAN ASSISTANT

## 2024-07-31 PROCEDURE — 81002 URINALYSIS NONAUTO W/O SCOPE: CPT | Performed by: PHYSICIAN ASSISTANT

## 2024-07-31 RX ORDER — FLUCONAZOLE 150 MG/1
TABLET ORAL
Qty: 2 TABLET | Refills: 1 | Status: SHIPPED | OUTPATIENT
Start: 2024-07-31 | End: 2024-08-06

## 2024-07-31 NOTE — PATIENT INSTRUCTIONS
1. Yeast vaginitis  Comments:  Secondary to Jardiance.  Diflucan now and repeat in 1 week if needed.  Keep well-hydrated and use wipes after urinating.  Orders:  -     fluconazole (DIFLUCAN) 150 mg tablet; One tablet now and repeat in one week.

## 2024-07-31 NOTE — PROGRESS NOTES
"Ambulatory Visit  Name: Juliana Hernandez      : 1960      MRN: [id number]  Encounter Provider: Jada Presley PA-C  Encounter Date: 2024   Encounter department: [facility]    Assessment & Plan   1. Yeast vaginitis  Comments:  Secondary to Jardiance.  Diflucan now and repeat in 1 week if needed.  Keep well-hydrated and use wipes after urinating.  Orders:  -     fluconazole (DIFLUCAN) 150 mg tablet; One tablet now and repeat in one week.  2. Urinary tract infection without hematuria, site unspecified  -     POCT urine dip       History of Present Illness     Patient presents with:  Vaginitis: Pt states she has burning and itchiness in general not just when urinating thinks may be related to yeast infections. Pt states she recently was placed on new medication jardiance with S/e being     Patient just started Jardiance roughly about a month and a half ago.  She is not having UTI symptoms per se but a lot of incessant itching and burning in her vaginal area that is been keeping her up at night.  She has tried over-the-counter Monistat and other similar antiyeast topical treatments without avail.        Review of Systems   Constitutional: Negative.    HENT: Negative.     Eyes: Negative.    Respiratory: Negative.     Cardiovascular: Negative.    Gastrointestinal: Negative.    Endocrine: Negative.    Genitourinary:         Vaginal itching and burning   Musculoskeletal: Negative.    Skin: Negative.    Allergic/Immunologic: Negative.    Neurological: Negative.    Hematological: Negative.    Psychiatric/Behavioral: Negative.         Objective     /72 (BP Location: Right arm, Patient Position: Sitting, Cuff Size: Adult)   Pulse (!) 106   Temp (!) 97.3 °F (36.3 °C) (Temporal)   Ht 5' 6\" (1.676 m)   Wt 73.9 kg (163 lb)   BMI 26.31 kg/m²     Physical Exam  Vitals and nursing note reviewed.   Constitutional:       Appearance: Normal appearance. She is well-developed.   HENT:      " Head: Normocephalic and atraumatic.   Eyes:      General: Lids are normal.      Conjunctiva/sclera: Conjunctivae normal.      Pupils: Pupils are equal, round, and reactive to light.   Cardiovascular:      Rate and Rhythm: Normal rate and regular rhythm.      Heart sounds: No murmur heard.  Pulmonary:      Effort: Pulmonary effort is normal.      Breath sounds: Normal breath sounds.   Abdominal:      General: Abdomen is protuberant. Bowel sounds are normal.      Palpations: Abdomen is soft.      Tenderness: There is no abdominal tenderness. There is no right CVA tenderness or left CVA tenderness.   Genitourinary:     Comments: Exam deferred  Skin:     General: Skin is warm and dry.   Neurological:      General: No focal deficit present.      Mental Status: She is alert.      Coordination: Coordination is intact.   Psychiatric:         Mood and Affect: Mood normal.         Behavior: Behavior normal. Behavior is cooperative.         Thought Content: Thought content normal.         Judgment: Judgment normal.       Administrative Statements

## 2024-08-01 ENCOUNTER — TELEPHONE (OUTPATIENT)
Age: 64
End: 2024-08-01

## 2024-08-01 NOTE — TELEPHONE ENCOUNTER
Called pt to reschedule appt that was on 8/12. Left v/m to call us back to see if they want to schedule an appt. I also explained that spots are limited.       -If they call back in time they can be put on Diana's schedule

## 2024-08-12 ENCOUNTER — CONSULT (OUTPATIENT)
Dept: DERMATOLOGY | Facility: CLINIC | Age: 64
End: 2024-08-12

## 2024-08-12 VITALS — BODY MASS INDEX: 26.63 KG/M2 | WEIGHT: 165 LBS | TEMPERATURE: 95.8 F

## 2024-08-12 DIAGNOSIS — D22.72 MULTIPLE BENIGN MELANOCYTIC NEVI OF UPPER AND LOWER EXTREMITIES AND TRUNK: ICD-10-CM

## 2024-08-12 DIAGNOSIS — Z85.828 HISTORY OF BASAL CELL CARCINOMA: ICD-10-CM

## 2024-08-12 DIAGNOSIS — L57.0 ACTINIC KERATOSIS: ICD-10-CM

## 2024-08-12 DIAGNOSIS — L81.4 LENTIGO: ICD-10-CM

## 2024-08-12 DIAGNOSIS — R21 RASH: Primary | ICD-10-CM

## 2024-08-12 DIAGNOSIS — D22.71 MULTIPLE BENIGN MELANOCYTIC NEVI OF UPPER AND LOWER EXTREMITIES AND TRUNK: ICD-10-CM

## 2024-08-12 DIAGNOSIS — D22.62 MULTIPLE BENIGN MELANOCYTIC NEVI OF UPPER AND LOWER EXTREMITIES AND TRUNK: ICD-10-CM

## 2024-08-12 DIAGNOSIS — L21.9 SEBORRHEIC DERMATITIS: ICD-10-CM

## 2024-08-12 DIAGNOSIS — D22.61 MULTIPLE BENIGN MELANOCYTIC NEVI OF UPPER AND LOWER EXTREMITIES AND TRUNK: ICD-10-CM

## 2024-08-12 DIAGNOSIS — C44.311 BASAL CELL CARCINOMA (BCC) OF SKIN OF NOSE: ICD-10-CM

## 2024-08-12 DIAGNOSIS — D18.01 CHERRY ANGIOMA: ICD-10-CM

## 2024-08-12 DIAGNOSIS — D22.5 MULTIPLE BENIGN MELANOCYTIC NEVI OF UPPER AND LOWER EXTREMITIES AND TRUNK: ICD-10-CM

## 2024-08-12 DIAGNOSIS — L82.1 SEBORRHEIC KERATOSIS: ICD-10-CM

## 2024-08-12 PROCEDURE — NC001 PR NO CHARGE: Performed by: STUDENT IN AN ORGANIZED HEALTH CARE EDUCATION/TRAINING PROGRAM

## 2024-08-12 RX ORDER — CLOBETASOL PROPIONATE 0.5 MG/ML
SOLUTION TOPICAL
Qty: 50 ML | Refills: 11 | Status: SHIPPED | OUTPATIENT
Start: 2024-08-12

## 2024-08-12 RX ORDER — CLOBETASOL PROPIONATE 0.5 MG/G
CREAM TOPICAL
Qty: 60 G | Refills: 0 | Status: SHIPPED | OUTPATIENT
Start: 2024-08-12

## 2024-08-12 RX ORDER — KETOCONAZOLE 20 MG/ML
SHAMPOO TOPICAL
Qty: 120 ML | Refills: 11 | Status: SHIPPED | OUTPATIENT
Start: 2024-08-12 | End: 2024-08-15 | Stop reason: SDUPTHER

## 2024-08-12 NOTE — PATIENT INSTRUCTIONS
HISTORY OF BASAL CELL CARCINOMA     Physical Exam:  Anatomic Location Affected:  nose  Morphological Description of scar:  Well healed  Suspected Recurrence: No  Pertinent Positives: father with history  Pertinent Negatives:        Additional History of Present Condition:  patient had mohs about 10 years ago on nose     Assessment and Plan:  Based on a thorough discussion of this condition and the management approach to it (including a comprehensive discussion of the known risks, side effects and potential benefits of treatment), the patient (family) agrees to implement the following specific plan:  When outside we recommend using a wide brim hat, sunglasses, long sleeve and pants, sunscreen with SPF 30+ with reapplication every 2 hours, or SPF specific clothing      How can basal cell carcinoma be prevented?  The most important way to prevent BCC is to avoid sunburn. This is especially important in childhood and early life. Fair skinned individuals and those with a personal or family history of BCC should protect their skin from sun exposure daily, year-round and lifelong.  Stay indoors or under the shade in the middle of the day   Wear covering clothing   Apply high protection factor SPF50+ broad-spectrum sunscreens generously to exposed skin if outdoors   Avoid indoor tanning (sun beds, solaria)  Oral nicotinamide (vitamin B3) in a dose of 500 mg twice daily may reduce the number and severity of BCCs.     What is the outlook for basal cell carcinoma?  Most BCCs are cured by treatment. Cure is most likely if treatment is undertaken when the lesion is small.  About 50% of people with BCC develop a second one within 3 years of the first. They are also at increased risk of other skin cancers, especially melanoma. Regular self-skin examinations and long-term annual skin checks by an experienced health professional are recommended.       SEBORRHEIC DERMATITIS    Physical Exam:  Anatomic Location Affected:   "scalp  Morphological Description:  dry and flaky  Pertinent Positives:  Pertinent Negatives:    Assessment and Plan:  Based on a thorough discussion of this condition and the management approach to it (including a comprehensive discussion of the known risks, side effects and potential benefits of treatment), the patient (family) agrees to implement the following specific plan:  Daily for 2 weeks straight and then on \"Mondays, Wednesdays and Fridays\":  Lather into scalp and skin on face, neck, chest, and back; leave on for 5 minutes and then rinse off completely.    Apply Clobetasol 0.05% solution twice daily to scalp       Seborrheic Dermatitis   Seborrheic dermatitis is a common, chronic or relapsing form of eczema/dermatitis that mainly affects the sebaceous, gland-rich regions of the scalp, face, and trunk.  There are infantile and adult forms of seborrhoeic dermatitis. It is sometimes associated with psoriasis and, in that clinical scenario, may be referred to as \"sebo-psoriasis.\"  Seborrheic dermatitis is also known as \"seborrheic eczema.\"  Dandruff (also called \"pityriasis capitis\") is an uninflamed form of seborrhoeic dermatitis. Dandruff presents as bran-like scaly patches scattered within hair-bearing areas of the scalp.  In an infant, this condition may be referred to as \"cradle cap.\"  The cause of seborrheic dermatitis is not completely understood. It is associated with proliferation of various species of the skin commensal Malassezia, in its yeast (non-pathogenic) form. Its metabolites (such as the fatty acids oleic acid, malssezin, and indole-3-carbaldehyde) may cause an inflammatory reaction. Differences in skin barrier lipid content and function may account for individual presentations.    Infantile Seborrheic Dermatitis  Infantile seborrheic dermatitis affects babies under the age of 3 months and usually resolves by 6-12 months of age.  Infantile seborrheic dermatitis causes \"cradle cap\" (diffuse, " "greasy scaling on scalp). The rash may spread to affect armpit and groin folds (a type of \"napkin dermatitis\").  There may be associated salmon-pink colored patches that may flake or peel.  The rash in this case is usually not especially itchy, so the baby often appears undisturbed by the rash, even when more generalized.    Adult Seborrheic Dermatitis  Adult seborrheic dermatitis tends to begin in late adolescence; prevalence is greatest in young adults and in the elderly. It is more common in males than in females.    The following factors are sometimes associated with severe adult seborrheic dermatitis:  Oily skin  Familial tendency to seborrhoeic dermatitis or a family history of psoriasis  Immunosuppression: organ transplant recipient, human immunodeficiency virus (HIV) infection and patients with lymphoma  Neurological and psychiatric diseases: Parkinson disease, tardive dyskinesia, depression, epilepsy, facial nerve palsy, spinal cord injury and congenital disorders such as Down syndrome  Treatment for psoriasis with psoralen and ultraviolet A (PUVA) therapy  Lack of sleep  Stressful events.    In adults, seborrheic dermatitis may typically affect the scalp, face (creases around the nose, behind ears, within eyebrows) and upper trunk. Typical clinical features include:  Winter flares, improving in summer following sun exposure  Minimal itch most of the time  Combination oily and dry mid-facial skin  Ill-defined localized scaly patches or diffuse scale in the scalp  Blepharitis; scaly red eyelid margins  Slade-pink, thin, scaly, and ill-defined plaques in skin folds on both sides of the face  Petal or ring-shaped flaky patches on hair-line and on anterior chest  Rash in armpits, under the breasts, in the groin folds and genital creases  Superficial folliculitis (inflamed hair follicles) on cheeks and upper trunk    Seborrheic dermatitis is diagnosed by its clinical appearance and behavior. Skin biopsy may be " helpful but is rarely necessary to make this diagnosis.     ACTINIC KERATOSIS    Physical Exam:  Anatomic Location Affected:  mid forehead  Morphological Description:  pink scaly macule      Assessment and Plan:  Based on a thorough discussion of this condition and the management approach to it (including a comprehensive discussion of the known risks, side effects and potential benefits of treatment), the patient (family) agrees to implement the following specific plan:    Liquid nitrogen was applied for 10-12 seconds to the skin lesion and the expected blistering or scabbing reaction explained. Do not pick at the area. Patient reminded to expect hypopigmented scars from the procedure. Return if lesion fails to fully resolve.      Actinic keratoses are very common on sites repeatedly exposed to the sun, especially the backs of the hands and the face.  They are considered precancers and have a low risk of turning into squamous cell carcinoma. It is rare for a solitary actinic keratosis to evolve into a squamous cell carcinoma (SCC), but the risk is 10-15% when more than 10 actinic keratoses are present. A tender, thickened, ulcerated or enlarging actinic keratosis is suspicious of SCC.    Actinic keratoses may be prevented by strict sun protection. If already present, keratoses may improve with a very high sun protection factor (50+) broad-spectrum sunscreen applied at least daily to affected areas, year-round.  We recommend that sun protective clothing and hats and sunglasses be worn whenever possible.  Note that you can make you own UPF 30 rate clothing using just your own washing machine with a product called sun guard    There are several different options for treating actinic keratoses    Topical “medications such as 5-fluorouracil or Aldara  - good for field treatment ie treats what's seen and not seen    Cryotherapy - good for single spots but treats “only what we see” versus a field treatment    Photodynamic  "therapy - involves application of a light sensitizing medicine and then exposure to a special light, also a good field treatment        RASH; LICHEN SCLEROSIS (VAGINAL)    Physical Exam:  (Anatomic Location); (Size and Morphological Description); (Differential Diagnosis):  Vaginal area  Pertinent Positives:  Pertinent Negatives:    Additional History of Present Condition:  Patient notes that has had for about  a couple of months; patient has OBGYN appointment next   week; Patient using poise pads      Assessment and Plan:  Based on a thorough discussion of this condition and the management approach to it (including a comprehensive discussion of the known risks, side effects and potential benefits of treatment), the patient (family) agrees to implement the following specific plan:    Use Fragrance free products; pads, apply Vaseline to area  Apply clobetasol 0.05% cream twice daily to vaginal area to see OBGYN   Follow up in 3 months      MELANOCYTIC NEVI (\"Moles\")    Physical Exam:  Anatomic Location Affected:   Mostly on sun-exposed areas of the trunk and extremities  Morphological Description:  Scattered, 1-4mm round to ovoid, symmetrical-appearing, even bordered, skin colored to dark brown macules/papules, mostly in sun-exposed areas  Pertinent Positives:  Pertinent Negatives:    Additional History of Present Condition:  see above note; family hx of skin cancer BCC with father    Assessment and Plan:  Based on a thorough discussion of this condition and the management approach to it (including a comprehensive discussion of the known risks, side effects and potential benefits of treatment), the patient (family) agrees to implement the following specific plan:  When outside we recommend using a wide brim hat, sunglasses, long sleeve and pants, sunscreen with SPF 30+ with reapplication every 2 hours, or SPF specific clothing   Benign, reassured  Annual skin check     Melanocytic Nevi  Melanocytic nevi (\"moles\") are " "tan or brown, raised or flat areas of the skin which have an increased number of melanocytes. Melanocytes are the cells in our body which make pigment and account for skin color.    Some moles are present at birth (I.e., \"congenital nevi\"), while others come up later in life (i.e., \"acquired nevi\").  The sun can stimulate the body to make more moles.  Sunburns are not the only thing that triggers more moles.  Chronic sun exposure can do it too.     Clinically distinguishing a healthy mole from melanoma may be difficult, even for experienced dermatologists. The \"ABCDE's\" of moles have been suggested as a means of helping to alert a person to a suspicious mole and the possible increased risk of melanoma.  The suggestions for raising alert are as follows:    Asymmetry: Healthy moles tend to be symmetric, while melanomas are often asymmetric.  Asymmetry means if you draw a line through the mole, the two halves do not match in color, size, shape, or surface texture. Asymmetry can be a result of rapid enlargement of a mole, the development of a raised area on a previously flat lesion, scaling, ulceration, bleeding or scabbing within the mole.  Any mole that starts to demonstrate \"asymmetry\" should be examined promptly by a board certified dermatologist.     Border: Healthy moles tend to have discrete, even borders.  The border of a melanoma often blends into the normal skin and does not sharply delineate the mole from normal skin.  Any mole that starts to demonstrate \"uneven borders\" should be examined promptly by a board certified dermatologist.     Color: Healthy moles tend to be one color throughout.  Melanomas tend to be made up of different colors ranging from dark black, blue, white, or red.  Any mole that demonstrates a color change should be examined promptly by a board certified dermatologist.     Diameter: Healthy moles tend to be smaller than 0.6 cm in size; an exception are \"congenital nevi\" that can be " "larger.  Melanomas tend to grow and can often be greater than 0.6 cm (1/4 of an inch, or the size of a pencil eraser). This is only a guideline, and many normal moles may be larger than 0.6 cm without being unhealthy.  Any mole that starts to change in size (small to bigger or bigger to smaller) should be examined promptly by a board certified dermatologist.     Evolving: Healthy moles tend to \"stay the same.\"  Melanomas may often show signs of change or evolution such as a change in size, shape, color, or elevation.  Any mole that starts to itch, bleed, crust, burn, hurt, or ulcerate or demonstrate a change or evolution should be examined promptly by a board certified dermatologist.      LENTIGO    Physical Exam:  Anatomic Location Affected:  trunk, arms  Morphological Description:  Light brown macules  Pertinent Positives:  Pertinent Negatives:    Assessment and Plan:  Based on a thorough discussion of this condition and the management approach to it (including a comprehensive discussion of the known risks, side effects and potential benefits of treatment), the patient (family) agrees to implement the following specific plan:  When outside we recommend using a wide brim hat, sunglasses, long sleeve and pants, sunscreen with SPF 30+ with reapplication every 2 hours, or SPF specific clothing     What is a lentigo?  A lentigo is a pigmented flat or slightly raised lesion with a clearly defined edge. Unlike an ephelis (freckle), it does not fade in the winter months. There are several kinds of lentigo.  The name lentigo originally referred to its appearance resembling a small lentil. The plural of lentigo is lentigines, although “lentigos” is also in common use.    Who gets lentigines?  Lentigines can affect males and females of all ages and races. Solar lentigines are especially prevalent in fair skinned adults. Lentigines associated with syndromes are present at birth or arise during childhood.    What causes " "lentigines?  Common forms of lentigo are due to exposure to ultraviolet radiation:  Sun damage including sunburn   Indoor tanning   Phototherapy, especially photochemotherapy (PUVA)    Ionizing radiation, eg radiation therapy, can also cause lentigines.  Several familial syndromes associated with widespread lentigines originate from mutations in Bc-MAP kinase, mTOR signaling and PTEN pathways.    What is the treatment for lentigines?  Most lentigines are left alone. Attempts to lighten them may not be successful. The following approaches are used:  SPF 50+ broad-spectrum sunscreen   Hydroquinone bleaching cream   Alpha hydroxy acids   Vitamin C   Retinoids   Azelaic acid   Chemical peels  Individual lesions can be permanently removed using:  Cryotherapy   Intense pulsed light   Pigment lasers    How can lentigines be prevented?  Lentigines associated with exposure ultraviolet radiation can be prevented by very careful sun protection. Clothing is more successful at preventing new lentigines than are sunscreens.    What is the outlook for lentigines?  Lentigines usually persist. They may increase in number with age and sun exposure. Some in sun-protected sites may fade and disappear.    MARR ANGIOMAS    Physical Exam:  Anatomic Location Affected:  Mostly on sun-exposed areas of the trunk and extremities  Morphological Description:  Scattered cherry red, 1-4 mm papules.  Pertinent Positives:  Pertinent Negatives:    Assessment and Plan:  Based on a thorough discussion of this condition and the management approach to it (including a comprehensive discussion of the known risks, side effects and potential benefits of treatment), the patient (family) agrees to implement the following specific plan:  Monitor for changes  Benign, reassured    Assessment and Plan:    Cherry angioma, also known as [place] spots, are benign vascular skin lesions. A \"cherry angioma\" is a firm red, blue or purple papule, 0.1-1 cm in " "diameter. When thrombosed, they can appear black in colour until evaluated with a dermatoscope when the red or purple colour is more easily seen. Cherry angioma may develop on any part of the body but most often appear on the scalp, face, lips and trunk.  An angioma is due to proliferating endothelial cells; these are the cells that line the inside of a blood vessel.    Angiomas can arise in early life or later in life; the most common type of angioma is a cherry angioma.  Cherry angiomas are very common in males and females of any age or race. They are more noticeable in white skin than in skin of colour. They markedly increase in number from about the age of 40. There may be a family history of similar lesions. Eruptive cherry angiomas have been rarely reported to be associated with internal malignancy. The cause of angiomas is unknown. Genetic analysis of cherry angiomas has shown that they frequently carry specific somatic missense mutations in the GNAQ and GNA11 (Q209H) genes, which are involved in other vascular and melanocytic proliferations.      SEBORRHEIC KERATOSIS; NON-INFLAMED    Physical Exam:  Anatomic Location Affected: Mostly on sun-exposed areas of the trunk and extremities  Morphological Description:  Flat and raised, waxy, smooth to warty textured, yellow to brownish-grey to dark brown to blackish, discrete, \"stuck-on\" appearing papules.  Pertinent Positives:  Pertinent Negatives:    Assessment and Plan:  Based on a thorough discussion of this condition and the management approach to it (including a comprehensive discussion of the known risks, side effects and potential benefits of treatment), the patient (family) agrees to implement the following specific plan:  Monitor for changes  Benign, reassured    Seborrheic Keratosis  A seborrheic keratosis is a harmless warty spot that appears during adult life as a common sign of skin aging.  Seborrheic keratoses can arise on any area of skin, covered or " "uncovered, with the usual exception of the palms and soles. They do not arise from mucous membranes. Seborrheic keratoses can have highly variable appearance.      Seborrheic keratoses are extremely common. It has been estimated that over 90% of adults over the age of 60 years have one or more of them. They occur in males and females of all races, typically beginning to erupt in the 30s or 40s. They are uncommon under the age of 20 years.  The precise cause of seborrhoeic keratoses is not known.  Seborrhoeic keratoses are considered degenerative in nature. As time goes by, seborrheic keratoses tend to become more numerous. Some people inherit a tendency to develop a very large number of them; some people may have hundreds of them.      There is no easy way to remove multiple lesions on a single occasion.  Unless a specific lesion is \"inflamed\" and is causing pain or stinging/burning or is bleeding, most insurance companies do not authorize treatment.    "

## 2024-08-12 NOTE — PROGRESS NOTES
"[facility] Note     Patient Name: Juliana Hernandez  Encounter Date: 8/12/24     Have you been cared for by a [facility] Dermatologist in the last 3 years and, if so, which description applies to you?    Yes.  I have been here within the last 3 years, and my medical history has NOT changed since that time.  I am FEMALE/of child-bearing potential.    REVIEW OF SYSTEMS:  Have you recently had or currently have any of the following? No changes in my recent health.   PAST MEDICAL HISTORY:  Have you personally ever had or currently have any of the following?  If \"YES,\" then please provide more detail. No changes in my medical history.   HISTORY OF IMMUNOSUPPRESSION: Do you have a history of any of the following:  Systemic Immunosuppression such as Diabetes, Biologic or Immunotherapy, Chemotherapy, Organ Transplantation, Bone Marrow Transplantation?  No     Answering \"YES\" requires the addition of the dotphrase \"IMMUNOSUPPRESSED\" as the first diagnosis of the patient's visit.   FAMILY HISTORY:  Any \"first degree relatives\" (parent, brother, sister, or child) with the following?    No changes in my family's known health.   PATIENT EXPERIENCE:    Do you want the Dermatologist to perform a COMPLETE skin exam today including a clinical examination under the \"bra and underwear\" areas?  Yes  If necessary, do we have your permission to call and leave a detailed message on your Preferred Phone number that includes your specific medical information?  Yes      Allergies   Allergen Reactions   • Bactrim [Sulfamethoxazole-Trimethoprim]    • Neomycin-Bacitracin Zn-Polymyx Other (See Comments)     REDNESS   • Statins Myalgia      Current Outpatient Medications:   •  Accu-Chek Softclix Lancets lancets, Use 1 each 2 (two) times a day, Disp: 200 each, Rfl: 3  •  amLODIPine (NORVASC) 5 mg tablet, Take 1 tablet by mouth once daily, Disp: 30 tablet, Rfl: 5  •  Bempedoic Acid 180 MG TABS, Take 180 mg by mouth every morning, " "Disp: 30 tablet, Rfl: 5  •  Blood Glucose Monitoring Suppl (Accu-Chek Guide) w/Device KIT, USE 1 TWICE DAILY, Disp: , Rfl:   •  clobetasol (TEMOVATE) 0.05 % cream, Apply twice daily to vaginal area for 10 days., Disp: 60 g, Rfl: 0  •  clobetasol (TEMOVATE) 0.05 % external solution, Apply twice daily to scalp for 3 weeks at a time. Take a one week break in between uses, Disp: 50 mL, Rfl: 11  •  clopidogrel (PLAVIX) 75 mg tablet, Take 1 tablet by mouth once daily, Disp: 90 tablet, Rfl: 1  •  Empagliflozin (Jardiance) 25 MG TABS, Take 1 tablet (25 mg total) by mouth every morning, Disp: 90 tablet, Rfl: 3  •  famotidine (PEPCID) 20 mg tablet, Take 1 tablet by mouth twice daily, Disp: 90 tablet, Rfl: 3  •  glimepiride (AMARYL) 2 mg tablet, Take 1 tablet (2 mg total) by mouth daily with breakfast, Disp: 90 tablet, Rfl: 1  •  glucose blood (OneTouch Verio) test strip, Check blood sugars twice a day, Disp: 100 each, Rfl: 3  •  ketoconazole (NIZORAL) 2 % shampoo, Daily for 2 weeks straight and then on \"Mondays, Wednesdays and Fridays\":  Lather into scalp and skin on face, neck, chest, and back; leave on for 5 minutes and then rinse off completely., Disp: 120 mL, Rfl: 11  •  lisinopril (ZESTRIL) 10 mg tablet, Take 1 tablet (10 mg total) by mouth daily, Disp: 30 tablet, Rfl: 5  •  metFORMIN (GLUCOPHAGE) 1000 MG tablet, Take 1 tablet (1,000 mg total) by mouth 2 (two) times a day with meals, Disp: 180 tablet, Rfl: 1  •  Multiple Vitamin (MULTI-VITAMIN DAILY PO), Take 1 tablet by mouth daily, Disp: , Rfl:   •  triamcinolone (KENALOG) 0.1 % cream, APPLY TOPICALLY TWO TIMES A DAY UNDER THE LEFT BREAST AS NEEDED FOR FLARES, Disp: 30 g, Rfl: 2          Whom besides the patient is providing clinical information about today's encounter?   NO ADDITIONAL HISTORIAN (patient alone provided history)    Physical Exam and Assessment/Plan by Diagnosis:      HISTORY OF BASAL CELL CARCINOMA     Physical Exam:  Anatomic Location Affected:  " nose  Morphological Description of scar:  Well healed mohs graft site  Suspected Recurrence: No  Pertinent Positives: father with history  Pertinent Negatives:        Additional History of Present Condition:  patient had mohs about 10 years ago on nose. No recurrences.     Assessment and Plan:  Based on a thorough discussion of this condition and the management approach to it (including a comprehensive discussion of the known risks, side effects and potential benefits of treatment), the patient (family) agrees to implement the following specific plan:  When outside we recommend using a wide brim hat, sunglasses, long sleeve and pants, sunscreen with SPF 30+ with reapplication every 2 hours, or SPF specific clothing  Recommend yearly skin exams      How can basal cell carcinoma be prevented?  The most important way to prevent BCC is to avoid sunburn. This is especially important in childhood and early life. Fair skinned individuals and those with a personal or family history of BCC should protect their skin from sun exposure daily, year-round and lifelong.  Stay indoors or under the shade in the middle of the day   Wear covering clothing   Apply high protection factor SPF50+ broad-spectrum sunscreens generously to exposed skin if outdoors   Avoid indoor tanning (sun beds, solaria)  Oral nicotinamide (vitamin B3) in a dose of 500 mg twice daily may reduce the number and severity of BCCs.     What is the outlook for basal cell carcinoma?  Most BCCs are cured by treatment. Cure is most likely if treatment is undertaken when the lesion is small.  About 50% of people with BCC develop a second one within 3 years of the first. They are also at increased risk of other skin cancers, especially melanoma. Regular self-skin examinations and long-term annual skin checks by an experienced health professional are recommended.       SEBORRHEIC DERMATITIS    Physical Exam:  Anatomic Location Affected:  scalp  Morphological  "Description:  dry and flaky  Pertinent Positives:  Pertinent Negatives:    Assessment and Plan:  Based on a thorough discussion of this condition and the management approach to it (including a comprehensive discussion of the known risks, side effects and potential benefits of treatment), the patient (family) agrees to implement the following specific plan:  Daily for 2 weeks straight and then on \"Mondays, Wednesdays and Fridays\":  Lather into scalp and skin on face, neck, chest, and back; leave on for 5 minutes and then rinse off completely.    Apply Clobetasol 0.05% solution twice daily to scalp for three weeks a time. Take a one week break in between uses.       Seborrheic Dermatitis   Seborrheic dermatitis is a common, chronic or relapsing form of eczema/dermatitis that mainly affects the sebaceous, gland-rich regions of the scalp, face, and trunk.  There are infantile and adult forms of seborrhoeic dermatitis. It is sometimes associated with psoriasis and, in that clinical scenario, may be referred to as \"sebo-psoriasis.\"  Seborrheic dermatitis is also known as \"seborrheic eczema.\"  Dandruff (also called \"pityriasis capitis\") is an uninflamed form of seborrhoeic dermatitis. Dandruff presents as bran-like scaly patches scattered within hair-bearing areas of the scalp.  In an infant, this condition may be referred to as \"cradle cap.\"  The cause of seborrheic dermatitis is not completely understood. It is associated with proliferation of various species of the skin commensal Malassezia, in its yeast (non-pathogenic) form. Its metabolites (such as the fatty acids oleic acid, malssezin, and indole-3-carbaldehyde) may cause an inflammatory reaction. Differences in skin barrier lipid content and function may account for individual presentations.    Infantile Seborrheic Dermatitis  Infantile seborrheic dermatitis affects babies under the age of 3 months and usually resolves by 6-12 months of age.  Infantile seborrheic " "dermatitis causes \"cradle cap\" (diffuse, greasy scaling on scalp). The rash may spread to affect armpit and groin folds (a type of \"napkin dermatitis\").  There may be associated salmon-pink colored patches that may flake or peel.  The rash in this case is usually not especially itchy, so the baby often appears undisturbed by the rash, even when more generalized.    Adult Seborrheic Dermatitis  Adult seborrheic dermatitis tends to begin in late adolescence; prevalence is greatest in young adults and in the elderly. It is more common in males than in females.    The following factors are sometimes associated with severe adult seborrheic dermatitis:  Oily skin  Familial tendency to seborrhoeic dermatitis or a family history of psoriasis  Immunosuppression: organ transplant recipient, human immunodeficiency virus (HIV) infection and patients with lymphoma  Neurological and psychiatric diseases: Parkinson disease, tardive dyskinesia, depression, epilepsy, facial nerve palsy, spinal cord injury and congenital disorders such as Down syndrome  Treatment for psoriasis with psoralen and ultraviolet A (PUVA) therapy  Lack of sleep  Stressful events.    In adults, seborrheic dermatitis may typically affect the scalp, face (creases around the nose, behind ears, within eyebrows) and upper trunk. Typical clinical features include:  Winter flares, improving in summer following sun exposure  Minimal itch most of the time  Combination oily and dry mid-facial skin  Ill-defined localized scaly patches or diffuse scale in the scalp  Blepharitis; scaly red eyelid margins  [place]-pink, thin, scaly, and ill-defined plaques in skin folds on both sides of the face  Petal or ring-shaped flaky patches on hair-line and on anterior chest  Rash in armpits, under the breasts, in the groin folds and genital creases  Superficial folliculitis (inflamed hair follicles) on cheeks and upper trunk    Seborrheic dermatitis is diagnosed by its clinical " appearance and behavior. Skin biopsy may be helpful but is rarely necessary to make this diagnosis.     ACTINIC KERATOSIS    Physical Exam:  Anatomic Location Affected:  mid forehead  Morphological Description:  pink scaly macule      Assessment and Plan:  Based on a thorough discussion of this condition and the management approach to it (including a comprehensive discussion of the known risks, side effects and potential benefits of treatment), the patient (family) agrees to implement the following specific plan:    Liquid nitrogen was applied for 10-12 seconds to the skin lesion and the expected blistering or scabbing reaction explained. Do not pick at the area. Patient reminded to expect hypopigmented scars from the procedure. Return if lesion fails to fully resolve.        Actinic keratoses are very common on sites repeatedly exposed to the sun, especially the backs of the hands and the face.  They are considered precancers and have a low risk of turning into squamous cell carcinoma. It is rare for a solitary actinic keratosis to evolve into a squamous cell carcinoma (SCC), but the risk is 10-15% when more than 10 actinic keratoses are present. A tender, thickened, ulcerated or enlarging actinic keratosis is suspicious of SCC.    Actinic keratoses may be prevented by strict sun protection. If already present, keratoses may improve with a very high sun protection factor (50+) broad-spectrum sunscreen applied at least daily to affected areas, year-round.  We recommend that sun protective clothing and hats and sunglasses be worn whenever possible.  Note that you can make you own UPF 30 rate clothing using just your own washing machine with a product called sun guard    There are several different options for treating actinic keratoses    Topical “medications such as 5-fluorouracil or Aldara  - good for field treatment ie treats what's seen and not seen    Cryotherapy - good for single spots but treats “only what we  see” versus a field treatment    Photodynamic therapy - involves application of a light sensitizing medicine and then exposure to a special light, also a good field treatment      PROCEDURE:  DESTRUCTION OF PRE-MALIGNANT LESIONS  After a thorough discussion of treatment options and risk/benefits/alternatives (including but not limited to local pain, scarring, dyspigmentation, blistering, and possible superinfection), verbal and written consent were obtained and the aforementioned lesions were treated on with cryotherapy using liquid nitrogen x 1 cycle for 5-10 seconds.    TOTAL NUMBER of 1 pre-malignant lesions were treated today on the ANATOMIC LOCATION: mid forehead.     The patient tolerated the procedure well, and after-care instructions were provided.      RASH; LICHEN SCLEROSIS (VAGINAL) vs CONTACT DERMATITIS    Physical Exam:  (Anatomic Location); (Size and Morphological Description); (Differential Diagnosis):  Vaginal area; erythematous patches without discharge; concern for early evolving lichen sclerosis or contact dermatitis  Pertinent Positives:  Pertinent Negatives:    Additional History of Present Condition:  Patient notes that has had for about  a couple of months; patient has OBGYN appointment next   week; Admits to pruritus.       Assessment and Plan:  Based on a thorough discussion of this condition and the management approach to it (including a comprehensive discussion of the known risks, side effects and potential benefits of treatment), the patient (family) agrees to implement the following specific plan:     There is a concern for early evolving lichen sclerosis. Will trial short course of high-potency topical steroids (clobetasol 0.05%) twice daily for 2 weeks.   Will monitor closely in coordination with OBGYN who she sees next week. Follow-up with dermatology in 3 months.   Use Fragrance free products; pads, apply Vaseline to area to act as a barrier        MELANOCYTIC NEVI  "(\"Moles\")    Physical Exam:  Anatomic Location Affected:   Mostly on sun-exposed areas of the trunk and extremities  Morphological Description:  Scattered, 1-4mm round to ovoid, symmetrical-appearing, even bordered, skin colored to dark brown macules/papules, mostly in sun-exposed areas  Pertinent Positives:  Pertinent Negatives:    Additional History of Present Condition:  see above note; family hx of skin cancer BCC with father    Assessment and Plan:  Based on a thorough discussion of this condition and the management approach to it (including a comprehensive discussion of the known risks, side effects and potential benefits of treatment), the patient (family) agrees to implement the following specific plan:  When outside we recommend using a wide brim hat, sunglasses, long sleeve and pants, sunscreen with SPF 30+ with reapplication every 2 hours, or SPF specific clothing   Benign, reassured  Annual skin check     Melanocytic Nevi  Melanocytic nevi (\"moles\") are tan or brown, raised or flat areas of the skin which have an increased number of melanocytes. Melanocytes are the cells in our body which make pigment and account for skin color.    Some moles are present at birth (I.e., \"congenital nevi\"), while others come up later in life (i.e., \"acquired nevi\").  The sun can stimulate the body to make more moles.  Sunburns are not the only thing that triggers more moles.  Chronic sun exposure can do it too.     Clinically distinguishing a healthy mole from melanoma may be difficult, even for experienced dermatologists. The \"ABCDE's\" of moles have been suggested as a means of helping to alert a person to a suspicious mole and the possible increased risk of melanoma.  The suggestions for raising alert are as follows:    Asymmetry: Healthy moles tend to be symmetric, while melanomas are often asymmetric.  Asymmetry means if you draw a line through the mole, the two halves do not match in color, size, shape, or surface " "texture. Asymmetry can be a result of rapid enlargement of a mole, the development of a raised area on a previously flat lesion, scaling, ulceration, bleeding or scabbing within the mole.  Any mole that starts to demonstrate \"asymmetry\" should be examined promptly by a board certified dermatologist.     Border: Healthy moles tend to have discrete, even borders.  The border of a melanoma often blends into the normal skin and does not sharply delineate the mole from normal skin.  Any mole that starts to demonstrate \"uneven borders\" should be examined promptly by a board certified dermatologist.     Color: Healthy moles tend to be one color throughout.  Melanomas tend to be made up of different colors ranging from dark black, blue, white, or red.  Any mole that demonstrates a color change should be examined promptly by a board certified dermatologist.     Diameter: Healthy moles tend to be smaller than 0.6 cm in size; an exception are \"congenital nevi\" that can be larger.  Melanomas tend to grow and can often be greater than 0.6 cm (1/4 of an inch, or the size of a pencil eraser). This is only a guideline, and many normal moles may be larger than 0.6 cm without being unhealthy.  Any mole that starts to change in size (small to bigger or bigger to smaller) should be examined promptly by a board certified dermatologist.     Evolving: Healthy moles tend to \"stay the same.\"  Melanomas may often show signs of change or evolution such as a change in size, shape, color, or elevation.  Any mole that starts to itch, bleed, crust, burn, hurt, or ulcerate or demonstrate a change or evolution should be examined promptly by a board certified dermatologist.      LENTIGO    Physical Exam:  Anatomic Location Affected:  trunk, arms  Morphological Description:  Light brown macules  Pertinent Positives:  Pertinent Negatives:    Assessment and Plan:  Based on a thorough discussion of this condition and the management approach to it " (including a comprehensive discussion of the known risks, side effects and potential benefits of treatment), the patient (family) agrees to implement the following specific plan:  When outside we recommend using a wide brim hat, sunglasses, long sleeve and pants, sunscreen with SPF 30+ with reapplication every 2 hours, or SPF specific clothing     What is a lentigo?  A lentigo is a pigmented flat or slightly raised lesion with a clearly defined edge. Unlike an ephelis (freckle), it does not fade in the winter months. There are several kinds of lentigo.  The name lentigo originally referred to its appearance resembling a small lentil. The plural of lentigo is lentigines, although “lentigos” is also in common use.    Who gets lentigines?  Lentigines can affect males and females of all ages and races. Solar lentigines are especially prevalent in fair skinned adults. Lentigines associated with syndromes are present at birth or arise during childhood.    What causes lentigines?  Common forms of lentigo are due to exposure to ultraviolet radiation:  Sun damage including sunburn   Indoor tanning   Phototherapy, especially photochemotherapy (PUVA)    Ionizing radiation, eg radiation therapy, can also cause lentigines.  Several familial syndromes associated with widespread lentigines originate from mutations in Bc-MAP kinase, mTOR signaling and PTEN pathways.    What is the treatment for lentigines?  Most lentigines are left alone. Attempts to lighten them may not be successful. The following approaches are used:  SPF 50+ broad-spectrum sunscreen   Hydroquinone bleaching cream   Alpha hydroxy acids   Vitamin C   Retinoids   Azelaic acid   Chemical peels  Individual lesions can be permanently removed using:  Cryotherapy   Intense pulsed light   Pigment lasers    How can lentigines be prevented?  Lentigines associated with exposure ultraviolet radiation can be prevented by very careful sun protection. Clothing is more  "successful at preventing new lentigines than are sunscreens.    What is the outlook for lentigines?  Lentigines usually persist. They may increase in number with age and sun exposure. Some in sun-protected sites may fade and disappear.    MARR ANGIOMAS    Physical Exam:  Anatomic Location Affected:  Mostly on sun-exposed areas of the trunk and extremities  Morphological Description:  Scattered cherry red, 1-4 mm papules.  Pertinent Positives:  Pertinent Negatives:    Assessment and Plan:  Based on a thorough discussion of this condition and the management approach to it (including a comprehensive discussion of the known risks, side effects and potential benefits of treatment), the patient (family) agrees to implement the following specific plan:  Monitor for changes  Benign, reassured    Assessment and Plan:    Cherry angioma, also known as [place] spots, are benign vascular skin lesions. A \"cherry angioma\" is a firm red, blue or purple papule, 0.1-1 cm in diameter. When thrombosed, they can appear black in colour until evaluated with a dermatoscope when the red or purple colour is more easily seen. Cherry angioma may develop on any part of the body but most often appear on the scalp, face, lips and trunk.  An angioma is due to proliferating endothelial cells; these are the cells that line the inside of a blood vessel.    Angiomas can arise in early life or later in life; the most common type of angioma is a cherry angioma.  Cherry angiomas are very common in males and females of any age or race. They are more noticeable in white skin than in skin of colour. They markedly increase in number from about the age of 40. There may be a family history of similar lesions. Eruptive cherry angiomas have been rarely reported to be associated with internal malignancy. The cause of angiomas is unknown. Genetic analysis of cherry angiomas has shown that they frequently carry specific somatic missense mutations in " "the GNAQ and GNA11 (Q209H) genes, which are involved in other vascular and melanocytic proliferations.      SEBORRHEIC KERATOSIS; NON-INFLAMED    Physical Exam:  Anatomic Location Affected: Mostly on sun-exposed areas of the trunk and extremities  Morphological Description:  Flat and raised, waxy, smooth to warty textured, yellow to brownish-grey to dark brown to blackish, discrete, \"stuck-on\" appearing papules.  Pertinent Positives:  Pertinent Negatives:    Assessment and Plan:  Based on a thorough discussion of this condition and the management approach to it (including a comprehensive discussion of the known risks, side effects and potential benefits of treatment), the patient (family) agrees to implement the following specific plan:  Monitor for changes  Benign, reassured    Seborrheic Keratosis  A seborrheic keratosis is a harmless warty spot that appears during adult life as a common sign of skin aging.  Seborrheic keratoses can arise on any area of skin, covered or uncovered, with the usual exception of the palms and soles. They do not arise from mucous membranes. Seborrheic keratoses can have highly variable appearance.      Seborrheic keratoses are extremely common. It has been estimated that over 90% of adults over the age of 60 years have one or more of them. They occur in males and females of all races, typically beginning to erupt in the 30s or 40s. They are uncommon under the age of 20 years.  The precise cause of seborrhoeic keratoses is not known.  Seborrhoeic keratoses are considered degenerative in nature. As time goes by, seborrheic keratoses tend to become more numerous. Some people inherit a tendency to develop a very large number of them; some people may have hundreds of them.      There is no easy way to remove multiple lesions on a single occasion.  Unless a specific lesion is \"inflamed\" and is causing pain or stinging/burning or is bleeding, most insurance companies do not authorize " treatment.      Scribe Attestation    I,:  Sofiya Pandey am acting as a scribe while in the presence of the attending physician.:       I,:  Diana Alvarez MD personally performed the services described in this documentation    as scribed in my presence.:

## 2024-08-13 ENCOUNTER — TELEPHONE (OUTPATIENT)
Age: 64
End: 2024-08-13

## 2024-08-13 NOTE — TELEPHONE ENCOUNTER
[facility] asking to clarify the direction of the ketoconazole shampoo he said normally it is just used for the scalp and then a cream is normally prescribed for the face, neck, chest, and back

## 2024-08-15 DIAGNOSIS — L21.9 SEBORRHEIC DERMATITIS: ICD-10-CM

## 2024-08-15 RX ORDER — KETOCONAZOLE 20 MG/ML
SHAMPOO TOPICAL
Qty: 120 ML | Refills: 11 | Status: SHIPPED | OUTPATIENT
Start: 2024-08-15

## 2024-08-15 NOTE — TELEPHONE ENCOUNTER
Resent without instructions to use on neck chest and back (just to avoid confusion with pharmacist but its totally fine to use it to wash face and neck and back)

## 2024-08-20 ENCOUNTER — OFFICE VISIT (OUTPATIENT)
Dept: OBGYN CLINIC | Facility: CLINIC | Age: 64
End: 2024-08-20
Payer: COMMERCIAL

## 2024-08-20 VITALS
BODY MASS INDEX: 26.84 KG/M2 | DIASTOLIC BLOOD PRESSURE: 78 MMHG | SYSTOLIC BLOOD PRESSURE: 124 MMHG | WEIGHT: 167 LBS | HEIGHT: 66 IN

## 2024-08-20 DIAGNOSIS — Z12.31 ENCOUNTER FOR SCREENING MAMMOGRAM FOR BREAST CANCER: Primary | ICD-10-CM

## 2024-08-20 DIAGNOSIS — N95.2 ATROPHIC VAGINITIS: ICD-10-CM

## 2024-08-20 DIAGNOSIS — Z01.419 ENCOUNTER FOR ROUTINE GYNECOLOGICAL EXAMINATION WITH PAPANICOLAOU SMEAR OF CERVIX: ICD-10-CM

## 2024-08-20 DIAGNOSIS — R10.13 EPIGASTRIC PAIN: ICD-10-CM

## 2024-08-20 PROCEDURE — G0101 CA SCREEN;PELVIC/BREAST EXAM: HCPCS | Performed by: OBSTETRICS & GYNECOLOGY

## 2024-08-20 RX ORDER — FAMOTIDINE 20 MG/1
20 TABLET, FILM COATED ORAL 2 TIMES DAILY
Qty: 180 TABLET | Refills: 1 | Status: SHIPPED | OUTPATIENT
Start: 2024-08-20

## 2024-08-20 NOTE — PROGRESS NOTES
Assessment/Plan:    No problem-specific Assessment & Plan notes found for this encounter.       Diagnoses and all orders for this visit:    Encounter for screening mammogram for breast cancer  -     Mammo screening bilateral w 3d & cad; Future    Encounter for routine gynecological examination with Papanicolaou smear of cervix  -     Thinprep Tis Pap Reflex HPV mRNA E6/E7    Atrophic vaginitis          Normal gynecological physical examination.  Self-breast examination stressed.  Mammogram ordered.  Discussed regular exercise, healthy diet, importance of vitamin D and calcium supplements.  Discussed importance of sun block use during periods of prolonged sun exposure.  Patient will be seen in 1 year for routine gynecologic and medical examination.  Patient will call office for any problems, concerns, or issues which may arise during the interim.     Subjective:          HPI    Patient ID: Juliana Hernandez is a 63 y.o. female who presents today for her annual gynecologic and medical examination    Menstrual status: Patient is postmenopausal and denies any vaginal bleeding    Vasomotor symptoms: Denies any significant vasomotor symptoms.  Complains of some vaginal dryness and external irritation.  I advised patient to use the clobetasol which was prescribed by dermatology externally only as needed.  She may also use natural organic coconut oil for the vaginal discomfort secondary to the atrophic changes.  I also advised her to utilize warm teabag baths for external symptomatic relief as well.    Patient reports normal appetite    Patient reports normal bowel and bladder habits    Patient denies any significant pelvic or abdominal pain    Patient denies any headaches, chest pain, shortness of breath fever shakes or chills    Patient denies any COVID 19 symptoms including cough or loss of taste or smell    COVID vaccine status: Aware COVID vaccination protocols    Medical problems: Followed medically for blood pressure  "and blood sugar    Colonoscopy status: Up-to-date with screening colonoscopy    Mammogram status: Importance of self breast exam stressed to the patient and she is up-to-date with screening mammography.  Appropriate arrangements for her annual screening mammogram were placed into the EMR system at today's visit.    The following portions of the patient's history were reviewed and updated as appropriate: allergies, current medications, past family history, past medical history, past social history, past surgical history and problem list.    Review of Systems   Constitutional: Negative.  Negative for appetite change, diaphoresis, fatigue, fever and unexpected weight change.   HENT: Negative.     Eyes: Negative.    Respiratory: Negative.     Cardiovascular: Negative.         Followed for blood pressure   Gastrointestinal: Negative.  Negative for abdominal pain, blood in stool, constipation, diarrhea, nausea and vomiting.   Endocrine: Negative.  Negative for cold intolerance and heat intolerance.        Followed for blood sugar   Genitourinary: Negative.  Negative for dysuria, frequency, hematuria, urgency, vaginal bleeding, vaginal discharge and vaginal pain.   Musculoskeletal: Negative.    Skin: Negative.    Allergic/Immunologic: Negative.    Neurological: Negative.    Hematological: Negative.  Negative for adenopathy.   Psychiatric/Behavioral: Negative.           Objective:      /78   Ht 5' 6\" (1.676 m)   Wt 75.8 kg (167 lb)   BMI 26.95 kg/m²          Physical Exam  Constitutional:       General: She is not in acute distress.     Appearance: Normal appearance. She is well-developed. She is not diaphoretic.   HENT:      Head: Normocephalic and atraumatic.   Eyes:      Pupils: Pupils are equal, round, and reactive to light.   Cardiovascular:      Rate and Rhythm: Normal rate and regular rhythm.      Heart sounds: Normal heart sounds. No murmur heard.     No friction rub. No gallop.   Pulmonary:      Effort: " Pulmonary effort is normal.      Breath sounds: Normal breath sounds.   Chest:   Breasts:     Breasts are symmetrical.      Right: No inverted nipple, mass, nipple discharge, skin change or tenderness.      Left: No inverted nipple, mass, nipple discharge, skin change or tenderness.   Abdominal:      General: Bowel sounds are normal.      Palpations: Abdomen is soft.   Genitourinary:     General: Normal vulva.      Exam position: Supine.      Labia:         Right: No rash or lesion.         Left: No rash or lesion.       Urethra: No urethral swelling or urethral lesion.      Vagina: Normal. No vaginal discharge, erythema, tenderness or bleeding.      Cervix: No discharge or friability.      Uterus: Not enlarged and not tender.       Adnexa:         Right: No mass, tenderness or fullness.          Left: No mass, tenderness or fullness.        Rectum: Normal. Guaiac result negative.      Comments: Pelvic exam revealed mild atrophic vaginitis  Good pelvic support confirmed  Minimal erythematous changes externally.  Patient advised to utilize her clobetasol only as needed and sparingly.  Importance of warm teabag as were discussed with her at today's visit.  Musculoskeletal:         General: Normal range of motion.      Cervical back: Normal range of motion and neck supple.   Lymphadenopathy:      Cervical: No cervical adenopathy.      Upper Body:      Right upper body: No supraclavicular adenopathy.      Left upper body: No supraclavicular adenopathy.   Skin:     General: Skin is warm and dry.      Findings: No rash.   Neurological:      General: No focal deficit present.      Mental Status: She is alert and oriented to person, place, and time.   Psychiatric:         Mood and Affect: Mood normal.         Speech: Speech normal.         Behavior: Behavior normal.         Thought Content: Thought content normal.         Judgment: Judgment normal.

## 2024-08-23 LAB
CLINICAL INFO: NORMAL
CYTO CVX: NORMAL
DATE PREVIOUS BX: NORMAL
LMP START DATE: NORMAL
SL AMB PREV. PAP:: NORMAL
SPECIMEN SOURCE CVX/VAG CYTO: NORMAL
STAT OF ADQ CVX/VAG CYTO-IMP: NORMAL

## 2024-10-04 DIAGNOSIS — R80.1 PERSISTENT PROTEINURIA: ICD-10-CM

## 2024-10-04 RX ORDER — LISINOPRIL 10 MG/1
10 TABLET ORAL DAILY
Qty: 30 TABLET | Refills: 0 | Status: SHIPPED | OUTPATIENT
Start: 2024-10-04

## 2024-10-07 ENCOUNTER — TELEPHONE (OUTPATIENT)
Dept: ENDOCRINOLOGY | Facility: CLINIC | Age: 64
End: 2024-10-07

## 2024-10-07 NOTE — TELEPHONE ENCOUNTER
Patient came in to drop off paperwork for patient assistance for her medication to be signed.  See Media

## 2024-10-14 ENCOUNTER — LAB (OUTPATIENT)
Dept: LAB | Facility: HOSPITAL | Age: 64
End: 2024-10-14
Payer: COMMERCIAL

## 2024-10-14 DIAGNOSIS — E11.8 TYPE 2 DIABETES MELLITUS WITH COMPLICATION, WITHOUT LONG-TERM CURRENT USE OF INSULIN (HCC): ICD-10-CM

## 2024-10-14 DIAGNOSIS — E55.9 VITAMIN D DEFICIENCY: ICD-10-CM

## 2024-10-14 DIAGNOSIS — E04.1 THYROID NODULE: ICD-10-CM

## 2024-10-14 LAB
25(OH)D3 SERPL-MCNC: 37 NG/ML (ref 30–100)
ALBUMIN SERPL BCG-MCNC: 4.1 G/DL (ref 3.5–5)
ALP SERPL-CCNC: 83 U/L (ref 34–104)
ALT SERPL W P-5'-P-CCNC: 19 U/L (ref 7–52)
ANION GAP SERPL CALCULATED.3IONS-SCNC: 8 MMOL/L (ref 4–13)
AST SERPL W P-5'-P-CCNC: 18 U/L (ref 13–39)
BILIRUB SERPL-MCNC: 0.88 MG/DL (ref 0.2–1)
BUN SERPL-MCNC: 25 MG/DL (ref 5–25)
CALCIUM SERPL-MCNC: 9.8 MG/DL (ref 8.4–10.2)
CHLORIDE SERPL-SCNC: 100 MMOL/L (ref 96–108)
CO2 SERPL-SCNC: 24 MMOL/L (ref 21–32)
CREAT SERPL-MCNC: 0.89 MG/DL (ref 0.6–1.3)
EST. AVERAGE GLUCOSE BLD GHB EST-MCNC: 160 MG/DL
GFR SERPL CREATININE-BSD FRML MDRD: 68 ML/MIN/1.73SQ M
GLUCOSE P FAST SERPL-MCNC: 150 MG/DL (ref 65–99)
HBA1C MFR BLD: 7.2 %
POTASSIUM SERPL-SCNC: 4.1 MMOL/L (ref 3.5–5.3)
PROT SERPL-MCNC: 7.7 G/DL (ref 6.4–8.4)
SODIUM SERPL-SCNC: 132 MMOL/L (ref 135–147)
T4 FREE SERPL-MCNC: 0.64 NG/DL (ref 0.61–1.12)
TSH SERPL DL<=0.05 MIU/L-ACNC: 6.48 UIU/ML (ref 0.45–4.5)

## 2024-10-14 PROCEDURE — 82306 VITAMIN D 25 HYDROXY: CPT

## 2024-10-14 PROCEDURE — 80053 COMPREHEN METABOLIC PANEL: CPT

## 2024-10-14 PROCEDURE — 83036 HEMOGLOBIN GLYCOSYLATED A1C: CPT

## 2024-10-14 PROCEDURE — 84443 ASSAY THYROID STIM HORMONE: CPT

## 2024-10-14 PROCEDURE — 84439 ASSAY OF FREE THYROXINE: CPT

## 2024-10-14 PROCEDURE — 36415 COLL VENOUS BLD VENIPUNCTURE: CPT

## 2024-10-15 DIAGNOSIS — I63.531 CEREBROVASCULAR ACCIDENT (CVA) DUE TO STENOSIS OF RIGHT POSTERIOR CEREBRAL ARTERY (HCC): ICD-10-CM

## 2024-10-16 RX ORDER — CLOPIDOGREL BISULFATE 75 MG/1
75 TABLET ORAL DAILY
Qty: 90 TABLET | Refills: 1 | Status: SHIPPED | OUTPATIENT
Start: 2024-10-16

## 2024-10-23 ENCOUNTER — OFFICE VISIT (OUTPATIENT)
Dept: ENDOCRINOLOGY | Facility: CLINIC | Age: 64
End: 2024-10-23
Payer: COMMERCIAL

## 2024-10-23 VITALS
WEIGHT: 162.6 LBS | HEIGHT: 66 IN | DIASTOLIC BLOOD PRESSURE: 72 MMHG | BODY MASS INDEX: 26.13 KG/M2 | HEART RATE: 95 BPM | SYSTOLIC BLOOD PRESSURE: 121 MMHG | OXYGEN SATURATION: 98 %

## 2024-10-23 DIAGNOSIS — I10 ESSENTIAL HYPERTENSION: Primary | ICD-10-CM

## 2024-10-23 DIAGNOSIS — E11.8 TYPE 2 DIABETES MELLITUS WITH COMPLICATION, WITHOUT LONG-TERM CURRENT USE OF INSULIN (HCC): ICD-10-CM

## 2024-10-23 DIAGNOSIS — R53.83 OTHER FATIGUE: ICD-10-CM

## 2024-10-23 DIAGNOSIS — R79.89 ELEVATED TSH: ICD-10-CM

## 2024-10-23 PROCEDURE — 99204 OFFICE O/P NEW MOD 45 MIN: CPT | Performed by: NURSE PRACTITIONER

## 2024-10-23 RX ORDER — GLIMEPIRIDE 2 MG/1
2 TABLET ORAL
Qty: 90 TABLET | Refills: 1 | Status: SHIPPED | OUTPATIENT
Start: 2024-10-23

## 2024-10-23 NOTE — ASSESSMENT & PLAN NOTE
Lab Results   Component Value Date    HGBA1C 7.2 (H) 10/14/2024     HGA1C close to goal not tolerating Jardiance having  side effects. Recommend discontinuing and increasing Amaryl to 2 mg twice daily. Will check blood sugars 2 times per day for the next week and send for review. Discussed GLP-1 and DPP4 options.    Discussed risks/complications associated with uncontrolled diabetes including organ involvement, heart attack, stroke, death.    Advised lifestyle modifications including attention to diet including the amount and types of carbohydrates consumed and regular activity.     Call for blood sugars less than 70 mg/dl or patterns over 250 mg/dl.     Discussed symptoms and treatment of hypoglycemia.  Reviewed risks associated with hypoglycemia. Always carry rapid acting carbohydrates and a glucometer (a way to check your blood sugar).    Recommendation for medical identification either bracelet, necklace.    Recommendation for glucagon if on insulin.     Routine follow up for diabetic eye and foot exams.     Ordered blood work to complete prior to next visit.    Send glucose logs/CGM download in 1-2 weeks for review    Follow up in 3 months.       Orders:    glimepiride (AMARYL) 2 mg tablet; Take 1 tablet (2 mg total) by mouth 2 (two) times a day

## 2024-10-23 NOTE — PATIENT INSTRUCTIONS
Amaryl 2 mg twice daily  Metformin 1000 mg twice daily     Send in blood sugar logs in 1-2 weeks  Call sooner for blood sugar patterns less than 70 or over 250 mg/dL

## 2024-10-23 NOTE — PROGRESS NOTES
Ambulatory Visit  Name: Juliana Hernandez      : 1960      MRN: [id number]  Encounter Provider: MARQIUTA Crowley  Encounter Date: 10/23/2024   Encounter department: [facility]    Assessment & Plan  Essential hypertension  BP under good control.  Continues on ACE         Type 2 diabetes mellitus with complication, without long-term current use of insulin (HCC)    Lab Results   Component Value Date    HGBA1C 7.2 (H) 10/14/2024     HGA1C close to goal not tolerating Jardiance having  side effects. Recommend discontinuing and increasing Amaryl to 2 mg twice daily. Will check blood sugars 2 times per day for the next week and send for review. Discussed GLP-1 and DPP4 options.    Discussed risks/complications associated with uncontrolled diabetes including organ involvement, heart attack, stroke, death.    Advised lifestyle modifications including attention to diet including the amount and types of carbohydrates consumed and regular activity.     Call for blood sugars less than 70 mg/dl or patterns over 250 mg/dl.     Discussed symptoms and treatment of hypoglycemia.  Reviewed risks associated with hypoglycemia. Always carry rapid acting carbohydrates and a glucometer (a way to check your blood sugar).    Recommendation for medical identification either bracelet, necklace.    Recommendation for glucagon if on insulin.     Routine follow up for diabetic eye and foot exams.     Ordered blood work to complete prior to next visit.    Send glucose logs/CGM download in 1-2 weeks for review    Follow up in 3 months.       Orders:    glimepiride (AMARYL) 2 mg tablet; Take 1 tablet (2 mg total) by mouth 2 (two) times a day    Elevated TSH  Clinically symptomatic consistent with hypothyroidism with elevated TSH and lowering trend of free T4. Recommend checking thyroid antibodies and TSH and free T4.   Orders:    Thyroid Antibodies Panel; Future    TSH, 3rd generation;  Future    T4, free; Future      History of Present Illness     Juliana Hernandez is a 63 y.o. female who presents  for follow up of diabetes mellitus. Experiencing  side effects on Jardiance.    Denies polyuria or polydipsia.   Denies visual changes.     Denies hypoglycemia.     Glucose 124-144 mg/dL and before bed 220 mg/dL. Checekd intermittently.     Current regimen:   Amaryl 2 mg daily  Jardiance 25 mg daily  Metformin 1000 mg twice daily     Denies change in energy level or weight.  Denies changes in level of concentration or ability to fall or stay asleep.  Denies anxiety, jitteriness, or tremors.  Denies tachycardia or palpitations.  Denies constipation or hyperdefecation.  Denies frequent headaches.  Denies temperature intolerances.       History obtained from : patient    Review of Systems  See HPI.   All other systems reviewed and are negative.    Medical History Reviewed by provider this encounter:       Current Outpatient Medications on File Prior to Visit   Medication Sig Dispense Refill    amLODIPine (NORVASC) 5 mg tablet Take 1 tablet by mouth once daily 30 tablet 5    Bempedoic Acid 180 MG TABS Take 180 mg by mouth every morning 30 tablet 5    clobetasol (TEMOVATE) 0.05 % cream Apply twice daily to vaginal area for 10 days. 60 g 0    clopidogrel (PLAVIX) 75 mg tablet Take 1 tablet by mouth once daily 90 tablet 1    famotidine (PEPCID) 20 mg tablet Take 1 tablet by mouth twice daily 180 tablet 1    lisinopril (ZESTRIL) 10 mg tablet Take 1 tablet by mouth once daily 30 tablet 0    metFORMIN (GLUCOPHAGE) 1000 MG tablet Take 1 tablet (1,000 mg total) by mouth 2 (two) times a day with meals 180 tablet 1    Multiple Vitamin (MULTI-VITAMIN DAILY PO) Take 1 tablet by mouth daily      [DISCONTINUED] Empagliflozin (Jardiance) 25 MG TABS Take 1 tablet (25 mg total) by mouth every morning 90 tablet 3    [DISCONTINUED] glimepiride (AMARYL) 2 mg tablet Take 1 tablet (2 mg total) by mouth daily with breakfast 90  "tablet 1    Accu-Chek Softclix Lancets lancets Use 1 each 2 (two) times a day 200 each 3    Blood Glucose Monitoring Suppl (Accu-Chek Guide) w/Device KIT USE 1 TWICE DAILY      glucose blood (OneTouch Verio) test strip Check blood sugars twice a day 100 each 3    triamcinolone (KENALOG) 0.1 % cream APPLY TOPICALLY TWO TIMES A DAY UNDER THE LEFT BREAST AS NEEDED FOR FLARES 30 g 2    [DISCONTINUED] clobetasol (TEMOVATE) 0.05 % external solution Apply twice daily to scalp for 3 weeks at a time. Take a one week break in between uses 50 mL 11    [DISCONTINUED] ketoconazole (NIZORAL) 2 % shampoo Daily for 2 weeks straight and then on \"Mondays, Wednesdays and Fridays\":  Lather into scalp; leave on for 5 minutes and then rinse off completely. 120 mL 11     No current facility-administered medications on file prior to visit.          Objective     /72 (BP Location: Left arm, Patient Position: Sitting, Cuff Size: Standard)   Pulse 95   Ht 5' 6\" (1.676 m)   Wt 73.8 kg (162 lb 9.6 oz)   SpO2 98%   BMI 26.24 kg/m²      Physical Exam  Vitals reviewed.   Constitutional:       Appearance: Normal appearance.   Cardiovascular:      Rate and Rhythm: Normal rate and regular rhythm.      Pulses: Normal pulses.      Heart sounds: Normal heart sounds.   Pulmonary:      Effort: Pulmonary effort is normal.      Breath sounds: Normal breath sounds.   Skin:     General: Skin is warm and dry.      Capillary Refill: Capillary refill takes less than 2 seconds.   Neurological:      General: No focal deficit present.      Mental Status: She is alert and oriented to person, place, and time.   Psychiatric:         Mood and Affect: Mood normal.         Behavior: Behavior normal.     Labs:   Component      Latest Ref Rng 10/14/2024   Sodium      135 - 147 mmol/L 132 (L)    Potassium      3.5 - 5.3 mmol/L 4.1    Chloride      96 - 108 mmol/L 100    Carbon Dioxide      21 - 32 mmol/L 24    ANION GAP      4 - 13 mmol/L 8    BUN      5 - 25 " mg/dL 25    Creatinine      0.60 - 1.30 mg/dL 0.89    GLUCOSE, FASTING      65 - 99 mg/dL 150 (H)    Calcium      8.4 - 10.2 mg/dL 9.8    AST      13 - 39 U/L 18    ALT      7 - 52 U/L 19    ALK PHOS      34 - 104 U/L 83    Total Protein      6.4 - 8.4 g/dL 7.7    Albumin      3.5 - 5.0 g/dL 4.1    Total Bilirubin      0.20 - 1.00 mg/dL 0.88    GFR, Calculated      ml/min/1.73sq m 68    Hemoglobin A1C      Normal 4.0-5.6%; PreDiabetic 5.7-6.4%; Diabetic >=6.5%; Glycemic control for adults with diabetes <7.0% % 7.2 (H)    eAG, EST AVG Glucose      mg/dl 160    VITAMIN D, 25-HYDROXY      30.0 - 100.0 ng/mL 37.0    TSH 3RD GENERATON      0.450 - 4.500 uIU/mL 6.481 (H)    FREE T4      0.61 - 1.12 ng/dL 0.64       Administrative Statements

## 2024-10-24 DIAGNOSIS — R80.9 TYPE 2 DIABETES MELLITUS WITH MICROALBUMINURIA, WITHOUT LONG-TERM CURRENT USE OF INSULIN (HCC): ICD-10-CM

## 2024-10-24 DIAGNOSIS — E11.29 TYPE 2 DIABETES MELLITUS WITH MICROALBUMINURIA, WITHOUT LONG-TERM CURRENT USE OF INSULIN (HCC): ICD-10-CM

## 2024-11-01 ENCOUNTER — TELEPHONE (OUTPATIENT)
Dept: ENDOCRINOLOGY | Facility: CLINIC | Age: 64
End: 2024-11-01

## 2024-11-04 DIAGNOSIS — R80.1 PERSISTENT PROTEINURIA: ICD-10-CM

## 2024-11-04 RX ORDER — LISINOPRIL 10 MG/1
10 TABLET ORAL DAILY
Qty: 30 TABLET | Refills: 5 | Status: SHIPPED | OUTPATIENT
Start: 2024-11-04

## 2024-11-19 ENCOUNTER — APPOINTMENT (OUTPATIENT)
Dept: LAB | Facility: HOSPITAL | Age: 64
End: 2024-11-19
Payer: COMMERCIAL

## 2024-11-19 ENCOUNTER — OFFICE VISIT (OUTPATIENT)
Dept: CARDIOLOGY CLINIC | Facility: CLINIC | Age: 64
End: 2024-11-19
Payer: COMMERCIAL

## 2024-11-19 VITALS
BODY MASS INDEX: 27 KG/M2 | HEART RATE: 88 BPM | SYSTOLIC BLOOD PRESSURE: 130 MMHG | DIASTOLIC BLOOD PRESSURE: 88 MMHG | WEIGHT: 168 LBS | HEIGHT: 66 IN

## 2024-11-19 DIAGNOSIS — R79.89 ELEVATED TSH: ICD-10-CM

## 2024-11-19 DIAGNOSIS — I10 ESSENTIAL HYPERTENSION: ICD-10-CM

## 2024-11-19 DIAGNOSIS — E78.2 MIXED HYPERLIPIDEMIA: Primary | ICD-10-CM

## 2024-11-19 DIAGNOSIS — N18.2 STAGE 2 CHRONIC KIDNEY DISEASE: ICD-10-CM

## 2024-11-19 DIAGNOSIS — I49.3 FREQUENT PVCS: ICD-10-CM

## 2024-11-19 DIAGNOSIS — Z86.73 HISTORY OF CVA (CEREBROVASCULAR ACCIDENT): ICD-10-CM

## 2024-11-19 DIAGNOSIS — E04.1 THYROID NODULE: ICD-10-CM

## 2024-11-19 DIAGNOSIS — Q21.12 PFO (PATENT FORAMEN OVALE): ICD-10-CM

## 2024-11-19 DIAGNOSIS — E11.8 TYPE 2 DIABETES MELLITUS WITH COMPLICATION, WITHOUT LONG-TERM CURRENT USE OF INSULIN (HCC): ICD-10-CM

## 2024-11-19 DIAGNOSIS — R53.83 OTHER FATIGUE: ICD-10-CM

## 2024-11-19 LAB
T4 FREE SERPL-MCNC: 0.63 NG/DL (ref 0.61–1.12)
TSH SERPL DL<=0.05 MIU/L-ACNC: 5.42 UIU/ML (ref 0.45–4.5)

## 2024-11-19 PROCEDURE — 84439 ASSAY OF FREE THYROXINE: CPT

## 2024-11-19 PROCEDURE — 99214 OFFICE O/P EST MOD 30 MIN: CPT

## 2024-11-19 PROCEDURE — 84443 ASSAY THYROID STIM HORMONE: CPT

## 2024-11-19 PROCEDURE — 36415 COLL VENOUS BLD VENIPUNCTURE: CPT

## 2024-11-19 PROCEDURE — 93000 ELECTROCARDIOGRAM COMPLETE: CPT

## 2024-11-19 PROCEDURE — 86800 THYROGLOBULIN ANTIBODY: CPT

## 2024-11-19 PROCEDURE — 86376 MICROSOMAL ANTIBODY EACH: CPT

## 2024-11-19 NOTE — ASSESSMENT & PLAN NOTE
Lab Results   Component Value Date    EGFR 68 10/14/2024    EGFR 69 07/09/2024    EGFR 68 03/25/2024    CREATININE 0.89 10/14/2024    CREATININE 0.88 07/09/2024    CREATININE 0.89 03/25/2024   Stable

## 2024-11-19 NOTE — PATIENT INSTRUCTIONS
Continue current medications  Repeat lipid blood work in 3 months  Continue heart healthy diet by limiting saturated fat and added sugars.  Stay active and hydrated  Bring complete list of medications to your follow-up appointment.   Follow-up in 8 months or earlier if any concerns  Please call the office if you have any questions

## 2024-11-19 NOTE — PROGRESS NOTES
General Cardiology Note  Juliana Hernandez  1960  [id number]  [facility]  [address]  [address], [address]  [facility] [address] [phone]    Referring Provider - No ref. provider found  Chief Complaint   Patient presents with    Follow-up     No cardiac complaints       Assessment & Plan  Mixed hyperlipidemia  She has multiple medication intolerances including to multiple statins.  Has also tried Zetia, gemfibrozil.  , HDL 40,  in Feb 2024  She will start taking her bempedoic acid and will repeat lipid panel in 3 months  PFO (patent foramen ovale)  She had a PFO closure several years ago after a transient ischemic attack.  She has been on Plavix since.  Continue  History of CVA (cerebrovascular accident)  On Plavix  Essential hypertension  BP is stable  Continue on amlodipine and lisinopril  Frequent PVCs  Denies any palpitations  Type 2 diabetes mellitus with complication, without long-term current use of insulin ([facility])  Care per endocrinology  Lab Results   Component Value Date    HGBA1C 7.2 (H) 10/14/2024     Thyroid nodule  Stable. Follow-up with endocrinology/PCP  TSH elevated, normal T4  Stage 2 chronic kidney disease  Lab Results   Component Value Date    EGFR 68 10/14/2024    EGFR 69 07/09/2024    EGFR 68 03/25/2024    CREATININE 0.89 10/14/2024    CREATININE 0.88 07/09/2024    CREATININE 0.89 03/25/2024   Stable     Will RTO in 8 months or sooner if necessary. Will call with any concerns.     Interval History: 64 y.o.  female  with PMH as below is here for routine follow up  Patient of Dr. Yin    she had a PFO closure several years ago after a transient ischemic attack.  During that event, she had intractable nausea and vomiting and then confusion and loss of memory.  PFO was closed 6 months later.  She has been on Plavix since then.   She has multiple medication intolerances including to multiple statins.  She has also tried Zetia  and most recently gemfibrozil which needed to be stopped due to GI side effects.  During her last visit, Nexletol was prescribed but she could not take because of expense. Can consider Leqvio or Repatha.   She reports that she will be trying the bempedoic acid soon.  Reports that she had to wait because of an order endocrine medication which she has since stopped  She also has diabetes with proteinuria. She  tried jardiance but had frequent uti with it.  Her endocrinologist is also managing her thyroid issues.  TSH high, free T normal. Not currently on meds  Sodium is slightly low at 132    Today, she reports no acute symptoms. she has chronic dependent ankle edema, which is worse at the end of the day but improves with leg elevation.  She states she tries to pay attention to what she eats and tries to stay active.      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    Abnormal computed tomography angiography (CTA) 12/10/2023    Hyponatremia 10/30/2023    Stroke-like symptoms 10/29/2023    Vitamin D deficiency 12/29/2020    Episodic cluster headache, not intractable 10/13/2020    Dizziness 10/13/2020    Cardiovascular risk factor 06/24/2020    Frequent PVCs 02/22/2020    Palpitations 02/22/2020    Statin intolerance 02/01/2020    Intracranial aneurysm 02/01/2020    Thyroid nodule 12/18/2019    History of CVA (cerebrovascular accident) 03/14/2018    PFO (patent foramen ovale) 03/14/2018    Type 2 diabetes mellitus with complication, without long-term current use of insulin (HCC) 03/13/2018    Total bilirubin, elevated 03/01/2018    Migraine 03/01/2018    Fatty infiltration of liver 12/26/2017    Mixed incontinence 12/01/2017    Pelvic floor weakness in female  12/01/2017    Vaginal atrophy 12/01/2017    Left knee DJD 03/08/2017    Basal cell carcinoma 02/13/2017    Demyelinating disease of central nervous system (HCC) 08/05/2015    Pineal gland cyst 08/05/2015    Persistent proteinuria 09/16/2013    Psoriasis 11/27/2012    Essential hypertension 11/10/2012    Mixed hyperlipidemia 09/27/2012    Chondromalacia patellae 12/30/2008     Past Medical History:   Diagnosis Date    Basal cell carcinoma     nose    Diabetes mellitus (HCC)     GERD (gastroesophageal reflux disease)     Take pepcid but still bothersome with certain foods    Headache(784.0)     Occasional    Hypertension     Nasal congestion     Espyat night    Otitis media     Occasional    Stroke (cerebrum) (HCC)     Stroke (HCC)     Tia    Tinnitus     Current     Social History     Tobacco Use    Smoking status: Never    Smokeless tobacco: Never    Tobacco comments:     No secondhand smoke exposure   Vaping Use    Vaping status: Never Used   Substance Use Topics    Alcohol use: No    Drug use: Never      Family History   Problem Relation Age of Onset    Basal cell carcinoma Father     Diabetes Father     Hypertension Father     Stroke Father     Diabetes type II Father     Hyperlipidemia Brother     No Known Problems Maternal Aunt     No Known Problems Maternal Aunt     No Known Problems Maternal Uncle     Breast cancer Paternal Aunt     No Known Problems Maternal Grandmother     No Known Problems Maternal Grandfather     No Known Problems Paternal Grandmother     No Known Problems Paternal Grandfather     Anemia Daughter     No Known Problems Daughter     No Known Problems Daughter     No Known Problems Daughter     No Known Problems Daughter     No Known Problems Half-Sister     No Known Problems Half-Sister      Past Surgical History:   Procedure Laterality Date    CHOLECYSTECTOMY      CHOLECYSTECTOMY LAPAROSCOPIC      MOHS SURGERY      Micrographic Surgery Nose, Last assessed: 2/13/17    SKIN BIOPSY      TOTAL  "KNEE ARTHROPLASTY Left 03/08/2017    Last assessed: 12/26/17    TUBAL LIGATION         Current Outpatient Medications:     Accu-Chek Softclix Lancets lancets, Use 1 each 2 (two) times a day, Disp: 200 each, Rfl: 3    amLODIPine (NORVASC) 5 mg tablet, Take 1 tablet by mouth once daily, Disp: 30 tablet, Rfl: 5    Bempedoic Acid 180 MG TABS, Take 180 mg by mouth every morning, Disp: 30 tablet, Rfl: 5    Blood Glucose Monitoring Suppl (Accu-Chek Guide) w/Device KIT, USE 1 TWICE DAILY, Disp: , Rfl:     clopidogrel (PLAVIX) 75 mg tablet, Take 1 tablet by mouth once daily, Disp: 90 tablet, Rfl: 1    famotidine (PEPCID) 20 mg tablet, Take 1 tablet by mouth twice daily, Disp: 180 tablet, Rfl: 1    glimepiride (AMARYL) 2 mg tablet, Take 1 tablet (2 mg total) by mouth 2 (two) times a day, Disp: 90 tablet, Rfl: 1    lisinopril (ZESTRIL) 10 mg tablet, Take 1 tablet by mouth once daily, Disp: 30 tablet, Rfl: 5    metFORMIN (GLUCOPHAGE) 1000 MG tablet, TAKE 1 TABLET BY MOUTH TWICE DAILY WITH MEALS, Disp: 180 tablet, Rfl: 0    Multiple Vitamin (MULTI-VITAMIN DAILY PO), Take 1 tablet by mouth daily, Disp: , Rfl:     triamcinolone (KENALOG) 0.1 % cream, APPLY TOPICALLY TWO TIMES A DAY UNDER THE LEFT BREAST AS NEEDED FOR FLARES, Disp: 30 g, Rfl: 2    clobetasol (TEMOVATE) 0.05 % cream, Apply twice daily to vaginal area for 10 days., Disp: 60 g, Rfl: 0    glucose blood (OneTouch Verio) test strip, Check blood sugars twice a day, Disp: 100 each, Rfl: 3    Allergies   Allergen Reactions    Bactrim [Sulfamethoxazole-Trimethoprim]     Neomycin-Bacitracin Zn-Polymyx Other (See Comments)     REDNESS    Statins Myalgia       Vitals:    11/19/24 1601   BP: 130/88   BP Location: Right arm   Patient Position: Sitting   Cuff Size: Standard   Pulse: 88   Weight: 76.2 kg (168 lb)   Height: 5' 6\" (1.676 m)        Vitals:    11/19/24 1601   Weight: 76.2 kg (168 lb)      Height: 5' 6\" (167.6 cm)   Body mass index is 27.12 kg/m².    Labs:     Chemistry " "       Component Value Date/Time     (L) 11/29/2017 0747    K 4.1 10/14/2024 0705    K 4.3 06/12/2023 0754    K 3.6 11/20/2021 1200     10/14/2024 0705    CL 93 (L) 06/12/2023 0754     11/20/2021 1200    CO2 24 10/14/2024 0705    CO2 22 06/12/2023 0754    CO2 27 11/20/2021 1200    BUN 25 10/14/2024 0705    BUN 14 06/12/2023 0754    BUN 12 11/20/2021 1200    CREATININE 0.89 10/14/2024 0705    CREATININE 0.83 11/20/2021 1200        Component Value Date/Time    CALCIUM 9.8 10/14/2024 0705    CALCIUM 9.8 03/29/2022 0725    CALCIUM 9.6 11/20/2021 1200    ALKPHOS 83 10/14/2024 0705    ALKPHOS 76 03/29/2022 0725    ALKPHOS 96 11/20/2021 1200    AST 18 10/14/2024 0705    AST 32 06/12/2023 0754    AST 23 11/20/2021 1200    ALT 19 10/14/2024 0705    ALT 38 (H) 06/12/2023 0754    ALT 36 11/20/2021 1200    BILITOT 1.0 11/29/2017 0747          Lab Results   Component Value Date    HGBA1C 7.2 (H) 10/14/2024      Lab Results   Component Value Date    CHOL 274 (H) 11/29/2017    CHOL 273 (H) 02/07/2017    CHOL 292 (H) 08/25/2016     Lab Results   Component Value Date    HDL 40 (L) 02/26/2024    HDL 44 (L) 10/30/2023    HDL 48 06/12/2023     Lab Results   Component Value Date    LDLCALC 102 (H) 02/26/2024    LDLCALC 131 (H) 10/30/2023    LDLCALC 131 (H) 06/12/2023     Lab Results   Component Value Date    TRIG 376 (H) 02/26/2024    TRIG 352 (H) 10/30/2023    TRIG 430 (H) 06/12/2023     No results found for: \"CHOLHDL\"    Imaging: No results found.    ECG:  Normal sinus rhythm.  Possible left atrial enlargement.  Left ventricular hypertrophy.  88 bpm   Reviewed by MARQUITA Miller      Review of Systems   Constitutional: Negative for chills, diaphoresis, fever and malaise/fatigue.   Cardiovascular:  Positive for leg swelling (dependent edema). Negative for chest pain, dyspnea on exertion, orthopnea, palpitations and syncope.   Respiratory:  Negative for cough and shortness of breath.    Musculoskeletal:  Positive " for back pain.   Gastrointestinal:  Negative for abdominal pain, nausea and vomiting.   Neurological:  Negative for dizziness, headaches and light-headedness.   Psychiatric/Behavioral:  Negative for altered mental status.    All other systems reviewed and are negative.    Except as noted in HPI, is otherwise reviewed in detail and a 12 point review of systems is negative.    Physical Exam  Vitals reviewed.   Constitutional:       General: She is not in acute distress.     Appearance: Normal appearance. She is not diaphoretic.   HENT:      Head: Normocephalic and atraumatic.   Eyes:      General: No scleral icterus.     Extraocular Movements: Extraocular movements intact.      Pupils: Pupils are equal, round, and reactive to light.   Cardiovascular:      Rate and Rhythm: Normal rate and regular rhythm.      Pulses: Normal pulses.           Radial pulses are 2+ on the right side and 2+ on the left side.      Heart sounds: Normal heart sounds, S1 normal and S2 normal.   Pulmonary:      Effort: Pulmonary effort is normal. No tachypnea or respiratory distress.      Breath sounds: Normal breath sounds. No wheezing or rales.   Abdominal:      General: Bowel sounds are normal. There is no distension.      Palpations: Abdomen is soft.   Musculoskeletal:      Right lower leg: Edema (ankle dependent edema) present.      Left lower leg: Edema (ankle dependent edema) present.   Skin:     General: Skin is warm and dry.      Capillary Refill: Capillary refill takes less than 2 seconds.   Neurological:      Mental Status: She is alert and oriented to person, place, and time.      Gait: Gait normal.   Psychiatric:         Mood and Affect: Mood normal.         Behavior: Behavior normal.          **Please Note: This note may have been constructed using a voice recognition system**     Thank you for the opportunity to participate in the care of this patient.   MARQUITA Miller

## 2024-11-19 NOTE — ASSESSMENT & PLAN NOTE
She had a PFO closure several years ago after a transient ischemic attack.  She has been on Plavix since.  Continue

## 2024-11-19 NOTE — ASSESSMENT & PLAN NOTE
She has multiple medication intolerances including to multiple statins.  Has also tried Zetia, gemfibrozil.  , HDL 40,  in Feb 2024  She will start taking her bempedoic acid and will repeat lipid panel in 3 months

## 2024-11-20 LAB
THYROGLOB AB SERPL-ACNC: <1 IU/ML (ref 0–0.9)
THYROPEROXIDASE AB SERPL-ACNC: 14 IU/ML (ref 0–34)

## 2024-11-22 ENCOUNTER — RESULTS FOLLOW-UP (OUTPATIENT)
Dept: ENDOCRINOLOGY | Facility: CLINIC | Age: 64
End: 2024-11-22

## 2024-12-18 DIAGNOSIS — E11.8 TYPE 2 DIABETES MELLITUS WITH COMPLICATION, WITHOUT LONG-TERM CURRENT USE OF INSULIN (HCC): ICD-10-CM

## 2024-12-19 RX ORDER — GLIMEPIRIDE 2 MG/1
2 TABLET ORAL
Qty: 90 TABLET | Refills: 0 | Status: SHIPPED | OUTPATIENT
Start: 2024-12-19

## 2024-12-20 DIAGNOSIS — I10 ESSENTIAL HYPERTENSION: ICD-10-CM

## 2024-12-20 RX ORDER — AMLODIPINE BESYLATE 5 MG/1
5 TABLET ORAL DAILY
Qty: 30 TABLET | Refills: 5 | Status: SHIPPED | OUTPATIENT
Start: 2024-12-20

## 2024-12-29 ENCOUNTER — NURSE TRIAGE (OUTPATIENT)
Dept: OTHER | Facility: OTHER | Age: 64
End: 2024-12-29

## 2024-12-29 NOTE — TELEPHONE ENCOUNTER
"Regarding: Congestion/ Sore throat  ----- Message from Yaritza MARIN sent at 12/29/2024 11:30 AM EST -----  Pt stated, \" I have a sore throat and a lot of congestion. I was around 5 people who tested positive for strep.\"    "

## 2024-12-29 NOTE — TELEPHONE ENCOUNTER
"Reason for Disposition   [1] Strep throat EXPOSURE within past 10 days AND [2] sore throat    Answer Assessment - Initial Assessment Questions  1. STREP EXPOSURE: \"Was the exposure to someone who lives within your home?\" If not, ask: \"How much contact did you have with the sick person?\"       Exposed to 5 people that tested positive for Strep around [place].    2. ONSET: \"How many days ago did the contact occur?\"       Since 12/25 at night.     3. PROVEN STREP: \"Are you sure the person with strep had a positive throat culture or rapid strep test?\"       States from what she is aware believes most were + with rapid test    4. STREP SYMPTOMS: \"Do YOU have a sore throat, fever, or other symptoms suggestive of strep?\"       Sore throat. Back of throat looks red.     5. VIRAL SYMPTOMS: \"Are there any symptoms of a cold, such as a runny nose, cough, hoarse voice?\"      Nasal congestion and is coughing. Denies fever.    Protocols used: Strep Throat Exposure-Adult-      Same day appt scheduled for tomorrow morning at 0940.   "

## 2024-12-30 ENCOUNTER — OFFICE VISIT (OUTPATIENT)
Dept: FAMILY MEDICINE CLINIC | Facility: CLINIC | Age: 64
End: 2024-12-30
Payer: COMMERCIAL

## 2024-12-30 ENCOUNTER — TELEPHONE (OUTPATIENT)
Dept: ADMINISTRATIVE | Facility: OTHER | Age: 64
End: 2024-12-30

## 2024-12-30 VITALS
BODY MASS INDEX: 26.9 KG/M2 | TEMPERATURE: 98 F | DIASTOLIC BLOOD PRESSURE: 70 MMHG | HEIGHT: 66 IN | HEART RATE: 98 BPM | OXYGEN SATURATION: 96 % | WEIGHT: 167.4 LBS | SYSTOLIC BLOOD PRESSURE: 122 MMHG

## 2024-12-30 DIAGNOSIS — J01.10 ACUTE NON-RECURRENT FRONTAL SINUSITIS: Primary | ICD-10-CM

## 2024-12-30 DIAGNOSIS — Z12.11 COLON CANCER SCREENING: ICD-10-CM

## 2024-12-30 PROCEDURE — 99213 OFFICE O/P EST LOW 20 MIN: CPT | Performed by: PHYSICIAN ASSISTANT

## 2024-12-30 PROCEDURE — G2211 COMPLEX E/M VISIT ADD ON: HCPCS | Performed by: PHYSICIAN ASSISTANT

## 2024-12-30 RX ORDER — CEFUROXIME AXETIL 250 MG/1
250 TABLET ORAL EVERY 12 HOURS SCHEDULED
Qty: 20 TABLET | Refills: 0 | Status: SHIPPED | OUTPATIENT
Start: 2024-12-30 | End: 2025-01-09

## 2024-12-30 NOTE — PATIENT INSTRUCTIONS
Acute non-recurrent frontal sinusitis  Antibiotic as directed. Mucinex plain generic or any Coricidin HBP product. Increase fluids.   Orders:    cefuroxime (CEFTIN) 250 mg tablet; Take 1 tablet (250 mg total) by mouth every 12 (twelve) hours for 10 days

## 2024-12-30 NOTE — LETTER
Diabetic Eye Exam Form    Date Requested: 24  Patient: Juliana Hernandez  Patient : 1960   Referring Provider: Jada Presley PA-C      DIABETIC Eye Exam Date _______________________________      Type of Exam MUST be documented for Diabetic Eye Exams. Please CHECK ONE.     Retinal Exam       Dilated Retinal Exam       OCT       Optomap-Iris Exam      Fundus Photography       Left Eye - Please check Retinopathy or No Retinopathy        Exam did show retinopathy    Exam did not show retinopathy       Right Eye - Please check Retinopathy or No Retinopathy       Exam did show retinopathy    Exam did not show retinopathy       Comments __________________________________________________________    Practice Providing Exam ______________________________________________    Exam Performed By (print name) _______________________________________      Provider Signature ___________________________________________________      These reports are needed for  compliance.  Please fax this completed form and a copy of the Diabetic Eye Exam report to our office located at [address] [place] [id number] as soon as possible via Fax [phone] attention Chao: Phone [phone]  We thank you for your assistance in treating our mutual patient.

## 2024-12-30 NOTE — TELEPHONE ENCOUNTER
----- Message from Mary AREVALO sent at 12/30/2024 10:27 AM EST -----  12/30/24 10:28 AM    Hello, our patient Juliana Hernandez has had Diabetic Eye Exam completed/performed. Please assist in updating the patient chart by making an External outreach to [facility] facility located in [facility]. The date of service is 2024.    Thank you,  Mary Maciel MA  [facility]

## 2024-12-30 NOTE — LETTER
Diabetic Eye Exam Form  Second request    Date Requested: 01/10/25  Patient: Juliana Hernandez  Patient : 1960   Referring Provider: Jada Presley PA-C      DIABETIC Eye Exam Date _______________________________      Type of Exam MUST be documented for Diabetic Eye Exams. Please CHECK ONE.     Retinal Exam       Dilated Retinal Exam       OCT       Optomap-Iris Exam      Fundus Photography       Left Eye - Please check Retinopathy or No Retinopathy        Exam did show retinopathy    Exam did not show retinopathy       Right Eye - Please check Retinopathy or No Retinopathy       Exam did show retinopathy    Exam did not show retinopathy       Comments __________________________________________________________    Practice Providing Exam ______________________________________________    Exam Performed By (print name) _______________________________________      Provider Signature ___________________________________________________      These reports are needed for  compliance.  Please fax this completed form and a copy of the Diabetic Eye Exam report to our office located at [address] [place] [id number] as soon as possible via Fax [phone] attention Chao: Phone [phone]  We thank you for your assistance in treating our mutual patient.

## 2024-12-30 NOTE — TELEPHONE ENCOUNTER
Upon review of the In Basket request and the patient's chart, initial outreach has been made via fax to facility. Please see Contacts section for details.     Thank you  Chao Lerma MA

## 2024-12-30 NOTE — PROGRESS NOTES
"Name: Juliana Hernandez      : 1960      MRN: [id number]  Encounter Provider: Jada Presley PA-C  Encounter Date: 2024   Encounter department: [facility] PRIMARY [facility]  :  Assessment & Plan  Acute non-recurrent frontal sinusitis  Antibiotic as directed. Mucinex plain generic or any Coricidin HBP product. Increase fluids.   Orders:  •  cefuroxime (CEFTIN) 250 mg tablet; Take 1 tablet (250 mg total) by mouth every 12 (twelve) hours for 10 days    Colon cancer screening  Patient is due for colonoscopy in  order placed.  Orders:  •  Ambulatory Referral to Gastroenterology; Future           History of Present Illness     Patient presents with:  Cold Like Symptoms: Sore throat, nasal congestion, headache,body aches, chills - exposed to family during the week that were + for strep - started sherley day. Did not test for covid. Taking tylenol and ibuprofen          Review of Systems   Constitutional:  Positive for chills.   HENT:  Positive for congestion and sore throat.    Eyes: Negative.    Respiratory: Negative.     Cardiovascular: Negative.    Gastrointestinal: Negative.    Endocrine: Negative.    Genitourinary: Negative.    Musculoskeletal:  Positive for myalgias.   Skin: Negative.    Allergic/Immunologic: Negative.    Neurological:  Positive for headaches.   Hematological: Negative.    Psychiatric/Behavioral: Negative.         Objective   /70 (BP Location: Left arm, Patient Position: Sitting, Cuff Size: Standard)   Pulse 98   Temp 98 °F (36.7 °C) (Tympanic)   Ht 5' 6\" (1.676 m)   Wt 75.9 kg (167 lb 6.4 oz)   SpO2 96%   BMI 27.02 kg/m²      Physical Exam  Vitals and nursing note reviewed.   Constitutional:       Appearance: Normal appearance. She is well-developed.   HENT:      Head: Normocephalic and atraumatic.      Right Ear: Hearing, ear canal and external ear normal.      Left Ear: Hearing, ear canal and external ear normal.      Ears:      Comments: Bilateral TMs dull " with decreasing light reflex.  No blatant erythema.     Nose: Congestion and rhinorrhea present.      Mouth/Throat:      Pharynx: Pharyngeal swelling, posterior oropharyngeal erythema and postnasal drip present.      Comments: No exudate.  Eyes:      General: Lids are normal.      Conjunctiva/sclera: Conjunctivae normal.      Pupils: Pupils are equal, round, and reactive to light.   Cardiovascular:      Rate and Rhythm: Normal rate and regular rhythm.      Heart sounds: No murmur heard.  Pulmonary:      Effort: Pulmonary effort is normal.      Breath sounds: Normal breath sounds.   Lymphadenopathy:      Cervical: Cervical adenopathy present.      Right cervical: Superficial cervical adenopathy present.      Left cervical: Superficial cervical adenopathy present.   Skin:     General: Skin is warm and dry.   Neurological:      General: No focal deficit present.      Mental Status: She is alert.      Coordination: Coordination is intact.   Psychiatric:         Mood and Affect: Mood normal.         Behavior: Behavior normal. Behavior is cooperative.         Thought Content: Thought content normal.         Judgment: Judgment normal.

## 2025-01-10 NOTE — TELEPHONE ENCOUNTER
As a follow-up, a second attempt has been made for outreach via fax to facility. Please see Contacts section for details.    Thank you  Chao Lerma MA

## 2025-01-14 NOTE — TELEPHONE ENCOUNTER
Upon review of the In Basket request we were able to locate, review, and update the patient chart as requested for Diabetic Eye Exam.    Any additional questions or concerns should be emailed to the Practice Liaisons via the appropriate education email address, please do not reply via In Basket.    Thank you  Chao Lerma MA   [facility]

## 2025-01-16 ENCOUNTER — OFFICE VISIT (OUTPATIENT)
Dept: FAMILY MEDICINE CLINIC | Facility: CLINIC | Age: 65
End: 2025-01-16
Payer: COMMERCIAL

## 2025-01-16 VITALS
OXYGEN SATURATION: 96 % | WEIGHT: 169 LBS | HEART RATE: 84 BPM | SYSTOLIC BLOOD PRESSURE: 118 MMHG | TEMPERATURE: 97.4 F | HEIGHT: 66 IN | BODY MASS INDEX: 27.16 KG/M2 | DIASTOLIC BLOOD PRESSURE: 72 MMHG

## 2025-01-16 DIAGNOSIS — I72.6 PSEUDOANEURYSM OF VERTEBRAL ARTERY (HCC): ICD-10-CM

## 2025-01-16 DIAGNOSIS — J01.41 ACUTE RECURRENT PANSINUSITIS: Primary | ICD-10-CM

## 2025-01-16 DIAGNOSIS — G37.9 DEMYELINATING DISEASE OF CENTRAL NERVOUS SYSTEM (HCC): ICD-10-CM

## 2025-01-16 DIAGNOSIS — E11.8 TYPE 2 DIABETES MELLITUS WITH COMPLICATION, WITHOUT LONG-TERM CURRENT USE OF INSULIN (HCC): ICD-10-CM

## 2025-01-16 PROCEDURE — 99214 OFFICE O/P EST MOD 30 MIN: CPT | Performed by: NURSE PRACTITIONER

## 2025-01-16 RX ORDER — LEVOFLOXACIN 500 MG/1
500 TABLET, FILM COATED ORAL EVERY 24 HOURS
Qty: 10 TABLET | Refills: 0 | Status: SHIPPED | OUTPATIENT
Start: 2025-01-16 | End: 2025-01-17

## 2025-01-16 NOTE — ASSESSMENT & PLAN NOTE
10-day course of Levaquin was ordered for treatment of continued sinusitis.  I did advise the patient that if her symptoms do not resolve after this course of antibiotics a CT of the sinuses will be considered.  Orders:  •  levofloxacin (LEVAQUIN) 500 mg tablet; Take 1 tablet (500 mg total) by mouth every 24 hours for 10 days

## 2025-01-16 NOTE — PROGRESS NOTES
Name: Juliana Hernandez      : 1960      MRN: [id number]  Encounter Provider: MARQUITA Rivera  Encounter Date: 2025   Encounter department: [facility]  :  Assessment & Plan  Acute recurrent pansinusitis  10-day course of Levaquin was ordered for treatment of continued sinusitis.  I did advise the patient that if her symptoms do not resolve after this course of antibiotics a CT of the sinuses will be considered.  Orders:  •  levofloxacin (LEVAQUIN) 500 mg tablet; Take 1 tablet (500 mg total) by mouth every 24 hours for 10 days    Demyelinating disease of central nervous system (HCC)  Patient continues to follow with neurology regarding this.       Type 2 diabetes mellitus with complication, without long-term current use of insulin (HCC)  Well-controlled on current regimen.  Patient's hemoglobin A1c can be reassessed at her next office visit.  Lab Results   Component Value Date    HGBA1C 7.2 (H) 10/14/2024          Pseudoaneurysm of vertebral artery (HCC)  Patient continues to follow with cardiology regarding this.             Depression Screening and Follow-up Plan: Patient was screened for depression during today's encounter. They screened negative with a PHQ-2 score of 0.      History of Present Illness     Sinusitis: Patient was last seen in the office on 2024 and she was diagnosed with sinusitis at that time.  She was prescribed a 10-day course of Ceftin for treatment.  She is reporting continued symptoms of sore throat, PND, bilateral ear congestion, sinus pressure and congestion, and chills.  Patient denies fever or shortness of breath.    Pseudoaneurysm left vertebral artery: Patient continues to follow with cardiology regarding this.  Patient denies any current cardiac symptoms.    Type II diabetes: Patient's most recent hemoglobin A1c was 7.2 showing good control.  Patient denies polydipsia, polyphagia, or polyuria.    Demyelinating disease of central nervous  "system: Patient continues to follow with neurology regarding this.  Patient denies any current symptoms related to this.      Review of Systems   Constitutional:  Positive for chills. Negative for fever.   HENT:  Positive for congestion, postnasal drip, rhinorrhea, sinus pressure, sinus pain and sore throat. Negative for ear pain.         Bilateral ear congestion   Eyes:  Negative for pain and visual disturbance.   Respiratory:  Negative for cough, chest tightness, shortness of breath and wheezing.    Cardiovascular:  Negative for chest pain, palpitations and leg swelling.   Gastrointestinal:  Negative for abdominal pain, constipation, diarrhea, nausea and vomiting.   Endocrine: Negative for cold intolerance and heat intolerance.   Genitourinary:  Negative for decreased urine volume, dysuria and hematuria.   Musculoskeletal:  Negative for arthralgias, back pain and myalgias.   Skin:  Negative for color change and rash.   Allergic/Immunologic: Negative for environmental allergies.   Neurological:  Positive for headaches. Negative for dizziness, seizures, syncope, light-headedness and numbness.   Hematological:  Negative for adenopathy.   Psychiatric/Behavioral:  Negative for confusion. The patient is not nervous/anxious.    All other systems reviewed and are negative.      Objective   /72 (BP Location: Left arm, Patient Position: Sitting, Cuff Size: Standard)   Pulse 84   Temp (!) 97.4 °F (36.3 °C) (Oral)   Ht 5' 6\" (1.676 m)   Wt 76.7 kg (169 lb)   SpO2 96%   BMI 27.28 kg/m²      Physical Exam  Vitals and nursing note reviewed.   Constitutional:       General: She is not in acute distress.     Appearance: Normal appearance. She is well-developed. She is not ill-appearing.   HENT:      Head: Normocephalic.      Right Ear: Hearing normal. A middle ear effusion (small) is present.      Left Ear: Hearing normal. A middle ear effusion (small) is present.      Mouth/Throat:      Pharynx: Postnasal drip " present. No pharyngeal swelling, oropharyngeal exudate, posterior oropharyngeal erythema or uvula swelling.      Tonsils: No tonsillar exudate or tonsillar abscesses.   Eyes:      Conjunctiva/sclera: Conjunctivae normal.   Cardiovascular:      Rate and Rhythm: Normal rate and regular rhythm.      Pulses: Normal pulses.           Carotid pulses are 2+ on the right side and 2+ on the left side.       Radial pulses are 2+ on the right side and 2+ on the left side.        Posterior tibial pulses are 2+ on the right side and 2+ on the left side.      Heart sounds: Normal heart sounds. No murmur heard.  Pulmonary:      Effort: Pulmonary effort is normal. No respiratory distress.      Breath sounds: Normal breath sounds. No decreased breath sounds, wheezing, rhonchi or rales.   Abdominal:      General: Abdomen is flat. Bowel sounds are normal. There is no distension.      Palpations: Abdomen is soft.      Tenderness: There is no abdominal tenderness. There is no guarding.   Musculoskeletal:         General: No swelling. Normal range of motion.      Cervical back: Normal range of motion and neck supple.      Right lower leg: No edema.      Left lower leg: No edema.   Skin:     General: Skin is warm and dry.      Capillary Refill: Capillary refill takes less than 2 seconds.   Neurological:      General: No focal deficit present.      Mental Status: She is alert and oriented to person, place, and time.   Psychiatric:         Mood and Affect: Mood normal.         Behavior: Behavior normal.         Thought Content: Thought content normal.         Judgment: Judgment normal.

## 2025-01-16 NOTE — ASSESSMENT & PLAN NOTE
Well-controlled on current regimen.  Patient's hemoglobin A1c can be reassessed at her next office visit.  Lab Results   Component Value Date    HGBA1C 7.2 (H) 10/14/2024

## 2025-01-17 DIAGNOSIS — J01.41 ACUTE RECURRENT PANSINUSITIS: Primary | ICD-10-CM

## 2025-01-17 RX ORDER — DOXYCYCLINE 100 MG/1
100 CAPSULE ORAL EVERY 12 HOURS SCHEDULED
Qty: 20 CAPSULE | Refills: 0 | Status: SHIPPED | OUTPATIENT
Start: 2025-01-17 | End: 2025-01-27

## 2025-01-23 DIAGNOSIS — R80.9 TYPE 2 DIABETES MELLITUS WITH MICROALBUMINURIA, WITHOUT LONG-TERM CURRENT USE OF INSULIN (HCC): ICD-10-CM

## 2025-01-23 DIAGNOSIS — E11.29 TYPE 2 DIABETES MELLITUS WITH MICROALBUMINURIA, WITHOUT LONG-TERM CURRENT USE OF INSULIN (HCC): ICD-10-CM

## 2025-01-31 DIAGNOSIS — E11.8 TYPE 2 DIABETES MELLITUS WITH COMPLICATION, WITHOUT LONG-TERM CURRENT USE OF INSULIN (HCC): ICD-10-CM

## 2025-01-31 RX ORDER — GLIMEPIRIDE 2 MG/1
2 TABLET ORAL 2 TIMES DAILY
Qty: 180 TABLET | Refills: 1 | Status: SHIPPED | OUTPATIENT
Start: 2025-01-31

## 2025-02-15 PROBLEM — J01.41 ACUTE RECURRENT PANSINUSITIS: Status: RESOLVED | Noted: 2025-01-16 | Resolved: 2025-02-15

## 2025-02-18 DIAGNOSIS — R10.13 EPIGASTRIC PAIN: ICD-10-CM

## 2025-02-19 RX ORDER — FAMOTIDINE 20 MG/1
20 TABLET, FILM COATED ORAL 2 TIMES DAILY
Qty: 180 TABLET | Refills: 1 | Status: SHIPPED | OUTPATIENT
Start: 2025-02-19

## 2025-03-03 ENCOUNTER — TELEPHONE (OUTPATIENT)
Dept: ENDOCRINOLOGY | Facility: CLINIC | Age: 65
End: 2025-03-03

## 2025-03-03 DIAGNOSIS — R79.89 ELEVATED TSH: Primary | ICD-10-CM

## 2025-03-03 DIAGNOSIS — Z79.4 TYPE 2 DIABETES MELLITUS WITH HYPERGLYCEMIA, WITH LONG-TERM CURRENT USE OF INSULIN (HCC): ICD-10-CM

## 2025-03-03 DIAGNOSIS — E11.65 TYPE 2 DIABETES MELLITUS WITH HYPERGLYCEMIA, WITH LONG-TERM CURRENT USE OF INSULIN (HCC): ICD-10-CM

## 2025-03-03 DIAGNOSIS — E11.65 TYPE 2 DIABETES MELLITUS WITH HYPERGLYCEMIA, UNSPECIFIED WHETHER LONG TERM INSULIN USE (HCC): ICD-10-CM

## 2025-03-03 DIAGNOSIS — E78.2 MIXED HYPERLIPIDEMIA: ICD-10-CM

## 2025-03-03 DIAGNOSIS — R79.89 ELEVATED TSH: ICD-10-CM

## 2025-03-03 DIAGNOSIS — E11.8 TYPE 2 DIABETES MELLITUS WITH COMPLICATION, WITHOUT LONG-TERM CURRENT USE OF INSULIN (HCC): Primary | ICD-10-CM

## 2025-03-17 ENCOUNTER — APPOINTMENT (OUTPATIENT)
Dept: LAB | Facility: HOSPITAL | Age: 65
End: 2025-03-17
Payer: COMMERCIAL

## 2025-03-17 DIAGNOSIS — E78.2 MIXED HYPERLIPIDEMIA: ICD-10-CM

## 2025-03-17 DIAGNOSIS — R79.89 ELEVATED TSH: ICD-10-CM

## 2025-03-17 DIAGNOSIS — E11.8 TYPE 2 DIABETES MELLITUS WITH COMPLICATION, WITHOUT LONG-TERM CURRENT USE OF INSULIN (HCC): ICD-10-CM

## 2025-03-17 LAB
ALBUMIN SERPL BCG-MCNC: 4.3 G/DL (ref 3.5–5)
ALP SERPL-CCNC: 58 U/L (ref 34–104)
ALT SERPL W P-5'-P-CCNC: 15 U/L (ref 7–52)
ANION GAP SERPL CALCULATED.3IONS-SCNC: 11 MMOL/L (ref 4–13)
AST SERPL W P-5'-P-CCNC: 17 U/L (ref 13–39)
BILIRUB SERPL-MCNC: 1.02 MG/DL (ref 0.2–1)
BUN SERPL-MCNC: 16 MG/DL (ref 5–25)
CALCIUM SERPL-MCNC: 9.7 MG/DL (ref 8.4–10.2)
CHLORIDE SERPL-SCNC: 100 MMOL/L (ref 96–108)
CHOLEST SERPL-MCNC: 263 MG/DL (ref ?–200)
CO2 SERPL-SCNC: 22 MMOL/L (ref 21–32)
CREAT SERPL-MCNC: 0.77 MG/DL (ref 0.6–1.3)
EST. AVERAGE GLUCOSE BLD GHB EST-MCNC: 171 MG/DL
GFR SERPL CREATININE-BSD FRML MDRD: 80 ML/MIN/1.73SQ M
GLUCOSE P FAST SERPL-MCNC: 175 MG/DL (ref 65–99)
HBA1C MFR BLD: 7.6 %
HDLC SERPL-MCNC: 45 MG/DL
LDLC SERPL CALC-MCNC: 145 MG/DL (ref 0–100)
POTASSIUM SERPL-SCNC: 4.3 MMOL/L (ref 3.5–5.3)
PROT SERPL-MCNC: 7.7 G/DL (ref 6.4–8.4)
SODIUM SERPL-SCNC: 133 MMOL/L (ref 135–147)
T4 FREE SERPL-MCNC: 0.63 NG/DL (ref 0.61–1.12)
TRIGL SERPL-MCNC: 366 MG/DL (ref ?–150)
TSH SERPL DL<=0.05 MIU/L-ACNC: 5.39 UIU/ML (ref 0.45–4.5)

## 2025-03-17 PROCEDURE — 84439 ASSAY OF FREE THYROXINE: CPT

## 2025-03-17 PROCEDURE — 83036 HEMOGLOBIN GLYCOSYLATED A1C: CPT

## 2025-03-17 PROCEDURE — 80061 LIPID PANEL: CPT

## 2025-03-17 PROCEDURE — 80053 COMPREHEN METABOLIC PANEL: CPT

## 2025-03-17 PROCEDURE — 84443 ASSAY THYROID STIM HORMONE: CPT

## 2025-03-17 PROCEDURE — 36415 COLL VENOUS BLD VENIPUNCTURE: CPT

## 2025-03-18 ENCOUNTER — RESULTS FOLLOW-UP (OUTPATIENT)
Dept: CARDIOLOGY CLINIC | Facility: CLINIC | Age: 65
End: 2025-03-18

## 2025-03-18 ENCOUNTER — RESULTS FOLLOW-UP (OUTPATIENT)
Dept: ENDOCRINOLOGY | Facility: CLINIC | Age: 65
End: 2025-03-18

## 2025-03-21 ENCOUNTER — OFFICE VISIT (OUTPATIENT)
Dept: ENDOCRINOLOGY | Facility: CLINIC | Age: 65
End: 2025-03-21
Payer: COMMERCIAL

## 2025-03-21 VITALS
SYSTOLIC BLOOD PRESSURE: 113 MMHG | OXYGEN SATURATION: 96 % | HEART RATE: 94 BPM | DIASTOLIC BLOOD PRESSURE: 70 MMHG | HEIGHT: 66 IN | BODY MASS INDEX: 27.74 KG/M2 | WEIGHT: 172.6 LBS

## 2025-03-21 DIAGNOSIS — I10 ESSENTIAL HYPERTENSION: ICD-10-CM

## 2025-03-21 DIAGNOSIS — E66.3 OVERWEIGHT (BMI 25.0-29.9): ICD-10-CM

## 2025-03-21 DIAGNOSIS — E11.8 TYPE 2 DIABETES MELLITUS WITH COMPLICATION, WITHOUT LONG-TERM CURRENT USE OF INSULIN (HCC): Primary | ICD-10-CM

## 2025-03-21 PROBLEM — E04.1 THYROID NODULE: Status: RESOLVED | Noted: 2019-12-18 | Resolved: 2025-03-21

## 2025-03-21 PROCEDURE — 99204 OFFICE O/P NEW MOD 45 MIN: CPT | Performed by: NURSE PRACTITIONER

## 2025-03-21 NOTE — ASSESSMENT & PLAN NOTE
Lifestyle modifications including moderate intensity physical activity up to 150 minutes/week with 2-3 days of weight training in addition to carb conscious diet.

## 2025-03-21 NOTE — PATIENT INSTRUCTIONS
In 1-2 weeks send in blood sugar logs for review or sooner if blood sugar patterns are less than 70 or over 250 mg/dl.

## 2025-03-21 NOTE — PROGRESS NOTES
Name: Juliana Hernandez      : 1960      MRN: [id number]  Encounter Provider: MARQUITA Crowley  Encounter Date: 3/21/2025   Encounter department: [facility]    No chief complaint on file.  :  Assessment & Plan  Type 2 diabetes mellitus with complication, without long-term current use of insulin ([facility])    Lab Results   Component Value Date    HGBA1C 7.6 (H) 2025     HGA1C above goal. Recently increased Glimepiride to 2 mg twice daily. Will review glucose levels in 1-2 weeks. Reviewed treatment options including DPP-4, GLP-1 very concerned about cost.     Discussed risks/complications associated with uncontrolled diabetes including organ involvement, heart attack, stroke, death.    Advised lifestyle modifications including attention to diet including the amount and types of carbohydrates consumed and regular activity.     Call for blood sugars less than 70 mg/dl or patterns over 250 mg/dl.     Discussed symptoms and treatment of hypoglycemia.  Reviewed risks associated with hypoglycemia. Always carry rapid acting carbohydrates and a glucometer (a way to check your blood sugar).    Recommendation for medical identification either bracelet, necklace.    Routine follow up for diabetic eye and foot exams.     Ordered blood work to complete prior to next visit.    Send glucose logs/CGM download in 1-2 weeks for review    Follow up in 3 months.     Orders:    Hemoglobin A1C; Future    Comprehensive metabolic panel; Future    Albumin / creatinine urine ratio; Future    Essential hypertension  BP under good control on regimen including ACE.          Overweight (BMI 25.0-29.9)  Lifestyle modifications including moderate intensity physical activity up to 150 minutes/week with 2-3 days of weight training in addition to carb conscious diet.               History of Present Illness     Juliana Hernandez is a 64 y.o. female who presents  for follow up of diabetes  "mellitus.     Experienced  side effects on Jardiance discontinued.      Denies polyuria or polydipsia.   Denies visual changes.      Denies hypoglycemia.      Blood glucose this morning 133, 144 mg/dL. Last night before bed 150 mg/dL. Yesterday morning 186 mg/dL. Wednesday 136 in the morning and 226 mg/dL before bed.      Current regimen:   Amaryl 2 mg twice daily  Metformin 1000 mg twice daily        Review of Systems as per HPI  Medical History Reviewed by provider this encounter:  Tobacco  Allergies  Meds  Problems  Med Hx  Surg Hx  Fam Hx     .  Current Outpatient Medications on File Prior to Visit   Medication Sig Dispense Refill    Accu-Chek Softclix Lancets lancets Use 1 each 2 (two) times a day 200 each 3    amLODIPine (NORVASC) 5 mg tablet Take 1 tablet by mouth once daily 30 tablet 5    Bempedoic Acid 180 MG TABS Take 180 mg by mouth every morning 30 tablet 5    Blood Glucose Monitoring Suppl (Accu-Chek Guide) w/Device KIT USE 1 TWICE DAILY      clopidogrel (PLAVIX) 75 mg tablet Take 1 tablet by mouth once daily 90 tablet 1    famotidine (PEPCID) 20 mg tablet Take 1 tablet by mouth twice daily 180 tablet 1    glimepiride (AMARYL) 2 mg tablet Take 1 tablet by mouth twice daily 180 tablet 1    lisinopril (ZESTRIL) 10 mg tablet Take 1 tablet by mouth once daily 30 tablet 5    metFORMIN (GLUCOPHAGE) 1000 MG tablet TAKE 1 TABLET BY MOUTH TWICE DAILY WITH MEALS 180 tablet 1    Multiple Vitamin (MULTI-VITAMIN DAILY PO) Take 1 tablet by mouth daily      triamcinolone (KENALOG) 0.1 % cream APPLY TOPICALLY TWO TIMES A DAY UNDER THE LEFT BREAST AS NEEDED FOR FLARES 30 g 2     No current facility-administered medications on file prior to visit.         Medical History Reviewed by provider this encounter:  Tobacco  Allergies  Meds  Problems  Med Hx  Surg Hx  Fam Hx     .    Objective   /70 (BP Location: Left arm, Patient Position: Sitting, Cuff Size: Large)   Pulse 94   Ht 5' 6\" (1.676 m)   Wt " 78.3 kg (172 lb 9.6 oz)   SpO2 96%   BMI 27.86 kg/m²      Body mass index is 27.86 kg/m².  Wt Readings from Last 3 Encounters:   03/21/25 78.3 kg (172 lb 9.6 oz)   01/16/25 76.7 kg (169 lb)   12/30/24 75.9 kg (167 lb 6.4 oz)        Physical Exam  Vitals reviewed.   Constitutional:       Appearance: Normal appearance.   Cardiovascular:      Rate and Rhythm: Normal rate and regular rhythm.      Pulses: Normal pulses.      Heart sounds: Normal heart sounds.   Pulmonary:      Effort: Pulmonary effort is normal.      Breath sounds: Normal breath sounds.   Skin:     General: Skin is warm and dry.      Capillary Refill: Capillary refill takes less than 2 seconds.   Neurological:      General: No focal deficit present.      Mental Status: She is alert and oriented to person, place, and time.   Psychiatric:         Mood and Affect: Mood normal.         Behavior: Behavior normal.       Labs:   Lab Results   Component Value Date    HGBA1C 7.6 (H) 03/17/2025    HGBA1C 7.2 (H) 10/14/2024    HGBA1C 7.0 (H) 07/09/2024     Lab Results   Component Value Date    CREATININE 0.77 03/17/2025    CREATININE 0.89 10/14/2024    CREATININE 0.88 07/09/2024    BUN 16 03/17/2025     (L) 11/29/2017    K 4.3 03/17/2025     03/17/2025    CO2 22 03/17/2025     GFR, Calculated   Date Value Ref Range Status   05/22/2020 75 >60 mL/min/1.73m2 Final     Comment:     mL/min per 1.73 square meters                                            Normal Function or Mild Renal    Disease (if clinically at risk):  >or=60  Moderately Decreased:                30-59  Severely Decreased:                  15-29  Renal Failure:                         <15                                            -American GFR: multiply reported GFR by 1.16    Please note that the eGFR is based on the CKD-EPI calculation, and is not intended to be used for drug dosing.     eGFR   Date Value Ref Range Status   03/17/2025 80 ml/min/1.73sq m Final     Lab Results    Component Value Date    CHOL 274 (H) 11/29/2017    HDL 45 (L) 03/17/2025    TRIG 366 (H) 03/17/2025     Lab Results   Component Value Date    ALT 15 03/17/2025    AST 17 03/17/2025    ALKPHOS 58 03/17/2025    BILITOT 1.0 11/29/2017     Lab Results   Component Value Date    FXY4BLWOFWTG 5.388 (H) 03/17/2025    IAA7KMNMGINQ 5.424 (H) 11/19/2024    MZB5DVBCSKCM 6.481 (H) 10/14/2024     Lab Results   Component Value Date    FREET4 0.63 03/17/2025       Patient Instructions   In 1-2 weeks send in blood sugar logs for review or sooner if blood sugar patterns are less than 70 or over 250 mg/dl.       Discussed with the patient and all questioned fully answered. She will call me if any problems arise.    Administrative Statements

## 2025-03-21 NOTE — ASSESSMENT & PLAN NOTE
Lab Results   Component Value Date    HGBA1C 7.6 (H) 03/17/2025     HGA1C above goal. Recently increased Glimepiride to 2 mg twice daily. Will review glucose levels in 1-2 weeks. Reviewed treatment options including DPP-4, GLP-1 very concerned about cost.     Discussed risks/complications associated with uncontrolled diabetes including organ involvement, heart attack, stroke, death.    Advised lifestyle modifications including attention to diet including the amount and types of carbohydrates consumed and regular activity.     Call for blood sugars less than 70 mg/dl or patterns over 250 mg/dl.     Discussed symptoms and treatment of hypoglycemia.  Reviewed risks associated with hypoglycemia. Always carry rapid acting carbohydrates and a glucometer (a way to check your blood sugar).    Recommendation for medical identification either bracelet, necklace.    Routine follow up for diabetic eye and foot exams.     Ordered blood work to complete prior to next visit.    Send glucose logs/CGM download in 1-2 weeks for review    Follow up in 3 months.     Orders:    Hemoglobin A1C; Future    Comprehensive metabolic panel; Future    Albumin / creatinine urine ratio; Future

## 2025-03-24 ENCOUNTER — RA CDI HCC (OUTPATIENT)
Dept: OTHER | Facility: HOSPITAL | Age: 65
End: 2025-03-24

## 2025-03-24 DIAGNOSIS — Z85.828 HX OF BASAL CELL CARCINOMA: Primary | ICD-10-CM

## 2025-03-24 NOTE — PROGRESS NOTES
HCC coding opportunities     LUIS FERNANDO Lopez  I have reviewed the recommendation(s) and accept the change(s) suggested.     Chart Reviewed number of suggestions sent to Provider: 2  E11.22  E11.65  I12.9-IB     Patients Insurance     Medicare Insurance: [employer]age

## 2025-03-25 ENCOUNTER — TELEPHONE (OUTPATIENT)
Dept: GASTROENTEROLOGY | Facility: CLINIC | Age: 65
End: 2025-03-25

## 2025-03-25 NOTE — TELEPHONE ENCOUNTER
03/25/25  Screened by: Sommer Espinal    Referring Provider PCP    Pre- Screening:     There is no height or weight on file to calculate BMI.  Has patient been referred for a routine screening Colonoscopy? yes  Is the patient between 45-75 years old? yes      Previous Colonoscopy no   If yes:    Date: at least 10 years    Facility:     Reason:       SCHEDULING STAFF: If the patient is between 45yrs-49yrs, please advise patient to confirm benefits/coverage with their insurance company for a routine screening colonoscopy, some insurance carriers will only cover at 50yrs or older. If the patient is over 75years old, please schedule an office visit.     Does the patient want to see a Gastroenterologist prior to their procedure OR are they having any GI symptoms? no    Has the patient been hospitalized or had abdominal surgery in the past 6 months? no    Does the patient use supplemental oxygen? no    Does the patient take Coumadin, Lovenox, Plavix, Elliquis, Xarelto, or other blood thinning medication? yes    Has the patient had a stroke, cardiac event, or stent placed in the past year? no    SCHEDULING STAFF: If patient answers NO to above questions, then schedule procedure. If patient answers YES to above questions, then schedule office appointment.     If patient is between 45yrs - 49yrs, please advise patient that we will have to confirm benefits & coverage with their insurance company for a routine screening colonoscopy.

## 2025-03-27 PROBLEM — I12.9 HYPERTENSIVE CKD (CHRONIC KIDNEY DISEASE): Status: ACTIVE | Noted: 2025-03-27

## 2025-03-31 ENCOUNTER — OFFICE VISIT (OUTPATIENT)
Dept: FAMILY MEDICINE CLINIC | Facility: CLINIC | Age: 65
End: 2025-03-31
Payer: COMMERCIAL

## 2025-03-31 VITALS
BODY MASS INDEX: 27.87 KG/M2 | HEIGHT: 66 IN | DIASTOLIC BLOOD PRESSURE: 70 MMHG | HEART RATE: 88 BPM | OXYGEN SATURATION: 96 % | WEIGHT: 173.4 LBS | SYSTOLIC BLOOD PRESSURE: 114 MMHG

## 2025-03-31 DIAGNOSIS — E11.29 MICROALBUMINURIA DUE TO TYPE 2 DIABETES MELLITUS  (HCC): ICD-10-CM

## 2025-03-31 DIAGNOSIS — R80.9 MICROALBUMINURIA DUE TO TYPE 2 DIABETES MELLITUS  (HCC): ICD-10-CM

## 2025-03-31 DIAGNOSIS — M79.674 GREAT TOE PAIN, RIGHT: ICD-10-CM

## 2025-03-31 DIAGNOSIS — E55.9 VITAMIN D DEFICIENCY: ICD-10-CM

## 2025-03-31 DIAGNOSIS — E11.8 TYPE 2 DIABETES MELLITUS WITH COMPLICATION, WITHOUT LONG-TERM CURRENT USE OF INSULIN (HCC): ICD-10-CM

## 2025-03-31 DIAGNOSIS — M85.89 OSTEOPENIA OF MULTIPLE SITES: ICD-10-CM

## 2025-03-31 DIAGNOSIS — E03.8 SUBCLINICAL HYPOTHYROIDISM: ICD-10-CM

## 2025-03-31 DIAGNOSIS — T46.6X5A MYALGIA DUE TO STATIN: ICD-10-CM

## 2025-03-31 DIAGNOSIS — Z85.828 HX OF BASAL CELL CARCINOMA: ICD-10-CM

## 2025-03-31 DIAGNOSIS — E78.2 MIXED HYPERLIPIDEMIA: ICD-10-CM

## 2025-03-31 DIAGNOSIS — Z88.8 ALLERGY TO STATIN MEDICATION: ICD-10-CM

## 2025-03-31 DIAGNOSIS — M79.10 MYALGIA DUE TO STATIN: ICD-10-CM

## 2025-03-31 DIAGNOSIS — Z00.00 MEDICARE ANNUAL WELLNESS VISIT, SUBSEQUENT: ICD-10-CM

## 2025-03-31 DIAGNOSIS — G37.9 DEMYELINATING DISEASE OF CENTRAL NERVOUS SYSTEM (HCC): ICD-10-CM

## 2025-03-31 DIAGNOSIS — G56.01 RIGHT CARPAL TUNNEL SYNDROME: ICD-10-CM

## 2025-03-31 DIAGNOSIS — Z86.73 HISTORY OF CVA (CEREBROVASCULAR ACCIDENT): ICD-10-CM

## 2025-03-31 DIAGNOSIS — Z23 ENCOUNTER FOR IMMUNIZATION: ICD-10-CM

## 2025-03-31 DIAGNOSIS — G72.0 DRUG-INDUCED MYOPATHY: ICD-10-CM

## 2025-03-31 DIAGNOSIS — I10 ESSENTIAL HYPERTENSION: Primary | ICD-10-CM

## 2025-03-31 PROCEDURE — G2211 COMPLEX E/M VISIT ADD ON: HCPCS | Performed by: PHYSICIAN ASSISTANT

## 2025-03-31 PROCEDURE — G0009 ADMIN PNEUMOCOCCAL VACCINE: HCPCS

## 2025-03-31 PROCEDURE — G0439 PPPS, SUBSEQ VISIT: HCPCS | Performed by: PHYSICIAN ASSISTANT

## 2025-03-31 PROCEDURE — 99214 OFFICE O/P EST MOD 30 MIN: CPT | Performed by: PHYSICIAN ASSISTANT

## 2025-03-31 PROCEDURE — 90677 PCV20 VACCINE IM: CPT

## 2025-03-31 NOTE — ASSESSMENT & PLAN NOTE
Patient does see endocrinology on a regular basis her A1c is 7.6 with a fasting glucose of 175.  Lab Results   Component Value Date    HGBA1C 7.6 (H) 03/17/2025

## 2025-03-31 NOTE — ASSESSMENT & PLAN NOTE
Patient is allergic to statins and currently is on the generic of Nexletol by cardiology.  LDL is 145 triglycerides are high 300s.  Cardiology is considering Repatha.

## 2025-03-31 NOTE — PROGRESS NOTES
Name: Juliana Hernandez      : 1960      MRN: [id number]  Encounter Provider: Jada Presley PA-C  Encounter Date: 3/31/2025   Encounter department: [facility] PRIMARY CARE    Assessment & Plan  Essential hypertension  Stable on current meds.        Type 2 diabetes mellitus with complication, without long-term current use of insulin (HCC)  Patient does see endocrinology on a regular basis her A1c is 7.6 with a fasting glucose of 175.  Lab Results   Component Value Date    HGBA1C 7.6 (H) 2025            Allergy to statin medication         History of CVA (cerebrovascular accident)  Continues on Plavix daily.       Osteopenia of multiple sites  Bone density up-to-date in  next due .       Hx of basal cell carcinoma  Continue dermatology regularly.       Drug-induced myopathy         Myalgia due to statin         Vitamin D deficiency  Check vitamin D with next labs.       Demyelinating disease of central nervous system (HCC)  Continue seeing neurology.       Mixed hyperlipidemia  Patient is allergic to statins and currently is on the generic of Nexletol by cardiology.  LDL is 145 triglycerides are high 300s.  Cardiology is considering Repatha.         Encounter for immunization    Orders:  •  Pneumococcal Conjugate Vaccine 20-valent (Pcv20)    Subclinical hypothyroidism  Endo has been watching her TSH which is slightly elevated with normal T4.        Microalbuminuria due to type 2 diabetes mellitus  (HCC)  Patient did see nephrology as her microalbumin a year ago was in the thousands.  Repeat in July was markedly improved.  They did increase her lisinopril to 10 mg.  Endocrinology tried Jardiance at the recommendation of nephrology however patient could not tolerate it secondary yeast infections.  Repeat microalbumin ordered for this year by endocrinology.  Lab Results   Component Value Date    HGBA1C 7.6 (H) 2025          Great toe pain, right  No history of gout but will check  uric acid when she next goes for blood work.  Orders:  •  Uric acid; Future    Right carpal tunnel syndrome  Recommend returning to wearing her wrist splint during sleep to improve her daytime symptoms.       Medicare annual wellness visit, subsequent  Prevnar given today.  Living will forms given today.  Blood work up-to-date continue with all specialists.          Preventive health issues were discussed with patient, and age appropriate screening tests were ordered as noted in patient's After Visit Summary. Personalized health advice and appropriate referrals for health education or preventive services given if needed, as noted in patient's After Visit Summary.    History of Present Illness       Juliana J Hernandez is here for chronic conditions f/u. Pt. had labs done prior to today's visit which included Recent Results (from the past 4 weeks)  -Lipid Panel with Direct LDL reflex:   Collection Time: 03/17/25  9:28 AM       Result                      Value             Ref Range           Cholesterol                 263 (H)           See Comment *       Triglycerides               366 (H)           See Comment *       HDL, Direct                 45 (L)            >=50 mg/dL          LDL Calculated              145 (H)           0 - 100 mg/dL  -Comprehensive metabolic panel:   Collection Time: 03/17/25  9:28 AM       Result                      Value             Ref Range           Sodium                      133 (L)           135 - 147 mm*       Potassium                   4.3               3.5 - 5.3 mm*       Chloride                    100               96 - 108 mmo*       CO2                         22                21 - 32 mmol*       ANION GAP                   11                4 - 13 mmol/L       BUN                         16                5 - 25 mg/dL        Creatinine                  0.77              0.60 - 1.30 *       Glucose, Fasting            175 (H)           65 - 99 mg/dL       Calcium                      9.7               8.4 - 10.2 m*       AST                         17                13 - 39 U/L         ALT                         15                7 - 52 U/L          Alkaline Phosphatase        58                34 - 104 U/L        Total Protein               7.7               6.4 - 8.4 g/*       Albumin                     4.3               3.5 - 5.0 g/*       Total Bilirubin             1.02 (H)          0.20 - 1.00 *       eGFR                        80                ml/min/1.73s*  -Hemoglobin A1C:   Collection Time: 03/17/25  9:28 AM       Result                      Value             Ref Range           Hemoglobin A1C              7.6 (H)           Normal 4.0-5*       EAG                         171               mg/dl          -TSH, 3rd generation:   Collection Time: 03/17/25  9:28 AM       Result                      Value             Ref Range           TSH 3RD GENERATON           5.388 (H)         0.450 - 4.50*  -T4, free:   Collection Time: 03/17/25  9:28 AM       Result                      Value             Ref Range           Free T4                     0.63              0.61 - 1.12 *         Patient Care Team:  Jada Presley PA-C as PCP - General (Family Medicine)  Dana Kaur MD as PCP - PCP-[facility] ([facility])  Poornima Monk RD as Diabetes Educator (Diabetes Services)  MARQUITA Fung (Endocrinology)  Marquise Stephen MD (Nephrology)    Review of Systems   Constitutional: Negative.    HENT: Negative.     Eyes: Negative.    Respiratory: Negative.     Cardiovascular: Negative.    Gastrointestinal: Negative.    Endocrine: Negative.    Genitourinary: Negative.    Musculoskeletal: Negative.    Skin: Negative.    Allergic/Immunologic: Negative.    Neurological: Negative.    Hematological: Negative.    Psychiatric/Behavioral: Negative.       Medical History Reviewed by provider this encounter:       Annual Wellness Visit Questionnaire   Juliana is here for her Subsequent  Wellness visit.     Health Risk Assessment:   Patient rates overall health as fair. Patient feels that their physical health rating is same. Patient is satisfied with their life. Eyesight was rated as same. Hearing was rated as same. Patient feels that their emotional and mental health rating is same. Patients states they are never, rarely angry. Patient states they are sometimes unusually tired/fatigued. Pain experienced in the last 7 days has been some. Patient's pain rating has been 4/10. Patient states that she has experienced no weight loss or gain in last 6 months.     Fall Risk Screening:   In the past year, patient has experienced: history of falling in past year    Number of falls: 1  Injured during fall?: No    Feels unsteady when standing or walking?: No    Worried about falling?: No      Urinary Incontinence Screening:   Patient has leaked urine accidently in the last six months.     Home Safety:  Patient does not have trouble with stairs inside or outside of their home. Patient has working smoke alarms and has working carbon monoxide detector. Home safety hazards include: none.     Nutrition:   Current diet is Diabetic and Low Carb.     Medications:   Patient is currently taking over-the-counter supplements. OTC medications include: see medication list. Patient is able to manage medications.     Activities of Daily Living (ADLs)/Instrumental Activities of Daily Living (IADLs):   Walk and transfer into and out of bed and chair?: Yes  Dress and groom yourself?: Yes    Bathe or shower yourself?: Yes    Feed yourself? Yes  Do your laundry/housekeeping?: Yes  Manage your money, pay your bills and track your expenses?: Yes  Make your own meals?: Yes    Do your own shopping?: Yes    Previous Hospitalizations:   Any hospitalizations or ED visits within the last 12 months?: No      Advance Care Planning:   Living will: No    Durable POA for healthcare: No    Advanced directive: No    Advanced directive  counseling given: Yes    ACP document given: Yes      Cognitive Screening:   Provider or family/friend/caregiver concerned regarding cognition?: No    PREVENTIVE SCREENINGS      Cardiovascular Screening:    General: Screening Not Indicated, History Lipid Disorder and Screening Current      Diabetes Screening:     General: Screening Not Indicated, History Diabetes and Screening Current      Colorectal Cancer Screening:     General: Screening Current      Breast Cancer Screening:     General: Screening Current      Cervical Cancer Screening:    General: Screening Not Indicated      Osteoporosis Screening:    General: Screening Current      Abdominal Aortic Aneurysm (AAA) Screening:        General: Screening Not Indicated      Lung Cancer Screening:     General: Screening Not Indicated      Hepatitis C Screening:    General: Risks and Benefits Discussed    Screening, Brief Intervention, and Referral to Treatment (SBIRT)     Screening  Typical number of drinks in a day: 0  Typical number of drinks in a week: 0  Interpretation: Low risk drinking behavior.    AUDIT-C Screenin) How often did you have a drink containing alcohol in the past year? never  2) How many drinks did you have on a typical day when you were drinking in the past year? 0  3) How often did you have 6 or more drinks on one occasion in the past year? never    AUDIT-C Score: 0  Interpretation: Score 0-2 (female): Negative screen for alcohol misuse    Single Item Drug Screening:  How often have you used an illegal drug (including marijuana) or a prescription medication for non-medical reasons in the past year? never    Single Item Drug Screen Score: 0  Interpretation: Negative screen for possible drug use disorder    Social Drivers of Health     Food Insecurity: No Food Insecurity (3/31/2025)    Hunger Vital Sign    • Worried About Running Out of Food in the Last Year: Never true    • Ran Out of Food in the Last Year: Never true   Transportation Needs:  "No Transportation Needs (3/31/2025)    PRAPARE - Transportation    • Lack of Transportation (Medical): No    • Lack of Transportation (Non-Medical): No   Housing Stability: Low Risk  (3/31/2025)    Housing Stability Vital Sign    • Unable to Pay for Housing in the Last Year: No    • Number of Times Moved in the Last Year: 0    • Homeless in the Last Year: No   Utilities: Not At Risk (3/31/2025)    [facility] Utilities    • Threatened with loss of utilities: No     No results found.    Objective   /70 (BP Location: Left arm, Patient Position: Sitting, Cuff Size: Standard)   Pulse 88   Ht 5' 6\" (1.676 m)   Wt 78.7 kg (173 lb 6.4 oz)   SpO2 96%   BMI 27.99 kg/m²     Physical Exam  Vitals and nursing note reviewed.   Constitutional:       Appearance: Normal appearance. She is well-developed.   HENT:      Head: Normocephalic and atraumatic.   Eyes:      General: Lids are normal.      Conjunctiva/sclera: Conjunctivae normal.      Pupils: Pupils are equal, round, and reactive to light.   Cardiovascular:      Rate and Rhythm: Normal rate and regular rhythm.      Heart sounds: No murmur heard.  Pulmonary:      Effort: Pulmonary effort is normal.      Breath sounds: Normal breath sounds.   Skin:     General: Skin is warm and dry.   Neurological:      General: No focal deficit present.      Mental Status: She is alert.      Coordination: Coordination is intact.   Psychiatric:         Mood and Affect: Mood normal.         Behavior: Behavior normal. Behavior is cooperative.         Thought Content: Thought content normal.         Judgment: Judgment normal.         "

## 2025-03-31 NOTE — PATIENT INSTRUCTIONS
1. Essential hypertension  Assessment & Plan:  Stable on current meds.        2. Type 2 diabetes mellitus with complication, without long-term current use of insulin (HCC)  Assessment & Plan:  Patient does see endocrinology on a regular basis her A1c is 7.6 with a fasting glucose of 175.  Lab Results   Component Value Date    HGBA1C 7.6 (H) 03/17/2025            3. Allergy to statin medication  Assessment & Plan:         4. History of CVA (cerebrovascular accident)  Assessment & Plan:  Continues on Plavix daily.       5. Osteopenia of multiple sites  Assessment & Plan:  Bone density up-to-date in 2024 next due 2026.       6. Hx of basal cell carcinoma  Assessment & Plan:  Continue dermatology regularly.       7. Drug-induced myopathy  Assessment & Plan:         8. Myalgia due to statin  Assessment & Plan:         9. Vitamin D deficiency  Assessment & Plan:  Check vitamin D with next labs.       10. Demyelinating disease of central nervous system (HCC)  Assessment & Plan:  Continue seeing neurology.       11. Mixed hyperlipidemia  Assessment & Plan:  Patient is allergic to statins and currently is on the generic of Nexletol by cardiology.  LDL is 145 triglycerides are high 300s.  Cardiology is considering Repatha.         12. Encounter for immunization  -     Pneumococcal Conjugate Vaccine 20-valent (Pcv20)  13. Subclinical hypothyroidism  Assessment & Plan:  Endo has been watching her TSH which is slightly elevated with normal T4.   14. Microalbuminuria due to type 2 diabetes mellitus  (HCC)  Assessment & Plan:  Patient did see nephrology as her microalbumin a year ago was in the thousands.  Repeat in July was markedly improved.  They did increase her lisinopril to 10 mg.  Endocrinology tried Jardiance at the recommendation of nephrology however patient could not tolerate it secondary yeast infections.  Repeat microalbumin ordered for this year by endocrinology.  Lab Results   Component Value Date    HGBA1C 7.6 (H)  03/17/2025     15. Great toe pain, right  -     Uric acid; Future  16. Right carpal tunnel syndrome  Assessment & Plan:  Recommend returning to wearing her wrist splint during sleep to improve her daytime symptoms.      Medicare Preventive Visit Patient Instructions  Thank you for completing your Welcome to Medicare Visit or Medicare Annual Wellness Visit today. Your next wellness visit will be due in one year (4/1/2026).  The screening/preventive services that you may require over the next 5-10 years are detailed below. Some tests may not apply to you based off risk factors and/or age. Screening tests ordered at today's visit but not completed yet may show as past due. Also, please note that scanned in results may not display below.  Preventive Screenings:  Service Recommendations Previous Testing/Comments   Colorectal Cancer Screening  * Colonoscopy    * Fecal Occult Blood Test (FOBT)/Fecal Immunochemical Test (FIT)  * Fecal DNA/Cologuard Test  * Flexible Sigmoidoscopy Age: 45-75 years old   Colonoscopy: every 10 years (may be performed more frequently if at higher risk)  OR  FOBT/FIT: every 1 year  OR  Cologuard: every 3 years  OR  Sigmoidoscopy: every 5 years  Screening may be recommended earlier than age 45 if at higher risk for colorectal cancer. Also, an individualized decision between you and your healthcare provider will decide whether screening between the ages of 76-85 would be appropriate. Colonoscopy: 11/13/2015  FOBT/FIT: Not on file  Cologuard: Not on file  Sigmoidoscopy: Not on file    Screening Current     Breast Cancer Screening Age: 40+ years old  Frequency: every 1-2 years  Not required if history of left and right mastectomy Mammogram: 07/09/2024    Screening Current   Cervical Cancer Screening Between the ages of 21-29, pap smear recommended once every 3 years.   Between the ages of 30-65, can perform pap smear with HPV co-testing every 5 years.   Recommendations may differ for women with a  history of total hysterectomy, cervical cancer, or abnormal pap smears in past. Pap Smear: 08/20/2024    Screening Not Indicated   Hepatitis C Screening Once for adults born between 1945 and 1965  More frequently in patients at high risk for Hepatitis C Hep C Antibody: Not on file    Risks and Benefits Discussed   Diabetes Screening 1-2 times per year if you're at risk for diabetes or have pre-diabetes Fasting glucose: 175 mg/dL (3/17/2025)  A1C: 7.6 % (3/17/2025)  Screening Not Indicated  History Diabetes  Screening Current   Cholesterol Screening Once every 5 years if you don't have a lipid disorder. May order more often based on risk factors. Lipid panel: 03/17/2025    Screening Not Indicated  History Lipid Disorder  Screening Current     Other Preventive Screenings Covered by Medicare:  Abdominal Aortic Aneurysm (AAA) Screening: covered once if your at risk. You're considered to be at risk if you have a family history of AAA.  Lung Cancer Screening: covers low dose CT scan once per year if you meet all of the following conditions: (1) Age 55-77; (2) No signs or symptoms of lung cancer; (3) Current smoker or have quit smoking within the last 15 years; (4) You have a tobacco smoking history of at least 20 pack years (packs per day multiplied by number of years you smoked); (5) You get a written order from a healthcare provider.  Glaucoma Screening: covered annually if you're considered high risk: (1) You have diabetes OR (2) Family history of glaucoma OR (3)  aged 50 and older OR (4)  American aged 65 and older  Osteoporosis Screening: covered every 2 years if you meet one of the following conditions: (1) You're estrogen deficient and at risk for osteoporosis based off medical history and other findings; (2) Have a vertebral abnormality; (3) On glucocorticoid therapy for more than 3 months; (4) Have primary hyperparathyroidism; (5) On osteoporosis medications and need to assess response to  drug therapy.   Last bone density test (DXA Scan): 07/09/2024.  HIV Screening: covered annually if you're between the age of 15-65. Also covered annually if you are younger than 15 and older than 65 with risk factors for HIV infection. For pregnant patients, it is covered up to 3 times per pregnancy.    Immunizations:  Immunization Recommendations   Influenza Vaccine Annual influenza vaccination during flu season is recommended for all persons aged >= 6 months who do not have contraindications   Pneumococcal Vaccine   * Pneumococcal conjugate vaccine = PCV13 (Prevnar 13), PCV15 (Vaxneuvance), PCV20 (Prevnar 20)  * Pneumococcal polysaccharide vaccine = PPSV23 (Pneumovax) Adults 19-65 yo with certain risk factors or if 65+ yo  If never received any pneumonia vaccine: recommend Prevnar 20 (PCV20)  Give PCV20 if previously received 1 dose of PCV13 or PPSV23   Hepatitis B Vaccine 3 dose series if at intermediate or high risk (ex: diabetes, end stage renal disease, liver disease)   Respiratory syncytial virus (RSV) Vaccine - COVERED BY MEDICARE PART D  * RSVPreF3 (Arexvy) CDC recommends that adults 60 years of age and older may receive a single dose of RSV vaccine using shared clinical decision-making (SCDM)   Tetanus (Td) Vaccine - COST NOT COVERED BY MEDICARE PART B Following completion of primary series, a booster dose should be given every 10 years to maintain immunity against tetanus. Td may also be given as tetanus wound prophylaxis.   Tdap Vaccine - COST NOT COVERED BY MEDICARE PART B Recommended at least once for all adults. For pregnant patients, recommended with each pregnancy.   Shingles Vaccine (Shingrix) - COST NOT COVERED BY MEDICARE PART B  2 shot series recommended in those 19 years and older who have or will have weakened immune systems or those 50 years and older     Health Maintenance Due:      Topic Date Due    Hepatitis C Screening  Never done    Breast Cancer Screening: Mammogram  07/09/2025     Colorectal Cancer Screening  11/13/2025     Immunizations Due:      Topic Date Due    Pneumococcal Vaccine: 65+ Years (1 of 2 - PCV) Never done    Influenza Vaccine (1) 09/01/2024    COVID-19 Vaccine (3 - 2024-25 season) 09/01/2024     Advance Directives   What are advance directives?  Advance directives are legal documents that state your wishes and plans for medical care. These plans are made ahead of time in case you lose your ability to make decisions for yourself. Advance directives can apply to any medical decision, such as the treatments you want, and if you want to donate organs.   What are the types of advance directives?  There are many types of advance directives, and each state has rules about how to use them. You may choose a combination of any of the following:  Living will:  This is a written record of the treatment you want. You can also choose which treatments you do not want, which to limit, and which to stop at a certain time. This includes surgery, medicine, IV fluid, and tube feedings.   Durable power of  for healthcare (DPAHC):  This is a written record that states who you want to make healthcare choices for you when you are unable to make them for yourself. This person, called a proxy, is usually a family member or a friend. You may choose more than 1 proxy.  Do not resuscitate (DNR) order:  A DNR order is used in case your heart stops beating or you stop breathing. It is a request not to have certain forms of treatment, such as CPR. A DNR order may be included in other types of advance directives.  Medical directive:  This covers the care that you want if you are in a coma, near death, or unable to make decisions for yourself. You can list the treatments you want for each condition. Treatment may include pain medicine, surgery, blood transfusions, dialysis, IV or tube feedings, and a ventilator (breathing machine).  Values history:  This document has questions about your views,  beliefs, and how you feel and think about life. This information can help others choose the care that you would choose.  Why are advance directives important?  An advance directive helps you control your care. Although spoken wishes may be used, it is better to have your wishes written down. Spoken wishes can be misunderstood, or not followed. Treatments may be given even if you do not want them. An advance directive may make it easier for your family to make difficult choices about your care.   Fall Prevention    Fall prevention  includes ways to make your home and other areas safer. It also includes ways you can move more carefully to prevent a fall. Health conditions that cause changes in your blood pressure, vision, or muscle strength and coordination may increase your risk for falls. Medicines may also increase your risk for falls if they make you dizzy, weak, or sleepy.   Fall prevention tips:   Stand or sit up slowly.    Use assistive devices as directed.    Wear shoes that fit well and have soles that .    Wear a personal alarm.    Stay active.    Manage your medical conditions.    Home Safety Tips:  Add items to prevent falls in the bathroom.    Keep paths clear.    Install bright lights in your home.    Keep items you use often on shelves within reach.    Paint or place reflective tape on the edges of your stairs.    Urinary Incontinence   Urinary incontinence (UI)  is when you lose control of your bladder. UI develops because your bladder cannot store or empty urine properly. The 3 most common types of UI are stress incontinence, urge incontinence, or both.  Medicines:   May be given to help strengthen your bladder control. Report any side effects of medication to your healthcare provider.  Do pelvic muscle exercises often:  Your pelvic muscles help you stop urinating. Squeeze these muscles tight for 5 seconds, then relax for 5 seconds. Gradually work up to squeezing for 10 seconds. Do 3 sets of 15  repetitions a day, or as directed. This will help strengthen your pelvic muscles and improve bladder control.  Train your bladder:  Go to the bathroom at set times, such as every 2 hours, even if you do not feel the urge to go. You can also try to hold your urine when you feel the urge to go. For example, hold your urine for 5 minutes when you feel the urge to go. As that becomes easier, hold your urine for 10 minutes.   Self-care:   Keep a UI record.  Write down how often you leak urine and how much you leak. Make a note of what you were doing when you leaked urine.  Drink liquids as directed. You may need to limit the amount of liquid you drink to help control your urine leakage. Do not drink any liquid right before you go to bed. Limit or do not have drinks that contain caffeine or alcohol.   Prevent constipation.  Eat a variety of high-fiber foods. Good examples are high-fiber cereals, beans, vegetables, and whole-grain breads. Walking is the best way to trigger your intestines to have a bowel movement.  Exercise regularly and maintain a healthy weight.  Weight loss and exercise will decrease pressure on your bladder and help you control your leakage.   Use a catheter as directed  to help empty your bladder. A catheter is a tiny, plastic tube that is put into your bladder to drain your urine.   Go to behavior therapy as directed.  Behavior therapy may be used to help you learn to control your urge to urinate.    Weight Management   Why it is important to manage your weight:  Being overweight increases your risk of health conditions such as heart disease, high blood pressure, type 2 diabetes, and certain types of cancer. It can also increase your risk for osteoarthritis, sleep apnea, and other respiratory problems. Aim for a slow, steady weight loss. Even a small amount of weight loss can lower your risk of health problems.  How to lose weight safely:  A safe and healthy way to lose weight is to eat fewer  calories and get regular exercise. You can lose up about 1 pound a week by decreasing the number of calories you eat by 500 calories each day.   Healthy meal plan for weight management:  A healthy meal plan includes a variety of foods, contains fewer calories, and helps you stay healthy. A healthy meal plan includes the following:  Eat whole-grain foods more often.  A healthy meal plan should contain fiber. Fiber is the part of grains, fruits, and vegetables that is not broken down by your body. Whole-grain foods are healthy and provide extra fiber in your diet. Some examples of whole-grain foods are whole-wheat breads and pastas, oatmeal, brown rice, and bulgur.  Eat a variety of vegetables every day.  Include dark, leafy greens such as spinach, kale, monika greens, and mustard greens. Eat yellow and orange vegetables such as carrots, sweet potatoes, and winter squash.   Eat a variety of fruits every day.  Choose fresh or canned fruit (canned in its own juice or light syrup) instead of juice. Fruit juice has very little or no fiber.  Eat low-fat dairy foods.  Drink fat-free (skim) milk or 1% milk. Eat fat-free yogurt and low-fat cottage cheese. Try low-fat cheeses such as mozzarella and other reduced-fat cheeses.  Choose meat and other protein foods that are low in fat.  Choose beans or other legumes such as split peas or lentils. Choose fish, skinless poultry (chicken or turkey), or lean cuts of red meat (beef or pork). Before you cook meat or poultry, cut off any visible fat.   Use less fat and oil.  Try baking foods instead of frying them. Add less fat, such as margarine, sour cream, regular salad dressing and mayonnaise to foods. Eat fewer high-fat foods. Some examples of high-fat foods include french fries, doughnuts, ice cream, and cakes.  Eat fewer sweets.  Limit foods and drinks that are high in sugar. This includes candy, cookies, regular soda, and sweetened drinks.  Exercise:  Exercise at least 30  minutes per day on most days of the week. Some examples of exercise include walking, biking, dancing, and swimming. You can also fit in more physical activity by taking the stairs instead of the elevator or parking farther away from stores. Ask your healthcare provider about the best exercise plan for you.      © Copyright [employer] 2018 Information is for End User's use only and may not be sold, redistributed or otherwise used for commercial purposes. All illustrations and images included in CareNotes® are the copyrighted property of [employer] or [employer]

## 2025-03-31 NOTE — ASSESSMENT & PLAN NOTE
Patient did see nephrology as her microalbumin a year ago was in the thousands.  Repeat in July was markedly improved.  They did increase her lisinopril to 10 mg.  Endocrinology tried Jardiance at the recommendation of nephrology however patient could not tolerate it secondary yeast infections.  Repeat microalbumin ordered for this year by endocrinology.  Lab Results   Component Value Date    HGBA1C 7.6 (H) 03/17/2025

## 2025-04-01 ENCOUNTER — TELEPHONE (OUTPATIENT)
Age: 65
End: 2025-04-01

## 2025-04-01 ENCOUNTER — CONSULT (OUTPATIENT)
Age: 65
End: 2025-04-01
Payer: COMMERCIAL

## 2025-04-01 VITALS
WEIGHT: 173 LBS | SYSTOLIC BLOOD PRESSURE: 128 MMHG | HEART RATE: 110 BPM | BODY MASS INDEX: 27.8 KG/M2 | DIASTOLIC BLOOD PRESSURE: 84 MMHG | HEIGHT: 66 IN

## 2025-04-01 DIAGNOSIS — Z12.11 COLON CANCER SCREENING: ICD-10-CM

## 2025-04-01 DIAGNOSIS — Z12.11 ENCOUNTER FOR SCREENING COLONOSCOPY: Primary | ICD-10-CM

## 2025-04-01 DIAGNOSIS — K76.0 FATTY INFILTRATION OF LIVER: ICD-10-CM

## 2025-04-01 DIAGNOSIS — K21.9 GASTROESOPHAGEAL REFLUX DISEASE WITHOUT ESOPHAGITIS: ICD-10-CM

## 2025-04-01 PROCEDURE — 99204 OFFICE O/P NEW MOD 45 MIN: CPT | Performed by: INTERNAL MEDICINE

## 2025-04-01 RX ORDER — SODIUM CHLORIDE, SODIUM LACTATE, POTASSIUM CHLORIDE, CALCIUM CHLORIDE 600; 310; 30; 20 MG/100ML; MG/100ML; MG/100ML; MG/100ML
125 INJECTION, SOLUTION INTRAVENOUS CONTINUOUS
OUTPATIENT
Start: 2025-04-01

## 2025-04-01 NOTE — PROGRESS NOTES
Name: Juliana Hernandez      : 1960      MRN: [id number]  Encounter Provider: Aurelio Williamson MD  Encounter Date: 2025   Encounter department: [facility]  :  64-year-old female, history of diabetes, history of TIA on Plavix due for colon cancer screening, also noted to have longstanding history of GERD well-controlled, and hepatic steatosis  Assessment & Plan  Colon cancer screening  -Patient is of average risk  -Dulcolax MiraLAX bowel prep  -Will schedule colonoscopy  -Discussed with the risks of procedure including bleeding, surgery, perforation, missed polyp detection rate  -Will obtain clearance from PCP to hold Plavix  Orders:    Ambulatory Referral to Gastroenterology    Colonoscopy; Future    Fatty infiltration of liver  -With remote history of elevated LFTs  -Will check elastography  -Suspect MASLD  Orders:    US elastography; Future    Gastroesophageal reflux disease without esophagitis  -Longstanding history of reflux, despite well-controlled symptoms recommend proceeding with an EGD at the same time of colonoscopy to exclude underlying Delatorre's esophagus  Orders:    EGD; Future    Patient's laboratory studies, imaging studies were reviewed.  History was independently obtained.    History of Present Illness   HPI  Juliana Hernandez is a 64 y.o. female who presents to discuss screening colonoscopy.  This is a pleasant 64-year-old female with a history of stroke, history of diabetes here for evaluation for colon cancer screening.  Her last colonoscopy 10 years ago.  She denies any significant lower GI symptoms she does have a longstanding history of gastric reflux.  Previously treated with PPI therapy for several years then transition to famotidine recently.  She denies any nausea, vomiting, dysphagia, abdominal pain.  Her weight has been stable.  She is never had endoscopic evaluation.  She had cross-sectional imaging about a year or 2 ago, was noted to have  "hepatic steatosis at that time, in review of her laboratory studies, she has had elevated transaminase, specifically AST and ALT for many years prior to that more recently those numbers have improved.    Medical history as noted above.    Surgical history is notable for cholecystectomy, tubal ligation, and left total knee replacement.    She denies any alcohol or tobacco, she is retired .    Family history is negative for GI or associated malignancies.      Review of Systems  Review of systems was negative except for pertinent positive mentioned in HPI         Objective   /84 (BP Location: Left arm, Patient Position: Sitting, Cuff Size: Standard)   Pulse (!) 110   Ht 5' 6\" (1.676 m)   Wt 78.5 kg (173 lb)   BMI 27.92 kg/m²      Physical Exam  GEN: WN/WD, no acute distress  HEENT: anicteric, MMM, no cervical lymphadenopathy  CV: RRR, no m/r/g  CHEST: CTA b/l, no WRR  ABD: +BS, soft NT/ND, no hepatosplenomegaly  EXT: no C/C/E  NEURO: AAOx3  SKIN: no rashes      "

## 2025-04-01 NOTE — TELEPHONE ENCOUNTER
Taty Presley MD    Our mutual patient is scheduled for procedure: colonoscopy and EGD    On: April 29 , 2025     With: Dr. Aurelio Williamson MD    He/She is taking the following blood thinner: Plavix (Clopidogrel)    Can this be stopped  5 days prior to the procedure    Physician Approving clearance:

## 2025-04-01 NOTE — ASSESSMENT & PLAN NOTE
-With remote history of elevated LFTs  -Will check elastography  -Suspect MASLD  Orders:    US elastography; Future

## 2025-04-01 NOTE — PATIENT INSTRUCTIONS
Scheduled date of EGD/colonoscopy (as of today):April 29, 2025  Physician performing EGD/colonoscopy:Dr. Williamson  Location of EGD/colonoscopy:[facility]  Desired bowel prep reviewed with patient:Miralax/Dulcolax  Instructions reviewed with patient by:MARLON  Clearances:  Plavix

## 2025-04-02 NOTE — TELEPHONE ENCOUNTER
Left a message on pts vm that NEHAL Roth gave consent for her to hold her Plavix 5 days prior to her Colonoscopy/EGD.

## 2025-04-04 ENCOUNTER — HOSPITAL ENCOUNTER (OUTPATIENT)
Dept: RADIOLOGY | Facility: HOSPITAL | Age: 65
Discharge: HOME | End: 2025-04-04
Attending: INTERNAL MEDICINE
Payer: COMMERCIAL

## 2025-04-04 DIAGNOSIS — K76.0 FATTY INFILTRATION OF LIVER: ICD-10-CM

## 2025-04-04 PROCEDURE — 76981 USE PARENCHYMA: CPT

## 2025-04-07 DIAGNOSIS — L40.9 PSORIASIS: ICD-10-CM

## 2025-04-09 RX ORDER — TRIAMCINOLONE ACETONIDE 1 MG/G
CREAM TOPICAL
Qty: 30 G | Refills: 0 | OUTPATIENT
Start: 2025-04-09

## 2025-04-09 NOTE — TELEPHONE ENCOUNTER
Last office visit 8/12/2024. Requesting Triamcinolone refill. Patient does not have any follow ups scheduled.

## 2025-04-10 ENCOUNTER — RESULTS FOLLOW-UP (OUTPATIENT)
Age: 65
End: 2025-04-10

## 2025-04-10 DIAGNOSIS — I63.531 CEREBROVASCULAR ACCIDENT (CVA) DUE TO STENOSIS OF RIGHT POSTERIOR CEREBRAL ARTERY (HCC): ICD-10-CM

## 2025-04-10 RX ORDER — CLOPIDOGREL BISULFATE 75 MG/1
75 TABLET ORAL DAILY
Qty: 90 TABLET | Refills: 0 | Status: SHIPPED | OUTPATIENT
Start: 2025-04-10

## 2025-04-11 NOTE — TELEPHONE ENCOUNTER
SPOKE WITH PT, INFORMED OF U/S RESULTS AND RECOMMENDATIONS. ALL QUESTIONS ANSWERED. PT IS SCHEDULED FOR EGD/COLONOSCOPY 4/29/25.

## 2025-04-15 ENCOUNTER — ANESTHESIA EVENT (OUTPATIENT)
Dept: ANESTHESIOLOGY | Facility: HOSPITAL | Age: 65
End: 2025-04-15

## 2025-04-15 ENCOUNTER — ANESTHESIA (OUTPATIENT)
Dept: ANESTHESIOLOGY | Facility: HOSPITAL | Age: 65
End: 2025-04-15

## 2025-04-24 ENCOUNTER — TELEPHONE (OUTPATIENT)
Age: 65
End: 2025-04-24

## 2025-04-24 NOTE — TELEPHONE ENCOUNTER
Spoke with pt confirming procedure for 4/29 with Dr. Williamson. She has her  arranged and her prep instructions. She remembered to hold her Plavix 5 days. She will be called the day before with her arrival time.

## 2025-04-29 ENCOUNTER — HOSPITAL ENCOUNTER (OUTPATIENT)
Dept: GASTROENTEROLOGY | Facility: AMBULARY SURGERY CENTER | Age: 65
Setting detail: OUTPATIENT SURGERY
Discharge: HOME | End: 2025-04-29
Attending: INTERNAL MEDICINE
Payer: COMMERCIAL

## 2025-04-29 ENCOUNTER — ANESTHESIA EVENT (OUTPATIENT)
Dept: GASTROENTEROLOGY | Facility: AMBULARY SURGERY CENTER | Age: 65
End: 2025-04-29
Payer: COMMERCIAL

## 2025-04-29 VITALS
HEART RATE: 80 BPM | SYSTOLIC BLOOD PRESSURE: 107 MMHG | OXYGEN SATURATION: 96 % | DIASTOLIC BLOOD PRESSURE: 60 MMHG | WEIGHT: 173 LBS | BODY MASS INDEX: 27.8 KG/M2 | HEIGHT: 66 IN | RESPIRATION RATE: 18 BRPM | TEMPERATURE: 97.9 F

## 2025-04-29 DIAGNOSIS — K21.9 GASTROESOPHAGEAL REFLUX DISEASE WITHOUT ESOPHAGITIS: ICD-10-CM

## 2025-04-29 DIAGNOSIS — Z12.11 COLON CANCER SCREENING: ICD-10-CM

## 2025-04-29 LAB — GLUCOSE SERPL-MCNC: 151 MG/DL (ref 65–140)

## 2025-04-29 PROCEDURE — 88341 IMHCHEM/IMCYTCHM EA ADD ANTB: CPT | Performed by: PATHOLOGY

## 2025-04-29 PROCEDURE — 88342 IMHCHEM/IMCYTCHM 1ST ANTB: CPT | Performed by: PATHOLOGY

## 2025-04-29 PROCEDURE — 88305 TISSUE EXAM BY PATHOLOGIST: CPT | Performed by: PATHOLOGY

## 2025-04-29 PROCEDURE — 88312 SPECIAL STAINS GROUP 1: CPT | Performed by: PATHOLOGY

## 2025-04-29 PROCEDURE — 82948 REAGENT STRIP/BLOOD GLUCOSE: CPT

## 2025-04-29 RX ORDER — PROPOFOL 10 MG/ML
INJECTION, EMULSION INTRAVENOUS AS NEEDED
Status: DISCONTINUED | OUTPATIENT
Start: 2025-04-29 | End: 2025-04-29

## 2025-04-29 RX ORDER — SODIUM CHLORIDE, SODIUM LACTATE, POTASSIUM CHLORIDE, CALCIUM CHLORIDE 600; 310; 30; 20 MG/100ML; MG/100ML; MG/100ML; MG/100ML
125 INJECTION, SOLUTION INTRAVENOUS CONTINUOUS
Status: DISCONTINUED | OUTPATIENT
Start: 2025-04-29 | End: 2025-05-03 | Stop reason: HOSPADM

## 2025-04-29 RX ORDER — LIDOCAINE HYDROCHLORIDE 10 MG/ML
INJECTION, SOLUTION EPIDURAL; INFILTRATION; INTRACAUDAL; PERINEURAL AS NEEDED
Status: DISCONTINUED | OUTPATIENT
Start: 2025-04-29 | End: 2025-04-29

## 2025-04-29 RX ORDER — PROPOFOL 10 MG/ML
INJECTION, EMULSION INTRAVENOUS CONTINUOUS PRN
Status: DISCONTINUED | OUTPATIENT
Start: 2025-04-29 | End: 2025-04-29

## 2025-04-29 RX ORDER — PANTOPRAZOLE SODIUM 40 MG/1
40 TABLET, DELAYED RELEASE ORAL DAILY
Qty: 30 TABLET | Refills: 3 | Status: SHIPPED | OUTPATIENT
Start: 2025-04-29

## 2025-04-29 RX ORDER — SODIUM CHLORIDE, SODIUM LACTATE, POTASSIUM CHLORIDE, CALCIUM CHLORIDE 600; 310; 30; 20 MG/100ML; MG/100ML; MG/100ML; MG/100ML
INJECTION, SOLUTION INTRAVENOUS CONTINUOUS PRN
Status: DISCONTINUED | OUTPATIENT
Start: 2025-04-29 | End: 2025-04-29

## 2025-04-29 RX ADMIN — SODIUM CHLORIDE, SODIUM LACTATE, POTASSIUM CHLORIDE, AND CALCIUM CHLORIDE 125 ML/HR: .6; .31; .03; .02 INJECTION, SOLUTION INTRAVENOUS at 07:17

## 2025-04-29 RX ADMIN — PROPOFOL 120 MG: 10 INJECTION, EMULSION INTRAVENOUS at 07:33

## 2025-04-29 RX ADMIN — LIDOCAINE HYDROCHLORIDE 50 MG: 10 INJECTION, SOLUTION EPIDURAL; INFILTRATION; INTRACAUDAL; PERINEURAL at 07:33

## 2025-04-29 RX ADMIN — SODIUM CHLORIDE, SODIUM LACTATE, POTASSIUM CHLORIDE, AND CALCIUM CHLORIDE: .6; .31; .03; .02 INJECTION, SOLUTION INTRAVENOUS at 07:29

## 2025-04-29 RX ADMIN — PROPOFOL 120 MCG/KG/MIN: 10 INJECTION, EMULSION INTRAVENOUS at 07:33

## 2025-04-29 NOTE — ANESTHESIA POSTPROCEDURE EVALUATION
Post-Op Assessment Note    CV Status:  Stable  Pain Score: 0    Pain management: adequate       Mental Status:  Awake   Hydration Status:  Stable   PONV Controlled:  None   Airway Patency:  Patent  Two or more mitigation strategies used for obstructive sleep apnea   Post Op Vitals Reviewed: Yes    No anethesia notable event occurred.    Staff: CRNA           Last Filed PACU Vitals:  Vitals Value Taken Time   Temp     Pulse 72    /62    Resp 12    SpO2 99

## 2025-04-29 NOTE — ANESTHESIA PREPROCEDURE EVALUATION
Procedure:  COLONOSCOPY  EGD    Relevant Problems   CARDIO   (+) Essential hypertension   (+) Frequent PVCs   (+) Intracranial aneurysm   (+) Migraine   (+) Mixed hyperlipidemia   (+) Pseudoaneurysm of vertebral artery (HCC)      ENDO   (+) Subclinical hypothyroidism   (+) Type 2 diabetes mellitus with complication, without long-term current use of insulin (HCC)      GI/HEPATIC   (+) Fatty infiltration of liver      /RENAL   (+) Hypertensive CKD (chronic kidney disease)   (+) Microalbuminuria due to type 2 diabetes mellitus  (HCC)   (+) Stage 2 chronic kidney disease      MUSCULOSKELETAL   (+) Drug-induced myopathy   (+) Left knee DJD      NEURO/PSYCH   (+) Episodic cluster headache, not intractable   (+) Migraine        Physical Exam    Airway    Mallampati score: II  TM Distance: >3 FB  Neck ROM: full     Dental   No notable dental hx     Cardiovascular      Pulmonary      Other Findings  post-pubertal.      Anesthesia Plan  ASA Score- 3     Anesthesia Type- IV sedation with anesthesia with ASA Monitors.         Additional Monitors:     Airway Plan:            Plan Factors-    Chart reviewed.    Patient summary reviewed.                  Induction- intravenous.    Postoperative Plan-     Perioperative Resuscitation Plan - Level 1 - Full Code.       Informed Consent- Anesthetic plan and risks discussed with patient.  I personally reviewed this patient with the CRNA. Discussed and agreed on the Anesthesia Plan with the CRNA..      NPO Status:  Vitals Value Taken Time   Date of last liquid 04/29/25 04/29/25 0710   Time of last liquid 0230 04/29/25 0710   Date of last solid 04/27/25 04/29/25 0710   Time of last solid 1830 04/29/25 0710

## 2025-04-29 NOTE — H&P
History and Physical - [facility]  Juliana Hernandez 64 y.o. female MRN: [id number]    HPI: Juliana Hernandez is a 64 y.o. year old female who presents with GERD, colon cancer screening      Review of Systems    Historical Information   Past Medical History:   Diagnosis Date    Basal cell carcinoma     nose    Diabetes mellitus (HCC)     GERD (gastroesophageal reflux disease)     Take pepcid but still bothersome with certain foods    Headache(784.0)     Occasional    Hypertension     Miscarriage     2 in past 1982, 1988    Nasal congestion     Espyat night    Otitis media     Occasional    Shingles     Skin tag     Stroke (cerebrum) (HCC)     Stroke (HCC)     Tia    Tinnitus     Current    Varicella     Verruca      Past Surgical History:   Procedure Laterality Date    CHOLECYSTECTOMY      CHOLECYSTECTOMY LAPAROSCOPIC      COLONOSCOPY      MOHS SURGERY      Micrographic Surgery Nose, Last assessed: 2/13/17    SKIN BIOPSY      TOTAL KNEE ARTHROPLASTY Left 03/08/2017    Last assessed: 12/26/17    TUBAL LIGATION       Social History   Social History     Substance and Sexual Activity   Alcohol Use No     Social History     Substance and Sexual Activity   Drug Use Never     Social History     Tobacco Use   Smoking Status Never   Smokeless Tobacco Never   Tobacco Comments    No secondhand smoke exposure     Family History   Problem Relation Age of Onset    Basal cell carcinoma Father     Diabetes Father     Hypertension Father     Stroke Father     Diabetes type II Father     Arthritis Father     Heart disease Father     Hyperlipidemia Brother     No Known Problems Maternal Aunt     No Known Problems Maternal Aunt     No Known Problems Maternal Uncle     Breast cancer Paternal Aunt     No Known Problems Maternal Grandmother     No Known Problems Maternal Grandfather     No Known Problems Paternal Grandmother     No Known Problems Paternal Grandfather     Anemia Daughter     No Known Problems Daughter   "   No Known Problems Daughter     No Known Problems Daughter     No Known Problems Daughter     No Known Problems Half-Sister     No Known Problems Half-Sister        Meds/Allergies     Not in a hospital admission.    Allergies   Allergen Reactions    Bactrim [Sulfamethoxazole-Trimethoprim]     Neomycin-Bacitracin Zn-Polymyx Other (See Comments)     REDNESS    Statins Myalgia       Objective     /80   Pulse 94   Temp 97.9 °F (36.6 °C) (Temporal)   Resp 18   Ht 5' 6\" (1.676 m)   Wt 78.5 kg (173 lb)   SpO2 98%   BMI 27.92 kg/m²       PHYSICAL EXAM    Gen: NAD  CV: RRR  CHEST: Clear  ABD: soft, NT/ND  EXT: no edema  Neuro: AAO      ASSESSMENT/PLAN:  This is a 64 y.o. year old female here for GERD, colon cancer screening      PLAN:   Procedure: egd/colonoscopy      "

## 2025-05-09 DIAGNOSIS — R80.1 PERSISTENT PROTEINURIA: ICD-10-CM

## 2025-05-09 RX ORDER — LISINOPRIL 10 MG/1
10 TABLET ORAL DAILY
Qty: 30 TABLET | Refills: 0 | OUTPATIENT
Start: 2025-05-09

## 2025-05-12 ENCOUNTER — RESULTS FOLLOW-UP (OUTPATIENT)
Dept: GASTROENTEROLOGY | Facility: AMBULARY SURGERY CENTER | Age: 65
End: 2025-05-12

## 2025-05-12 NOTE — TELEPHONE ENCOUNTER
"Phone call from patient returning call to schedule appt. Patient scheduled for appt in July with CASSIE & added to wait list . Please contact patient if labs are needed prior to appt (no new orders in chart).    In addition patient would like know \"if medication will be able to be ordered now that she has appt\".     Please contact patient to advise.   "

## 2025-05-14 DIAGNOSIS — R80.1 PERSISTENT PROTEINURIA: ICD-10-CM

## 2025-05-14 DIAGNOSIS — N18.2 STAGE 2 CHRONIC KIDNEY DISEASE: Primary | ICD-10-CM

## 2025-05-14 RX ORDER — LISINOPRIL 10 MG/1
10 TABLET ORAL DAILY
Qty: 30 TABLET | Refills: 1 | Status: SHIPPED | OUTPATIENT
Start: 2025-05-14

## 2025-05-15 NOTE — TELEPHONE ENCOUNTER
I Lm for Juliana making her aware Lisinopril was refilled by Guerline. She did add labs for her to complete prior to follow up on 7/1/25.

## 2025-05-31 DIAGNOSIS — R80.1 PERSISTENT PROTEINURIA: ICD-10-CM

## 2025-06-02 RX ORDER — LISINOPRIL 10 MG/1
10 TABLET ORAL DAILY
Qty: 30 TABLET | Refills: 0 | OUTPATIENT
Start: 2025-06-02

## 2025-06-13 ENCOUNTER — TELEPHONE (OUTPATIENT)
Age: 65
End: 2025-06-13

## 2025-06-13 NOTE — TELEPHONE ENCOUNTER
I called and left a voicemail for the patient stating to have her lab work done in July for the provider can still review them.   
Phone call from patient with lab orders. Please contact patient if you would like for her to have lab orders done now or wait until upcoming appointment in December. Patient is on wait list.   
(0) independent

## 2025-06-16 ENCOUNTER — APPOINTMENT (OUTPATIENT)
Dept: LAB | Facility: HOSPITAL | Age: 65
End: 2025-06-16
Attending: NURSE PRACTITIONER
Payer: COMMERCIAL

## 2025-06-16 ENCOUNTER — RESULTS FOLLOW-UP (OUTPATIENT)
Dept: FAMILY MEDICINE CLINIC | Facility: CLINIC | Age: 65
End: 2025-06-16

## 2025-06-16 DIAGNOSIS — N18.2 STAGE 2 CHRONIC KIDNEY DISEASE: ICD-10-CM

## 2025-06-16 DIAGNOSIS — R80.1 PERSISTENT PROTEINURIA: ICD-10-CM

## 2025-06-16 DIAGNOSIS — M79.674 GREAT TOE PAIN, RIGHT: ICD-10-CM

## 2025-06-16 DIAGNOSIS — E11.8 TYPE 2 DIABETES MELLITUS WITH COMPLICATION, WITHOUT LONG-TERM CURRENT USE OF INSULIN (HCC): ICD-10-CM

## 2025-06-16 LAB
ALBUMIN SERPL BCG-MCNC: 4.2 G/DL (ref 3.5–5)
ALP SERPL-CCNC: 73 U/L (ref 34–104)
ALT SERPL W P-5'-P-CCNC: 15 U/L (ref 7–52)
ANION GAP SERPL CALCULATED.3IONS-SCNC: 12 MMOL/L (ref 4–13)
AST SERPL W P-5'-P-CCNC: 16 U/L (ref 13–39)
BILIRUB SERPL-MCNC: 1.08 MG/DL (ref 0.2–1)
BUN SERPL-MCNC: 17 MG/DL (ref 5–25)
CALCIUM SERPL-MCNC: 9.5 MG/DL (ref 8.4–10.2)
CHLORIDE SERPL-SCNC: 98 MMOL/L (ref 96–108)
CO2 SERPL-SCNC: 23 MMOL/L (ref 21–32)
CREAT SERPL-MCNC: 0.79 MG/DL (ref 0.6–1.3)
CREAT UR-MCNC: 188.6 MG/DL
CREAT UR-MCNC: 199.5 MG/DL
EST. AVERAGE GLUCOSE BLD GHB EST-MCNC: 171 MG/DL
GFR SERPL CREATININE-BSD FRML MDRD: 78 ML/MIN/1.73SQ M
GLUCOSE P FAST SERPL-MCNC: 212 MG/DL (ref 65–99)
HBA1C MFR BLD: 7.6 %
MICROALBUMIN UR-MCNC: 1360.3 MG/L
MICROALBUMIN/CREAT 24H UR: 721 MG/G CREATININE (ref 0–30)
POTASSIUM SERPL-SCNC: 4 MMOL/L (ref 3.5–5.3)
PROT SERPL-MCNC: 7.2 G/DL (ref 6.4–8.4)
PROT UR-MCNC: 193 MG/DL
PROT/CREAT UR: 1 MG/G{CREAT}
SODIUM SERPL-SCNC: 133 MMOL/L (ref 135–147)
URATE SERPL-MCNC: 5.8 MG/DL (ref 2–7.5)

## 2025-06-16 PROCEDURE — 36415 COLL VENOUS BLD VENIPUNCTURE: CPT

## 2025-06-16 PROCEDURE — 82570 ASSAY OF URINE CREATININE: CPT

## 2025-06-16 PROCEDURE — 84156 ASSAY OF PROTEIN URINE: CPT

## 2025-06-16 PROCEDURE — 83036 HEMOGLOBIN GLYCOSYLATED A1C: CPT

## 2025-06-16 PROCEDURE — 84550 ASSAY OF BLOOD/URIC ACID: CPT

## 2025-06-16 PROCEDURE — 82043 UR ALBUMIN QUANTITATIVE: CPT

## 2025-06-16 PROCEDURE — 80053 COMPREHEN METABOLIC PANEL: CPT

## 2025-06-17 ENCOUNTER — RESULTS FOLLOW-UP (OUTPATIENT)
Dept: OTHER | Facility: HOSPITAL | Age: 65
End: 2025-06-17

## 2025-06-27 NOTE — PROGRESS NOTES
Name: Juliana Hernandez      : 1960      MRN: [id number]  Encounter Provider: Guerline Oquendo PA-C  Encounter Date: 2025   Encounter department: [facility] [place]  :  Assessment & Plan  Persistent proteinuria  Suspect in the setting of underlying DM, most recent A1C 7.6  Unable to tolerate Jardiance in the past d/t GI issues    Most recent albumin creatinine ratio 721, protein creatinine ratio 1.0  Per my discussion with Dr. Stephen, if worsening proteinuria despite maximum conservative therapy, then we can consider further serological workup and potential renal biopsy  Goal to uptitrate lisinopril and potentially add Aldactone. Patient ran out of lisinopril and has not been taking this. Will resume lisinopril.   Hypertensive kidney disease with stage 2 chronic kidney disease  BP today 136/72 before taking amlodipine  Current regimen: Amlodipine 5 mg daily  Was on lisinopril 10 mg daily but ran out  Plan:  Will d/c amlodipine and resume lisinopril  Patient will send BP logs x2 weeks in to the office. Goal to uptitrate lisinopril to 20 mg and further if BP tolerates given proteinuria  Can also consider aldactone in the future  Avoid hypotension   Stage 2 chronic kidney disease  Etiology: In the setting of proteinuria, suspected secondary to diabetic nephropathy and hypertensive nephrosclerosis  Follows with Dr. Stephen    Baseline creatinine 0.7-0.8  Most recent creatinine 0.79  Plan:  Avoid NSAIDs and caution with IV contrast  Continue to monitor blood pressure and glucose to prevent worsening of CKD  Follow up in 3-4 months with labs prior to visit   Hyponatremia  History of hyponatremia ranging from 131-134 at baseline however consistently hyperglycemic.  Occasionally 135-137  Most recent sodium 133, corrects to 135 when accounting for hyperglycemia  Monitor  Type 2 diabetes mellitus with complication, without long-term current use of insulin (HCC)    Lab Results   Component  "Value Date    HGBA1C 7.6 (H) 06/16/2025   Suspected source of proteinuria.  Continue to encourage glycemic control to prevent progression of CKD      History of Present Illness   HPI  Juliana Hernandez is a 64 y.o. female who presents for follow-up of proteinuria and hypertension.  Seen in consult by Dr. Stephen in March of last year for proteinuria.  Her dose of lisinopril was increased from 5 mg to 10 mg at her last visit. She also tried Jardiance but was unable to tolerate this. Unfortunately she ran out of her lisinopril and has not been taking this. /72 today without any antihypertensive. Will d/c amlodipine and resume lisinopril. Patient will check BP x 2 weeks and send BP logs into the office. Goal to uptitrate lisinopril given proteinuria. Patient does not currently check BP at home. Otherwise she has been doing well since her last visit. No recent illnesses or hospitalizations. She was started on Protonix recently, will check magnesium level. Patient in agreement with our plan for today. All questions and concerns addressed.     History obtained from: patient    Review of Systems   Constitutional:  Negative for appetite change, chills and fever.   Respiratory:  Negative for chest tightness and shortness of breath.    Cardiovascular:  Negative for chest pain.   Gastrointestinal:  Negative for abdominal pain, diarrhea and vomiting.   Genitourinary:  Negative for decreased urine volume, difficulty urinating, dysuria and hematuria.   Neurological:  Negative for dizziness and headaches.     Medications Ordered Prior to Encounter[1]   Social History[2]     Objective   /72 (BP Location: Left arm, Patient Position: Sitting, Cuff Size: Large)   Ht 5' 6\" (1.676 m)   Wt 77.6 kg (171 lb)   BMI 27.60 kg/m²      Physical Exam  Vitals reviewed.   Constitutional:       Appearance: Normal appearance.     Cardiovascular:      Rate and Rhythm: Normal rate and regular rhythm.   Pulmonary:      Effort: Pulmonary " effort is normal. No respiratory distress.      Breath sounds: Normal breath sounds.     Musculoskeletal:      Right lower leg: Edema present.      Left lower leg: Edema present.      Comments: Mild b/l ankle edema, suspects this is d/t heat     Skin:     General: Skin is warm and dry.     Neurological:      General: No focal deficit present.      Mental Status: She is alert.     Psychiatric:         Mood and Affect: Mood normal.         Behavior: Behavior normal.            [1]   Current Outpatient Medications on File Prior to Visit   Medication Sig Dispense Refill    Accu-Chek Softclix Lancets lancets Use 1 each 2 (two) times a day 200 each 3    Bempedoic Acid 180 MG TABS Take 180 mg by mouth every morning 30 tablet 5    Blood Glucose Monitoring Suppl (Accu-Chek Guide) w/Device KIT       clopidogrel (PLAVIX) 75 mg tablet Take 1 tablet by mouth once daily 90 tablet 0    glimepiride (AMARYL) 2 mg tablet Take 1 tablet by mouth twice daily 180 tablet 1    metFORMIN (GLUCOPHAGE) 1000 MG tablet TAKE 1 TABLET BY MOUTH TWICE DAILY WITH MEALS 180 tablet 1    Multiple Vitamin (MULTI-VITAMIN DAILY PO) Take 1 tablet by mouth in the morning.      pantoprazole (PROTONIX) 40 mg tablet Take 1 tablet (40 mg total) by mouth daily 30 tablet 3    triamcinolone (KENALOG) 0.1 % cream APPLY TOPICALLY TWO TIMES A DAY UNDER THE LEFT BREAST AS NEEDED FOR FLARES 30 g 2    [DISCONTINUED] amLODIPine (NORVASC) 5 mg tablet Take 1 tablet by mouth once daily 30 tablet 5    [DISCONTINUED] lisinopril (ZESTRIL) 10 mg tablet Take 1 tablet (10 mg total) by mouth daily (Patient not taking: Reported on 7/1/2025) 30 tablet 1     No current facility-administered medications on file prior to visit.   [2]   Social History  Tobacco Use    Smoking status: Never    Smokeless tobacco: Never    Tobacco comments:     No secondhand smoke exposure   Vaping Use    Vaping status: Never Used   Substance and Sexual Activity    Alcohol use: No    Drug use: Never     Sexual activity: Yes     Partners: Male     Birth control/protection: Post-menopausal

## 2025-06-27 NOTE — ASSESSMENT & PLAN NOTE
Etiology: In the setting of proteinuria, suspected secondary to diabetic nephropathy and hypertensive nephrosclerosis  Follows with Dr. Stephen    Baseline creatinine 0.7-0.8  Most recent creatinine 0.79  Plan:  Avoid NSAIDs and caution with IV contrast  Continue to monitor blood pressure and glucose to prevent worsening of CKD  Follow up in 3-4 months with labs prior to visit

## 2025-06-27 NOTE — ASSESSMENT & PLAN NOTE
BP today 136/72 before taking amlodipine  Current regimen: Amlodipine 5 mg daily  Was on lisinopril 10 mg daily but ran out  Plan:  Will d/c amlodipine and resume lisinopril  Patient will send BP logs x2 weeks in to the office. Goal to uptitrate lisinopril to 20 mg and further if BP tolerates given proteinuria  Can also consider aldactone in the future  Avoid hypotension

## 2025-06-27 NOTE — ASSESSMENT & PLAN NOTE
History of hyponatremia ranging from 131-134 at baseline however consistently hyperglycemic.  Occasionally 135-137  Most recent sodium 133, corrects to 135 when accounting for hyperglycemia  Monitor

## 2025-06-27 NOTE — ASSESSMENT & PLAN NOTE
Suspect in the setting of underlying DM, most recent A1C 7.6  Unable to tolerate Jardiance in the past d/t GI issues    Most recent albumin creatinine ratio 721, protein creatinine ratio 1.0  Per my discussion with Dr. Stephen, if worsening proteinuria despite maximum conservative therapy, then we can consider further serological workup and potential renal biopsy  Goal to uptitrate lisinopril and potentially add Aldactone. Patient ran out of lisinopril and has not been taking this. Will resume lisinopril.

## 2025-06-27 NOTE — ASSESSMENT & PLAN NOTE
Lab Results   Component Value Date    HGBA1C 7.6 (H) 06/16/2025   Suspected source of proteinuria.  Continue to encourage glycemic control to prevent progression of CKD

## 2025-07-01 ENCOUNTER — OFFICE VISIT (OUTPATIENT)
Dept: NEPHROLOGY | Facility: CLINIC | Age: 65
End: 2025-07-01
Payer: COMMERCIAL

## 2025-07-01 VITALS
DIASTOLIC BLOOD PRESSURE: 72 MMHG | HEIGHT: 66 IN | SYSTOLIC BLOOD PRESSURE: 136 MMHG | WEIGHT: 171 LBS | BODY MASS INDEX: 27.48 KG/M2

## 2025-07-01 DIAGNOSIS — I12.9 HYPERTENSIVE KIDNEY DISEASE WITH STAGE 2 CHRONIC KIDNEY DISEASE: ICD-10-CM

## 2025-07-01 DIAGNOSIS — R80.1 PERSISTENT PROTEINURIA: Primary | ICD-10-CM

## 2025-07-01 DIAGNOSIS — E11.8 TYPE 2 DIABETES MELLITUS WITH COMPLICATION, WITHOUT LONG-TERM CURRENT USE OF INSULIN (HCC): ICD-10-CM

## 2025-07-01 DIAGNOSIS — E87.1 HYPONATREMIA: ICD-10-CM

## 2025-07-01 DIAGNOSIS — N18.2 STAGE 2 CHRONIC KIDNEY DISEASE: ICD-10-CM

## 2025-07-01 DIAGNOSIS — N18.2 HYPERTENSIVE KIDNEY DISEASE WITH STAGE 2 CHRONIC KIDNEY DISEASE: ICD-10-CM

## 2025-07-01 PROCEDURE — 99214 OFFICE O/P EST MOD 30 MIN: CPT

## 2025-07-01 RX ORDER — LISINOPRIL 10 MG/1
10 TABLET ORAL DAILY
Qty: 30 TABLET | Refills: 1 | Status: SHIPPED | OUTPATIENT
Start: 2025-07-01 | End: 2025-07-02

## 2025-07-01 NOTE — PATIENT INSTRUCTIONS
Please check blood pressure 2x a day before medications and send BP logs into office  Please stop taking amlodipine  Resume lisinopril. We will try to increase the dose of this to help minimize protein in the urine

## 2025-07-02 DIAGNOSIS — R80.1 PERSISTENT PROTEINURIA: ICD-10-CM

## 2025-07-02 RX ORDER — LISINOPRIL 10 MG/1
10 TABLET ORAL DAILY
Qty: 30 TABLET | Refills: 5 | Status: SHIPPED | OUTPATIENT
Start: 2025-07-02 | End: 2025-07-11 | Stop reason: SDUPTHER

## 2025-07-02 NOTE — TELEPHONE ENCOUNTER
Patient calling stated refill for losartan did not reach the pharmacy . She called them this morning and they confirmed they did not received the script when it was sent yesterday.       Losartan     Send to [facility] pharmacy #[id number]

## 2025-07-03 ENCOUNTER — TELEPHONE (OUTPATIENT)
Dept: ENDOCRINOLOGY | Facility: CLINIC | Age: 65
End: 2025-07-03

## 2025-07-08 DIAGNOSIS — I63.531 CEREBROVASCULAR ACCIDENT (CVA) DUE TO STENOSIS OF RIGHT POSTERIOR CEREBRAL ARTERY (HCC): ICD-10-CM

## 2025-07-09 RX ORDER — CLOPIDOGREL BISULFATE 75 MG/1
75 TABLET ORAL DAILY
Qty: 90 TABLET | Refills: 1 | Status: SHIPPED | OUTPATIENT
Start: 2025-07-09

## 2025-07-11 ENCOUNTER — TELEPHONE (OUTPATIENT)
Dept: NEPHROLOGY | Facility: CLINIC | Age: 65
End: 2025-07-11

## 2025-07-11 DIAGNOSIS — N18.2 STAGE 2 CHRONIC KIDNEY DISEASE: Primary | ICD-10-CM

## 2025-07-11 DIAGNOSIS — R80.1 PERSISTENT PROTEINURIA: ICD-10-CM

## 2025-07-11 RX ORDER — LISINOPRIL 20 MG/1
20 TABLET ORAL DAILY
Qty: 30 TABLET | Refills: 5 | Status: SHIPPED | OUTPATIENT
Start: 2025-07-11

## 2025-07-11 NOTE — TELEPHONE ENCOUNTER
I would recommend to increase lisinopril from 10 mg to 20 mg daily.  I have sent updated prescription to the pharmacy.  She should have repeat BMP in 2 to 3 weeks after increasing lisinopril.    I would prefer lisinopril over amlodipine given ongoing proteinuria as lisinopril should help with proteinuria as well along with blood pressure.

## 2025-07-16 ENCOUNTER — APPOINTMENT (OUTPATIENT)
Dept: LAB | Facility: CLINIC | Age: 65
End: 2025-07-16
Payer: COMMERCIAL

## 2025-07-16 ENCOUNTER — OFFICE VISIT (OUTPATIENT)
Dept: FAMILY MEDICINE CLINIC | Facility: CLINIC | Age: 65
End: 2025-07-16
Payer: COMMERCIAL

## 2025-07-16 VITALS
BODY MASS INDEX: 27.83 KG/M2 | SYSTOLIC BLOOD PRESSURE: 126 MMHG | WEIGHT: 173.2 LBS | HEIGHT: 66 IN | HEART RATE: 95 BPM | TEMPERATURE: 97.8 F | OXYGEN SATURATION: 96 % | DIASTOLIC BLOOD PRESSURE: 76 MMHG

## 2025-07-16 DIAGNOSIS — R53.81 MALAISE: Primary | ICD-10-CM

## 2025-07-16 DIAGNOSIS — M25.50 ARTHRALGIA, UNSPECIFIED JOINT: ICD-10-CM

## 2025-07-16 DIAGNOSIS — R53.81 MALAISE: ICD-10-CM

## 2025-07-16 DIAGNOSIS — N18.2 HYPERTENSIVE KIDNEY DISEASE WITH STAGE 2 CHRONIC KIDNEY DISEASE: ICD-10-CM

## 2025-07-16 DIAGNOSIS — N18.2 STAGE 2 CHRONIC KIDNEY DISEASE: ICD-10-CM

## 2025-07-16 DIAGNOSIS — R80.1 PERSISTENT PROTEINURIA: ICD-10-CM

## 2025-07-16 DIAGNOSIS — I12.9 HYPERTENSIVE KIDNEY DISEASE WITH STAGE 2 CHRONIC KIDNEY DISEASE: ICD-10-CM

## 2025-07-16 PROBLEM — R42 DIZZINESS: Status: RESOLVED | Noted: 2020-10-13 | Resolved: 2025-07-16

## 2025-07-16 LAB
ALBUMIN SERPL BCG-MCNC: 4.5 G/DL (ref 3.5–5)
ALP SERPL-CCNC: 83 U/L (ref 34–104)
ALT SERPL W P-5'-P-CCNC: 23 U/L (ref 7–52)
ANION GAP SERPL CALCULATED.3IONS-SCNC: 9 MMOL/L (ref 4–13)
AST SERPL W P-5'-P-CCNC: 24 U/L (ref 13–39)
BASOPHILS # BLD AUTO: 0.04 THOUSANDS/ÂΜL (ref 0–0.1)
BASOPHILS NFR BLD AUTO: 1 % (ref 0–1)
BILIRUB SERPL-MCNC: 1.15 MG/DL (ref 0.2–1)
BUN SERPL-MCNC: 16 MG/DL (ref 5–25)
CALCIUM SERPL-MCNC: 9.9 MG/DL (ref 8.4–10.2)
CHLORIDE SERPL-SCNC: 96 MMOL/L (ref 96–108)
CO2 SERPL-SCNC: 26 MMOL/L (ref 21–32)
CREAT SERPL-MCNC: 0.77 MG/DL (ref 0.6–1.3)
EOSINOPHIL # BLD AUTO: 0.2 THOUSAND/ÂΜL (ref 0–0.61)
EOSINOPHIL NFR BLD AUTO: 4 % (ref 0–6)
ERYTHROCYTE [DISTWIDTH] IN BLOOD BY AUTOMATED COUNT: 12.3 % (ref 11.6–15.1)
GFR SERPL CREATININE-BSD FRML MDRD: 80 ML/MIN/1.73SQ M
GLUCOSE SERPL-MCNC: 143 MG/DL (ref 65–140)
HCT VFR BLD AUTO: 37.7 % (ref 34.8–46.1)
HGB BLD-MCNC: 13.1 G/DL (ref 11.5–15.4)
IMM GRANULOCYTES # BLD AUTO: 0.02 THOUSAND/UL (ref 0–0.2)
IMM GRANULOCYTES NFR BLD AUTO: 0 % (ref 0–2)
LYMPHOCYTES # BLD AUTO: 1.76 THOUSANDS/ÂΜL (ref 0.6–4.47)
LYMPHOCYTES NFR BLD AUTO: 31 % (ref 14–44)
MAGNESIUM SERPL-MCNC: 1.6 MG/DL (ref 1.9–2.7)
MCH RBC QN AUTO: 31.6 PG (ref 26.8–34.3)
MCHC RBC AUTO-ENTMCNC: 34.7 G/DL (ref 31.4–37.4)
MCV RBC AUTO: 91 FL (ref 82–98)
MONOCYTES # BLD AUTO: 0.86 THOUSAND/ÂΜL (ref 0.17–1.22)
MONOCYTES NFR BLD AUTO: 15 % (ref 4–12)
NEUTROPHILS # BLD AUTO: 2.75 THOUSANDS/ÂΜL (ref 1.85–7.62)
NEUTS SEG NFR BLD AUTO: 49 % (ref 43–75)
NRBC BLD AUTO-RTO: 0 /100 WBCS
PLATELET # BLD AUTO: 320 THOUSANDS/UL (ref 149–390)
PMV BLD AUTO: 9.1 FL (ref 8.9–12.7)
POTASSIUM SERPL-SCNC: 4.2 MMOL/L (ref 3.5–5.3)
PROT SERPL-MCNC: 8 G/DL (ref 6.4–8.4)
RBC # BLD AUTO: 4.14 MILLION/UL (ref 3.81–5.12)
SODIUM SERPL-SCNC: 131 MMOL/L (ref 135–147)
WBC # BLD AUTO: 5.63 THOUSAND/UL (ref 4.31–10.16)

## 2025-07-16 PROCEDURE — 80053 COMPREHEN METABOLIC PANEL: CPT | Performed by: FAMILY MEDICINE

## 2025-07-16 PROCEDURE — 36415 COLL VENOUS BLD VENIPUNCTURE: CPT

## 2025-07-16 PROCEDURE — 99213 OFFICE O/P EST LOW 20 MIN: CPT | Performed by: FAMILY MEDICINE

## 2025-07-16 PROCEDURE — 86618 LYME DISEASE ANTIBODY: CPT

## 2025-07-16 PROCEDURE — 85025 COMPLETE CBC W/AUTO DIFF WBC: CPT | Performed by: FAMILY MEDICINE

## 2025-07-16 PROCEDURE — G2211 COMPLEX E/M VISIT ADD ON: HCPCS | Performed by: FAMILY MEDICINE

## 2025-07-16 PROCEDURE — 83735 ASSAY OF MAGNESIUM: CPT

## 2025-07-16 NOTE — PROGRESS NOTES
"Name: Juliana Hernandez      : 1960      MRN: [id number]  Encounter Provider: Poornima Bonds MD  Encounter Date: 2025   Encounter department: [facility]  :  Assessment & Plan  Malaise    Orders:    Lyme Total AB W Reflex to IGM/IGG; Future    Comprehensive metabolic panel    CBC and differential    Arthralgia, unspecified joint    Orders:    Lyme Total AB W Reflex to IGM/IGG; Future    Comprehensive metabolic panel    CBC and differential           History of Present Illness   Patient presents with:  Fatigue  Nausea  Insect Bite: Onset 3 weeks , decreased appetite,  diarrhea, heartburn, nausea no vomiting, some  nasal congestion, sore throat, pressure  in ears,  no cough, no temp, no blood  in bms         Review of Systems   Constitutional:  Positive for appetite change, chills and fatigue. Negative for activity change, fever and unexpected weight change.   HENT:  Positive for congestion, ear pain and sore throat. Negative for rhinorrhea, sinus pressure and sinus pain.    Respiratory:  Negative for cough.    Cardiovascular:  Negative for chest pain.   Gastrointestinal:  Positive for diarrhea and nausea. Negative for vomiting.   Musculoskeletal:  Positive for arthralgias and myalgias.   Skin:  Negative for rash.   Neurological:  Negative for dizziness, light-headedness and headaches.   Hematological:  Negative for adenopathy.       Objective   /76 (BP Location: Left arm, Patient Position: Sitting, Cuff Size: Adult)   Pulse 95   Temp 97.8 °F (36.6 °C) (Tympanic)   Ht 5' 6\" (1.676 m)   Wt 78.6 kg (173 lb 3.2 oz)   SpO2 96%   BMI 27.96 kg/m²      Physical Exam  Vitals reviewed.   Constitutional:       General: She is not in acute distress.     Appearance: She is ill-appearing. She is not toxic-appearing.      Comments: Looks  tired  not  usual self   HENT:      Right Ear: Tympanic membrane normal.      Left Ear: Tympanic membrane normal.      Nose: Congestion present. No " rhinorrhea.      Mouth/Throat:      Pharynx: No oropharyngeal exudate or posterior oropharyngeal erythema.   Neck:      Comments: Shoddy  nodes  Cardiovascular:      Rate and Rhythm: Normal rate and regular rhythm.      Pulses: Normal pulses.      Heart sounds: Normal heart sounds.   Pulmonary:      Effort: Pulmonary effort is normal.      Breath sounds: Normal breath sounds.   Abdominal:      General: Abdomen is flat.      Palpations: Abdomen is soft.      Tenderness: There is no abdominal tenderness.     Musculoskeletal:      Right lower leg: No edema.      Left lower leg: No edema.   Lymphadenopathy:      Cervical: Cervical adenopathy present.     Neurological:      Mental Status: She is alert.

## 2025-07-17 ENCOUNTER — RESULTS FOLLOW-UP (OUTPATIENT)
Dept: NEPHROLOGY | Facility: CLINIC | Age: 65
End: 2025-07-17

## 2025-07-17 DIAGNOSIS — R80.9 MICROALBUMINURIA: ICD-10-CM

## 2025-07-17 DIAGNOSIS — E87.1 HYPONATREMIA: ICD-10-CM

## 2025-07-17 DIAGNOSIS — I10 ESSENTIAL HYPERTENSION: ICD-10-CM

## 2025-07-17 DIAGNOSIS — N18.2 STAGE 2 CHRONIC KIDNEY DISEASE: Primary | ICD-10-CM

## 2025-07-17 DIAGNOSIS — E83.42 HYPOMAGNESEMIA: Primary | ICD-10-CM

## 2025-07-17 LAB — B BURGDOR IGG+IGM SER QL IA: NEGATIVE

## 2025-07-17 RX ORDER — MAGNESIUM OXIDE 400 MG/1
400 TABLET ORAL DAILY
Qty: 30 TABLET | Refills: 2 | Status: SHIPPED | OUTPATIENT
Start: 2025-07-17

## 2025-07-17 NOTE — TELEPHONE ENCOUNTER
Please relay the message that Guerline has sent in [employer] today regarding the magnesium level and starting magnesium supplement.    Also I do not see the repeat BMP order in the system from telephone call note on 7/11/2025.  Please place BMP order for her to do in 2 weeks.    Also please let her know that sodium level remains lower.  She needs to follow strict fluid restriction less than 1.5 L/day.  She can have salt liberal diet for time being.  Avoid any NSAIDs.  Would like to do initial evaluation for hyponatremia.  Please have her do urine sodium, urine osmolality, serum osmolality, serum uric acid along with BMP in 10 to 14 days.

## 2025-07-17 NOTE — TELEPHONE ENCOUNTER
Patient returning call and made aware:      Guerline Oquendo PA-C to Juliana Hernandez      7/17/25  7:47 AM  Hi Juliana,  I reviewed the blood work you had done yesterday. Your magnesium is slightly low. I have prescribed a magnesium supplement, I sent this to your pharmacy. Please let us know if you experience any diarrhea while taking this.     Your sodium is also slightly lower. Please go for repeat blood work in 2-3 weeks as recommended by Dr. Stephen due to increasing dose of lisinopril. The blood work will check your kidney function, your sodium, and your magnesium. Let me know if you have any questions.      Thank you and have a great day!  Guerline Stephen MD      7/17/25  7:53 AM  Note     Please relay the message that Guerline has sent in [employer] today regarding the magnesium level and starting magnesium supplement.     Also I do not see the repeat BMP order in the system from telephone call note on 7/11/2025.  Please place BMP order for her to do in 2 weeks.     Also please let her know that sodium level remains lower.  She needs to follow strict fluid restriction less than 1.5 L/day.  She can have salt liberal diet for time being.  Avoid any NSAIDs.  Would like to do initial evaluation for hyponatremia.  Please have her do urine sodium, urine osmolality, serum osmolality, serum uric acid along with BMP in 10 to 14 days.        Patient verbalized understanding  
No

## 2025-07-17 NOTE — TELEPHONE ENCOUNTER
LVM for pt regarding the following message     Please relay the message that Guerline has sent in [employer] today regarding the magnesium level and starting magnesium supplement.    Also I do not see the repeat BMP order in the system from telephone call note on 7/11/2025.  Please place BMP order for her to do in 2 weeks.    Also please let her know that sodium level remains lower.  She needs to follow strict fluid restriction less than 1.5 L/day.  She can have salt liberal diet for time being.  Avoid any NSAIDs.  Would like to do initial evaluation for hyponatremia.  Please have her do urine sodium, urine osmolality, serum osmolality, serum uric acid along with BMP in 10 to 14 days.     Asked pt to please give us a call back with any questions or concerns.

## 2025-07-17 NOTE — TELEPHONE ENCOUNTER
----- Message from Marquise Stephen MD sent at 7/17/2025  7:54 AM EDT -----    Please ensure all the orders are placed as in the note.  ----- Message -----  From: Lab, Background User  Sent: 7/16/2025   9:33 PM EDT  To: Marquise Stephen MD

## 2025-07-21 DIAGNOSIS — E11.8 TYPE 2 DIABETES MELLITUS WITH COMPLICATION, WITHOUT LONG-TERM CURRENT USE OF INSULIN (HCC): ICD-10-CM

## 2025-07-22 DIAGNOSIS — E11.29 TYPE 2 DIABETES MELLITUS WITH MICROALBUMINURIA, WITHOUT LONG-TERM CURRENT USE OF INSULIN (HCC): ICD-10-CM

## 2025-07-22 DIAGNOSIS — R80.9 TYPE 2 DIABETES MELLITUS WITH MICROALBUMINURIA, WITHOUT LONG-TERM CURRENT USE OF INSULIN (HCC): ICD-10-CM

## 2025-07-22 RX ORDER — GLIMEPIRIDE 2 MG/1
2 TABLET ORAL 2 TIMES DAILY
Qty: 180 TABLET | Refills: 1 | Status: SHIPPED | OUTPATIENT
Start: 2025-07-22

## 2025-07-29 ENCOUNTER — APPOINTMENT (OUTPATIENT)
Dept: LAB | Facility: HOSPITAL | Age: 65
End: 2025-07-29
Payer: COMMERCIAL

## 2025-07-29 DIAGNOSIS — R80.9 MICROALBUMINURIA: ICD-10-CM

## 2025-07-29 DIAGNOSIS — I10 ESSENTIAL HYPERTENSION: ICD-10-CM

## 2025-07-29 DIAGNOSIS — E87.1 HYPONATREMIA: ICD-10-CM

## 2025-07-29 DIAGNOSIS — N18.2 STAGE 2 CHRONIC KIDNEY DISEASE: ICD-10-CM

## 2025-07-29 DIAGNOSIS — E83.42 HYPOMAGNESEMIA: ICD-10-CM

## 2025-07-29 LAB
ANION GAP SERPL CALCULATED.3IONS-SCNC: 8 MMOL/L (ref 4–13)
BUN SERPL-MCNC: 17 MG/DL (ref 5–25)
CALCIUM SERPL-MCNC: 9.9 MG/DL (ref 8.4–10.2)
CHLORIDE SERPL-SCNC: 95 MMOL/L (ref 96–108)
CO2 SERPL-SCNC: 27 MMOL/L (ref 21–32)
CREAT SERPL-MCNC: 0.79 MG/DL (ref 0.6–1.3)
GFR SERPL CREATININE-BSD FRML MDRD: 78 ML/MIN/1.73SQ M
GLUCOSE P FAST SERPL-MCNC: 177 MG/DL (ref 65–99)
MAGNESIUM SERPL-MCNC: 1.7 MG/DL (ref 1.9–2.7)
OSMOLALITY UR/SERPL-RTO: 292 MMOL/KG (ref 282–298)
OSMOLALITY UR: 618 MMOL/KG (ref 250–900)
POTASSIUM SERPL-SCNC: 4 MMOL/L (ref 3.5–5.3)
SODIUM SERPL-SCNC: 130 MMOL/L (ref 135–147)
SODIUM UR-SCNC: 61 MMOL/L
URATE SERPL-MCNC: 6.1 MG/DL (ref 2–7.5)

## 2025-07-29 PROCEDURE — 80048 BASIC METABOLIC PNL TOTAL CA: CPT

## 2025-07-29 PROCEDURE — 36415 COLL VENOUS BLD VENIPUNCTURE: CPT

## 2025-07-29 PROCEDURE — 83735 ASSAY OF MAGNESIUM: CPT

## 2025-07-29 PROCEDURE — 83935 ASSAY OF URINE OSMOLALITY: CPT

## 2025-07-29 PROCEDURE — 84550 ASSAY OF BLOOD/URIC ACID: CPT

## 2025-07-29 PROCEDURE — 83930 ASSAY OF BLOOD OSMOLALITY: CPT

## 2025-07-29 PROCEDURE — 84300 ASSAY OF URINE SODIUM: CPT

## 2025-07-30 DIAGNOSIS — E87.1 HYPONATREMIA: ICD-10-CM

## 2025-07-30 DIAGNOSIS — E83.42 HYPOMAGNESEMIA: Primary | ICD-10-CM

## 2025-07-31 ENCOUNTER — HOSPITAL ENCOUNTER (EMERGENCY)
Facility: HOSPITAL | Age: 65
Discharge: HOME | End: 2025-07-31
Attending: EMERGENCY MEDICINE | Admitting: EMERGENCY MEDICINE
Payer: COMMERCIAL

## 2025-07-31 ENCOUNTER — APPOINTMENT (EMERGENCY)
Dept: CT IMAGING | Facility: HOSPITAL | Age: 65
End: 2025-07-31
Payer: COMMERCIAL

## 2025-07-31 VITALS
TEMPERATURE: 98 F | SYSTOLIC BLOOD PRESSURE: 179 MMHG | BODY MASS INDEX: 27.72 KG/M2 | WEIGHT: 171.74 LBS | HEART RATE: 90 BPM | OXYGEN SATURATION: 97 % | RESPIRATION RATE: 16 BRPM | DIASTOLIC BLOOD PRESSURE: 93 MMHG

## 2025-07-31 DIAGNOSIS — K52.9 COLITIS: ICD-10-CM

## 2025-07-31 DIAGNOSIS — E87.1 HYPONATREMIA: ICD-10-CM

## 2025-07-31 DIAGNOSIS — R68.83 CHILLS: ICD-10-CM

## 2025-07-31 DIAGNOSIS — R10.9 ABDOMINAL PAIN: Primary | ICD-10-CM

## 2025-07-31 LAB
ALBUMIN SERPL BCG-MCNC: 4.6 G/DL (ref 3.5–5)
ALP SERPL-CCNC: 75 U/L (ref 34–104)
ALT SERPL W P-5'-P-CCNC: 22 U/L (ref 7–52)
ANION GAP SERPL CALCULATED.3IONS-SCNC: 9 MMOL/L (ref 4–13)
AST SERPL W P-5'-P-CCNC: 22 U/L (ref 13–39)
BACTERIA UR QL AUTO: NORMAL /HPF
BASOPHILS # BLD AUTO: 0.03 THOUSANDS/ÂΜL (ref 0–0.1)
BASOPHILS NFR BLD AUTO: 1 % (ref 0–1)
BILIRUB SERPL-MCNC: 1.01 MG/DL (ref 0.2–1)
BILIRUB UR QL STRIP: NEGATIVE
BUN SERPL-MCNC: 20 MG/DL (ref 5–25)
CALCIUM SERPL-MCNC: 10.2 MG/DL (ref 8.4–10.2)
CHLORIDE SERPL-SCNC: 98 MMOL/L (ref 96–108)
CLARITY UR: CLEAR
CO2 SERPL-SCNC: 24 MMOL/L (ref 21–32)
COLOR UR: ABNORMAL
CREAT SERPL-MCNC: 0.78 MG/DL (ref 0.6–1.3)
EOSINOPHIL # BLD AUTO: 0.14 THOUSAND/ÂΜL (ref 0–0.61)
EOSINOPHIL NFR BLD AUTO: 3 % (ref 0–6)
ERYTHROCYTE [DISTWIDTH] IN BLOOD BY AUTOMATED COUNT: 12.3 % (ref 11.6–15.1)
FLUAV AG UPPER RESP QL IA.RAPID: NEGATIVE
FLUBV AG UPPER RESP QL IA.RAPID: NEGATIVE
GFR SERPL CREATININE-BSD FRML MDRD: 79 ML/MIN/1.73SQ M
GLUCOSE SERPL-MCNC: 114 MG/DL (ref 65–140)
GLUCOSE UR STRIP-MCNC: NEGATIVE MG/DL
HCT VFR BLD AUTO: 39.2 % (ref 34.8–46.1)
HGB BLD-MCNC: 13.5 G/DL (ref 11.5–15.4)
HGB UR QL STRIP.AUTO: NEGATIVE
IMM GRANULOCYTES # BLD AUTO: 0.03 THOUSAND/UL (ref 0–0.2)
IMM GRANULOCYTES NFR BLD AUTO: 1 % (ref 0–2)
KETONES UR STRIP-MCNC: NEGATIVE MG/DL
LEUKOCYTE ESTERASE UR QL STRIP: ABNORMAL
LIPASE SERPL-CCNC: 58 U/L (ref 11–82)
LYMPHOCYTES # BLD AUTO: 1.81 THOUSANDS/ÂΜL (ref 0.6–4.47)
LYMPHOCYTES NFR BLD AUTO: 32 % (ref 14–44)
MCH RBC QN AUTO: 31.8 PG (ref 26.8–34.3)
MCHC RBC AUTO-ENTMCNC: 34.4 G/DL (ref 31.4–37.4)
MCV RBC AUTO: 92 FL (ref 82–98)
MONOCYTES # BLD AUTO: 0.8 THOUSAND/ÂΜL (ref 0.17–1.22)
MONOCYTES NFR BLD AUTO: 14 % (ref 4–12)
NEUTROPHILS # BLD AUTO: 2.81 THOUSANDS/ÂΜL (ref 1.85–7.62)
NEUTS SEG NFR BLD AUTO: 49 % (ref 43–75)
NITRITE UR QL STRIP: NEGATIVE
NON-SQ EPI CELLS URNS QL MICRO: NORMAL /HPF
NRBC BLD AUTO-RTO: 0 /100 WBCS
PH UR STRIP.AUTO: 5.5 [PH]
PLATELET # BLD AUTO: 283 THOUSANDS/UL (ref 149–390)
PMV BLD AUTO: 8.8 FL (ref 8.9–12.7)
POTASSIUM SERPL-SCNC: 4.2 MMOL/L (ref 3.5–5.3)
PROT SERPL-MCNC: 8.2 G/DL (ref 6.4–8.4)
PROT UR STRIP-MCNC: ABNORMAL MG/DL
RBC # BLD AUTO: 4.25 MILLION/UL (ref 3.81–5.12)
RBC #/AREA URNS AUTO: NORMAL /HPF
SARS-COV+SARS-COV-2 AG RESP QL IA.RAPID: NEGATIVE
SODIUM SERPL-SCNC: 131 MMOL/L (ref 135–147)
SP GR UR STRIP.AUTO: 1.01 (ref 1–1.03)
UROBILINOGEN UR STRIP-ACNC: <2 MG/DL
WBC # BLD AUTO: 5.62 THOUSAND/UL (ref 4.31–10.16)
WBC #/AREA URNS AUTO: NORMAL /HPF

## 2025-07-31 PROCEDURE — 96361 HYDRATE IV INFUSION ADD-ON: CPT

## 2025-07-31 PROCEDURE — 87811 SARS-COV-2 COVID19 W/OPTIC: CPT

## 2025-07-31 PROCEDURE — 96360 HYDRATION IV INFUSION INIT: CPT

## 2025-07-31 PROCEDURE — 80053 COMPREHEN METABOLIC PANEL: CPT

## 2025-07-31 PROCEDURE — 83690 ASSAY OF LIPASE: CPT

## 2025-07-31 PROCEDURE — 81001 URINALYSIS AUTO W/SCOPE: CPT

## 2025-07-31 PROCEDURE — 99284 EMERGENCY DEPT VISIT MOD MDM: CPT

## 2025-07-31 PROCEDURE — 99285 EMERGENCY DEPT VISIT HI MDM: CPT

## 2025-07-31 PROCEDURE — 85025 COMPLETE CBC W/AUTO DIFF WBC: CPT

## 2025-07-31 PROCEDURE — 74177 CT ABD & PELVIS W/CONTRAST: CPT

## 2025-07-31 PROCEDURE — 36415 COLL VENOUS BLD VENIPUNCTURE: CPT

## 2025-07-31 PROCEDURE — 87804 INFLUENZA ASSAY W/OPTIC: CPT

## 2025-07-31 RX ORDER — ONDANSETRON 4 MG/1
4 TABLET, FILM COATED ORAL EVERY 6 HOURS
Qty: 12 TABLET | Refills: 0 | Status: SHIPPED | OUTPATIENT
Start: 2025-07-31 | End: 2025-08-15 | Stop reason: ALTCHOICE

## 2025-07-31 RX ORDER — LOPERAMIDE HYDROCHLORIDE 2 MG/1
2 CAPSULE ORAL 4 TIMES DAILY PRN
Qty: 12 CAPSULE | Refills: 0 | Status: SHIPPED | OUTPATIENT
Start: 2025-07-31 | End: 2025-08-15 | Stop reason: ALTCHOICE

## 2025-07-31 RX ORDER — ACETAMINOPHEN 325 MG/1
650 TABLET ORAL ONCE
Status: COMPLETED | OUTPATIENT
Start: 2025-07-31 | End: 2025-07-31

## 2025-07-31 RX ADMIN — ACETAMINOPHEN 650 MG: 325 TABLET ORAL at 12:21

## 2025-07-31 RX ADMIN — IOHEXOL 85 ML: 350 INJECTION, SOLUTION INTRAVENOUS at 13:02

## 2025-07-31 RX ADMIN — SODIUM CHLORIDE 1000 ML: 0.9 INJECTION, SOLUTION INTRAVENOUS at 12:23

## 2025-08-04 ENCOUNTER — TELEPHONE (OUTPATIENT)
Age: 65
End: 2025-08-04

## 2025-08-08 ENCOUNTER — TELEPHONE (OUTPATIENT)
Dept: ENDOCRINOLOGY | Facility: CLINIC | Age: 65
End: 2025-08-08

## 2025-08-15 ENCOUNTER — OFFICE VISIT (OUTPATIENT)
Dept: FAMILY MEDICINE CLINIC | Facility: CLINIC | Age: 65
End: 2025-08-15
Payer: COMMERCIAL

## 2025-08-15 PROBLEM — M48.00 SPINAL STENOSIS: Status: ACTIVE | Noted: 2025-08-15

## 2025-08-15 PROBLEM — E83.42 HYPOMAGNESEMIA: Status: ACTIVE | Noted: 2025-08-15

## 2025-08-22 ENCOUNTER — HOSPITAL ENCOUNTER (OUTPATIENT)
Dept: RADIOLOGY | Facility: HOSPITAL | Age: 65
Discharge: HOME | End: 2025-08-22
Attending: PHYSICIAN ASSISTANT
Payer: COMMERCIAL

## 2025-08-22 DIAGNOSIS — R93.89 ABNORMAL CT OF THE CHEST: ICD-10-CM

## 2025-08-22 PROCEDURE — 71250 CT THORAX DX C-: CPT
